# Patient Record
Sex: MALE | Race: BLACK OR AFRICAN AMERICAN | NOT HISPANIC OR LATINO | Employment: OTHER | URBAN - METROPOLITAN AREA
[De-identification: names, ages, dates, MRNs, and addresses within clinical notes are randomized per-mention and may not be internally consistent; named-entity substitution may affect disease eponyms.]

---

## 2018-01-04 PROBLEM — C61 PROSTATE CANCER: Status: ACTIVE | Noted: 2018-01-04

## 2018-01-04 PROBLEM — E78.5 HYPERLIPIDEMIA LDL GOAL <70: Status: ACTIVE | Noted: 2018-01-04

## 2018-01-04 PROBLEM — E88.810 METABOLIC SYNDROME: Status: ACTIVE | Noted: 2018-01-04

## 2018-01-04 PROBLEM — K06.2 DENTURE IRRITATION: Status: ACTIVE | Noted: 2018-01-04

## 2018-07-05 PROBLEM — F33.41 RECURRENT MAJOR DEPRESSIVE DISORDER, IN PARTIAL REMISSION: Status: ACTIVE | Noted: 2018-07-05

## 2018-07-05 PROBLEM — E11.610 TYPE 2 DIABETES MELLITUS WITH DIABETIC NEUROPATHIC ARTHROPATHY, WITHOUT LONG-TERM CURRENT USE OF INSULIN: Status: ACTIVE | Noted: 2018-07-05

## 2020-03-29 ENCOUNTER — HOSPITAL ENCOUNTER (INPATIENT)
Facility: OTHER | Age: 65
LOS: 19 days | Discharge: HOME HEALTH | DRG: 207 | End: 2020-04-17
Attending: EMERGENCY MEDICINE | Admitting: INTERNAL MEDICINE
Payer: MEDICARE

## 2020-03-29 DIAGNOSIS — N17.9 AKI (ACUTE KIDNEY INJURY): ICD-10-CM

## 2020-03-29 DIAGNOSIS — E87.0 HYPERNATREMIA: ICD-10-CM

## 2020-03-29 DIAGNOSIS — Z20.822 SUSPECTED COVID-19 VIRUS INFECTION: Primary | ICD-10-CM

## 2020-03-29 DIAGNOSIS — R94.31 QT PROLONGATION: ICD-10-CM

## 2020-03-29 DIAGNOSIS — J18.9 PNEUMONIA OF RIGHT LOWER LOBE DUE TO INFECTIOUS ORGANISM: ICD-10-CM

## 2020-03-29 DIAGNOSIS — U07.1 ACUTE RESPIRATORY DISEASE DUE TO COVID-19 VIRUS: ICD-10-CM

## 2020-03-29 DIAGNOSIS — J06.9 ACUTE RESPIRATORY DISEASE DUE TO COVID-19 VIRUS: ICD-10-CM

## 2020-03-29 DIAGNOSIS — U07.1 ACUTE RESPIRATORY DISTRESS SYNDROME (ARDS) DUE TO COVID-19 VIRUS: ICD-10-CM

## 2020-03-29 DIAGNOSIS — J96.01 ACUTE HYPOXEMIC RESPIRATORY FAILURE: ICD-10-CM

## 2020-03-29 DIAGNOSIS — R06.00 DYSPNEA: ICD-10-CM

## 2020-03-29 DIAGNOSIS — U07.1 COVID-19 VIRUS DETECTED: ICD-10-CM

## 2020-03-29 DIAGNOSIS — R09.02 HYPOXIA: ICD-10-CM

## 2020-03-29 DIAGNOSIS — J80 ACUTE RESPIRATORY DISTRESS SYNDROME (ARDS) DUE TO COVID-19 VIRUS: ICD-10-CM

## 2020-03-29 LAB
ALBUMIN SERPL BCP-MCNC: 3 G/DL (ref 3.5–5.2)
ALP SERPL-CCNC: 78 U/L (ref 55–135)
ALT SERPL W/O P-5'-P-CCNC: 187 U/L (ref 10–44)
ANION GAP SERPL CALC-SCNC: 17 MMOL/L (ref 8–16)
AST SERPL-CCNC: 206 U/L (ref 10–40)
BACTERIA #/AREA URNS HPF: ABNORMAL /HPF
BASOPHILS # BLD AUTO: ABNORMAL K/UL (ref 0–0.2)
BASOPHILS NFR BLD: 0 % (ref 0–1.9)
BILIRUB SERPL-MCNC: 0.3 MG/DL (ref 0.1–1)
BILIRUB UR QL STRIP: NEGATIVE
BUN SERPL-MCNC: 25 MG/DL (ref 8–23)
CALCIUM SERPL-MCNC: 8.8 MG/DL (ref 8.7–10.5)
CHLORIDE SERPL-SCNC: 98 MMOL/L (ref 95–110)
CLARITY UR: CLEAR
CO2 SERPL-SCNC: 17 MMOL/L (ref 23–29)
COLOR UR: YELLOW
CREAT SERPL-MCNC: 1.4 MG/DL (ref 0.5–1.4)
CRP SERPL-MCNC: 122.7 MG/L (ref 0–8.2)
D DIMER PPP IA.FEU-MCNC: 0.53 MG/L FEU
DIFFERENTIAL METHOD: ABNORMAL
EOSINOPHIL # BLD AUTO: ABNORMAL K/UL (ref 0–0.5)
EOSINOPHIL NFR BLD: 0 % (ref 0–8)
ERYTHROCYTE [DISTWIDTH] IN BLOOD BY AUTOMATED COUNT: 18.1 % (ref 11.5–14.5)
EST. GFR  (AFRICAN AMERICAN): >60 ML/MIN/1.73 M^2
EST. GFR  (NON AFRICAN AMERICAN): 52 ML/MIN/1.73 M^2
GLUCOSE SERPL-MCNC: 203 MG/DL (ref 70–110)
GLUCOSE UR QL STRIP: ABNORMAL
HCT VFR BLD AUTO: 36.5 % (ref 40–54)
HGB BLD-MCNC: 11.2 G/DL (ref 14–18)
HGB UR QL STRIP: ABNORMAL
HYALINE CASTS #/AREA URNS LPF: 0 /LPF
IMM GRANULOCYTES # BLD AUTO: ABNORMAL K/UL (ref 0–0.04)
IMM GRANULOCYTES NFR BLD AUTO: ABNORMAL % (ref 0–0.5)
KETONES UR QL STRIP: NEGATIVE
LACTATE SERPL-SCNC: 3.7 MMOL/L (ref 0.5–2.2)
LDH SERPL L TO P-CCNC: 789 U/L (ref 110–260)
LEUKOCYTE ESTERASE UR QL STRIP: NEGATIVE
LYMPHOCYTES # BLD AUTO: ABNORMAL K/UL (ref 1–4.8)
LYMPHOCYTES NFR BLD: 8 % (ref 18–48)
MCH RBC QN AUTO: 22.5 PG (ref 27–31)
MCHC RBC AUTO-ENTMCNC: 30.7 G/DL (ref 32–36)
MCV RBC AUTO: 73 FL (ref 82–98)
MICROSCOPIC COMMENT: ABNORMAL
MONOCYTES # BLD AUTO: ABNORMAL K/UL (ref 0.3–1)
MONOCYTES NFR BLD: 6 % (ref 4–15)
NEUTROPHILS NFR BLD: 86 % (ref 38–73)
NITRITE UR QL STRIP: NEGATIVE
NRBC BLD-RTO: 0 /100 WBC
PH UR STRIP: 6 [PH] (ref 5–8)
PLATELET # BLD AUTO: 268 K/UL (ref 150–350)
PLATELET BLD QL SMEAR: ABNORMAL
PMV BLD AUTO: 9.3 FL (ref 9.2–12.9)
POTASSIUM SERPL-SCNC: 5.2 MMOL/L (ref 3.5–5.1)
PROCALCITONIN SERPL IA-MCNC: 0.13 NG/ML
PROT SERPL-MCNC: 7.8 G/DL (ref 6–8.4)
PROT UR QL STRIP: ABNORMAL
RBC # BLD AUTO: 4.98 M/UL (ref 4.6–6.2)
RBC #/AREA URNS HPF: 10 /HPF (ref 0–4)
SODIUM SERPL-SCNC: 132 MMOL/L (ref 136–145)
SP GR UR STRIP: 1.01 (ref 1–1.03)
URN SPEC COLLECT METH UR: ABNORMAL
UROBILINOGEN UR STRIP-ACNC: NEGATIVE EU/DL
WBC # BLD AUTO: 7.92 K/UL (ref 3.9–12.7)
WBC #/AREA URNS HPF: 3 /HPF (ref 0–5)
YEAST URNS QL MICRO: ABNORMAL

## 2020-03-29 PROCEDURE — 63600175 PHARM REV CODE 636 W HCPCS: Performed by: EMERGENCY MEDICINE

## 2020-03-29 PROCEDURE — 25000003 PHARM REV CODE 250: Performed by: EMERGENCY MEDICINE

## 2020-03-29 PROCEDURE — 86140 C-REACTIVE PROTEIN: CPT

## 2020-03-29 PROCEDURE — 12000002 HC ACUTE/MED SURGE SEMI-PRIVATE ROOM

## 2020-03-29 PROCEDURE — 84145 PROCALCITONIN (PCT): CPT

## 2020-03-29 PROCEDURE — 85027 COMPLETE CBC AUTOMATED: CPT

## 2020-03-29 PROCEDURE — 85007 BL SMEAR W/DIFF WBC COUNT: CPT

## 2020-03-29 PROCEDURE — 87040 BLOOD CULTURE FOR BACTERIA: CPT | Mod: 59

## 2020-03-29 PROCEDURE — 81000 URINALYSIS NONAUTO W/SCOPE: CPT

## 2020-03-29 PROCEDURE — 96374 THER/PROPH/DIAG INJ IV PUSH: CPT

## 2020-03-29 PROCEDURE — 83605 ASSAY OF LACTIC ACID: CPT

## 2020-03-29 PROCEDURE — 93005 ELECTROCARDIOGRAM TRACING: CPT

## 2020-03-29 PROCEDURE — 93010 EKG 12-LEAD: ICD-10-PCS | Mod: ,,, | Performed by: INTERNAL MEDICINE

## 2020-03-29 PROCEDURE — 83615 LACTATE (LD) (LDH) ENZYME: CPT

## 2020-03-29 PROCEDURE — 80053 COMPREHEN METABOLIC PANEL: CPT

## 2020-03-29 PROCEDURE — 99291 CRITICAL CARE FIRST HOUR: CPT | Mod: 25

## 2020-03-29 PROCEDURE — 85379 FIBRIN DEGRADATION QUANT: CPT

## 2020-03-29 PROCEDURE — U0002 COVID-19 LAB TEST NON-CDC: HCPCS

## 2020-03-29 PROCEDURE — 93010 ELECTROCARDIOGRAM REPORT: CPT | Mod: ,,, | Performed by: INTERNAL MEDICINE

## 2020-03-29 RX ORDER — SIMETHICONE 80 MG
80 TABLET,CHEWABLE ORAL EVERY 6 HOURS PRN
COMMUNITY

## 2020-03-29 RX ORDER — SODIUM CHLORIDE 9 MG/ML
1000 INJECTION, SOLUTION INTRAVENOUS
Status: COMPLETED | OUTPATIENT
Start: 2020-03-29 | End: 2020-03-29

## 2020-03-29 RX ORDER — ONDANSETRON 2 MG/ML
4 INJECTION INTRAMUSCULAR; INTRAVENOUS
Status: COMPLETED | OUTPATIENT
Start: 2020-03-29 | End: 2020-03-29

## 2020-03-29 RX ORDER — PANTOPRAZOLE SODIUM 40 MG/1
40 TABLET, DELAYED RELEASE ORAL DAILY
COMMUNITY

## 2020-03-29 RX ORDER — ACETAMINOPHEN 500 MG
1000 TABLET ORAL
Status: COMPLETED | OUTPATIENT
Start: 2020-03-29 | End: 2020-03-29

## 2020-03-29 RX ADMIN — ONDANSETRON 4 MG: 2 INJECTION INTRAMUSCULAR; INTRAVENOUS at 10:03

## 2020-03-29 RX ADMIN — SODIUM CHLORIDE 1000 ML: 0.9 INJECTION, SOLUTION INTRAVENOUS at 11:03

## 2020-03-29 RX ADMIN — ACETAMINOPHEN 1000 MG: 500 TABLET ORAL at 10:03

## 2020-03-29 RX ADMIN — CEFTRIAXONE SODIUM 2 G: 2 INJECTION, SOLUTION INTRAVENOUS at 10:03

## 2020-03-29 RX ADMIN — AZITHROMYCIN MONOHYDRATE 250 MG: 500 INJECTION, POWDER, LYOPHILIZED, FOR SOLUTION INTRAVENOUS at 11:03

## 2020-03-30 ENCOUNTER — ANESTHESIA EVENT (OUTPATIENT)
Dept: INTENSIVE CARE | Facility: OTHER | Age: 65
DRG: 207 | End: 2020-03-30
Payer: MEDICARE

## 2020-03-30 ENCOUNTER — ANESTHESIA (OUTPATIENT)
Dept: INTENSIVE CARE | Facility: OTHER | Age: 65
DRG: 207 | End: 2020-03-30
Payer: MEDICARE

## 2020-03-30 PROBLEM — U07.1 COVID-19 VIRUS INFECTION: Status: ACTIVE | Noted: 2020-03-29

## 2020-03-30 PROBLEM — R79.89 ELEVATED LFTS: Status: ACTIVE | Noted: 2020-03-30

## 2020-03-30 PROBLEM — J06.9 ACUTE RESPIRATORY DISEASE DUE TO COVID-19 VIRUS: Status: ACTIVE | Noted: 2020-03-29

## 2020-03-30 LAB
ALBUMIN SERPL BCP-MCNC: 2.6 G/DL (ref 3.5–5.2)
ALP SERPL-CCNC: 66 U/L (ref 55–135)
ALT SERPL W/O P-5'-P-CCNC: 160 U/L (ref 10–44)
ANION GAP SERPL CALC-SCNC: 15 MMOL/L (ref 8–16)
AST SERPL-CCNC: 174 U/L (ref 10–40)
BASOPHILS # BLD AUTO: 0.01 K/UL (ref 0–0.2)
BASOPHILS NFR BLD: 0.2 % (ref 0–1.9)
BILIRUB SERPL-MCNC: 0.2 MG/DL (ref 0.1–1)
BUN SERPL-MCNC: 24 MG/DL (ref 8–23)
CALCIUM SERPL-MCNC: 8 MG/DL (ref 8.7–10.5)
CHLORIDE SERPL-SCNC: 102 MMOL/L (ref 95–110)
CO2 SERPL-SCNC: 14 MMOL/L (ref 23–29)
CREAT SERPL-MCNC: 1.2 MG/DL (ref 0.5–1.4)
DIFFERENTIAL METHOD: ABNORMAL
EOSINOPHIL # BLD AUTO: 0 K/UL (ref 0–0.5)
EOSINOPHIL NFR BLD: 0 % (ref 0–8)
ERYTHROCYTE [DISTWIDTH] IN BLOOD BY AUTOMATED COUNT: 18.3 % (ref 11.5–14.5)
EST. GFR  (AFRICAN AMERICAN): >60 ML/MIN/1.73 M^2
EST. GFR  (NON AFRICAN AMERICAN): >60 ML/MIN/1.73 M^2
ESTIMATED AVG GLUCOSE: 154 MG/DL (ref 68–131)
GLUCOSE SERPL-MCNC: 146 MG/DL (ref 70–110)
HBA1C MFR BLD HPLC: 7 % (ref 4–5.6)
HCT VFR BLD AUTO: 35.5 % (ref 40–54)
HGB BLD-MCNC: 10.2 G/DL (ref 14–18)
IMM GRANULOCYTES # BLD AUTO: 0.04 K/UL (ref 0–0.04)
IMM GRANULOCYTES NFR BLD AUTO: 0.6 % (ref 0–0.5)
LACTATE SERPL-SCNC: 2.3 MMOL/L (ref 0.5–2.2)
LYMPHOCYTES # BLD AUTO: 0.5 K/UL (ref 1–4.8)
LYMPHOCYTES NFR BLD: 7.6 % (ref 18–48)
MAGNESIUM SERPL-MCNC: 2.8 MG/DL (ref 1.6–2.6)
MCH RBC QN AUTO: 22.3 PG (ref 27–31)
MCHC RBC AUTO-ENTMCNC: 28.7 G/DL (ref 32–36)
MCV RBC AUTO: 78 FL (ref 82–98)
MONOCYTES # BLD AUTO: 0.5 K/UL (ref 0.3–1)
MONOCYTES NFR BLD: 7.6 % (ref 4–15)
NEUTROPHILS # BLD AUTO: 5.3 K/UL (ref 1.8–7.7)
NEUTROPHILS NFR BLD: 84 % (ref 38–73)
NRBC BLD-RTO: 0 /100 WBC
PHOSPHATE SERPL-MCNC: 4 MG/DL (ref 2.7–4.5)
PLATELET # BLD AUTO: 220 K/UL (ref 150–350)
PMV BLD AUTO: 9.4 FL (ref 9.2–12.9)
POCT GLUCOSE: 128 MG/DL (ref 70–110)
POCT GLUCOSE: 128 MG/DL (ref 70–110)
POTASSIUM SERPL-SCNC: 4.9 MMOL/L (ref 3.5–5.1)
PROT SERPL-MCNC: 6.7 G/DL (ref 6–8.4)
RBC # BLD AUTO: 4.57 M/UL (ref 4.6–6.2)
SARS-COV-2 RNA RESP QL NAA+PROBE: DETECTED
SODIUM SERPL-SCNC: 131 MMOL/L (ref 136–145)
WBC # BLD AUTO: 6.32 K/UL (ref 3.9–12.7)

## 2020-03-30 PROCEDURE — 63600175 PHARM REV CODE 636 W HCPCS: Performed by: ANESTHESIOLOGY

## 2020-03-30 PROCEDURE — 25000003 PHARM REV CODE 250: Performed by: INTERNAL MEDICINE

## 2020-03-30 PROCEDURE — S0028 INJECTION, FAMOTIDINE, 20 MG: HCPCS | Performed by: STUDENT IN AN ORGANIZED HEALTH CARE EDUCATION/TRAINING PROGRAM

## 2020-03-30 PROCEDURE — 63600175 PHARM REV CODE 636 W HCPCS: Performed by: NURSE PRACTITIONER

## 2020-03-30 PROCEDURE — 99291 PR CRITICAL CARE, E/M 30-74 MINUTES: ICD-10-PCS | Mod: ,,, | Performed by: INTERNAL MEDICINE

## 2020-03-30 PROCEDURE — 76937 US GUIDE VASCULAR ACCESS: CPT | Performed by: ANESTHESIOLOGY

## 2020-03-30 PROCEDURE — 25000003 PHARM REV CODE 250: Performed by: NURSE PRACTITIONER

## 2020-03-30 PROCEDURE — 25000003 PHARM REV CODE 250: Performed by: ANESTHESIOLOGY

## 2020-03-30 PROCEDURE — 63600175 PHARM REV CODE 636 W HCPCS

## 2020-03-30 PROCEDURE — 36415 COLL VENOUS BLD VENIPUNCTURE: CPT

## 2020-03-30 PROCEDURE — 99223 1ST HOSP IP/OBS HIGH 75: CPT | Mod: ,,, | Performed by: NURSE PRACTITIONER

## 2020-03-30 PROCEDURE — 85025 COMPLETE CBC W/AUTO DIFF WBC: CPT

## 2020-03-30 PROCEDURE — 80053 COMPREHEN METABOLIC PANEL: CPT

## 2020-03-30 PROCEDURE — 93010 ELECTROCARDIOGRAM REPORT: CPT | Mod: ,,, | Performed by: INTERNAL MEDICINE

## 2020-03-30 PROCEDURE — 83605 ASSAY OF LACTIC ACID: CPT

## 2020-03-30 PROCEDURE — 25000003 PHARM REV CODE 250: Performed by: STUDENT IN AN ORGANIZED HEALTH CARE EDUCATION/TRAINING PROGRAM

## 2020-03-30 PROCEDURE — 20000000 HC ICU ROOM

## 2020-03-30 PROCEDURE — 99223 PR INITIAL HOSPITAL CARE,LEVL III: ICD-10-PCS | Mod: ,,, | Performed by: NURSE PRACTITIONER

## 2020-03-30 PROCEDURE — 93005 ELECTROCARDIOGRAM TRACING: CPT

## 2020-03-30 PROCEDURE — 94761 N-INVAS EAR/PLS OXIMETRY MLT: CPT

## 2020-03-30 PROCEDURE — 63600175 PHARM REV CODE 636 W HCPCS: Performed by: STUDENT IN AN ORGANIZED HEALTH CARE EDUCATION/TRAINING PROGRAM

## 2020-03-30 PROCEDURE — 83036 HEMOGLOBIN GLYCOSYLATED A1C: CPT

## 2020-03-30 PROCEDURE — 84100 ASSAY OF PHOSPHORUS: CPT

## 2020-03-30 PROCEDURE — 93010 EKG 12-LEAD: ICD-10-PCS | Mod: ,,, | Performed by: INTERNAL MEDICINE

## 2020-03-30 PROCEDURE — 99900026 HC AIRWAY MAINTENANCE (STAT)

## 2020-03-30 PROCEDURE — 99291 CRITICAL CARE FIRST HOUR: CPT | Mod: ,,, | Performed by: INTERNAL MEDICINE

## 2020-03-30 PROCEDURE — 25000003 PHARM REV CODE 250

## 2020-03-30 PROCEDURE — 83735 ASSAY OF MAGNESIUM: CPT

## 2020-03-30 PROCEDURE — 94002 VENT MGMT INPAT INIT DAY: CPT

## 2020-03-30 PROCEDURE — 27000221 HC OXYGEN, UP TO 24 HOURS

## 2020-03-30 RX ORDER — KETAMINE HCL IN 0.9 % NACL 50 MG/5 ML
50 SYRINGE (ML) INTRAVENOUS ONCE
Status: COMPLETED | OUTPATIENT
Start: 2020-03-30 | End: 2020-03-30

## 2020-03-30 RX ORDER — SIMETHICONE 80 MG
80 TABLET,CHEWABLE ORAL EVERY 6 HOURS PRN
Status: DISCONTINUED | OUTPATIENT
Start: 2020-03-30 | End: 2020-04-01

## 2020-03-30 RX ORDER — MIDAZOLAM HYDROCHLORIDE 1 MG/ML
INJECTION INTRAMUSCULAR; INTRAVENOUS
Status: COMPLETED
Start: 2020-03-30 | End: 2020-03-30

## 2020-03-30 RX ORDER — INSULIN ASPART 100 [IU]/ML
1-10 INJECTION, SOLUTION INTRAVENOUS; SUBCUTANEOUS
Status: DISCONTINUED | OUTPATIENT
Start: 2020-03-30 | End: 2020-03-31

## 2020-03-30 RX ORDER — METFORMIN HYDROCHLORIDE 1000 MG/1
1000 TABLET ORAL 2 TIMES DAILY WITH MEALS
COMMUNITY
End: 2020-04-28 | Stop reason: SDUPTHER

## 2020-03-30 RX ORDER — IBUPROFEN 200 MG
24 TABLET ORAL
Status: DISCONTINUED | OUTPATIENT
Start: 2020-03-30 | End: 2020-04-18 | Stop reason: HOSPADM

## 2020-03-30 RX ORDER — SODIUM CHLORIDE 0.9 % (FLUSH) 0.9 %
10 SYRINGE (ML) INJECTION
Status: DISCONTINUED | OUTPATIENT
Start: 2020-03-30 | End: 2020-04-18 | Stop reason: HOSPADM

## 2020-03-30 RX ORDER — TALC
9 POWDER (GRAM) TOPICAL NIGHTLY PRN
COMMUNITY

## 2020-03-30 RX ORDER — HYDROXYCHLOROQUINE SULFATE 200 MG/1
400 TABLET, FILM COATED ORAL 2 TIMES DAILY
Status: COMPLETED | OUTPATIENT
Start: 2020-03-30 | End: 2020-03-31

## 2020-03-30 RX ORDER — KETAMINE HCL IN 0.9 % NACL 50 MG/5 ML
SYRINGE (ML) INTRAVENOUS
Status: DISCONTINUED | OUTPATIENT
Start: 2020-03-30 | End: 2020-03-30 | Stop reason: HOSPADM

## 2020-03-30 RX ORDER — DICLOFENAC SODIUM 10 MG/G
GEL TOPICAL 4 TIMES DAILY PRN
COMMUNITY

## 2020-03-30 RX ORDER — LAMOTRIGINE 100 MG/1
200 TABLET ORAL DAILY
Status: DISCONTINUED | OUTPATIENT
Start: 2020-03-30 | End: 2020-03-30

## 2020-03-30 RX ORDER — HEPARIN SODIUM 10000 [USP'U]/ML
10000 INJECTION, SOLUTION INTRAVENOUS; SUBCUTANEOUS
Status: DISCONTINUED | OUTPATIENT
Start: 2020-03-30 | End: 2020-04-13

## 2020-03-30 RX ORDER — HYDROXYCHLOROQUINE SULFATE 200 MG/1
400 TABLET, FILM COATED ORAL DAILY
Status: DISPENSED | OUTPATIENT
Start: 2020-04-01 | End: 2020-04-05

## 2020-03-30 RX ORDER — PHENYLEPHRINE HCL IN 0.9% NACL 1 MG/10 ML
100 SYRINGE (ML) INTRAVENOUS ONCE
Status: DISCONTINUED | OUTPATIENT
Start: 2020-03-30 | End: 2020-04-02

## 2020-03-30 RX ORDER — ROCURONIUM BROMIDE 10 MG/ML
INJECTION, SOLUTION INTRAVENOUS
Status: DISCONTINUED | OUTPATIENT
Start: 2020-03-30 | End: 2020-03-30 | Stop reason: HOSPADM

## 2020-03-30 RX ORDER — FAMOTIDINE 10 MG/ML
20 INJECTION INTRAVENOUS 2 TIMES DAILY
Status: DISCONTINUED | OUTPATIENT
Start: 2020-03-30 | End: 2020-04-03

## 2020-03-30 RX ORDER — PROPOFOL 10 MG/ML
VIAL (ML) INTRAVENOUS
Status: DISCONTINUED | OUTPATIENT
Start: 2020-03-30 | End: 2020-03-30 | Stop reason: HOSPADM

## 2020-03-30 RX ORDER — SUCCINYLCHOLINE CHLORIDE 20 MG/ML
INJECTION INTRAMUSCULAR; INTRAVENOUS
Status: DISCONTINUED | OUTPATIENT
Start: 2020-03-30 | End: 2020-03-30 | Stop reason: HOSPADM

## 2020-03-30 RX ORDER — MIDAZOLAM HYDROCHLORIDE 1 MG/ML
INJECTION INTRAMUSCULAR; INTRAVENOUS
Status: DISCONTINUED | OUTPATIENT
Start: 2020-03-30 | End: 2020-03-30 | Stop reason: HOSPADM

## 2020-03-30 RX ORDER — LISINOPRIL 10 MG/1
10 TABLET ORAL DAILY
Status: DISCONTINUED | OUTPATIENT
Start: 2020-03-30 | End: 2020-03-30

## 2020-03-30 RX ORDER — ZIPRASIDONE HYDROCHLORIDE 20 MG/1
80 CAPSULE ORAL NIGHTLY
Status: DISCONTINUED | OUTPATIENT
Start: 2020-03-30 | End: 2020-03-30

## 2020-03-30 RX ORDER — BUPROPION HYDROCHLORIDE 150 MG/1
450 TABLET ORAL DAILY
Status: DISCONTINUED | OUTPATIENT
Start: 2020-03-30 | End: 2020-03-30

## 2020-03-30 RX ORDER — LIDOCAINE HYDROCHLORIDE 20 MG/ML
INJECTION INTRAVENOUS
Status: DISCONTINUED | OUTPATIENT
Start: 2020-03-30 | End: 2020-03-30 | Stop reason: HOSPADM

## 2020-03-30 RX ORDER — HEPARIN SODIUM 1000 [USP'U]/ML
INJECTION, SOLUTION INTRAVENOUS; SUBCUTANEOUS
Status: DISPENSED
Start: 2020-03-30 | End: 2020-03-31

## 2020-03-30 RX ORDER — MODAFINIL 100 MG/1
200 TABLET ORAL DAILY
Status: DISCONTINUED | OUTPATIENT
Start: 2020-03-30 | End: 2020-03-30

## 2020-03-30 RX ORDER — GABAPENTIN 100 MG/1
100 CAPSULE ORAL 2 TIMES DAILY
Status: DISCONTINUED | OUTPATIENT
Start: 2020-03-30 | End: 2020-04-02

## 2020-03-30 RX ORDER — CHLORHEXIDINE GLUCONATE ORAL RINSE 1.2 MG/ML
15 SOLUTION DENTAL 2 TIMES DAILY
Status: DISCONTINUED | OUTPATIENT
Start: 2020-03-30 | End: 2020-04-06

## 2020-03-30 RX ORDER — PROPOFOL 10 MG/ML
INJECTION, EMULSION INTRAVENOUS
Status: COMPLETED
Start: 2020-03-30 | End: 2020-03-30

## 2020-03-30 RX ORDER — ONDANSETRON 8 MG/1
8 TABLET, ORALLY DISINTEGRATING ORAL EVERY 8 HOURS PRN
Status: DISCONTINUED | OUTPATIENT
Start: 2020-03-30 | End: 2020-04-18 | Stop reason: HOSPADM

## 2020-03-30 RX ORDER — ACETAMINOPHEN 325 MG/1
650 TABLET ORAL EVERY 4 HOURS PRN
Status: DISCONTINUED | OUTPATIENT
Start: 2020-03-30 | End: 2020-04-18 | Stop reason: HOSPADM

## 2020-03-30 RX ORDER — IBUPROFEN 200 MG
1 TABLET ORAL
Status: DISCONTINUED | OUTPATIENT
Start: 2020-03-30 | End: 2020-03-31

## 2020-03-30 RX ORDER — PROPOFOL 10 MG/ML
5 INJECTION, EMULSION INTRAVENOUS CONTINUOUS
Status: DISCONTINUED | OUTPATIENT
Start: 2020-03-30 | End: 2020-03-30

## 2020-03-30 RX ORDER — MEGESTROL ACETATE 40 MG/1
40 TABLET ORAL DAILY
Status: DISCONTINUED | OUTPATIENT
Start: 2020-03-30 | End: 2020-03-30

## 2020-03-30 RX ORDER — IBUPROFEN 200 MG
16 TABLET ORAL
Status: DISCONTINUED | OUTPATIENT
Start: 2020-03-30 | End: 2020-04-18 | Stop reason: HOSPADM

## 2020-03-30 RX ORDER — PROPOFOL 10 MG/ML
5 INJECTION, EMULSION INTRAVENOUS CONTINUOUS
Status: DISCONTINUED | OUTPATIENT
Start: 2020-03-30 | End: 2020-04-05

## 2020-03-30 RX ORDER — GLUCAGON 1 MG
1 KIT INJECTION
Status: DISCONTINUED | OUTPATIENT
Start: 2020-03-30 | End: 2020-04-01

## 2020-03-30 RX ORDER — PHENYLEPHRINE HCL IN 0.9% NACL 1 MG/10 ML
SYRINGE (ML) INTRAVENOUS
Status: DISPENSED
Start: 2020-03-30 | End: 2020-03-31

## 2020-03-30 RX ORDER — PANTOPRAZOLE SODIUM 40 MG/1
40 TABLET, DELAYED RELEASE ORAL DAILY
Status: DISCONTINUED | OUTPATIENT
Start: 2020-03-30 | End: 2020-03-30

## 2020-03-30 RX ORDER — ENOXAPARIN SODIUM 100 MG/ML
40 INJECTION SUBCUTANEOUS EVERY 24 HOURS
Status: DISCONTINUED | OUTPATIENT
Start: 2020-03-30 | End: 2020-04-07

## 2020-03-30 RX ORDER — LISINOPRIL 10 MG/1
10 TABLET ORAL NIGHTLY
Status: DISCONTINUED | OUTPATIENT
Start: 2020-03-30 | End: 2020-03-30

## 2020-03-30 RX ORDER — HEPARIN SODIUM 10000 [USP'U]/ML
10000 INJECTION, SOLUTION INTRAVENOUS; SUBCUTANEOUS
Status: DISCONTINUED | OUTPATIENT
Start: 2020-03-30 | End: 2020-03-30

## 2020-03-30 RX ADMIN — GABAPENTIN 100 MG: 100 CAPSULE ORAL at 08:03

## 2020-03-30 RX ADMIN — PROPOFOL 150 MG: 10 INJECTION, EMULSION INTRAVENOUS at 05:03

## 2020-03-30 RX ADMIN — PHENYLEPHRINE HYDROCHLORIDE 200 MCG: 10 INJECTION INTRAVENOUS at 05:03

## 2020-03-30 RX ADMIN — Medication 50 MG: at 04:03

## 2020-03-30 RX ADMIN — HYDROXYCHLOROQUINE SULFATE 400 MG: 200 TABLET, FILM COATED ORAL at 08:03

## 2020-03-30 RX ADMIN — SUCCINYLCHOLINE CHLORIDE 120 MG: 20 INJECTION, SOLUTION INTRAMUSCULAR; INTRAVENOUS at 05:03

## 2020-03-30 RX ADMIN — MEGESTROL ACETATE 40 MG: 40 TABLET ORAL at 08:03

## 2020-03-30 RX ADMIN — FENTANYL CITRATE 100 MCG/HR: 50 INJECTION, SOLUTION INTRAMUSCULAR; INTRAVENOUS at 04:03

## 2020-03-30 RX ADMIN — LAMOTRIGINE 200 MG: 100 TABLET ORAL at 08:03

## 2020-03-30 RX ADMIN — LIDOCAINE HYDROCHLORIDE 100 MG: 20 INJECTION, SOLUTION INTRAVENOUS at 05:03

## 2020-03-30 RX ADMIN — PROPOFOL 10 MCG/KG/MIN: 10 INJECTION, EMULSION INTRAVENOUS at 04:03

## 2020-03-30 RX ADMIN — MIDAZOLAM HYDROCHLORIDE 10 MG: 1 INJECTION, SOLUTION INTRAMUSCULAR; INTRAVENOUS at 05:03

## 2020-03-30 RX ADMIN — Medication 100 MG: at 05:03

## 2020-03-30 RX ADMIN — MODAFINIL 200 MG: 100 TABLET ORAL at 08:03

## 2020-03-30 RX ADMIN — PROPOFOL 35 MCG/KG/MIN: 10 INJECTION, EMULSION INTRAVENOUS at 07:03

## 2020-03-30 RX ADMIN — ROCURONIUM BROMIDE 50 MG: 10 SOLUTION INTRAVENOUS at 05:03

## 2020-03-30 RX ADMIN — BUPROPION HYDROCHLORIDE 450 MG: 150 TABLET, FILM COATED, EXTENDED RELEASE ORAL at 08:03

## 2020-03-30 RX ADMIN — CEFTRIAXONE 1 G: 1 INJECTION, SOLUTION INTRAVENOUS at 10:03

## 2020-03-30 RX ADMIN — CHLORHEXIDINE GLUCONATE 0.12% ORAL RINSE 15 ML: 1.2 LIQUID ORAL at 08:03

## 2020-03-30 RX ADMIN — FAMOTIDINE 20 MG: 10 INJECTION, SOLUTION INTRAVENOUS at 08:03

## 2020-03-30 RX ADMIN — HEPARIN SODIUM 10000 UNITS: 10000 INJECTION, SOLUTION INTRAVENOUS; SUBCUTANEOUS at 04:03

## 2020-03-30 RX ADMIN — PANTOPRAZOLE SODIUM 40 MG: 40 TABLET, DELAYED RELEASE ORAL at 08:03

## 2020-03-30 RX ADMIN — AZITHROMYCIN MONOHYDRATE 500 MG: 500 INJECTION, POWDER, LYOPHILIZED, FOR SOLUTION INTRAVENOUS at 11:03

## 2020-03-30 RX ADMIN — ENOXAPARIN SODIUM 40 MG: 100 INJECTION SUBCUTANEOUS at 05:03

## 2020-03-30 NOTE — H&P
[facility] Medicine  History & Physical    Patient Name: Cash Crawford  MRN: [id number]  Admission Date: 3/29/2020  Attending Physician: Emilee Mandel MD   Primary Care Provider: Briana Gonzalez MD (Inactive)         Patient information was obtained from patient, past medical records and ER records.     Subjective:     Principal Problem:Suspected Covid-19 Virus Infection    Chief Complaint:   Chief Complaint   Patient presents with    Shortness of Breath     c/o SOB since this morning,  denies cough and fever.  denies being around anybody with covid19        HPI: The patient is a 64 y.o. male with hx of bipolar disorder and prostate cancer who presents with complaint of shortness of breath. Pt works at the [employer] in housekeeping. Four days ago, pt experienced her SOB symptoms while working. Pt was seen at the [facility] and reports having a negative flu swab and a normal chest X-ray. Pt has been at home ever since. Pt reports his SOB is intermittent. Today, he was walking from his kitchen to the room and felt weak to the point of falling. Pt is normally able to walk this distance. He denies fever, chills, bodyaches, vomiting, diarrhea, and cough.    Past Medical History:   Diagnosis Date    Behavioral problem     Bipolar 1 disorder     Cancer     Prostate; completed treatment    Diabetes mellitus type II     History of psychiatric hospitalization     Hx of psychiatric care     Hypertension     Yen     Psychiatric exam requested by authority     Psychiatric problem     Self-harming behavior     Sleep apnea     Sleep apnea with use of continuous positive airway pressure (CPAP)     Suicide attempt     Therapy        Past Surgical History:   Procedure Laterality Date    CHOLECYSTECTOMY      HERNIA REPAIR      LEG SURGERY      ORIF tib/fib    SPINE SURGERY         Review of patient's allergies indicates:  No Known Allergies    No current facility-administered medications on  file prior to encounter.      Current Outpatient Medications on File Prior to Encounter   Medication Sig    BUPROPION HCL (BUPROBAN ORAL) Take 450 mg by mouth once daily.     empagliflozin (JARDIANCE) 25 mg Tab Take by mouth.    gabapentin (NEURONTIN) 100 MG capsule Take 1 capsule (100 mg total) by mouth 2 (two) times daily.    gluc-kenneth-msm3-ffgh-yzcene-etc 500-450-0.67 mg Cap Take by mouth.    lamotrigine (LAMICTAL) 200 MG tablet Take 200 mg by mouth once daily.    lisinopril 10 MG tablet Take 1 tablet (10 mg total) by mouth once daily.    megestrol (MEGACE) 40 MG Tab Take 40 mg by mouth once daily.    modafinil (PROVIGIL) 200 MG Tab Take 200 mg by mouth once daily.    multivitamin capsule Take 1 capsule by mouth once daily.    pantoprazole (PROTONIX) 40 MG tablet Take 40 mg by mouth once daily.    simethicone (MYLICON) 80 MG chewable tablet Take 80 mg by mouth every 6 (six) hours as needed for Flatulence.    ziprasidone (GEODON) 80 MG capsule Take 80 mg by mouth nightly.    B-complex with vitamin C (Z-BEC OR EQUIV) tablet Take 1 tablet by mouth once daily.    multivitamin capsule Take 1 capsule by mouth once daily.    nicotine (NICODERM CQ) 21 mg/24 hr Place 1 patch onto the skin every 24 hours.    terbinafine HCl (LAMISIL) 1 % cream Apply 1 % topically daily as needed.    [DISCONTINUED] diazepam (VALIUM) 5 MG tablet Take 1 tablet (5 mg total) by mouth every 8 (eight) hours as needed (muscle spasms). 1 tablet Oral Every 6 hours     Family History     Problem Relation (Age of Onset)    Alcohol abuse Mother, Father, Maternal Uncle    Arthritis Mother    Drug abuse Maternal Uncle    Hypertension Mother    Suicide Maternal Uncle        Tobacco Use    Smoking status: Current Every Day Smoker     Packs/day: 0.50     Years: 30.00     Pack years: 15.00     Types: Cigarettes     Last attempt to quit: 2015     Years since quittin.3    Smokeless tobacco: Never Used    Tobacco comment: currently  using nicotine patches 21 mg   Substance and Sexual Activity    Alcohol use: No     Alcohol/week: 0.0 standard drinks     Comment: stoped 17 months ago but says that he is an alcoholic    Drug use: Not Currently     Comment: stopped 17 months ago; used to used heavy drugs, particularly marijuana, acid, amphetamines, cocaine, IV crack cocaine    Sexual activity: Not on file     Review of Systems   Constitutional: Positive for activity change, appetite change, fatigue and fever.   HENT: Negative for congestion, ear pain and postnasal drip.    Eyes: Negative for discharge.   Respiratory: Positive for cough and shortness of breath. Negative for apnea and wheezing.    Cardiovascular: Negative for chest pain and leg swelling.   Gastrointestinal: Negative for abdominal distention, abdominal pain, nausea and vomiting.   Endocrine: Negative for polydipsia, polyphagia and polyuria.   Genitourinary: Negative for difficulty urinating, flank pain, frequency, hematuria and urgency.   Musculoskeletal: Positive for myalgias. Negative for arthralgias and joint swelling.   Skin: Negative for pallor and rash.   Allergic/Immunologic: Negative for environmental allergies and food allergies.   Neurological: Positive for weakness. Negative for dizziness, speech difficulty, light-headedness and headaches.   Hematological: Does not bruise/bleed easily.   Psychiatric/Behavioral: Negative for agitation.     Objective:     Vital Signs (Most Recent):  Temp: 98.8 °F (37.1 °C) (03/30/20 0018)  Pulse: 99 (03/30/20 0018)  Resp: (!) 22 (03/30/20 0018)  BP: 107/71 (03/30/20 0018)  SpO2: (!) 93 % (03/30/20 0018) Vital Signs (24h Range):  Temp:  [98.8 °F (37.1 °C)-101.6 °F (38.7 °C)] 98.8 °F (37.1 °C)  Pulse:  [] 99  Resp:  [22-34] 22  SpO2:  [85 %-96 %] 93 %  BP: (107-152)/(59-74) 107/71     Weight: 87.4 kg (192 lb 11.3 oz)  Body mass index is 28.46 kg/m².    Physical Exam   Constitutional: He is oriented to person, place, and time. He  appears well-developed and well-nourished.   HENT:   Head: Normocephalic.   Eyes: Conjunctivae are normal.   Neck: Normal range of motion. Neck supple.   Cardiovascular: Regular rhythm, normal heart sounds and intact distal pulses. Tachycardia present.   Pulses:       Radial pulses are 2+ on the right side, and 2+ on the left side.   Pulmonary/Chest: Effort normal. Tachypnea noted. He has decreased breath sounds in the right lower field and the left lower field.   Abdominal: Soft. He exhibits no distension. Bowel sounds are increased. There is no tenderness.   Musculoskeletal: Normal range of motion.   Neurological: He is alert and oriented to person, place, and time. He has normal strength. GCS eye subscore is 4. GCS verbal subscore is 5. GCS motor subscore is 6.   Skin: Skin is warm and dry.   Psychiatric: He has a normal mood and affect. His speech is normal and behavior is normal.           Significant Labs:   CBC:   Recent Labs   Lab 03/29/20  2141   WBC 7.92   HGB 11.2*   HCT 36.5*        CMP:   Recent Labs   Lab 03/29/20  2141   *   K 5.2*   CL 98   CO2 17*   *   BUN 25*   CREATININE 1.4   CALCIUM 8.8   PROT 7.8   ALBUMIN 3.0*   BILITOT 0.3   ALKPHOS 78   *   *   ANIONGAP 17*   EGFRNONAA 52*       Significant Imaging: I have reviewed all pertinent imaging results/findings within the past 24 hours.    Assessment/Plan:     * Suspected Covid-19 Virus Infection  CXR- Consolidation seen within the medial aspect of the right lung base most suggestive for developing pneumonia.   CRP- 122.7, D dimer and LDH elevated    Covid pending  Isolation  Oxygen  Rocephin/Azithromycin  Procalcitonin- .13    Type 2 diabetes mellitus with diabetic neuropathic arthropathy, without long-term current use of insulin  BG- 203    A1c pending  Moderate dose SSI  BG AC/HS      Hyperlipidemia LDL goal <70  Lipid Panel pending          Essential hypertension, benign  Normotensive currently    Continue  lisinopril        VTE Risk Mitigation (From admission, onward)         Ordered     enoxaparin injection 40 mg  Daily      03/30/20 0014     Place sequential compression device  Until discontinued      03/30/20 0015     IP VTE HIGH RISK PATIENT  Once      03/30/20 0015                   Larry Conroy NP  Department of Hospital Medicine   [facility]

## 2020-03-30 NOTE — SIGNIFICANT EVENT
Artificial Intelligence Notification      Admit Date: 3/29/2020  LOS: 1  Code Status: Full Code   Date of Consult: 2020  : 1955  Age: 64 y.o.  Weight:   Wt Readings from Last 1 Encounters:   20 87.4 kg (192 lb 11.3 oz)     Sex: male  Bed: [place] [address] A:   MRN: [id number]  Attending Physician: Emilee Mandel MD  Primary Service: Networked reference to record PCT   Time AI Alert Received: 1217   Time at Bedside: at bedside doing H&P           Patient found SEE H&P      Vital Signs (Most Recent):  Temp: 98.8 °F (37.1 °C) (20 0018)  Pulse: 98 (20 0025)  Resp: (!) 22 (20 0018)  BP: 107/71 (20 0018)  SpO2: (!) 93 % (20 0018) Vital Signs (24h Range):  Temp:  [98.8 °F (37.1 °C)-101.6 °F (38.7 °C)] 98.8 °F (37.1 °C)  Pulse:  [] 98  Resp:  [22-34] 22  SpO2:  [85 %-96 %] 93 %  BP: (107-152)/(59-74) 107/71         This encounter was triggered by an Artificial Intelligence Notification.     Artificial Intelligence alert discussed with Primary team:

## 2020-03-30 NOTE — SUBJECTIVE & OBJECTIVE
Past Medical History:   Diagnosis Date    Behavioral problem     Bipolar 1 disorder     Cancer     Prostate; completed treatment    Diabetes mellitus type II     History of psychiatric hospitalization     Hx of psychiatric care     Hypertension     Yen     Psychiatric exam requested by authority     Psychiatric problem     Self-harming behavior     Sleep apnea     Sleep apnea with use of continuous positive airway pressure (CPAP)     Suicide attempt     Therapy        Past Surgical History:   Procedure Laterality Date    CHOLECYSTECTOMY      HERNIA REPAIR      LEG SURGERY      ORIF tib/fib    SPINE SURGERY         Review of patient's allergies indicates:  No Known Allergies    No current facility-administered medications on file prior to encounter.      Current Outpatient Medications on File Prior to Encounter   Medication Sig    BUPROPION HCL (BUPROBAN ORAL) Take 450 mg by mouth once daily.     empagliflozin (JARDIANCE) 25 mg Tab Take by mouth.    gabapentin (NEURONTIN) 100 MG capsule Take 1 capsule (100 mg total) by mouth 2 (two) times daily.    gluc-kenneth-msm3-opty-fchbzy-xvb 500-450-0.67 mg Cap Take by mouth.    lamotrigine (LAMICTAL) 200 MG tablet Take 200 mg by mouth once daily.    lisinopril 10 MG tablet Take 1 tablet (10 mg total) by mouth once daily.    megestrol (MEGACE) 40 MG Tab Take 40 mg by mouth once daily.    modafinil (PROVIGIL) 200 MG Tab Take 200 mg by mouth once daily.    multivitamin capsule Take 1 capsule by mouth once daily.    pantoprazole (PROTONIX) 40 MG tablet Take 40 mg by mouth once daily.    simethicone (MYLICON) 80 MG chewable tablet Take 80 mg by mouth every 6 (six) hours as needed for Flatulence.    ziprasidone (GEODON) 80 MG capsule Take 80 mg by mouth nightly.    B-complex with vitamin C (Z-BEC OR EQUIV) tablet Take 1 tablet by mouth once daily.    multivitamin capsule Take 1 capsule by mouth once daily.    nicotine (NICODERM CQ) 21 mg/24 hr Place  1 patch onto the skin every 24 hours.    terbinafine HCl (LAMISIL) 1 % cream Apply 1 % topically daily as needed.    [DISCONTINUED] diazepam (VALIUM) 5 MG tablet Take 1 tablet (5 mg total) by mouth every 8 (eight) hours as needed (muscle spasms). 1 tablet Oral Every 6 hours     Family History     Problem Relation (Age of Onset)    Alcohol abuse Mother, Father, Maternal Uncle    Arthritis Mother    Drug abuse Maternal Uncle    Hypertension Mother    Suicide Maternal Uncle        Tobacco Use    Smoking status: Current Every Day Smoker     Packs/day: 0.50     Years: 30.00     Pack years: 15.00     Types: Cigarettes     Last attempt to quit: 2015     Years since quittin.3    Smokeless tobacco: Never Used    Tobacco comment: currently using nicotine patches 21 mg   Substance and Sexual Activity    Alcohol use: No     Alcohol/week: 0.0 standard drinks     Comment: stoped 17 months ago but says that he is an alcoholic    Drug use: Not Currently     Comment: stopped 17 months ago; used to used heavy drugs, particularly marijuana, acid, amphetamines, cocaine, IV crack cocaine    Sexual activity: Not on file     Review of Systems   Constitutional: Positive for activity change, appetite change, fatigue and fever.   HENT: Negative for congestion, ear pain and postnasal drip.    Eyes: Negative for discharge.   Respiratory: Positive for cough and shortness of breath. Negative for apnea and wheezing.    Cardiovascular: Negative for chest pain and leg swelling.   Gastrointestinal: Negative for abdominal distention, abdominal pain, nausea and vomiting.   Endocrine: Negative for polydipsia, polyphagia and polyuria.   Genitourinary: Negative for difficulty urinating, flank pain, frequency, hematuria and urgency.   Musculoskeletal: Positive for myalgias. Negative for arthralgias and joint swelling.   Skin: Negative for pallor and rash.   Allergic/Immunologic: Negative for environmental allergies and food allergies.    Neurological: Positive for weakness. Negative for dizziness, speech difficulty, light-headedness and headaches.   Hematological: Does not bruise/bleed easily.   Psychiatric/Behavioral: Negative for agitation.     Objective:     Vital Signs (Most Recent):  Temp: 98.8 °F (37.1 °C) (03/30/20 0018)  Pulse: 99 (03/30/20 0018)  Resp: (!) 22 (03/30/20 0018)  BP: 107/71 (03/30/20 0018)  SpO2: (!) 93 % (03/30/20 0018) Vital Signs (24h Range):  Temp:  [98.8 °F (37.1 °C)-101.6 °F (38.7 °C)] 98.8 °F (37.1 °C)  Pulse:  [] 99  Resp:  [22-34] 22  SpO2:  [85 %-96 %] 93 %  BP: (107-152)/(59-74) 107/71     Weight: 87.4 kg (192 lb 11.3 oz)  Body mass index is 28.46 kg/m².    Physical Exam   Constitutional: He is oriented to person, place, and time. He appears well-developed and well-nourished.   HENT:   Head: Normocephalic.   Eyes: Conjunctivae are normal.   Neck: Normal range of motion. Neck supple.   Cardiovascular: Regular rhythm, normal heart sounds and intact distal pulses. Tachycardia present.   Pulses:       Radial pulses are 2+ on the right side, and 2+ on the left side.   Pulmonary/Chest: Effort normal. Tachypnea noted. He has decreased breath sounds in the right lower field and the left lower field.   Abdominal: Soft. He exhibits no distension. Bowel sounds are increased. There is no tenderness.   Musculoskeletal: Normal range of motion.   Neurological: He is alert and oriented to person, place, and time. He has normal strength. GCS eye subscore is 4. GCS verbal subscore is 5. GCS motor subscore is 6.   Skin: Skin is warm and dry.   Psychiatric: He has a normal mood and affect. His speech is normal and behavior is normal.           Significant Labs:   CBC:   Recent Labs   Lab 03/29/20 2141   WBC 7.92   HGB 11.2*   HCT 36.5*        CMP:   Recent Labs   Lab 03/29/20  2141   *   K 5.2*   CL 98   CO2 17*   *   BUN 25*   CREATININE 1.4   CALCIUM 8.8   PROT 7.8   ALBUMIN 3.0*   BILITOT 0.3   ALKPHOS 78    *   *   ANIONGAP 17*   EGFRNONAA 52*       Significant Imaging: I have reviewed all pertinent imaging results/findings within the past 24 hours.

## 2020-03-30 NOTE — NURSING
Pt arrived to ICU on nonrebreather  AAOx4, in respiratory distress. Anesthesia immediately at bedside to intubate, place lines, and sommer.  Will continue to monitor.

## 2020-03-30 NOTE — NURSING
Rounding performed and vitals taken, pt O2 saturation 88-90% on 4L NC. Switched pt to 31% at 6L per VM. Pts current O2 saturation 91% on VM. Notified Dr. Mandel. Pt denies SOB and does not appear to be labored. Will continue to monitor.

## 2020-03-30 NOTE — NURSING
I, Ayan Martinez LPN, am scribing for Benita Rao LPN. Assessment documented by myself and performed by Benita Rao LPN.

## 2020-03-30 NOTE — ASSESSMENT & PLAN NOTE
CXR- Consolidation seen within the medial aspect of the right lung base most suggestive for developing pneumonia.   CRP- 122.7, D dimer and LDH elevated    Covid pending  Isolation  Oxygen  Rocephin/Azithromycin  Procalcitonin- .13

## 2020-03-30 NOTE — PLAN OF CARE
CM spoke with pt's spouse, nino Crawford, [phone], for initial discharge planning assessment.    Pt's demographic information is correct in Epic. Pt's PCP is correct, [facility] is [facility].  [facility], so will need paper scripts when discharged.    Pt is independent with ADL's, uses no DME, and has not had HH. Spouse will provide transportation home when discharged.  Pt has not had HH, is not on HD and does not take coumadin.  Pt does not have a LW or POA at this time.    No CM needs anticipated for discharge, but will follow.     03/30/20 1002   Discharge Assessment   Assessment Type Discharge Planning Assessment   Confirmed/corrected address and phone number on facesheet? Yes   Assessment information obtained from? Caregiver;Medical Record   Expected Length of Stay (days) 3   Prior to hospitilization cognitive status: Alert/Oriented   Prior to hospitalization functional status: Independent   Current cognitive status: Alert/Oriented   Current Functional Status: Independent   Lives With spouse   Able to Return to Prior Arrangements yes   Is patient able to care for self after discharge? Yes   Patient's perception of discharge disposition home or selfcare   Readmission Within the Last 30 Days no previous admission in last 30 days   Patient currently being followed by outpatient case management? No   Patient currently receives any other outside agency services? No   Equipment Currently Used at Home none   Do you have any problems affording any of your prescribed medications? No   Is the patient taking medications as prescribed? yes   Does the patient have transportation home? Yes   Transportation Anticipated family or friend will provide   Discharge Plan A Home   Discharge Plan B Home   DME Needed Upon Discharge  none   Patient/Family in Agreement with Plan yes

## 2020-03-30 NOTE — ANESTHESIA PROCEDURE NOTES
Intubation  Performed by: Mervin Meyers MD  Authorized by: Mervin Meyers MD     Intubation:     Induction:  Intravenous    Intubated:  Postinduction    Mask Ventilation:  Easy mask    Attempts:  1    Attempted By:  Staff anesthesiologist    Method of Intubation:  Video laryngoscopy    Blade:  Thomas 3    Laryngeal View Grade: Grade I - full view of chords      Difficult Airway Encountered?: No      Complications:  None    Airway Device:  Oral endotracheal tube    Airway Device Size:  8.0    Style/Cuff Inflation:  Cuffed    Tube secured:  23    Secured at:  The lips    Placement Verified By:  Colorimetric ETCO2 device and Chest x-ray    Complicating Factors:  None    Findings Post-Intubation:  BS equal bilateral  Notes:      cxr ordered

## 2020-03-30 NOTE — NURSING TRANSFER
Nursing Transfer Note      3/30/2020     Transfer ICU    Transfer via bed    Transfer with zole and 50% at 12L VM    Transported by ALESHA Mckeon and Nenita RN    Medicines sent: none    Chart send with patient: yes    Notified: ALESHA Tamayo    Patient reassessed at: 1615    Upon arrival to floor:1615

## 2020-03-30 NOTE — NURSING
Pt oxygen saturations between 89-90% on 4L oxygen per NC. Switched pt to 28% at 6L VM. Pt oxygen saturations 91% on VM. Notified Dr. Mandel. Will continue to monitor.

## 2020-03-30 NOTE — ANESTHESIA PROCEDURE NOTES
Right supraclavicular subclavian    Diagnosis: resp failure  Patient location during procedure: ICU    Staffing  Authorizing Provider: Daniel Lopez MD  Performing Provider: Daniel Lopez MD    Staffing  Performed: anesthesiologist   Anesthesiologist was present at the time of the procedure.  Preanesthetic Checklist  Completed: patient identified, site marked, pre-op evaluation, timeout performed, IV checked, risks and benefits discussed, monitors and equipment checked and anesthesia consent given  Indication   Indication: hemodialysis, med administration, vascular access, hemodynamic monitoring     Anesthesia   general anesthesia    Central Line   Skin Prep: skin prepped with ChloraPrep, skin prep agent completely dried prior to procedure  maximum sterile barriers used during central venous catheter insertion  hand hygiene performed prior to central venous catheter insertion  Location: right, subclavian.   Catheter type: double lumen  Catheter Size: 14 Fr  Inserted Catheter Length: 14 cm  Ultrasound: vascular probe with ultrasound  Vessel Caliber: medium, patent, compressibility normal  Needle advanced into vessel with real time Ultrasound guidance.  Guidewire confirmed in vessel.  Manometry: none  Insertion Attempts: 1   Securement:line sutured, chlorhexidine patch, sterile dressing applied and blood return through all ports    Post-Procedure   Adverse Events:none    Guidewire Guidewire removed intact.   Additional Notes  CXR pending

## 2020-03-30 NOTE — ED PROVIDER NOTES
Encounter Date: 3/29/2020    SCRIBE #1 NOTE: I, Kemal Chang, am scribing for, and in the presence of, Dr. Martinez.       History     Chief Complaint   Patient presents with    Shortness of Breath     c/o SOB since this morning,  denies cough and fever.  denies being around anybody with covid19     Time seen by provider: 9:46 PM     This is a 64 y.o. male with hx of bipolar disorder and prostate cancer who presents with complaint of shortness of breath. Pt works at the [employer] in housekeeping. Four days ago, pt experienced her SOB symptoms while working. Pt was seen at the [employer] and reports having a negative flu swab and a normal chest X-ray. Pt has been at home ever since. Pt reports her SOB is intermittent. Today, he was walking from his kitchen to the room and felt weak to the point of falling. Pt is normally able to walk this distance. He denies fever, chills, bodyaches, vomiting, diarrhea, and cough.      The history is provided by the patient.     Review of patient's allergies indicates:   Allergen Reactions    Aspirin Nausea And Vomiting    Tylenol [acetaminophen] Nausea And Vomiting     Past Medical History:   Diagnosis Date    Behavioral problem     Bipolar 1 disorder     Cancer     Prostate; completed treatment    Diabetes mellitus type II     History of psychiatric hospitalization     Hx of psychiatric care     Hypertension     Yen     Psychiatric exam requested by authority     Psychiatric problem     Self-harming behavior     Sleep apnea     Sleep apnea with use of continuous positive airway pressure (CPAP)     Suicide attempt     Therapy      Past Surgical History:   Procedure Laterality Date    CHOLECYSTECTOMY      HERNIA REPAIR      LEG SURGERY      ORIF tib/fib    SPINE SURGERY       Family History   Problem Relation Age of Onset    Hypertension Mother     Arthritis Mother     Alcohol abuse Mother     Alcohol abuse Father     Alcohol abuse Maternal Uncle     Drug abuse Maternal  Uncle     Suicide Maternal Uncle      Social History     Tobacco Use    Smoking status: Current Every Day Smoker     Packs/day: 0.50     Years: 30.00     Pack years: 15.00     Types: Cigarettes     Last attempt to quit: 2015     Years since quittin.3    Smokeless tobacco: Never Used    Tobacco comment: currently using nicotine patches 21 mg   Substance Use Topics    Alcohol use: No     Alcohol/week: 0.0 standard drinks     Comment: stoped 17 months ago but says that he is an alcoholic    Drug use: Yes     Comment: stopped 17 months ago; used to used heavy drugs, particularly marijuana, acid, amphetamines, cocaine, IV crack cocaine     Review of Systems   Constitutional: Negative for chills and fever.   HENT: Negative for sore throat.    Eyes: Negative for visual disturbance.   Respiratory: Positive for shortness of breath. Negative for cough.    Cardiovascular: Negative for chest pain.   Gastrointestinal: Negative for diarrhea and vomiting.   Genitourinary: Negative for dysuria.   Musculoskeletal: Negative for myalgias.   Neurological: Negative for weakness.   Psychiatric/Behavioral: Negative for confusion.       Physical Exam     Initial Vitals [20 2132]   BP Pulse Resp Temp SpO2   (!) 152/74 (!) 113 (!) 24 98.9 °F (37.2 °C) (!) 85 %      MAP       --         Physical Exam    Nursing note and vitals reviewed.  Constitutional: He appears well-developed and well-nourished. He is not diaphoretic. No distress.   HENT:   Head: Normocephalic and atraumatic.   Eyes: EOM are normal. Pupils are equal, round, and reactive to light.   Neck: Normal range of motion. Neck supple. No JVD present.   Cardiovascular: Normal rate, regular rhythm and normal heart sounds. Exam reveals no gallop and no friction rub.    No murmur heard.  Pulmonary/Chest: Breath sounds normal. No respiratory distress. He has no wheezes (audible). He has no rhonchi. He has no rales.   Speaking in full sentences.   Musculoskeletal:  Normal range of motion. He exhibits no edema or tenderness.   No leg edema.   Neurological: He is alert and oriented to person, place, and time.   Skin: Skin is warm and dry.   Psychiatric: He has a normal mood and affect. His behavior is normal. Judgment and thought content normal.         ED Course   Critical Care  Date/Time: 3/29/2020 11:03 PM  Performed by: Denice Martinez MD  Authorized by: Denice Martinez MD   Direct patient critical care time: 30 minutes  Additional history critical care time: 10 minutes  Ordering / reviewing critical care time: 15 minutes  Documentation critical care time: 10 minutes  Consulting other physicians critical care time: 5 minutes  Total critical care time (exclusive of procedural time) : 70 minutes  Critical care was necessary to treat or prevent imminent or life-threatening deterioration of the following conditions: respiratory failure.  Critical care was time spent personally by me on the following activities: discussions with consultants, obtaining history from patient or surrogate, ordering and review of laboratory studies, review of old charts, examination of patient, development of treatment plan with patient or surrogate, evaluation of patient's response to treatment, ordering and performing treatments and interventions, ordering and review of radiographic studies and re-evaluation of patient's condition.        Labs Reviewed   CBC W/ AUTO DIFFERENTIAL - Abnormal; Notable for the following components:       Result Value    Hemoglobin 11.2 (*)     Hematocrit 36.5 (*)     Mean Corpuscular Volume 73 (*)     Mean Corpuscular Hemoglobin 22.5 (*)     Mean Corpuscular Hemoglobin Conc 30.7 (*)     RDW 18.1 (*)     Gran% 86.0 (*)     Lymph% 8.0 (*)     All other components within normal limits   C-REACTIVE PROTEIN - Abnormal; Notable for the following components:    .7 (*)     All other components within normal limits   COMPREHENSIVE METABOLIC PANEL - Abnormal; Notable for the  following components:    Sodium 132 (*)     Potassium 5.2 (*)     CO2 17 (*)     Glucose 203 (*)     BUN, Bld 25 (*)     Albumin 3.0 (*)      (*)      (*)     Anion Gap 17 (*)     eGFR if non  52 (*)     All other components within normal limits   D DIMER, QUANTITATIVE - Abnormal; Notable for the following components:    D-Dimer 0.53 (*)     All other components within normal limits   URINALYSIS, REFLEX TO URINE CULTURE - Abnormal; Notable for the following components:    Protein, UA 1+ (*)     Glucose, UA 3+ (*)     Occult Blood UA Trace (*)     All other components within normal limits    Narrative:     Preferred Collection Type->Urine, Clean Catch   LACTIC ACID, PLASMA - Abnormal; Notable for the following components:    Lactate (Lactic Acid) 3.7 (*)     All other components within normal limits    Narrative:        critical result(s) called and verbal readback obtained from   Damion Arango RN by [facility] 03/29/2020 22:53   URINALYSIS MICROSCOPIC - Abnormal; Notable for the following components:    RBC, UA 10 (*)     All other components within normal limits    Narrative:     Preferred Collection Type->Urine, Clean Catch   CULTURE, BLOOD   CULTURE, BLOOD   LACTATE DEHYDROGENASE   PROCALCITONIN   SARS-COV-2 (COVID-19) QUALITATIVE PCR     EKG Readings: (Independently Interpreted)   Sinus tachycardia at rate of 109. No significant ST/T wave changes compared to to September 2014.       Imaging Results          X-Ray Chest 1 View (Final result)  Result time 03/29/20 22:27:03    Final result by Norma Antunez MD (03/29/20 22:27:03)                 Impression:      Consolidation seen within the medial aspect of the right lung base most suggestive for developing pneumonia.      Electronically signed by: Norma Antunez MD  Date:    03/29/2020  Time:    22:27             Narrative:    EXAMINATION:  XR CHEST 1 VIEW    CLINICAL HISTORY:  Hypoxemia    TECHNIQUE:  Single frontal view of the chest was  performed.    COMPARISON:  September 2014.    FINDINGS:  Cardiac silhouette is stable in size.  Lungs are symmetrically expanded.  Consolidative changes are visualized within the medial aspect of the right lung base.  No evidence of pneumothorax or significant pleural effusion.  No acute osseous abnormality identified.                              X-Rays:   Independently Interpreted Readings:   Chest X-Ray: Normal heart size. Right-sided consolidation. No acute process.        Medical Decision Making:   History:   Old Medical Records: I decided to obtain old medical records.  Independently Interpreted Test(s):   I have ordered and independently interpreted X-rays - see prior notes.  I have ordered and independently interpreted EKG Reading(s) - see prior notes  Clinical Tests:   Lab Tests: Ordered and Reviewed  Radiological Study: Ordered and Reviewed  Medical Tests: Ordered and Reviewed    Additional MDM:   Comments: 64-year-old male presents complaining of dyspnea intermittently over the past 5 days that worsened today while at home and was associated with episode of weakness.  The patient denies any other symptoms at this time.  He was found to be febrile, tachycardic and hypoxic once placed in the room.  Oxygen saturation did improve significantly on nasal cannula.  He is currently on 6 L nasal cannula and oxygen saturation is in the mid 90s.  Patient is currently sitting comfortably in bed using his phone and reports feeling much better since arriving in the emergency department.  Chest x-ray does show a right lower lobe consolidation.  Labs with changes concerning for COVID-19.  Patient was tested tonight.  He will be given Rocephin and azithromycin given chest x-ray findings and 1 L of normal saline given lactic acid of 3.5 as well as Tylenol.  The case has been discussed with the hospitalist and patient will be admitted to their service..          Scribe Attestation:   Scribe #1: I performed the above scribed  service and the documentation accurately describes the services I performed. I attest to the accuracy of the note.    Attending Attestation:           Physician Attestation for Scribe:  Physician Attestation Statement for Scribe #1: I, Dr. Martinez, reviewed documentation, as scribed by Kemal Chang in my presence, and it is both accurate and complete.                               Clinical Impression:     1. Suspected Covid-19 Virus Infection    2. Hypoxia    3. Dyspnea    4. Pneumonia of right lower lobe due to infectious organism                ED Disposition Condition    Admit                           Denice Martinez MD  03/29/20 2644

## 2020-03-30 NOTE — SIGNIFICANT EVENT
Artificial Intelligence Notification      Admit Date: 3/29/2020  LOS: 1  Code Status: Full Code   Date of Consult: 2020  : 1955  Age: 64 y.o.  Weight:   Wt Readings from Last 1 Encounters:   20 87.4 kg (192 lb 11.3 oz)     Sex: male  Bed: [place] [address] A:   MRN: [id number]  Attending Physician: Emilee Mandel MD  Primary Service: Networked reference to record PCT   Time AI Alert Received: 2:10 pm  Time at Bedside: 2:25 pm           Patient evaluated at bedside. He is COVID-19 positive with worsening hypoxia. Initially on 4L NC this morning and has increased to FiO2 50% 12 LPM VM. He reports feeling comfortable and able to speak complete sentences. Exam unchanged from earlier today. He is at increased risk of decompensation. Continuous pulse ox has been ordered and I have discussed patient status with pulm/CC. Will monitor closely      Vital Signs (Most Recent):  Temp: (!) 100.7 °F (38.2 °C) (20 1135)  Pulse: 105 (20 1441)  Resp: (!) 26 (20 1358)  BP: 113/62 (20 1135)  SpO2: (!) 92 % (20 1441) Vital Signs (24h Range):  Temp:  [98.8 °F (37.1 °C)-101.6 °F (38.7 °C)] 100.7 °F (38.2 °C)  Pulse:  [] 105  Resp:  [16-34] 26  SpO2:  [84 %-96 %] 92 %  BP: (107-152)/(59-74) 113/62         This encounter was triggered by an Artificial Intelligence Notification.

## 2020-03-30 NOTE — CONSULTS
Pulmonary / Critical Care Medicine  Consult Note      Reason for Consult: hypoxic respiratory failure      History of Present Illness:   Mr. Cash Crawford is a 63 yo M with PMHx HTN, bipolar disorder, BARBI, tobacco dependence, and prostate cancer who presented to [facility] 3/29 with chief complaint of flu like symptoms and SOB x 5 days. Admitted to hospital medicine for COVID pneumonia. Over course of past 24 hours, had rapidly increasing oxygen requirements. On floor still with tachypnia to the upper 30s requiring nonrebreather to maintain adequate saturations thus was transferred to MICU. COVID-19 positive, stepped up to MICU 3/30 for acute hypoxic respiratory failure requiring mechanical ventilation.        ROS: Comprehensive review of systems obtained and is negative except as noted in HPI.    PMHx:   Past Medical History:   Diagnosis Date    Behavioral problem     Bipolar 1 disorder     Cancer     Prostate; completed treatment    Diabetes mellitus type II     History of psychiatric hospitalization     Hx of psychiatric care     Hypertension     Yen     Psychiatric exam requested by authority     Psychiatric problem     Self-harming behavior     Sleep apnea     Sleep apnea with use of continuous positive airway pressure (CPAP)     Suicide attempt     Therapy        Home Meds:   No current facility-administered medications on file prior to encounter.      Current Outpatient Medications on File Prior to Encounter   Medication Sig Dispense Refill    BUPROPION HCL (BUPROBAN ORAL) Take 450 mg by mouth once daily.       diclofenac sodium (VOLTAREN) 1 % Gel Apply topically 4 (four) times daily as needed.      empagliflozin (JARDIANCE) 25 mg Tab Take by mouth.      gabapentin (NEURONTIN) 100 MG capsule Take 1 capsule (100 mg total) by mouth 2 (two) times daily. 180 capsule 3    gluc-kenneth-msm3-vuqn-zgsapj-kia 500-450-0.67 mg Cap Take by mouth.      lamotrigine (LAMICTAL) 200 MG tablet Take 200 mg by  mouth once daily.      lisinopril 10 MG tablet Take 1 tablet (10 mg total) by mouth once daily. 90 tablet 3    megestrol (MEGACE) 40 MG Tab Take 40 mg by mouth once daily.      melatonin (MELATIN) 3 mg tablet Take 9 mg by mouth nightly as needed for Insomnia.      metFORMIN (GLUCOPHAGE) 1000 MG tablet Take 1,000 mg by mouth 2 (two) times daily with meals.      modafinil (PROVIGIL) 200 MG Tab Take 200 mg by mouth once daily.      multivitamin capsule Take 1 capsule by mouth once daily.      pantoprazole (PROTONIX) 40 MG tablet Take 40 mg by mouth once daily.      peg 400-propylene glycol, PF, (LUBRICANT EYE, PG-,,PF,) 0.4-0.3 % Dpet Apply 1 drop to eye 4 (four) times daily as needed.      simethicone (MYLICON) 80 MG chewable tablet Take 80 mg by mouth every 6 (six) hours as needed for Flatulence.      ziprasidone (GEODON) 80 MG capsule Take 80 mg by mouth nightly.      B-complex with vitamin C (Z-BEC OR EQUIV) tablet Take 1 tablet by mouth once daily.      multivitamin capsule Take 1 capsule by mouth once daily.      nicotine (NICODERM CQ) 21 mg/24 hr Place 1 patch onto the skin every 24 hours.      terbinafine HCl (LAMISIL) 1 % cream Apply 1 % topically daily as needed.      [DISCONTINUED] diazepam (VALIUM) 5 MG tablet Take 1 tablet (5 mg total) by mouth every 8 (eight) hours as needed (muscle spasms). 1 tablet Oral Every 6 hours 60 tablet 0       PSHx:   Past Surgical History:   Procedure Laterality Date    CHOLECYSTECTOMY      HERNIA REPAIR      LEG SURGERY      ORIF tib/fib    SPINE SURGERY         Allergies:   Review of patient's allergies indicates:  No Known Allergies      SHX:   - Tobacco:  reports that he has been smoking cigarettes. He has a 15.00 pack-year smoking history. He has never used smokeless tobacco.  - EtOH:  reports that he does not drink alcohol.  - Illicit:   Social History     Substance and Sexual Activity   Drug Use Not Currently    Comment: stopped 17 months ago;  used to used heavy drugs, particularly marijuana, acid, amphetamines, cocaine, IV crack cocaine     - Occupation: Data Unavailable    FHx:   family history includes Alcohol abuse in his father, maternal uncle, and mother; Arthritis in his mother; Drug abuse in his maternal uncle; Hypertension in his mother; Suicide in his maternal uncle.    Current Meds:   Scheduled:    azithromycin  500 mg Intravenous Q24H    cefTRIAXone (ROCEPHIN) IVPB  1 g Intravenous Q24H    chlorhexidine  15 mL Mouth/Throat BID    enoxaparin  40 mg Subcutaneous Daily    famotidine (PF)  20 mg Intravenous BID    gabapentin  100 mg Oral BID    hydroxychloroquine  400 mg Oral BID    Followed by    [START ON 4/1/2020] hydroxychloroquine  400 mg Oral Daily    ketamine in 0.9 % sod chloride  50 mg Intravenous Once    ketamine in 0.9 % sod chloride  50 mg Intravenous Once    lamoTRIgine  200 mg Oral QHS    lisinopriL  10 mg Oral QHS    megestroL  40 mg Oral Daily    midazolam        modafiniL  200 mg Oral Daily    nicotine  1 patch Transdermal Q24H    phenylephrine HCl in 0.9% NaCl  100 mcg Intravenous Once    phenylephrine HCl in 0.9% NaCl        propofoL           Continuous Infusions:    fentanyl      propofoL         PRN:   acetaminophen, dextrose 50%, dextrose 50%, glucagon (human recombinant), glucose, glucose, insulin aspart U-100, ondansetron, simethicone, sodium chloride 0.9%, sodium chloride 0.9%    VITAL SIGNS:   Temp:  [98.8 °F (37.1 °C)-101.6 °F (38.7 °C)]   Pulse:  []   Resp:  [16-34]   BP: (107-152)/(59-74)   SpO2:  [84 %-100 %]           Physical Exam   Gen: well developed, well nourished, respiratory distress   HEENT: moist mucus membranes  conjunctivae/corneas clear. PERRL.   CVS: tachycardic, regular rhythm   Chest: increased respiratory effort, occasional crackles bilaterally   Abdomen: soft, non-tender non-distended; bowel sounds normal   Ext: no cyanosis or edema, or clubbing   Skin: Dry, warm   Neuro:  oriented, normal, normal mood, grossly non-focal            LABS:     Recent Labs   Lab 03/30/20  0131   WBC 6.32   RBC 4.57*   HGB 10.2*   HCT 35.5*      MCV 78*   MCH 22.3*   MCHC 28.7*   *   K 4.9      CO2 14*   BUN 24*   CREATININE 1.2   MG 2.8*   *   *   ALKPHOS 66   BILITOT 0.2   PROT 6.7   ALBUMIN 2.6*           CXR:    X-ray Chest 1 View    Result Date: 3/29/2020  Consolidation seen within the medial aspect of the right lung base most suggestive for developing pneumonia. Electronically signed by: Norma Antunez MD Date:    03/29/2020 Time:    22:27     Micro:   Microbiology Results (last 7 days)     Procedure Component Value Units Date/Time    Blood culture x two cultures. Draw prior to antibiotics. [[id number]] Collected:  03/29/20 2203    Order Status:  Sent Specimen:  Blood from Peripheral, Forearm, Right Updated:  03/30/20 0822    Blood culture x two cultures. Draw prior to antibiotics. [[id number]] Collected:  03/29/20 2230    Order Status:  Sent Specimen:  Blood from Peripheral, Forearm, Right Updated:  03/30/20 0822            ASSESSMENT/PLAN:   Mr. Cash Crawford is a 65 yo M with PMHx HTN, bipolar disorder, BARBI, and prostate cancer who presented [facility] 3/29 with chief complaint of flu like symptoms and SOB x 5 days. Admitted to hospital medicine for COVID pneumonia. Over course of 24 hours, had rapidly increasing oxygen requirements. COVID-19 positive, stepped up to MICU 3/30 for acute hypoxic respiratory failure requiring mechanical ventilation.      CNS:   - sedate with propofol, fentanyl    Cardio:   #HTN  - hold lisinopril for now    Resp:   # Acute hypoxic respiratory failure   # ARDS secondary to pneumonia   - intubated 3/30  - lung protective strategies with 6cc/kg TV (420mm), can allow permissive hypercapnia to pH >7.2, goal plateau pressures < 30,  - goal O2 sat > 88%, titrate PEEP/FiO2 per ARDS net protocol.     2) Pneumonia due to COVID-19  - ddimer,  ferritin, TG, CRP, coags, d-dimer in am   - continue HCQ (day 1/5)  - continue rocephin/azithro (day 2), will discontinue if culture remain negative  - f/u sputum culture    F: caution IVF so as to not precipitate pulmonary edema  E: wnl  N: likely start tube feeds tomorrow, nutrition consulted    GI:  - famotidine for GI ppx    Renal  - no issues, creatinine 1.2 which is baseline    Endo:   #DM: A1c 7.0  - accuchecks q6h  - SSI for now, may consider long acting pending BG trend    Psych:   #depression/bipolar disorder:   - will hold home buproprion, modafanil while intubated  - hold home ziprazidone as on multiple medications can prolong QT, will restart later in admission once off other QT prolonging agents  - continue home gabapentin and lamotrigine    PPx: lovenox, famotidine    Lottie Garsia MD  Pulmonary / Critical Care Fellow, PGYIV  Pager: [phone]  03/30/2020  4:33 PM

## 2020-03-30 NOTE — ANESTHESIA PROCEDURE NOTES
Right radial    Diagnosis: Resp failure    Patient location during procedure: ICU    Staffing  Authorizing Provider: Daniel Lopez MD  Performing Provider: Daniel Lopez MD    Anesthesiologist was present at the time of the procedure.    Preanesthetic Checklist  Completed: patient identified, site marked, pre-op evaluation, timeout performed, IV checked, risks and benefits discussed, monitors and equipment checked and anesthesia consent givenRight radial  Skin Prep: chlorhexidine gluconate  Orientation: right  Location: radial  Catheter Size: 20 G  Catheter placement by Ultrasound guidance and Anatomical landmarks. Heme positive aspiration all ports.  Vessel Caliber: medium, patent, compressibility normal  Needle advanced into vessel with real time Ultrasound guidance.  Guidewire confirmed in vessel.  Sterile sheath used.  Assessment  Dressing: secured with tape and tegaderm  Patient: Tolerated well  Additional Notes  Taped securely and coban wrapped

## 2020-03-30 NOTE — HPI
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     Hospital Course:  Initial workup showed CXR right lobe infiltrate with elevated , D-Dimer 1.03,  and Ferritin 331. Patient swabbed for COVID 19:positive. Patient started on IV Rocephin and Azithromycin and Plaquenil. He had rapid decline in respiratory status within 24H of admission and was intubated 3/30/2020 c/b ARDS.  He completed Abx for empiric CAP treatment on 4/2 and course of hydroxychloroquine ended 4/5.  Extubated on 4/4/2020 and transitioned to bipap/NC.   Developed hypernatremia which resolved on D5 drip requiring insulin for hyperglycemia.  Developed RUE DVT on therapeutic lovenox.  Started on Depakote for agitation    4/12: Started apixaban, down to 8L NC, pending psych consult  4/13: Down to 4L NC, feeling well, behavioral problems w/ nursing (smeared feces all over lines)  4/14: Down to 3L NC, resume home ziprasidone, weaning depakote  4/15: On 3L NC, H/H drifting down to 7.8, decreased oxycodone  4/16;    On 2L NC, H/H is stable at 7.2      Interval History:     No acute events overnight,   Hgb at 7.2, VS are stable  no N/V/abd pain, eating mechanical diet due to lack of dentures  No bleeding per patient  On 2L NC    4/17 -  96% on RA,  Took a few steps w the walker.  Reported ankle pain - history of trauma, previous  surgery.  Used a walker.   Xray - no fracture,     Medically ready for dc, to home under family's care.   F/u pcp.  Cold packs/ stretch to ankle.     Review of Systems:  Gen  - working w therapists and walker  Respiratory:  No dysnea, denies cough  Cardiovascular: denies chest pain/palpitations  GI: no abd pain, no N/V.      Inpatient Medications:    Current Facility-Administered Medications:     acetaminophen tablet 650 mg, 650 mg, Oral, Q4H PRN, Emilee Mandel MD, 650 mg at 04/16/20 1553    apixaban tablet 10 mg, 10 mg, Oral, BID, 10 mg at 04/17/20 0843 **FOLLOWED BY** [START ON 4/19/2020] apixaban tablet 5 mg, 5 mg, Oral, BID, Layo Elam MD    bisacodyL suppository 10 mg, 10 mg, Rectal, Daily PRN, Anila Daily MD, 10 mg at 04/08/20 2205    sennosides 8.8 mg/5 ml syrup 5 mL, 5 mL, Oral, Daily, 5 mL at 04/15/20 0932 **AND** docusate 50 mg/5 mL liquid 50 mg, 50 mg, Oral, Daily, Crow Chery MD, 50 mg at 04/15/20 0932    glucose chewable tablet 16 g, 16 g, Oral, PRN, Emilee Mandel MD    glucose chewable tablet 24 g, 24 g, Oral, PRN, Emilee Mandel MD    insulin aspart U-100 pen 0-5 Units, 0-5 Units, Subcutaneous, QID (AC + HS) PRN, Rob Alejandro MD    insulin detemir U-100 pen 10 Units, 10 Units, Subcutaneous, QHS, Rob Alejandro MD, 10 Units at 04/16/20 2123    labetalol 20 mg/4 mL (5 mg/mL) IV syring, 15 mg, Intravenous, Q4H PRN, Usman Bal MD, 15 mg at 04/04/20 1932    lamoTRIgine tablet 200 mg, 200 mg, Oral, QHS, Emilee Mandel MD, 200 mg at 04/16/20 2122    lidocaine 5 % patch 1 patch, 1 patch, Transdermal, Q24H, Waleska Muñoz MD, 1 patch at 04/17/20 0843    lisinopriL tablet 10 mg, 10 mg, Oral, Daily, Layo Elam MD, 10 mg at 04/17/20 0843    melatonin tablet 3 mg, 3 mg, Oral, Nightly PRN, Mary Boston NP, 3 mg at 04/15/20 2006    OLANZapine tablet 5 mg, 5 mg, Oral, Q6H PRN, Layo Elam MD    ondansetron disintegrating tablet 8 mg, 8 mg, Oral, Q8H PRN,  "Emilee Mandel MD    oxyCODONE-acetaminophen 5-325 mg per tablet 1 tablet, 1 tablet, Oral, Q6H PRN, Layo Elam MD, 1 tablet at 04/17/20 0421    polyethylene glycol packet 17 g, 17 g, Oral, Daily, Crow Chery MD, 17 g at 04/15/20 0932    sodium chloride 0.9% flush 10 mL, 10 mL, Intravenous, PRN, Emilee Mandel MD    sodium chloride 0.9% flush 10 mL, 10 mL, Intravenous, PRN, Emilee Mandel MD    ziprasidone capsule 20 mg, 20 mg, Oral, QHS, Layo Elam MD, 20 mg at 04/16/20 2122      Physical Exam:      Intake/Output Summary (Last 24 hours) at 4/17/2020 1159  Last data filed at 4/17/2020 0612  Gross per 24 hour   Intake 597 ml   Output 775 ml   Net -178 ml     Wt Readings from Last 3 Encounters:   04/10/20 87.4 kg (192 lb 10.9 oz)   04/01/20 87.1 kg (192 lb)   02/05/19 96.8 kg (213 lb 6.4 oz)       /67   Pulse 96   Temp 98.9 °F (37.2 °C) (Oral)   Resp (!) 26   Ht 5' 9" (1.753 m)   Wt 87.4 kg (192 lb 10.9 oz)   SpO2 (!) 94%   BMI 28.45 kg/m²     Limited exam 2/2 minimizing exposure to COVID-19.  GEN: NAD, MMM  Resp: normal work of breathing   Neuro: AAOx3    Laboratory:  Lab Results   Component Value Date    [id number] Not Detected 04/15/2020       Recent Labs   Lab 04/15/20  1011 04/16/20  0016 04/17/20  1112   WBC 6.43 5.26 7.34   LYMPH 11.2*  0.7* 12.9*  0.7* 10.8*  0.8*   HGB 7.8* 7.2* 7.4*   HCT 26.1* 21.9* 25.4*   * 627* 696*     Recent Labs   Lab 04/11/20  0435 04/12/20  0421 04/15/20  1011 04/16/20  0344    145 144 144   K 4.1 3.5 3.8 3.8    109 107 107   CO2 23 27 26 28   BUN 14 12 8 7*   CREATININE 0.7 0.7 0.7 0.7   * 144* 117* 78   CALCIUM 7.9* 7.8* 8.5* 8.0*   MG 2.8*  --   --   --    PHOS 2.4*  --   --   --      Recent Labs   Lab 04/12/20  0421 04/15/20  1011 04/16/20  0344   ALKPHOS 81 78 76   * 164* 177*   * 168* 208*   ALBUMIN 1.7* 1.9* 1.7*   PROT 6.3 6.4 6.0   BILITOT 0.3 0.3 0.3        Recent Labs     04/14/20  1741 " 04/15/20  1011 04/16/20  1744   DDIMER  --  4.98*  --    FERRITIN  --  259  --    CRP 78.6*  --  86.8*   LDH  --  569*  --        All labs within the last 24 hours were reviewed.     Microbiology:  Microbiology Results (last 7 days)     ** No results found for the last 168 hours. **            Imaging  ECG Results          EKG 12-lead (Final result)  Result time 04/02/20 08:08:36    Final result by Interface, Lab In [facility] (04/02/20 08:08:36)                 Narrative:    Test Reason : R68.89,    Vent. Rate : 097 BPM     Atrial Rate : 097 BPM     P-R Int : 150 ms          QRS Dur : 072 ms      QT Int : 332 ms       P-R-T Axes : 065 025 039 degrees     QTc Int : 421 ms    Age and gender specific analysis  Normal sinus rhythm  Normal ECG  When compared with ECG of 30-MAR-2020 15:09,    No significant change was found  Confirmed by CORY DURAND MD ([id number]) on 4/2/2020 8:08:25 AM    Referred By: [email or web address]   SELF           Confirmed By:CORY DURAND MD                             EKG 12-lead (Final result)  Result time 03/31/20 09:55:44    Final result by Interface, Lab In [facility] (03/31/20 09:55:44)                 Narrative:    Test Reason : J22,B97.29,    Vent. Rate : 105 BPM     Atrial Rate : 104 BPM     P-R Int : 000 ms          QRS Dur : 068 ms      QT Int : 338 ms       P-R-T Axes : 000 -06 006 degrees     QTc Int : 446 ms    Atrial pacing.  Confirmed by Musa Padron MD ([id number]) on 3/31/2020 9:55:37 AM    Referred By: [email or web address]   SELF           Confirmed By:Musa Padron MD                             EKG 12-lead (Final result)  Result time 03/30/20 14:45:25    Final result by Interface, Lab In [facility] (03/30/20 14:45:25)                 Narrative:    Test Reason : R06.00,    Vent. Rate : 109 BPM     Atrial Rate : 109 BPM     P-R Int : 146 ms          QRS Dur : 070 ms      QT Int : 324 ms       P-R-T Axes : 063 023 036 degrees     QTc Int : 436 ms    Sinus tachycardia  Otherwise normal ECG    Confirmed  by Musa Padron MD ([id number]) on 3/30/2020 2:45:14 PM    Referred By: IRMA   SELF           Confirmed By:Musa Padron MD                              No results found for this or any previous visit.    X-Ray Ankle Complete Left  Narrative: EXAMINATION:  XR ANKLE COMPLETE 3 VIEW LEFT    CLINICAL HISTORY:  New On set acute L ankle pain;    TECHNIQUE:  AP, lateral and oblique views of the left ankle were performed.    COMPARISON:  None    FINDINGS:  Three views left ankle.    No acute displaced fracture or dislocation of the ankle.  No radiopaque foreign body.  The ankle mortise is intact.  The distal aspect of tibial naren and screw construct noted, no findings to suggest loosening.  Impression: 1. No acute displaced fracture or dislocation of the ankle.    Electronically signed by: Brannon Cardona MD  Date:    04/16/2020  Time:    17:28      All imaging within the last 24 hours was reviewed.     Assessment and Plan:    Active Hospital Problems    Diagnosis  POA    *Acute respiratory distress syndrome (ARDS) due to COVID-19 virus [U07.1, J80]  Yes    Hypernatremia [E87.0]  Unknown    Acute deep vein thrombosis (DVT) of axillary vein of right upper extremity [I82.A11]  No    Bipolar 1 disorder, depressed [F31.9]  Yes     - Per PCP Dr. Lechuga notes, sees psychiatrist, on Ziprasidone 80 mg QD, Buproprion 150 mg TID, Lamotrigine 200mg QD.  - Patient currently sedated, and holding home meds EXCEPT for lamotrigine.  4/1.  - Will consider re-adding his home medications once extubated and out of ICU.      Anemia [D64.9]  No    JULIET (acute kidney injury) [N17.9]  Unknown    Elevated LFTs [R94.5]  Yes     - likely 2/2 to covid; however, does have a history of treated hep c in 2015  - AST//600s on transfer, now downtrending to 400s/480s. Continuing to trend CMPs  - Despite downtrending AST/ALTs, patient has Hx of treated HCV but no recent HCV RNA, and continued IVDU/substance use.      Type 2  diabetes mellitus with diabetic neuropathic arthropathy, without long-term current use of insulin [E11.610]  Yes    Essential hypertension, benign [I10]  Yes    Hepatitis C [B19.20]  Yes      Resolved Hospital Problems   No resolved problems to display.       Covid-19 Virus Infection  - COVID-19 testing: Collection Date: 4/15/2020 Collection Time:   4:07 PM  - Infection Control notified    - Isolation:   - Airborne and Droplet Precautions  - Surgical mask on patient   - N95 masks must be fit tested, wear eye protection  - 20 second hand hygiene   - Limit visitors per hospital policy   - Consolidate lab draws, nursing care, and interventions    - Diagnostics: (Lymphopenia, hyponatremia, hyperferritinemia, elevated troponin, elevated d-dimer, age, and comorbidities are significant predictors of poor clinical outcome)   - CBC: Hb stable , normal WBC  trend Q48hrs  - CMP:    hypoNa-> hyperNa--> Na 144   trend Q48hrs  - Procalcitonin: 0.13  - D-dimer: 1.03->4.98    repeat prior to discharge  - Ferritin: 331->369->259   repeat prior to discharge   - CRP:  122-->peak 306-->146->182->78   trend Q48hrs  - LDH: 789->987->569   repeat prior to discharge  - Troponin: 0.011   - ECG: normal   - rapid Flu: negative   - CXR: RLL consolidation   - UA : no infection   - CPK: 143->699->408    - Management:   Bundle care as able to maintain isolation & minimize in/out of room   - Supplemental O2 to maintain SpO2 92%-96%   if requiring 6L NC or higher, place on nonrebreather and discuss case with MICU   - Telemetry & continuous pulse oximetry    - If wheezing   - albuterol inhaler 2-4 puff Q6hr PRN    - ipratropium daily    - acetaminophen 650mg PO Q6hr PRN fever   - loperamide PRN for viral diarrhea   - Empiric antibiotics and hydroxychloroquine completed    - not on statin 2/2 elevated CPK   - Investigational Treatment Protocol: (if patient meets criteria)    [email or web address]       Safety notes:   - d/c bipap as he is no longer requiring it   - Cautious use of NSAIDS for fever per WHO recommendations (3/16/2020)   - No new ACEi/ARB start or discontinuation of chronic med unless hypotensive (Esler et al. Journal of Hypertension 2020, 38:000-000)   - Careful use of steroids in the absence of other indications (shock, ARDS)   - Fluid sparing resuscitation, avoid maintenance fluids    5/17 - resolving,     Anemia of chronic disease:  H/H slowly trending down but no signs of bleeding or rise in BUN  - continue apixaban and monitor for bleeding  - so far no signs of bleeding  4/17 - stable      Acute deep vein thrombosis (DVT) of axillary vein of right upper extremity  RUE us 04/07 consistent with DVTs in the R IJ, subclavian, and axillary veins. Noted thrombosis of cephalic, brachial, and basilic veins.    -transitioned from therapeutic lovenox to apixabian 4/12, no bleeding  -PRN oxy decreased from  -> 5mg q 6H prn on 4/15  -CPK normal    4/17 - on apixaban x 5 days, will need about 6 months.  F/u pcp.       Type 2 diabetes mellitus with diabetic neuropathic arthropathy, without long-term current use of insulin  Home medication includes jardiance 25mg (on hold)   -Change to levemir 10 units QHS, to prevent low, was on BID  -Low dose sliding scale aspart TID AC for now  -If DC home can consider restarting Jardiance, given A1C of 7, no need for insulin  Recent Labs     04/16/20  0725 04/16/20  1110 04/16/20  1528 04/16/20  2119 04/17/20  0753 04/17/20  1113   POCTGLUCOSE 125* 130* 178* 167* 109 160*           Bipolar 1 disorder, depressed  Home meds include lamotrigine 200 QHS and ziprasidone but now on lamotrigine and depakote per ICU team  -per psych recommendations: (appreciate recs on resuming bupropion)   - resume home ziprasidone 20 mg nightly tonight   - continue  home lamotrigine 200 mg qHS   - got last dose of depakote this am, monitor mood/behavior   - zyprexa prn po for agitation (no benzos within 1 hr of zyprexa)      Essential hypertension, benign  BP at goal, continue home lisinopril 10 mg daily      Diet: mech soft (per patient preference 2/2 not having dentures) + bloost glucose control, thin liquids       Advance Care Planning  Goals of care, counseling/discussion- Full code    4/15 12pm - had long discussion w/ wife regarding patient's level of deconditioning and amount of assistance he requires now for all activities including transfers.  She reports that there are 4 people living in her home, one of which is her daughter who is a nurse.  They feel confident that they will be able to provide safe care for Mr. Crawford and home and do not want him to go to SNF.       4/17 - called wife, Zeenat,  [phone].   Discussed home isolation. One more week.          Anticipated DME needs:    Advance Care Planning        VTE High Risk Prophylaxis:   VTE Risk Mitigation (From admission, onward)         Ordered     apixaban tablet 5 mg  2 times daily      04/12/20 1231     apixaban tablet 10 mg  2 times daily      04/12/20 1231     heparin (porcine) 1,000 unit/mL injection      03/30/20 1640     Place sequential compression device  Until discontinued      03/30/20 0015     IP VTE HIGH RISK PATIENT  Once      03/30/20 0015                Patient's chronic/stable medical conditions noted in the problem list above will be managed with the patient's home medications as tolerated.       Maddy Mackenzie MD   [facility]

## 2020-03-30 NOTE — NURSING
Rounding performed and rechecked pt's O2 saturations. Pt was 84% on 31% at 6L per VM. Increased oxygen to 35% at 8L per VM and pt recovered to 88-89%. Increased pt's oxygen again to 50% at 12L per VM and pt's current oxygen saturation 95% per VM. Notified Dr. Mandel and Dr. Fortune. Bed low and locked, call light within reach, side rails up x2, urinal provided at bedside, room near nurses station. Will continue to monitor.

## 2020-03-30 NOTE — ANESTHESIA PROCEDURE NOTES
Central Line    Diagnosis: respiratory failure  Doctor requesting consult: creek  Patient location during procedure: ICU  Procedure end time: 3/30/2020 5:30 PM    Staffing  Authorizing Provider: Mervin Meyers MD  Performing Provider: Mervin Meyers MD    Staffing  Anesthesiologist: Mervin Meyers MD  Performed: anesthesiologist   Anesthesiologist was present at the time of the procedure.  Preanesthetic Checklist  Completed: patient identified, site marked, timeout performed, IV checked, risks and benefits discussed, monitors and equipment checked and anesthesia consent given  Indication   Indication: hemodialysis, med administration, vascular access     Anesthesia   general anesthesia    Central Line   Skin Prep: skin prepped with ChloraPrep, skin prep agent completely dried prior to procedure  maximum sterile barriers used during central venous catheter insertion  hand hygiene performed prior to central venous catheter insertion  Location: right, subclavian.   Catheter type: dialysis  Catheter Size: 14 Fr  Ultrasound: vascular probe with ultrasound  Vessel Caliber: medium, patent  Vascular Doppler:  not done, compressibility normal  Needle advanced into vessel with real time Ultrasound guidance.  Guidewire confirmed in vessel.  Image recorded and saved.   Manometry: Venous cannualation confirmed by visual estimation of blood vessel pressure using manometry.  Insertion Attempts: 1   Securement:line sutured, chlorhexidine patch, sterile dressing applied and blood return through all ports    Post-Procedure   X-Ray: no pneumothorax on x-ray, placement verified by x-ray, successful placement and tip termination  Tip termination comments: svc   Adverse Events:none    Guidewire

## 2020-03-30 NOTE — ANESTHESIA PROCEDURE NOTES
Arterial    Diagnosis: respiratory failure  Doctor requesting consult: creek    Patient location during procedure: ICU  Procedure end time: 3/30/2020 5:33 PM    Staffing  Authorizing Provider: Mervin Meyers MD  Performing Provider: Mervin Meyers MD    Anesthesiologist was present at the time of the procedure.    Preanesthetic Checklist  Completed: patient identified, site marked, timeout performed, IV checked, risks and benefits discussed, monitors and equipment checked and anesthesia consent givenArterial  Skin Prep: chlorhexidine gluconate  Local Infiltration: none  Orientation: right  Location: radial  Catheter Size: 20 G  Catheter placement by Ultrasound guidance. Heme positive aspiration all ports.  Vessel Caliber: medium, patent, compressibility normal  Vascular Doppler:  not done  Needle advanced into vessel with real time Ultrasound guidance.  Guidewire confirmed in vessel.  Sterile sheath used.  Image recorded and saved.Insertion Attempts: 1  Assessment  Dressing: secured with tape and tegaderm  Patient: Tolerated well

## 2020-03-30 NOTE — ANESTHESIA PROCEDURE NOTES
Ad Hoc Intubation  Performed by: Daniel Lopez MD  Authorized by: Daniel Lopez MD     Indications:  Respiratory failure  Diagnosis:  Resp failure  Patient Location:  ICU  Staff:     patient identified, IV checked, site marked, risks and benefits discussed, monitors and equipment checked, pre-op evaluation, timeout performed and anesthesia consent      Anesthesiologist Present: Yes    Intubation:     Induction:  Intravenous    Intubated:  Postinduction    Mask Ventilation:  Easy mask    Attempts:  1    Attempted By:  Staff anesthesiologist    Method of Intubation:  Video laryngoscopy    Blade:  Thomas 3    Laryngeal View Grade: Grade I - full view of chords      Difficult Airway Encountered?: No      Complications:  None    Airway Device Size:  8.0    Style/Cuff Inflation:  Cuffed (inflated to minimal occlusive pressure)    Tube secured:  22    Secured at:  The lips    Placement Verified By:  Colorimetric ETCO2 device    Complicating Factors:  None    Findings Post-Intubation:  BS equal bilateral  Notes:      CXR pending

## 2020-03-30 NOTE — PLAN OF CARE
Pt intubated with 8.0ETT secured at the 23cm angela. +ETCO2. BBS. Placed on vent with documented settings. Will continue to monitor.

## 2020-03-30 NOTE — PLAN OF CARE
Patient lying quietly in bed with television on. Sinus rhythm on telemetry. Left antecubital 18 gauge IV access and right antecubital 18 gauge IV access intact. Patient currently 91% on 4 liters nasal cannula. Call light and urinal within reach Bed alarm set. Encouraged patient to call for any and all needs. Patient verbalized understanding of instructions. Will continue to monitor patient.

## 2020-03-31 PROBLEM — J80 ACUTE RESPIRATORY DISTRESS SYNDROME (ARDS) DUE TO COVID-19 VIRUS: Status: ACTIVE | Noted: 2020-03-29

## 2020-03-31 PROBLEM — J18.9 PNEUMONIA DUE TO INFECTIOUS ORGANISM: Status: ACTIVE | Noted: 2020-03-31

## 2020-03-31 LAB
ALBUMIN SERPL BCP-MCNC: 2.5 G/DL (ref 3.5–5.2)
ALLENS TEST: ABNORMAL
ALP SERPL-CCNC: 100 U/L (ref 55–135)
ALT SERPL W/O P-5'-P-CCNC: 311 U/L (ref 10–44)
ANION GAP SERPL CALC-SCNC: 11 MMOL/L (ref 8–16)
ANION GAP SERPL CALC-SCNC: 13 MMOL/L (ref 8–16)
APTT BLDCRRT: 28.4 SEC (ref 21–32)
AST SERPL-CCNC: 383 U/L (ref 10–40)
BILIRUB SERPL-MCNC: 0.3 MG/DL (ref 0.1–1)
BUN SERPL-MCNC: 25 MG/DL (ref 8–23)
BUN SERPL-MCNC: 27 MG/DL (ref 8–23)
CALCIUM SERPL-MCNC: 7.9 MG/DL (ref 8.7–10.5)
CALCIUM SERPL-MCNC: 8 MG/DL (ref 8.7–10.5)
CHLORIDE SERPL-SCNC: 100 MMOL/L (ref 95–110)
CHLORIDE SERPL-SCNC: 102 MMOL/L (ref 95–110)
CK SERPL-CCNC: 143 U/L (ref 20–200)
CO2 SERPL-SCNC: 19 MMOL/L (ref 23–29)
CO2 SERPL-SCNC: 20 MMOL/L (ref 23–29)
CREAT SERPL-MCNC: 1.3 MG/DL (ref 0.5–1.4)
CREAT SERPL-MCNC: 1.5 MG/DL (ref 0.5–1.4)
CRP SERPL-MCNC: 173.8 MG/L (ref 0–8.2)
D DIMER PPP IA.FEU-MCNC: 1.03 MG/L FEU
DELSYS: ABNORMAL
ERYTHROCYTE [SEDIMENTATION RATE] IN BLOOD BY WESTERGREN METHOD: 22 MM/H
EST. GFR  (AFRICAN AMERICAN): 56 ML/MIN/1.73 M^2
EST. GFR  (AFRICAN AMERICAN): >60 ML/MIN/1.73 M^2
EST. GFR  (NON AFRICAN AMERICAN): 48 ML/MIN/1.73 M^2
EST. GFR  (NON AFRICAN AMERICAN): 57 ML/MIN/1.73 M^2
FERRITIN SERPL-MCNC: 331 NG/ML (ref 20–300)
FIO2: 50
GLUCOSE SERPL-MCNC: 98 MG/DL (ref 70–110)
GLUCOSE SERPL-MCNC: 99 MG/DL (ref 70–110)
HCO3 UR-SCNC: 21.8 MMOL/L (ref 24–28)
INR PPP: 0.9 (ref 0.8–1.2)
MIN VOL: 10.8
MODE: ABNORMAL
PCO2 BLDA: 40.5 MMHG (ref 35–45)
PEEP: 12
PH SMN: 7.34 [PH] (ref 7.35–7.45)
PIP: 33
PO2 BLDA: 85 MMHG (ref 80–100)
POC BE: -4 MMOL/L
POC SATURATED O2: 96 % (ref 95–100)
POC TCO2: 23 MMOL/L (ref 23–27)
POCT GLUCOSE: 114 MG/DL (ref 70–110)
POCT GLUCOSE: 142 MG/DL (ref 70–110)
POCT GLUCOSE: 165 MG/DL (ref 70–110)
POTASSIUM SERPL-SCNC: 4.9 MMOL/L (ref 3.5–5.1)
POTASSIUM SERPL-SCNC: 5.2 MMOL/L (ref 3.5–5.1)
PROT SERPL-MCNC: 7.1 G/DL (ref 6–8.4)
PROTHROMBIN TIME: 9.5 SEC (ref 9–12.5)
SAMPLE: ABNORMAL
SITE: ABNORMAL
SODIUM SERPL-SCNC: 132 MMOL/L (ref 136–145)
SODIUM SERPL-SCNC: 133 MMOL/L (ref 136–145)
SP02: 95
TRIGL SERPL-MCNC: 160 MG/DL (ref 30–150)
VT: 420

## 2020-03-31 PROCEDURE — 25000003 PHARM REV CODE 250: Performed by: STUDENT IN AN ORGANIZED HEALTH CARE EDUCATION/TRAINING PROGRAM

## 2020-03-31 PROCEDURE — 99291 PR CRITICAL CARE, E/M 30-74 MINUTES: ICD-10-PCS | Mod: GC,,, | Performed by: INTERNAL MEDICINE

## 2020-03-31 PROCEDURE — 63600175 PHARM REV CODE 636 W HCPCS: Performed by: INTERNAL MEDICINE

## 2020-03-31 PROCEDURE — 63600175 PHARM REV CODE 636 W HCPCS: Performed by: NURSE PRACTITIONER

## 2020-03-31 PROCEDURE — S0028 INJECTION, FAMOTIDINE, 20 MG: HCPCS | Performed by: STUDENT IN AN ORGANIZED HEALTH CARE EDUCATION/TRAINING PROGRAM

## 2020-03-31 PROCEDURE — 82728 ASSAY OF FERRITIN: CPT

## 2020-03-31 PROCEDURE — 25000003 PHARM REV CODE 250: Performed by: INTERNAL MEDICINE

## 2020-03-31 PROCEDURE — S4991 NICOTINE PATCH NONLEGEND: HCPCS | Performed by: NURSE PRACTITIONER

## 2020-03-31 PROCEDURE — 63600175 PHARM REV CODE 636 W HCPCS: Performed by: STUDENT IN AN ORGANIZED HEALTH CARE EDUCATION/TRAINING PROGRAM

## 2020-03-31 PROCEDURE — 85730 THROMBOPLASTIN TIME PARTIAL: CPT

## 2020-03-31 PROCEDURE — 99900026 HC AIRWAY MAINTENANCE (STAT)

## 2020-03-31 PROCEDURE — 99291 CRITICAL CARE FIRST HOUR: CPT | Mod: GC,,, | Performed by: INTERNAL MEDICINE

## 2020-03-31 PROCEDURE — 94003 VENT MGMT INPAT SUBQ DAY: CPT

## 2020-03-31 PROCEDURE — 94761 N-INVAS EAR/PLS OXIMETRY MLT: CPT

## 2020-03-31 PROCEDURE — 80048 BASIC METABOLIC PNL TOTAL CA: CPT

## 2020-03-31 PROCEDURE — 80053 COMPREHEN METABOLIC PANEL: CPT

## 2020-03-31 PROCEDURE — 85379 FIBRIN DEGRADATION QUANT: CPT

## 2020-03-31 PROCEDURE — 82550 ASSAY OF CK (CPK): CPT

## 2020-03-31 PROCEDURE — 99900035 HC TECH TIME PER 15 MIN (STAT)

## 2020-03-31 PROCEDURE — 84478 ASSAY OF TRIGLYCERIDES: CPT

## 2020-03-31 PROCEDURE — 20000000 HC ICU ROOM

## 2020-03-31 PROCEDURE — 85610 PROTHROMBIN TIME: CPT

## 2020-03-31 PROCEDURE — 82803 BLOOD GASES ANY COMBINATION: CPT

## 2020-03-31 PROCEDURE — 86140 C-REACTIVE PROTEIN: CPT

## 2020-03-31 PROCEDURE — 25000003 PHARM REV CODE 250: Performed by: NURSE PRACTITIONER

## 2020-03-31 RX ORDER — GLUCAGON 1 MG
1 KIT INJECTION
Status: DISCONTINUED | OUTPATIENT
Start: 2020-03-31 | End: 2020-03-31 | Stop reason: SDUPTHER

## 2020-03-31 RX ORDER — INSULIN ASPART 100 [IU]/ML
0-5 INJECTION, SOLUTION INTRAVENOUS; SUBCUTANEOUS EVERY 6 HOURS PRN
Status: DISCONTINUED | OUTPATIENT
Start: 2020-03-31 | End: 2020-03-31 | Stop reason: SDUPTHER

## 2020-03-31 RX ORDER — SODIUM BICARBONATE 650 MG/1
1300 TABLET ORAL 2 TIMES DAILY
Status: DISCONTINUED | OUTPATIENT
Start: 2020-03-31 | End: 2020-04-02

## 2020-03-31 RX ORDER — INSULIN ASPART 100 [IU]/ML
1-10 INJECTION, SOLUTION INTRAVENOUS; SUBCUTANEOUS EVERY 6 HOURS
Status: DISCONTINUED | OUTPATIENT
Start: 2020-03-31 | End: 2020-04-01

## 2020-03-31 RX ADMIN — CHLORHEXIDINE GLUCONATE 0.12% ORAL RINSE 15 ML: 1.2 LIQUID ORAL at 08:03

## 2020-03-31 RX ADMIN — FENTANYL CITRATE 200 MCG/HR: 50 INJECTION, SOLUTION INTRAMUSCULAR; INTRAVENOUS at 04:03

## 2020-03-31 RX ADMIN — FENTANYL CITRATE 35 MCG/HR: 50 INJECTION, SOLUTION INTRAMUSCULAR; INTRAVENOUS at 12:03

## 2020-03-31 RX ADMIN — SODIUM BICARBONATE 650 MG TABLET 1300 MG: at 11:03

## 2020-03-31 RX ADMIN — PROPOFOL 50 MCG/KG/MIN: 10 INJECTION, EMULSION INTRAVENOUS at 08:03

## 2020-03-31 RX ADMIN — LAMOTRIGINE 200 MG: 100 TABLET ORAL at 08:03

## 2020-03-31 RX ADMIN — GABAPENTIN 100 MG: 100 CAPSULE ORAL at 08:03

## 2020-03-31 RX ADMIN — FAMOTIDINE 20 MG: 10 INJECTION, SOLUTION INTRAVENOUS at 08:03

## 2020-03-31 RX ADMIN — PROPOFOL 50 MCG/KG/MIN: 10 INJECTION, EMULSION INTRAVENOUS at 05:03

## 2020-03-31 RX ADMIN — ENOXAPARIN SODIUM 40 MG: 100 INJECTION SUBCUTANEOUS at 03:03

## 2020-03-31 RX ADMIN — PROPOFOL 50 MCG/KG/MIN: 10 INJECTION, EMULSION INTRAVENOUS at 02:03

## 2020-03-31 RX ADMIN — SODIUM BICARBONATE 650 MG TABLET 1300 MG: at 08:03

## 2020-03-31 RX ADMIN — CEFTRIAXONE 1 G: 1 INJECTION, SOLUTION INTRAVENOUS at 11:03

## 2020-03-31 RX ADMIN — PROPOFOL 35 MCG/KG/MIN: 10 INJECTION, EMULSION INTRAVENOUS at 04:03

## 2020-03-31 RX ADMIN — HYDROXYCHLOROQUINE SULFATE 400 MG: 200 TABLET, FILM COATED ORAL at 08:03

## 2020-03-31 RX ADMIN — NICOTINE 1 PATCH: 21 PATCH, EXTENDED RELEASE TRANSDERMAL at 12:03

## 2020-03-31 RX ADMIN — PROPOFOL 35 MCG/KG/MIN: 10 INJECTION, EMULSION INTRAVENOUS at 12:03

## 2020-03-31 RX ADMIN — AZITHROMYCIN MONOHYDRATE 500 MG: 500 INJECTION, POWDER, LYOPHILIZED, FOR SOLUTION INTRAVENOUS at 11:03

## 2020-03-31 NOTE — PLAN OF CARE
Recommendations    1. On day three of intubation recommend Peptamen VHP @ 30ml/hr   to provide 720 kcals, 66 g protein, 605 g water.     2. Weekly weights.     Goals: 1. Initiate nutrition intake by RD follow up.   Nutrition Goal Status: new  Communication of RD Recs: other (comment)(POC)

## 2020-03-31 NOTE — ASSESSMENT & PLAN NOTE
Contributing Nutrition Diagnosis  Inadequate energy intake     Related to (etiology):   Inability to consume sufficient energy     Signs and Symptoms (as evidenced by):   NPO, mechanically ventilated     Interventions (treatment strategy):  Collaboration with other providers  EN    Nutrition Diagnosis Status:   New

## 2020-03-31 NOTE — PROGRESS NOTES
[facility]  Pulmonology  Progress Note    Patient Name: Cash Crawford  MRN: [id number]  Admission Date: 3/29/2020  Hospital Length of Stay: 2 days  Code Status: Full Code  Attending Provider: Donaldo Trejo MD  Primary Care Provider: Briana Gonzalez MD (Inactive)   Principal Problem: Acute respiratory distress syndrome (ARDS) due to COVID-19 virus    Subjective:     Interval History: No overnight events reported.  This morning patient was on 50% and PEEP of 19 with an O2 Sat of 98%.    Objective:     Vital Signs (Most Recent):  Temp: 98.4 °F (36.9 °C) (03/31/20 1115)  Pulse: 89 (03/31/20 1245)  Resp: (!) 26 (03/31/20 1245)  BP: 105/65 (03/31/20 1135)  SpO2: 95 % (03/31/20 1245) Vital Signs (24h Range):  Temp:  [98.4 °F (36.9 °C)-100.6 °F (38.1 °C)] 98.4 °F (36.9 °C)  Pulse:  [] 89  Resp:  [9-30] 26  SpO2:  [84 %-100 %] 95 %  BP: ()/(61-90) 105/65  Arterial Line BP: (115-142)/(57-60) 115/60     Weight: 87.4 kg (192 lb 10.9 oz)  Body mass index is 28.45 kg/m².      Intake/Output Summary (Last 24 hours) at 3/31/2020 1336  Last data filed at 3/31/2020 1200  Gross per 24 hour   Intake 412.79 ml   Output 1250 ml   Net -837.21 ml       Physical Exam   Constitutional: He appears well-developed and well-nourished.   HENT:   Head: Normocephalic and atraumatic.   ETT in place  OG tube in place   Eyes: Pupils are equal, round, and reactive to light.   Neck: Normal range of motion. No tracheal deviation present.   Cardiovascular: Normal rate and regular rhythm.   No murmur heard.  Pulmonary/Chest: Effort normal and breath sounds normal. He has no wheezes. He has no rales.   Abdominal: Soft. Bowel sounds are normal. He exhibits no distension. There is no tenderness.   Musculoskeletal: He exhibits no edema.   Neurological:   sedated   Skin: Skin is warm and dry.   Psychiatric: He has a normal mood and affect. His behavior is normal.   Vitals reviewed.      Vents:  Vent Mode: A/C (03/31/20  1245)  Ventilator Initiated: Yes (03/30/20 1624)  Set Rate: 22 BPM (03/31/20 1245)  Vt Set: 420 mL (03/31/20 1245)  Pressure Support: 0 cmH20 (03/31/20 1245)  PEEP/CPAP: 10 cmH20 (03/31/20 1245)  Oxygen Concentration (%): 35 (03/31/20 1245)  Peak Airway Pressure: 30 cmH2O (03/31/20 1245)  Plateau Pressure: 0 cmH20 (03/31/20 1245)  Total Ve: 9.85 mL (03/31/20 1245)  F/VT Ratio<105 (RSBI): (!) 56.4 (03/31/20 1245)    Lines/Drains/Airways     Central Venous Catheter Line                 Hemodialysis Catheter 03/30/20 1630 right subclavian less than 1 day    Percutaneous Central Line Insertion/Assessment - Double Lumen  03/30/20 1837 less than 1 day    Permacath 03/30/20 1733 less than 1 day          Drain                 NG/OG Tube 03/30/20 1630 [place] sump less than 1 day         Urethral Catheter 03/30/20 1657 less than 1 day          Airway                 Airway - Non-Surgical 03/30/20 1620 Endotracheal Tube less than 1 day       Airway Anesthesia 03/30/20 less than 1 day          Arterial Line                 Arterial Line 03/30/20 1645 Right Radial less than 1 day          Peripheral Intravenous Line                 Peripheral IV - Single Lumen 03/29/20 2203 18 G Right Antecubital 1 day         Peripheral IV - Single Lumen 03/29/20 2230 18 G Left Antecubital 1 day                Significant Labs:    CBC/Anemia Profile:  Recent Labs   Lab 03/29/20 2141 03/30/20 0131 03/31/20  0412   WBC 7.92 6.32  --    HGB 11.2* 10.2*  --    HCT 36.5* 35.5*  --     220  --    MCV 73* 78*  --    RDW 18.1* 18.3*  --    FERRITIN  --   --  331*        Chemistries:  Recent Labs   Lab 03/29/20 2141 03/30/20 0131 03/31/20  0412   * 131* 132*   K 5.2* 4.9 5.2*   CL 98 102 100   CO2 17* 14* 19*   BUN 25* 24* 25*   CREATININE 1.4 1.2 1.5*   CALCIUM 8.8 8.0* 7.9*   ALBUMIN 3.0* 2.6* 2.5*   PROT 7.8 6.7 7.1   BILITOT 0.3 0.2 0.3   ALKPHOS 78 66 100   * 160* 311*   * 174* 383*   MG  --  2.8*  --    PHOS  --  4.0   --        All pertinent labs within the past 24 hours have been reviewed.    Significant Imaging:  I have reviewed and interpreted all pertinent imaging results/findings within the past 24 hours.    Assessment/Plan:     * Acute respiratory distress syndrome (ARDS) due to COVID-19 virus  Continue low tidal volume lung protective ventilation.  Weaning FiO2 to 35%; will wean PEEP further today while maintaining an O2Sat>92%  Sedation with propofol and fentanyl to promote ventilator synchrony    Pneumonia due to infectious organism  Likely 2/2 COVID-19  Will send respiratory cultures to r/o a concomitant bacterial co-infection.  Continue azithromycin and ceftriaxone until cultures mature.    COVID-19 virus detected  isolation precautions: contact/droplet/airborne  Hydroxychloroquine day #2 of 5    Type 2 diabetes mellitus with diabetic neuropathic arthropathy, without long-term current use of insulin  ISS q6h to maintain a FSBG 140-180        Critical Care Time: 32 minutes  Critical care was time spent personally by me on the following activities: evaluating this patient's organ dysfunction, development of treatment plan, discussing treatment plan with patient or surrogate and bedside caregivers, discussions with consultants, evaluation of patient's response to treatment, examination of patient, ordering and performing treatments and interventions, ordering and review of laboratory studies, ordering and review of radiographic studies, re-evaluation of patient's condition. This critical care time did not overlap with that of any other provider or involve time for any procedures.             Lala Corado MD  Pulmonology  [facility]

## 2020-03-31 NOTE — CONSULTS
"  [facility]  Adult Nutrition  Consult Note    SUMMARY     Recommendations    1. On day three of intubation recommend Peptamen VHP @ 30ml/hr   to provide 720 kcals, 66 g protein, 605 g water.     2. Weekly weights.     Goals: 1. Initiate nutrition intake by RD follow up.   Nutrition Goal Status: new  Communication of RD Recs: other (comment)(POC)    Reason for Assessment    Reason For Assessment: consult  Diagnosis: (Acute respiratory disease due to COVID-19 virus )  Interdisciplinary Rounds: did not attend  General Information Comments:   3.20- Pt is a 64 y.o. male positive with COVID -19. Pt currently intubated. UBW 213lbs, -15lbs x 1 year. Prior to intubation pt was on 2000 kcal DM, cardiac diet eating 50% of meals. NFPE not performed patient positive for SARS-CoV-2 (COVID-19) and has been placed on airborne and contact precautions.  Nutrition Discharge Planning: Unable to detemine at this time.     Nutrition Risk Screen    Nutrition Risk Screen: no indicators present    Nutrition/Diet History    Spiritual, Cultural Beliefs, Lutheran Practices, Values that Affect Care: no    Anthropometrics    Temp: 98.4 °F (36.9 °C)  Height Method: Stated  Height: 5' 9" (175.3 cm)  Height (inches): 69 in  Weight Method: Standard Scale  Weight: 87.4 kg (192 lb 10.9 oz)  Weight (lb): 192.68 lb  Ideal Body Weight (IBW), Male: 160 lb  % Ideal Body Weight, Male (lb): 120.44 %  BMI (Calculated): 28.4  BMI Grade: 25 - 29.9 - overweight  Usual Body Weight (UBW), k.8 kg  % Usual Body Weight: 90.48  % Weight Change From Usual Weight: -9.71 %     Lab/Procedures/Meds    Pertinent Labs Reviewed: reviewed  Pertinent Labs Comments: Na 132, K 5.2, BUN 25, Cr 1.5   Pertinent Medications Reviewed: reviewed  Pertinent Medications Comments: Phenylephrine, profofol    Estimated/Assessed Needs    Weight Used For Calorie Calculations: 87.4 kg (192 lb 10.9 oz)  Energy Calorie Requirements (kcal): 2041.31 kcals/day  Energy " Need Method: [facility]  Protein Requirements: 105.1-131.3 g/day(1.2-1.5 g/kg )  Weight Used For Protein Calculations: 87.4 kg (192 lb 10.9 oz)  Fluid Requirements (mL): 1mL/kcal or per MD  Estimated Fluid Requirement Method: RDA Method  RDA Method (mL): 2041.31     Nutrition Prescription Ordered    Current Diet Order: NPO    Evaluation of Received Nutrient/Fluid Intake    Energy Calories Required: not meeting needs  Protein Required: not meeting needs  Fluid Required: not meeting needs  Comments: LBM 3.28.20  % Intake of Estimated Energy Needs: 0 %  % Meal Intake: NPO    Nutrition Risk    Level of Risk/Frequency of Follow-up: moderate - high     Assessment and Plan    * Acute respiratory disease due to COVID-19 virus  Contributing Nutrition Diagnosis  Inadequate energy intake     Related to (etiology):   Inability to consume sufficient energy     Signs and Symptoms (as evidenced by):   NPO, mechanically ventilated     Interventions (treatment strategy):  Collaboration with other providers  EN    Nutrition Diagnosis Status:   New    Monitor and Evaluation    Food and Nutrient Intake: energy intake  Food and Nutrient Adminstration: enteral and parenteral nutrition administration  Knowledge/Beliefs/Attitudes: food and nutrition knowledge/skill  Physical Activity and Function: nutrition-related ADLs and IADLs  Anthropometric Measurements: weight  Biochemical Data, Medical Tests and Procedures: lipid profile, glucose/endocrine profile  Nutrition-Focused Physical Findings: overall appearance     Malnutrition Assessment     Eusebio Score: 13  NFPE not performed patient positive for SARS-CoV-2 (COVID-19) and has been placed on airborne and contact precautions.    Nutrition Follow-Up    RD Follow-up?: Yes

## 2020-03-31 NOTE — SUBJECTIVE & OBJECTIVE
Interval History: No overnight events reported.  This morning patient was on 50% and PEEP of 19 with an O2 Sat of 98%.    Objective:     Vital Signs (Most Recent):  Temp: 98.4 °F (36.9 °C) (03/31/20 1115)  Pulse: 89 (03/31/20 1245)  Resp: (!) 26 (03/31/20 1245)  BP: 105/65 (03/31/20 1135)  SpO2: 95 % (03/31/20 1245) Vital Signs (24h Range):  Temp:  [98.4 °F (36.9 °C)-100.6 °F (38.1 °C)] 98.4 °F (36.9 °C)  Pulse:  [] 89  Resp:  [9-30] 26  SpO2:  [84 %-100 %] 95 %  BP: ()/(61-90) 105/65  Arterial Line BP: (115-142)/(57-60) 115/60     Weight: 87.4 kg (192 lb 10.9 oz)  Body mass index is 28.45 kg/m².      Intake/Output Summary (Last 24 hours) at 3/31/2020 1336  Last data filed at 3/31/2020 1200  Gross per 24 hour   Intake 412.79 ml   Output 1250 ml   Net -837.21 ml       Physical Exam   Constitutional: He appears well-developed and well-nourished.   HENT:   Head: Normocephalic and atraumatic.   ETT in place  OG tube in place   Eyes: Pupils are equal, round, and reactive to light.   Neck: Normal range of motion. No tracheal deviation present.   Cardiovascular: Normal rate and regular rhythm.   No murmur heard.  Pulmonary/Chest: Effort normal and breath sounds normal. He has no wheezes. He has no rales.   Abdominal: Soft. Bowel sounds are normal. He exhibits no distension. There is no tenderness.   Musculoskeletal: He exhibits no edema.   Neurological:   sedated   Skin: Skin is warm and dry.   Psychiatric: He has a normal mood and affect. His behavior is normal.   Vitals reviewed.      Vents:  Vent Mode: A/C (03/31/20 1245)  Ventilator Initiated: Yes (03/30/20 1624)  Set Rate: 22 BPM (03/31/20 1245)  Vt Set: 420 mL (03/31/20 1245)  Pressure Support: 0 cmH20 (03/31/20 1245)  PEEP/CPAP: 10 cmH20 (03/31/20 1245)  Oxygen Concentration (%): 35 (03/31/20 1245)  Peak Airway Pressure: 30 cmH2O (03/31/20 1245)  Plateau Pressure: 0 cmH20 (03/31/20 1245)  Total Ve: 9.85 mL (03/31/20 1245)  F/VT Ratio<105 (RSBI): (!) 56.4  (03/31/20 1245)    Lines/Drains/Airways     Central Venous Catheter Line                 Hemodialysis Catheter 03/30/20 1630 right subclavian less than 1 day    Percutaneous Central Line Insertion/Assessment - Double Lumen  03/30/20 1837 less than 1 day    Permacath 03/30/20 1733 less than 1 day          Drain                 NG/OG Tube 03/30/20 1630 [place] sump less than 1 day         Urethral Catheter 03/30/20 1657 less than 1 day          Airway                 Airway - Non-Surgical 03/30/20 1620 Endotracheal Tube less than 1 day       Airway Anesthesia 03/30/20 less than 1 day          Arterial Line                 Arterial Line 03/30/20 1645 Right Radial less than 1 day          Peripheral Intravenous Line                 Peripheral IV - Single Lumen 03/29/20 2203 18 G Right Antecubital 1 day         Peripheral IV - Single Lumen 03/29/20 2230 18 G Left Antecubital 1 day                Significant Labs:    CBC/Anemia Profile:  Recent Labs   Lab 03/29/20 2141 03/30/20 0131 03/31/20  0412   WBC 7.92 6.32  --    HGB 11.2* 10.2*  --    HCT 36.5* 35.5*  --     220  --    MCV 73* 78*  --    RDW 18.1* 18.3*  --    FERRITIN  --   --  331*        Chemistries:  Recent Labs   Lab 03/29/20 2141 03/30/20 0131 03/31/20  0412   * 131* 132*   K 5.2* 4.9 5.2*   CL 98 102 100   CO2 17* 14* 19*   BUN 25* 24* 25*   CREATININE 1.4 1.2 1.5*   CALCIUM 8.8 8.0* 7.9*   ALBUMIN 3.0* 2.6* 2.5*   PROT 7.8 6.7 7.1   BILITOT 0.3 0.2 0.3   ALKPHOS 78 66 100   * 160* 311*   * 174* 383*   MG  --  2.8*  --    PHOS  --  4.0  --        All pertinent labs within the past 24 hours have been reviewed.    Significant Imaging:  I have reviewed and interpreted all pertinent imaging results/findings within the past 24 hours.

## 2020-03-31 NOTE — PLAN OF CARE
Patient received on mechanical vent, settings as documented. Oral care done, FIO2 weaned.  ABG done, results WNL. Pt. in no distress, will continue to monitor.

## 2020-03-31 NOTE — ASSESSMENT & PLAN NOTE
Likely 2/2 COVID-19  Will send respiratory cultures to r/o a concomitant bacterial co-infection.  Continue azithromycin and ceftriaxone until cultures mature.

## 2020-03-31 NOTE — CONSULTS
REASON FOR CONSULTATION: JULIET     HPI:64 y.o. male with bipolar D/O, DM, prostate CA presented to ED 3/29/2020 with SOB, fever, weakness.  Admitted and subsequently transferred to ICU 3/30 and intubated due to hypoxia/resp distress.  COVID-19 Positive.    REVIEW OF SYSTEMS:  See HPI for all pertinent ROS.    Past Medical History:   Diagnosis Date    Behavioral problem     Bipolar 1 disorder     Cancer     Prostate; completed treatment    Diabetes mellitus type II     History of psychiatric hospitalization     Hx of psychiatric care     Hypertension     Yen     Psychiatric exam requested by authority     Psychiatric problem     Self-harming behavior     Sleep apnea     Sleep apnea with use of continuous positive airway pressure (CPAP)     Suicide attempt     Therapy        Past Surgical History:   Procedure Laterality Date    CHOLECYSTECTOMY      HERNIA REPAIR      LEG SURGERY      ORIF tib/fib    SPINE SURGERY         Review of patient's allergies indicates:  No Known Allergies    Medications Prior to Admission   Medication Sig Dispense Refill Last Dose    BUPROPION HCL (BUPROBAN ORAL) Take 450 mg by mouth once daily.    3/29/2020    diclofenac sodium (VOLTAREN) 1 % Gel Apply topically 4 (four) times daily as needed.       empagliflozin (JARDIANCE) 25 mg Tab Take by mouth.   3/29/2020    gabapentin (NEURONTIN) 100 MG capsule Take 1 capsule (100 mg total) by mouth 2 (two) times daily. 180 capsule 3 3/29/2020    gluc-kenneth-msm3-znsc-qwzukw-ybi 500-450-0.67 mg Cap Take by mouth.   3/29/2020    lamotrigine (LAMICTAL) 200 MG tablet Take 200 mg by mouth once daily.   3/29/2020    lisinopril 10 MG tablet Take 1 tablet (10 mg total) by mouth once daily. 90 tablet 3 3/28/2020    megestrol (MEGACE) 40 MG Tab Take 40 mg by mouth once daily.   3/29/2020    melatonin (MELATIN) 3 mg tablet Take 9 mg by mouth nightly as needed for Insomnia.       metFORMIN (GLUCOPHAGE) 1000 MG tablet Take 1,000 mg by  mouth 2 (two) times daily with meals.       modafinil (PROVIGIL) 200 MG Tab Take 200 mg by mouth once daily.   3/29/2020    multivitamin capsule Take 1 capsule by mouth once daily.   3/29/2020    pantoprazole (PROTONIX) 40 MG tablet Take 40 mg by mouth once daily.   3/29/2020    peg 400-propylene glycol, PF, (LUBRICANT EYE, PG-,,PF,) 0.4-0.3 % Dpet Apply 1 drop to eye 4 (four) times daily as needed.       simethicone (MYLICON) 80 MG chewable tablet Take 80 mg by mouth every 6 (six) hours as needed for Flatulence.   3/29/2020    ziprasidone (GEODON) 80 MG capsule Take 80 mg by mouth nightly.   3/28/2020    B-complex with vitamin C (Z-BEC OR EQUIV) tablet Take 1 tablet by mouth once daily.   Not Taking    multivitamin capsule Take 1 capsule by mouth once daily.   Not Taking    nicotine (NICODERM CQ) 21 mg/24 hr Place 1 patch onto the skin every 24 hours.   Not Taking    terbinafine HCl (LAMISIL) 1 % cream Apply 1 % topically daily as needed.   Not Taking       Current Facility-Administered Medications   Medication Dose Route Frequency Provider Last Rate Last Dose    acetaminophen tablet 650 mg  650 mg Oral Q4H PRN Larry Conroy NP        azithromycin 500 mg in dextrose 5 % 250 mL IVPB (ready to mix system)  500 mg Intravenous Q24H Lottie Garsia  mL/hr at 03/30/20 2329 500 mg at 03/30/20 2329    cefTRIAXone (ROCEPHIN) 1 g in dextrose 5 % 50 mL IVPB  1 g Intravenous Q24H Lottie Garsia MD   1 g at 03/30/20 2222    chlorhexidine 0.12 % solution 15 mL  15 mL Mouth/Throat BID Lottie Garsia MD   15 mL at 03/31/20 0834    dextrose 50% injection 12.5 g  12.5 g Intravenous PRN Larry Conroy NP        dextrose 50% injection 25 g  25 g Intravenous PRN Larry Conroy NP        enoxaparin injection 40 mg  40 mg Subcutaneous Daily Larry Conroy NP   40 mg at 03/30/20 1728    famotidine (PF) injection 20 mg  20 mg Intravenous BID Lottie Garsia MD   20 mg at 03/31/20 0841    fentaNYL  (SUBLIMAZE) 2,500 mcg in sodium chloride 0.9% 250 mL infusion  25 mcg/hr Intravenous Continuous Emilee Mandel MD 3.5 mL/hr at 03/31/20 0045 35 mcg/hr at 03/31/20 0045    gabapentin capsule 100 mg  100 mg Oral BID Larry Conroy NP   100 mg at 03/31/20 0842    glucagon (human recombinant) injection 1 mg  1 mg Intramuscular PRN Larry Conroy NP        glucose chewable tablet 16 g  16 g Oral PRN Larry Conroy NP        glucose chewable tablet 24 g  24 g Oral PRN Larry Conroy NP        heparin (porcine) injection 10,000 Units  10,000 Units Intravenous PRN Mervin Meyers MD   10,000 Units at 03/30/20 1647    [START ON 4/1/2020] hydroxychloroquine tablet 400 mg  400 mg Oral Daily Emilee Mandel MD        insulin aspart U-100 pen 1-10 Units  1-10 Units Subcutaneous QID (AC + HS) PRN Larry Conroy NP        lamoTRIgine tablet 200 mg  200 mg Oral QHS Emilee Mandel MD   200 mg at 03/30/20 2018    nicotine 21 mg/24 hr 1 patch  1 patch Transdermal Q24H Larry Conroy NP   1 patch at 03/31/20 0021    ondansetron disintegrating tablet 8 mg  8 mg Oral Q8H PRN Larry Conroy NP        phenylephrine HCl in 0.9% NaCl 1 mg/10 mL (100 mcg/mL) syringe 100 mcg  100 mcg Intravenous Once Mervin Meyers MD        propofol (DIPRIVAN) 10 mg/mL infusion  5 mcg/kg/min Intravenous Continuous Lottie Garsia MD 26.2 mL/hr at 03/31/20 0857 50 mcg/kg/min at 03/31/20 0857    simethicone chewable tablet 80 mg  80 mg Oral Q6H PRN Larry Conroy NP        sodium chloride 0.9% flush 10 mL  10 mL Intravenous PRN Denice Martinez MD        sodium chloride 0.9% flush 10 mL  10 mL Intravenous PRN Larry Conroy NP           Social History     Socioeconomic History    Marital status:      Spouse name: Not on file    Number of children: 7    Years of education: Not on file    Highest education level: Not on file   Occupational History    Occupation: unemployed   Social Needs     Financial resource strain: Not on file    Food insecurity:     Worry: Not on file     Inability: Not on file    Transportation needs:     Medical: Not on file     Non-medical: Not on file   Tobacco Use    Smoking status: Current Every Day Smoker     Packs/day: 0.50     Years: 30.00     Pack years: 15.00     Types: Cigarettes     Last attempt to quit: 2015     Years since quittin.3    Smokeless tobacco: Never Used    Tobacco comment: currently using nicotine patches 21 mg   Substance and Sexual Activity    Alcohol use: No     Alcohol/week: 0.0 standard drinks     Comment: stoped 17 months ago but says that he is an alcoholic    Drug use: Not Currently     Comment: stopped 17 months ago; used to used heavy drugs, particularly marijuana, acid, amphetamines, cocaine, IV crack cocaine    Sexual activity: Not on file   Lifestyle    Physical activity:     Days per week: Not on file     Minutes per session: Not on file    Stress: Not on file   Relationships    Social connections:     Talks on phone: Not on file     Gets together: Not on file     Attends Pentecostal service: Not on file     Active member of club or organization: Not on file     Attends meetings of clubs or organizations: Not on file     Relationship status: Not on file   Other Topics Concern    Patient feels they ought to cut down on drinking/drug use Not Asked    Patient annoyed by others criticizing their drinking/drug use Not Asked    Patient has felt bad or guilty about drinking/drug use Not Asked    Patient has had a drink/used drugs as an eye opener in the AM Not Asked   Social History Narrative    Not on file       Family History   Problem Relation Age of Onset    Hypertension Mother     Arthritis Mother     Alcohol abuse Mother     Alcohol abuse Father     Alcohol abuse Maternal Uncle     Drug abuse Maternal Uncle     Suicide Maternal Uncle        Temp:  [98.7 °F (37.1 °C)-100.7 °F (38.2 °C)] 98.7 °F (37.1 °C)  Pulse:   [] 97  Resp:  [9-30] 21  SpO2:  [84 %-100 %] 90 %  BP: ()/(61-90) 113/68  Arterial Line BP: (115-142)/(57-60) 115/60      PHYSICAL EXAM:    GEN:  Intubated and sedated, well developed BM  HEENT:  NCAT, VALERY, No conjunctivitis, normal sclera, No LAD, Nml JVD, no carotid bruits  CV:  RRR no MRG  Lungs:  CTAb, no rhonchi, wheeze, crackles, Decreased BS  Abdomen: Obese, soft, NTND, no fluid wave, no HSM, NABS  Ext:  No CCE, normal DP pulses bilat  Neuro:  Non-Focal, EOMI, gait not assessed  Skin:  No rash, excoriation, petchiae      Labs: Reviewed    CXR: Reviewed    ASSESSMENT AND PLAN:    63 YO male with acute resp distress/ARDS COVID +  -Intubated 3/30/2020  -On hydroxychloroqine, Rocephin, Azithromycin  -Per CCT    JULIET on CKD 2  -Baseline creat appears to be about 1.1 currently 1.5  -652 in/ 1925 UOP  -Hold ACE  -Would not treat hyperkalemia with anything other than bicarb at this time  -If trend continues will likely need to intitialte CRRT tomorrow.    DM  -Normally on Jardiance   -SSI for now  -History of poor control but last HgA1c on record 7.1  -Holding ACE    Transaminitis  -Related to COVID 19

## 2020-03-31 NOTE — PLAN OF CARE
spoke with doctor and provided reflective listening, prayer support and assessed natty resources for family as well as explored family's concerns via phone call.

## 2020-03-31 NOTE — ASSESSMENT & PLAN NOTE
Continue low tidal volume lung protective ventilation.  Weaning FiO2 to 35%; will wean PEEP further today while maintaining an O2Sat>92%  Sedation with propofol and fentanyl to promote ventilator synchrony

## 2020-04-01 PROBLEM — K06.2 DENTURE IRRITATION: Status: RESOLVED | Noted: 2018-01-04 | Resolved: 2020-04-01

## 2020-04-01 PROBLEM — E88.810 METABOLIC SYNDROME: Status: RESOLVED | Noted: 2018-01-04 | Resolved: 2020-04-01

## 2020-04-01 PROBLEM — J12.82 PNEUMONIA DUE TO COVID-19 VIRUS: Status: ACTIVE | Noted: 2020-03-31

## 2020-04-01 PROBLEM — U07.1 PNEUMONIA DUE TO COVID-19 VIRUS: Status: ACTIVE | Noted: 2020-03-31

## 2020-04-01 PROBLEM — N17.9 AKI (ACUTE KIDNEY INJURY): Status: ACTIVE | Noted: 2020-04-01

## 2020-04-01 PROBLEM — C61 PROSTATE CANCER: Status: RESOLVED | Noted: 2018-01-04 | Resolved: 2020-04-01

## 2020-04-01 LAB
ALBUMIN SERPL BCP-MCNC: 2.2 G/DL (ref 3.5–5.2)
ALBUMIN SERPL BCP-MCNC: 2.2 G/DL (ref 3.5–5.2)
ALP SERPL-CCNC: 154 U/L (ref 55–135)
ALP SERPL-CCNC: 181 U/L (ref 55–135)
ALT SERPL W/O P-5'-P-CCNC: 571 U/L (ref 10–44)
ALT SERPL W/O P-5'-P-CCNC: 598 U/L (ref 10–44)
ANION GAP SERPL CALC-SCNC: 10 MMOL/L (ref 8–16)
ANION GAP SERPL CALC-SCNC: 12 MMOL/L (ref 8–16)
ANISOCYTOSIS BLD QL SMEAR: SLIGHT
ANISOCYTOSIS BLD QL SMEAR: SLIGHT
AST SERPL-CCNC: 603 U/L (ref 10–40)
AST SERPL-CCNC: 650 U/L (ref 10–40)
BASOPHILS # BLD AUTO: 0.02 K/UL (ref 0–0.2)
BASOPHILS # BLD AUTO: ABNORMAL K/UL (ref 0–0.2)
BASOPHILS # BLD AUTO: ABNORMAL K/UL (ref 0–0.2)
BASOPHILS NFR BLD: 0 % (ref 0–1.9)
BASOPHILS NFR BLD: 0 % (ref 0–1.9)
BASOPHILS NFR BLD: 0.3 % (ref 0–1.9)
BILIRUB SERPL-MCNC: 0.3 MG/DL (ref 0.1–1)
BILIRUB SERPL-MCNC: 0.4 MG/DL (ref 0.1–1)
BUN SERPL-MCNC: 23 MG/DL (ref 8–23)
BUN SERPL-MCNC: 24 MG/DL (ref 8–23)
CALCIUM SERPL-MCNC: 7.7 MG/DL (ref 8.7–10.5)
CALCIUM SERPL-MCNC: 7.9 MG/DL (ref 8.7–10.5)
CHLORIDE SERPL-SCNC: 102 MMOL/L (ref 95–110)
CHLORIDE SERPL-SCNC: 103 MMOL/L (ref 95–110)
CK SERPL-CCNC: 699 U/L (ref 20–200)
CO2 SERPL-SCNC: 22 MMOL/L (ref 23–29)
CO2 SERPL-SCNC: 23 MMOL/L (ref 23–29)
CREAT SERPL-MCNC: 1 MG/DL (ref 0.5–1.4)
CREAT SERPL-MCNC: 1.2 MG/DL (ref 0.5–1.4)
CRP SERPL-MCNC: 215.9 MG/L (ref 0–8.2)
DIFFERENTIAL METHOD: ABNORMAL
EOSINOPHIL # BLD AUTO: 0 K/UL (ref 0–0.5)
EOSINOPHIL # BLD AUTO: ABNORMAL K/UL (ref 0–0.5)
EOSINOPHIL # BLD AUTO: ABNORMAL K/UL (ref 0–0.5)
EOSINOPHIL NFR BLD: 0.7 % (ref 0–8)
EOSINOPHIL NFR BLD: 1 % (ref 0–8)
EOSINOPHIL NFR BLD: 1 % (ref 0–8)
ERYTHROCYTE [DISTWIDTH] IN BLOOD BY AUTOMATED COUNT: 18.4 % (ref 11.5–14.5)
ERYTHROCYTE [DISTWIDTH] IN BLOOD BY AUTOMATED COUNT: 18.4 % (ref 11.5–14.5)
ERYTHROCYTE [DISTWIDTH] IN BLOOD BY AUTOMATED COUNT: 18.6 % (ref 11.5–14.5)
EST. GFR  (AFRICAN AMERICAN): >60 ML/MIN/1.73 M^2
EST. GFR  (AFRICAN AMERICAN): >60 ML/MIN/1.73 M^2
EST. GFR  (NON AFRICAN AMERICAN): >60 ML/MIN/1.73 M^2
EST. GFR  (NON AFRICAN AMERICAN): >60 ML/MIN/1.73 M^2
FERRITIN SERPL-MCNC: 369 NG/ML (ref 20–300)
GLUCOSE SERPL-MCNC: 106 MG/DL (ref 70–110)
GLUCOSE SERPL-MCNC: 132 MG/DL (ref 70–110)
HCT VFR BLD AUTO: 29.2 % (ref 40–54)
HCT VFR BLD AUTO: 29.2 % (ref 40–54)
HCT VFR BLD AUTO: 31.1 % (ref 40–54)
HGB BLD-MCNC: 8.9 G/DL (ref 14–18)
HGB BLD-MCNC: 8.9 G/DL (ref 14–18)
HGB BLD-MCNC: 9.2 G/DL (ref 14–18)
HYPOCHROMIA BLD QL SMEAR: ABNORMAL
HYPOCHROMIA BLD QL SMEAR: ABNORMAL
IMM GRANULOCYTES # BLD AUTO: 0.08 K/UL (ref 0–0.04)
IMM GRANULOCYTES # BLD AUTO: ABNORMAL K/UL (ref 0–0.04)
IMM GRANULOCYTES # BLD AUTO: ABNORMAL K/UL (ref 0–0.04)
IMM GRANULOCYTES NFR BLD AUTO: 1.4 % (ref 0–0.5)
IMM GRANULOCYTES NFR BLD AUTO: ABNORMAL % (ref 0–0.5)
IMM GRANULOCYTES NFR BLD AUTO: ABNORMAL % (ref 0–0.5)
LACTATE SERPL-SCNC: 0.9 MMOL/L (ref 0.5–2.2)
LDH SERPL L TO P-CCNC: 987 U/L (ref 110–260)
LYMPHOCYTES # BLD AUTO: 0.5 K/UL (ref 1–4.8)
LYMPHOCYTES # BLD AUTO: ABNORMAL K/UL (ref 1–4.8)
LYMPHOCYTES # BLD AUTO: ABNORMAL K/UL (ref 1–4.8)
LYMPHOCYTES NFR BLD: 13 % (ref 18–48)
LYMPHOCYTES NFR BLD: 13 % (ref 18–48)
LYMPHOCYTES NFR BLD: 8.3 % (ref 18–48)
MCH RBC QN AUTO: 22.7 PG (ref 27–31)
MCH RBC QN AUTO: 22.7 PG (ref 27–31)
MCH RBC QN AUTO: 22.8 PG (ref 27–31)
MCHC RBC AUTO-ENTMCNC: 29.6 G/DL (ref 32–36)
MCHC RBC AUTO-ENTMCNC: 30.5 G/DL (ref 32–36)
MCHC RBC AUTO-ENTMCNC: 30.5 G/DL (ref 32–36)
MCV RBC AUTO: 75 FL (ref 82–98)
MCV RBC AUTO: 75 FL (ref 82–98)
MCV RBC AUTO: 77 FL (ref 82–98)
MONOCYTES # BLD AUTO: 0.6 K/UL (ref 0.3–1)
MONOCYTES # BLD AUTO: ABNORMAL K/UL (ref 0.3–1)
MONOCYTES # BLD AUTO: ABNORMAL K/UL (ref 0.3–1)
MONOCYTES NFR BLD: 6 % (ref 4–15)
MONOCYTES NFR BLD: 6 % (ref 4–15)
MONOCYTES NFR BLD: 9.5 % (ref 4–15)
NEUTROPHILS # BLD AUTO: 4.6 K/UL (ref 1.8–7.7)
NEUTROPHILS NFR BLD: 79.8 % (ref 38–73)
NEUTROPHILS NFR BLD: 80 % (ref 38–73)
NEUTROPHILS NFR BLD: 80 % (ref 38–73)
NRBC BLD-RTO: 0 /100 WBC
PLATELET # BLD AUTO: 266 K/UL (ref 150–350)
PLATELET # BLD AUTO: 266 K/UL (ref 150–350)
PLATELET # BLD AUTO: 305 K/UL (ref 150–350)
PLATELET BLD QL SMEAR: ABNORMAL
PLATELET BLD QL SMEAR: ABNORMAL
PMV BLD AUTO: 9.5 FL (ref 9.2–12.9)
PMV BLD AUTO: 9.6 FL (ref 9.2–12.9)
PMV BLD AUTO: 9.6 FL (ref 9.2–12.9)
POCT GLUCOSE: 115 MG/DL (ref 70–110)
POCT GLUCOSE: 118 MG/DL (ref 70–110)
POCT GLUCOSE: 130 MG/DL (ref 70–110)
POCT GLUCOSE: 141 MG/DL (ref 70–110)
POCT GLUCOSE: 152 MG/DL (ref 70–110)
POIKILOCYTOSIS BLD QL SMEAR: SLIGHT
POIKILOCYTOSIS BLD QL SMEAR: SLIGHT
POTASSIUM SERPL-SCNC: 4.9 MMOL/L (ref 3.5–5.1)
POTASSIUM SERPL-SCNC: 5.1 MMOL/L (ref 3.5–5.1)
PROT SERPL-MCNC: 6.5 G/DL (ref 6–8.4)
PROT SERPL-MCNC: 6.8 G/DL (ref 6–8.4)
RBC # BLD AUTO: 3.92 M/UL (ref 4.6–6.2)
RBC # BLD AUTO: 3.92 M/UL (ref 4.6–6.2)
RBC # BLD AUTO: 4.04 M/UL (ref 4.6–6.2)
SCHISTOCYTES BLD QL SMEAR: ABNORMAL
SCHISTOCYTES BLD QL SMEAR: ABNORMAL
SODIUM SERPL-SCNC: 135 MMOL/L (ref 136–145)
SODIUM SERPL-SCNC: 137 MMOL/L (ref 136–145)
TROPONIN I SERPL DL<=0.01 NG/ML-MCNC: 0.01 NG/ML (ref 0–0.03)
WBC # BLD AUTO: 4.68 K/UL (ref 3.9–12.7)
WBC # BLD AUTO: 4.68 K/UL (ref 3.9–12.7)
WBC # BLD AUTO: 5.81 K/UL (ref 3.9–12.7)

## 2020-04-01 PROCEDURE — 80053 COMPREHEN METABOLIC PANEL: CPT

## 2020-04-01 PROCEDURE — 94002 VENT MGMT INPAT INIT DAY: CPT

## 2020-04-01 PROCEDURE — 27000221 HC OXYGEN, UP TO 24 HOURS

## 2020-04-01 PROCEDURE — 25000003 PHARM REV CODE 250: Performed by: INTERNAL MEDICINE

## 2020-04-01 PROCEDURE — S0028 INJECTION, FAMOTIDINE, 20 MG: HCPCS | Performed by: STUDENT IN AN ORGANIZED HEALTH CARE EDUCATION/TRAINING PROGRAM

## 2020-04-01 PROCEDURE — 93005 ELECTROCARDIOGRAM TRACING: CPT

## 2020-04-01 PROCEDURE — 99900026 HC AIRWAY MAINTENANCE (STAT)

## 2020-04-01 PROCEDURE — 63600175 PHARM REV CODE 636 W HCPCS

## 2020-04-01 PROCEDURE — 84484 ASSAY OF TROPONIN QUANT: CPT

## 2020-04-01 PROCEDURE — 94770 HC EXHALED C02 TEST: CPT

## 2020-04-01 PROCEDURE — 63600175 PHARM REV CODE 636 W HCPCS: Performed by: INTERNAL MEDICINE

## 2020-04-01 PROCEDURE — 82550 ASSAY OF CK (CPK): CPT

## 2020-04-01 PROCEDURE — 83605 ASSAY OF LACTIC ACID: CPT

## 2020-04-01 PROCEDURE — 63600175 PHARM REV CODE 636 W HCPCS: Performed by: EMERGENCY MEDICINE

## 2020-04-01 PROCEDURE — 93010 ELECTROCARDIOGRAM REPORT: CPT | Mod: ,,, | Performed by: INTERNAL MEDICINE

## 2020-04-01 PROCEDURE — 83615 LACTATE (LD) (LDH) ENZYME: CPT

## 2020-04-01 PROCEDURE — 63600175 PHARM REV CODE 636 W HCPCS: Performed by: HOSPITALIST

## 2020-04-01 PROCEDURE — 25000003 PHARM REV CODE 250: Performed by: STUDENT IN AN ORGANIZED HEALTH CARE EDUCATION/TRAINING PROGRAM

## 2020-04-01 PROCEDURE — 99900035 HC TECH TIME PER 15 MIN (STAT)

## 2020-04-01 PROCEDURE — 85007 BL SMEAR W/DIFF WBC COUNT: CPT

## 2020-04-01 PROCEDURE — 94003 VENT MGMT INPAT SUBQ DAY: CPT

## 2020-04-01 PROCEDURE — 85025 COMPLETE CBC W/AUTO DIFF WBC: CPT

## 2020-04-01 PROCEDURE — 86140 C-REACTIVE PROTEIN: CPT

## 2020-04-01 PROCEDURE — 80053 COMPREHEN METABOLIC PANEL: CPT | Mod: 91

## 2020-04-01 PROCEDURE — 94761 N-INVAS EAR/PLS OXIMETRY MLT: CPT

## 2020-04-01 PROCEDURE — 20000000 HC ICU ROOM

## 2020-04-01 PROCEDURE — 27100171 HC OXYGEN HIGH FLOW UP TO 24 HOURS

## 2020-04-01 PROCEDURE — 82728 ASSAY OF FERRITIN: CPT

## 2020-04-01 PROCEDURE — 63600175 PHARM REV CODE 636 W HCPCS: Performed by: STUDENT IN AN ORGANIZED HEALTH CARE EDUCATION/TRAINING PROGRAM

## 2020-04-01 PROCEDURE — 99291 CRITICAL CARE FIRST HOUR: CPT | Mod: ,,, | Performed by: INTERNAL MEDICINE

## 2020-04-01 PROCEDURE — 93010 EKG 12-LEAD: ICD-10-PCS | Mod: ,,, | Performed by: INTERNAL MEDICINE

## 2020-04-01 PROCEDURE — 27100092 HC HIGH FLOW DELIVERY CANNULA

## 2020-04-01 PROCEDURE — 85027 COMPLETE CBC AUTOMATED: CPT

## 2020-04-01 PROCEDURE — 99291 PR CRITICAL CARE, E/M 30-74 MINUTES: ICD-10-PCS | Mod: ,,, | Performed by: INTERNAL MEDICINE

## 2020-04-01 RX ORDER — FENTANYL CITRATE 50 UG/ML
50 INJECTION, SOLUTION INTRAMUSCULAR; INTRAVENOUS
Status: COMPLETED | OUTPATIENT
Start: 2020-04-01 | End: 2020-04-01

## 2020-04-01 RX ORDER — DEXTROSE MONOHYDRATE 100 MG/ML
INJECTION, SOLUTION INTRAVENOUS CONTINUOUS PRN
Status: DISCONTINUED | OUTPATIENT
Start: 2020-04-01 | End: 2020-04-06

## 2020-04-01 RX ORDER — CEFTRIAXONE 1 G/1
1 INJECTION, POWDER, FOR SOLUTION INTRAMUSCULAR; INTRAVENOUS
Status: DISCONTINUED | OUTPATIENT
Start: 2020-04-02 | End: 2020-04-02

## 2020-04-01 RX ORDER — INSULIN ASPART 100 [IU]/ML
0-5 INJECTION, SOLUTION INTRAVENOUS; SUBCUTANEOUS EVERY 4 HOURS PRN
Status: DISCONTINUED | OUTPATIENT
Start: 2020-04-01 | End: 2020-04-06

## 2020-04-01 RX ORDER — SENNOSIDES 8.8 MG/5ML
5 LIQUID ORAL DAILY
Status: DISCONTINUED | OUTPATIENT
Start: 2020-04-02 | End: 2020-04-14

## 2020-04-01 RX ORDER — AMOXICILLIN 250 MG
1 CAPSULE ORAL DAILY
Status: DISCONTINUED | OUTPATIENT
Start: 2020-04-02 | End: 2020-04-01

## 2020-04-01 RX ORDER — GLUCAGON 1 MG
1 KIT INJECTION
Status: DISCONTINUED | OUTPATIENT
Start: 2020-04-01 | End: 2020-04-06

## 2020-04-01 RX ORDER — POLYETHYLENE GLYCOL 3350 17 G/17G
17 POWDER, FOR SOLUTION ORAL DAILY
Status: DISCONTINUED | OUTPATIENT
Start: 2020-04-02 | End: 2020-04-14

## 2020-04-01 RX ORDER — DOCUSATE SODIUM 50 MG/5ML
50 LIQUID ORAL DAILY
Status: DISCONTINUED | OUTPATIENT
Start: 2020-04-02 | End: 2020-04-14

## 2020-04-01 RX ORDER — MIDAZOLAM HYDROCHLORIDE 1 MG/ML
2 INJECTION INTRAMUSCULAR; INTRAVENOUS EVERY 30 MIN PRN
Status: DISCONTINUED | OUTPATIENT
Start: 2020-04-01 | End: 2020-04-09

## 2020-04-01 RX ORDER — FENTANYL CITRATE-0.9 % NACL/PF 10 MCG/ML
25 PLASTIC BAG, INJECTION (ML) INTRAVENOUS CONTINUOUS
Status: DISCONTINUED | OUTPATIENT
Start: 2020-04-01 | End: 2020-04-05

## 2020-04-01 RX ORDER — PROPOFOL 10 MG/ML
INJECTION, EMULSION INTRAVENOUS
Status: COMPLETED
Start: 2020-04-01 | End: 2020-04-01

## 2020-04-01 RX ADMIN — HYDROXYCHLOROQUINE SULFATE 400 MG: 200 TABLET, FILM COATED ORAL at 08:04

## 2020-04-01 RX ADMIN — ENOXAPARIN SODIUM 40 MG: 100 INJECTION SUBCUTANEOUS at 08:04

## 2020-04-01 RX ADMIN — FENTANYL CITRATE 300 MCG/HR: 50 INJECTION, SOLUTION INTRAMUSCULAR; INTRAVENOUS at 10:04

## 2020-04-01 RX ADMIN — PROPOFOL 50 MCG/KG/MIN: 10 INJECTION, EMULSION INTRAVENOUS at 03:04

## 2020-04-01 RX ADMIN — PROPOFOL 50 MCG/KG/MIN: 10 INJECTION, EMULSION INTRAVENOUS at 06:04

## 2020-04-01 RX ADMIN — SODIUM BICARBONATE 650 MG TABLET 1300 MG: at 08:04

## 2020-04-01 RX ADMIN — FENTANYL CITRATE 300 MCG/HR: 50 INJECTION, SOLUTION INTRAMUSCULAR; INTRAVENOUS at 05:04

## 2020-04-01 RX ADMIN — FAMOTIDINE 20 MG: 10 INJECTION, SOLUTION INTRAVENOUS at 08:04

## 2020-04-01 RX ADMIN — CHLORHEXIDINE GLUCONATE 0.12% ORAL RINSE 15 ML: 1.2 LIQUID ORAL at 08:04

## 2020-04-01 RX ADMIN — LAMOTRIGINE 200 MG: 100 TABLET ORAL at 08:04

## 2020-04-01 RX ADMIN — GABAPENTIN 100 MG: 100 CAPSULE ORAL at 08:04

## 2020-04-01 RX ADMIN — PROPOFOL 50 MCG/KG/MIN: 10 INJECTION, EMULSION INTRAVENOUS at 12:04

## 2020-04-01 RX ADMIN — FENTANYL CITRATE 50 MCG: 50 INJECTION INTRAMUSCULAR; INTRAVENOUS at 04:04

## 2020-04-01 RX ADMIN — PROPOFOL 50 MCG/KG/MIN: 10 INJECTION, EMULSION INTRAVENOUS at 08:04

## 2020-04-01 RX ADMIN — FENTANYL CITRATE 300 MCG/HR: 50 INJECTION, SOLUTION INTRAMUSCULAR; INTRAVENOUS at 12:04

## 2020-04-01 RX ADMIN — INSULIN ASPART 2 UNITS: 100 INJECTION, SOLUTION INTRAVENOUS; SUBCUTANEOUS at 12:04

## 2020-04-01 RX ADMIN — FENTANYL CITRATE 300 MCG/HR: 50 INJECTION, SOLUTION INTRAMUSCULAR; INTRAVENOUS at 11:04

## 2020-04-01 NOTE — ASSESSMENT & PLAN NOTE
- Cr peak of 1.5 (baseline 1.1)  - Cr down trending to 1.2  - making good urine. 1.5L UOP yesterday, 500 UOP today  - net negative 1.6L for admission

## 2020-04-01 NOTE — ED PROVIDER NOTES
Encounter Date: 3/29/2020       History     Chief Complaint   Patient presents with    Shortness of Breath     c/o SOB since this morning,  denies cough and fever.  denies being around anybody with covid19    Transfer     arrived amadeologan from [facility] for icu admission covid patient     64-year-old male with past medical history of T2 DM, obstructive sleep apnea, psychiatric disorders, remote prostate cancer, presenting with shortness of breath for the past day.  Patient initially seen at [facility] where he was intubated for respiratory distress and hypoxia.  Patient is currently sedated and intubated.        Review of patient's allergies indicates:  No Known Allergies  Past Medical History:   Diagnosis Date    Behavioral problem     Bipolar 1 disorder     Cervical spondylosis with myelopathy 10/9/2012    Diabetes mellitus type II     History of prostate cancer, s/p radiation 2015    History of psychiatric hospitalization     Hx of psychiatric care     Psychiatric exam requested by authority     Psychiatric problem     Recurrent major depressive disorder, in partial remission 2018    Self-harming behavior     Sleep apnea with use of continuous positive airway pressure (CPAP)     Suicide attempt     Therapy     Tobacco abuse 2015     Past Surgical History:   Procedure Laterality Date    CHOLECYSTECTOMY      HERNIA REPAIR      LEG SURGERY      ORIF tib/fib    SPINE SURGERY       Family History   Problem Relation Age of Onset    Hypertension Mother     Arthritis Mother     Alcohol abuse Mother     Alcohol abuse Father     Alcohol abuse Maternal Uncle     Drug abuse Maternal Uncle     Suicide Maternal Uncle      Social History     Tobacco Use    Smoking status: Current Every Day Smoker     Packs/day: 0.50     Years: 30.00     Pack years: 15.00     Types: Cigarettes     Last attempt to quit: 2015     Years since quittin.3    Smokeless tobacco: Never Used    Tobacco  comment: currently using nicotine patches 21 mg   Substance Use Topics    Alcohol use: No     Alcohol/week: 0.0 standard drinks     Comment: stoped 17 months ago but says that he is an alcoholic    Drug use: Not Currently     Comment: stopped 17 months ago; used to used heavy drugs, particularly marijuana, acid, amphetamines, cocaine, IV crack cocaine     Review of Systems   Unable to perform ROS: Intubated       Physical Exam     Initial Vitals [03/29/20 2132]   BP Pulse Resp Temp SpO2   (!) 152/74 (!) 113 (!) 24 98.9 °F (37.2 °C) (!) 85 %      MAP       --         Physical Exam    Constitutional: He appears well-developed. He is not diaphoretic.   Ill-appearing   HENT:   Head: Normocephalic and atraumatic.   Pulmonary/Chest: No stridor.   Intubated, breath sounds equal bilaterally   Abdominal: Soft.   Neurological:   Sedated   Skin: Skin is warm and dry. Capillary refill takes less than 2 seconds.         ED Course   Critical Care  Date/Time: 4/1/2020 6:33 PM  Performed by: Cony Raza MD  Authorized by: Cony Raza MD   Direct patient critical care time: 20 minutes  Additional history critical care time: 10 minutes  Ordering / reviewing critical care time: 5 minutes  Documentation critical care time: 5 minutes  Total critical care time (exclusive of procedural time) : 40 minutes  Critical care time was exclusive of separately billable procedures and treating other patients and teaching time.  Critical care was necessary to treat or prevent imminent or life-threatening deterioration of the following conditions: respiratory failure.  Critical care was time spent personally by me on the following activities: discussions with consultants, interpretation of cardiac output measurements, evaluation of patient's response to treatment, examination of patient, ordering and performing treatments and interventions, ordering and review of laboratory studies, ordering and review of radiographic studies, pulse  oximetry and re-evaluation of patient's condition.  Subsequent provider of critical care: I assumed direction of critical care for this patient from another provider of my specialty.        Labs Reviewed   CBC W/ AUTO DIFFERENTIAL - Abnormal; Notable for the following components:       Result Value    Hemoglobin 11.2 (*)     Hematocrit 36.5 (*)     Mean Corpuscular Volume 73 (*)     Mean Corpuscular Hemoglobin 22.5 (*)     Mean Corpuscular Hemoglobin Conc 30.7 (*)     RDW 18.1 (*)     Gran% 86.0 (*)     Lymph% 8.0 (*)     All other components within normal limits   C-REACTIVE PROTEIN - Abnormal; Notable for the following components:    .7 (*)     All other components within normal limits   LACTATE DEHYDROGENASE - Abnormal; Notable for the following components:     (*)     All other components within normal limits   COMPREHENSIVE METABOLIC PANEL - Abnormal; Notable for the following components:    Sodium 132 (*)     Potassium 5.2 (*)     CO2 17 (*)     Glucose 203 (*)     BUN, Bld 25 (*)     Albumin 3.0 (*)      (*)      (*)     Anion Gap 17 (*)     eGFR if non  52 (*)     All other components within normal limits   D DIMER, QUANTITATIVE - Abnormal; Notable for the following components:    D-Dimer 0.53 (*)     All other components within normal limits   URINALYSIS, REFLEX TO URINE CULTURE - Abnormal; Notable for the following components:    Protein, UA 1+ (*)     Glucose, UA 3+ (*)     Occult Blood UA Trace (*)     All other components within normal limits    Narrative:     Preferred Collection Type->Urine, Clean Catch   LACTIC ACID, PLASMA - Abnormal; Notable for the following components:    Lactate (Lactic Acid) 3.7 (*)     All other components within normal limits    Narrative:        critical result(s) called and verbal readback obtained from   Damion Arango RN by DEQUAN 03/29/2020 22:53   URINALYSIS MICROSCOPIC - Abnormal; Notable for the following components:    RBC, UA 10  (*)     All other components within normal limits    Narrative:     Preferred Collection Type->Urine, Clean Catch   PROCALCITONIN        ECG Results          EKG 12-lead (Final result)  Result time 03/31/20 09:55:44    Final result by Interface, Lab In [facility] (03/31/20 09:55:44)                 Narrative:    Test Reason : J22,B97.29,    Vent. Rate : 105 BPM     Atrial Rate : 104 BPM     P-R Int : 000 ms          QRS Dur : 068 ms      QT Int : 338 ms       P-R-T Axes : 000 -06 006 degrees     QTc Int : 446 ms    Atrial pacing.  Confirmed by Musa Padron MD ([id number]) on 3/31/2020 9:55:37 AM    Referred By: IRMA   SELF           Confirmed By:Musa Padron MD                             EKG 12-lead (Final result)  Result time 03/30/20 14:45:25    Final result by Interface, Lab In [facility] (03/30/20 14:45:25)                 Narrative:    Test Reason : R06.00,    Vent. Rate : 109 BPM     Atrial Rate : 109 BPM     P-R Int : 146 ms          QRS Dur : 070 ms      QT Int : 324 ms       P-R-T Axes : 063 023 036 degrees     QTc Int : 436 ms    Sinus tachycardia  Otherwise normal ECG    Confirmed by Musa Padron MD ([id number]) on 3/30/2020 2:45:14 PM    Referred By: IRMA   SELF           Confirmed By:Musa Padron MD                            Imaging Results          X-Ray Chest AP Portable (Final result)  Result time 03/30/20 17:20:22    Final result by Vickey Lares MD (03/30/20 17:20:22)                 Impression:      As above.      Electronically signed by: Vickey Lares MD  Date:    03/30/2020  Time:    17:20             Narrative:    EXAMINATION:  XR CHEST AP PORTABLE    CLINICAL HISTORY:  ET tube placement;    TECHNIQUE:  Single frontal view of the chest was performed.    COMPARISON:  Chest radiograph 03/29/2020    FINDINGS:  Monitoring leads, tubing and ventilator apparatus overlie the chest.  Additionally, the lateral most aspect of the right mid to lower lung zone are not included in field  of view limiting evaluation of these regions.    Interval placement of endotracheal tube with tip approximately 3.8 cm above the miguel.  Interval placement of enteric tube with distal portion coiled upon itself cephalad with tip near the GE junction at the medial left upper quadrant.  Interval placement of right IJ CVC with tip overlying the mid SVC.  No large pneumothorax.    Interval increased opacities at the right greater than left mid to lower lung zones.  Cardiomediastinal silhouette is stable.  Osseous structures are unchanged.                               X-Ray Chest 1 View (Final result)  Result time 03/29/20 22:27:03    Final result by Norma Antunez MD (03/29/20 22:27:03)                 Impression:      Consolidation seen within the medial aspect of the right lung base most suggestive for developing pneumonia.      Electronically signed by: Norma Antunez MD  Date:    03/29/2020  Time:    22:27             Narrative:    EXAMINATION:  XR CHEST 1 VIEW    CLINICAL HISTORY:  Hypoxemia    TECHNIQUE:  Single frontal view of the chest was performed.    COMPARISON:  September 2014.    FINDINGS:  Cardiac silhouette is stable in size.  Lungs are symmetrically expanded.  Consolidative changes are visualized within the medial aspect of the right lung base.  No evidence of pneumothorax or significant pleural effusion.  No acute osseous abnormality identified.                                 Medical Decision Making:   History:   Old Medical Records: I decided to obtain old medical records.  Old Records Summarized: records from clinic visits, records from another hospital and records from previous admission(s).       <> Summary of Records: 64-year-old male presents complaining of dyspnea intermittently over the past 5 days that worsened today while at home and was associated with episode of weakness.  Pt works at the [employer] in housekeeping. Four days ago, pt experienced her SOB symptoms while working. Pt was seen at  the [employer] and reports having a negative flu swab and a normal chest X-ray. Pt has been at home ever since. Pt reports her SOB is intermittent. Today, he was walking from his kitchen to the room and felt weak to the point of falling. Pt is normally able to walk this distance. He denies fever, chills, bodyaches, vomiting, diarrhea, and cough.    He was found to be febrile, tachycardic and hypoxic once placed in the room.  O2 sat 85% on RA. Oxygen saturation did improve significantly on nasal cannula.  He is currently on 6 L nasal cannula and oxygen saturation is in the mid 90s.  Patient is currently sitting comfortably in bed using his phone and reports feeling much better since arriving in the emergency department.  Chest x-ray does show a right lower lobe consolidation.  Labs with changes concerning for COVID-19.  Patient was tested tonight.  He will be given Rocephin and azithromycin given chest x-ray findings and 1 L of normal saline given lactic acid of 3.5 as well as Tylenol.   Intubated 3/30 at 5:30 PM  Initial Assessment:   Patient is intubated and sedated on fentanyl 300 and propofol 50, blood pressure stable without need for pressors.  Bilateral breath sounds present.  ICU consulted.  Differential Diagnosis:   Covid, viral syndrome, PNA, pleural effusion, pulmonary edema, electrolyte abnormality, metabolic abnormality    Clinical Tests:   Lab Tests: Reviewed  ED Management:  ICU notifed.    4:15 PM  Pt becoming slightly more agitated, will give Versed. Stable on Vent with FiO2 50% and PEEP 10. D/w ICU fellow.   Other:   I have discussed this case with another health care provider.       <> Summary of the Discussion: Notified ICU fellow of increasing agitation and versed administration                                 Clinical Impression:       ICD-10-CM ICD-9-CM   1. Suspected Covid-19 Virus Infection R68.89    2. Hypoxia R09.02 799.02   3. Dyspnea R06.00 786.09   4. Pneumonia of right lower lobe due to  infectious organism J18.1 486   5. COVID-19 virus detected J22     B97.29    6. Acute respiratory disease due to COVID-19 virus J06.9     B97.29    7. Acute hypoxemic respiratory failure J96.01 518.81             ED Disposition Condition    Admit                           Cony Raza MD  04/05/20 0501

## 2020-04-01 NOTE — PROGRESS NOTES
REASON FOR CONSULTATION: JULIET     HPI:64 y.o. male with bipolar D/O, DM, prostate CA presented to ED 3/29/2020 with SOB, fever, weakness.  Admitted and subsequently transferred to ICU 3/30 and intubated due to hypoxia/resp distress.  COVID-19 Positive.    Interval History:  No real improvement over night in resp status. Drop in Hgb noted. Creatinine remains stable and   UOP adequate.    REVIEW OF SYSTEMS:  Unable to obtain    Past Medical History:   Diagnosis Date    Behavioral problem     Bipolar 1 disorder     Cancer     Prostate; completed treatment    Diabetes mellitus type II     History of psychiatric hospitalization     Hx of psychiatric care     Hypertension     Yen     Psychiatric exam requested by authority     Psychiatric problem     Self-harming behavior     Sleep apnea     Sleep apnea with use of continuous positive airway pressure (CPAP)     Suicide attempt     Therapy        Past Surgical History:   Procedure Laterality Date    CHOLECYSTECTOMY      HERNIA REPAIR      LEG SURGERY      ORIF tib/fib    SPINE SURGERY         Review of patient's allergies indicates:  No Known Allergies    Medications Prior to Admission   Medication Sig Dispense Refill Last Dose    BUPROPION HCL (BUPROBAN ORAL) Take 450 mg by mouth once daily.    3/29/2020    diclofenac sodium (VOLTAREN) 1 % Gel Apply topically 4 (four) times daily as needed.       empagliflozin (JARDIANCE) 25 mg Tab Take by mouth.   3/29/2020    gabapentin (NEURONTIN) 100 MG capsule Take 1 capsule (100 mg total) by mouth 2 (two) times daily. 180 capsule 3 3/29/2020    gluc-kenneth-msm3-tugm-jhmxqt-txp 500-450-0.67 mg Cap Take by mouth.   3/29/2020    lamotrigine (LAMICTAL) 200 MG tablet Take 200 mg by mouth once daily.   3/29/2020    lisinopril 10 MG tablet Take 1 tablet (10 mg total) by mouth once daily. 90 tablet 3 3/28/2020    megestrol (MEGACE) 40 MG Tab Take 40 mg by mouth once daily.   3/29/2020    melatonin (MELATIN) 3 mg  tablet Take 9 mg by mouth nightly as needed for Insomnia.       metFORMIN (GLUCOPHAGE) 1000 MG tablet Take 1,000 mg by mouth 2 (two) times daily with meals.       modafinil (PROVIGIL) 200 MG Tab Take 200 mg by mouth once daily.   3/29/2020    multivitamin capsule Take 1 capsule by mouth once daily.   3/29/2020    pantoprazole (PROTONIX) 40 MG tablet Take 40 mg by mouth once daily.   3/29/2020    peg 400-propylene glycol, PF, (LUBRICANT EYE, PG-,,PF,) 0.4-0.3 % Dpet Apply 1 drop to eye 4 (four) times daily as needed.       simethicone (MYLICON) 80 MG chewable tablet Take 80 mg by mouth every 6 (six) hours as needed for Flatulence.   3/29/2020    ziprasidone (GEODON) 80 MG capsule Take 80 mg by mouth nightly.   3/28/2020    B-complex with vitamin C (Z-BEC OR EQUIV) tablet Take 1 tablet by mouth once daily.   Not Taking    multivitamin capsule Take 1 capsule by mouth once daily.   Not Taking    nicotine (NICODERM CQ) 21 mg/24 hr Place 1 patch onto the skin every 24 hours.   Not Taking    terbinafine HCl (LAMISIL) 1 % cream Apply 1 % topically daily as needed.   Not Taking       Current Facility-Administered Medications   Medication Dose Route Frequency Provider Last Rate Last Dose    acetaminophen tablet 650 mg  650 mg Oral Q4H PRN Emilee Mandel MD        azithromycin 500 mg in dextrose 5 % 250 mL IVPB (ready to mix system)  500 mg Intravenous Q24H Lottie Garsia  mL/hr at 03/31/20 2346 500 mg at 03/31/20 2346    cefTRIAXone (ROCEPHIN) 1 g in dextrose 5 % 50 mL IVPB  1 g Intravenous Q24H Emilee Mandel MD   1 g at 03/31/20 2352    chlorhexidine 0.12 % solution 15 mL  15 mL Mouth/Throat BID Lottie Garsia MD   15 mL at 03/31/20 2001    dextrose 50% injection 12.5 g  12.5 g Intravenous PRN Emilee Mandel MD        dextrose 50% injection 25 g  25 g Intravenous PRN Emilee Mandel MD        enoxaparin injection 40 mg  40 mg Subcutaneous Daily Emilee Mandel MD   40 mg at 03/31/20 1551     famotidine (PF) injection 20 mg  20 mg Intravenous BID Lottie Garsia MD   20 mg at 03/31/20 2000    fentaNYL (SUBLIMAZE) 2,500 mcg in sodium chloride 0.9% 250 mL infusion  25 mcg/hr Intravenous Continuous Neto Robertson MD 30 mL/hr at 04/01/20 0800 300 mcg/hr at 04/01/20 0800    gabapentin capsule 100 mg  100 mg Oral BID Emilee Mandel MD   100 mg at 03/31/20 2000    glucagon (human recombinant) injection 1 mg  1 mg Intramuscular PRN Emilee Mandel MD        glucose chewable tablet 16 g  16 g Oral PRN Emilee Mandel MD        glucose chewable tablet 24 g  24 g Oral PRN Emilee Mandel MD        heparin (porcine) injection 10,000 Units  10,000 Units Intravenous PRN Mervin Meyers MD   10,000 Units at 03/30/20 1647    hydroxychloroquine tablet 400 mg  400 mg Oral Daily Emilee Mandel MD        insulin aspart U-100 pen 1-10 Units  1-10 Units Subcutaneous Q6H Lala Corado MD        lamoTRIgine tablet 200 mg  200 mg Oral QHS Emilee Mandel MD   200 mg at 03/31/20 2000    ondansetron disintegrating tablet 8 mg  8 mg Oral Q8H PRN Emilee Mandel MD        phenylephrine HCl in 0.9% NaCl 1 mg/10 mL (100 mcg/mL) syringe 100 mcg  100 mcg Intravenous Once Mervin Meyers MD        propofol (DIPRIVAN) 10 mg/mL infusion  5 mcg/kg/min Intravenous Continuous Lottie Garsia MD 26.2 mL/hr at 04/01/20 0800 50 mcg/kg/min at 04/01/20 0800    simethicone chewable tablet 80 mg  80 mg Oral Q6H PRN Emilee Mandel MD        sodium bicarbonate tablet 1,300 mg  1,300 mg Oral BID Radha Huynh MD   1,300 mg at 03/31/20 2000    sodium chloride 0.9% flush 10 mL  10 mL Intravenous PRN Emilee Mandel MD        sodium chloride 0.9% flush 10 mL  10 mL Intravenous PRN Emilee Mandel MD           Social History     Socioeconomic History    Marital status:      Spouse name: Not on file    Number of children: 7    Years of education: Not on file    Highest education level: Not on file   Occupational History     Occupation: unemployed   Social Needs    Financial resource strain: Not on file    Food insecurity:     Worry: Not on file     Inability: Not on file    Transportation needs:     Medical: Not on file     Non-medical: Not on file   Tobacco Use    Smoking status: Current Every Day Smoker     Packs/day: 0.50     Years: 30.00     Pack years: 15.00     Types: Cigarettes     Last attempt to quit: 2015     Years since quittin.3    Smokeless tobacco: Never Used    Tobacco comment: currently using nicotine patches 21 mg   Substance and Sexual Activity    Alcohol use: No     Alcohol/week: 0.0 standard drinks     Comment: stoped 17 months ago but says that he is an alcoholic    Drug use: Not Currently     Comment: stopped 17 months ago; used to used heavy drugs, particularly marijuana, acid, amphetamines, cocaine, IV crack cocaine    Sexual activity: Not on file   Lifestyle    Physical activity:     Days per week: Not on file     Minutes per session: Not on file    Stress: Not on file   Relationships    Social connections:     Talks on phone: Not on file     Gets together: Not on file     Attends Qu[employer] service: Not on file     Active member of club or organization: Not on file     Attends meetings of clubs or organizations: Not on file     Relationship status: Not on file   Other Topics Concern    Patient feels they ought to cut down on drinking/drug use Not Asked    Patient annoyed by others criticizing their drinking/drug use Not Asked    Patient has felt bad or guilty about drinking/drug use Not Asked    Patient has had a drink/used drugs as an eye opener in the AM Not Asked   Social History Narrative    Not on file       Family History   Problem Relation Age of Onset    Hypertension Mother     Arthritis Mother     Alcohol abuse Mother     Alcohol abuse Father     Alcohol abuse Maternal Uncle     Drug abuse Maternal Uncle     Suicide Maternal Uncle        Temp:  [98 °F (36.7 °C)-100.8  °F (38.2 °C)] 100.3 °F (37.9 °C)  Pulse:  [] 102  Resp:  [14-27] 22  SpO2:  [88 %-98 %] 95 %  BP: (103-126)/(59-73) 119/70  Arterial Line BP: (111)/(56) 111/56      PHYSICAL EXAM:    GEN:  Intubated and sedated, well developed BM  HEENT:  NCAT, VALERY, No conjunctivitis, normal sclera, No LAD, Nml JVD, no carotid bruits  CV:  RRR no MRG  Lungs:  CTAb, no rhonchi, wheeze, crackles, Decreased BS  Abdomen: Obese, soft, NTND, no fluid wave, no HSM, NABS  Ext:  No CCE, normal DP pulses bilat  Neuro:  Non-Focal, EOMI, gait not assessed  Skin:  No rash, excoriation, petchiae      Labs: Reviewed    CXR: Reviewed    ASSESSMENT AND PLAN:    63 YO male with acute resp distress/ARDS COVID +  -Intubated 3/30/2020  -On hydroxychloroqine, Rocephin, Azithromycin  -Per CCT    JULIET on CKD 2  -Baseline creat appears to be about 1.1 jumped to 1.5  -Today back to near baseline 1.2  -1700 in/ 1490 UOP  -Hold ACE  -Would not treat hyperkalemia with anything other than bicarb at this time  -continue to monitor closely    DM  -Normally on Jardiance   -SSI for now  -History of poor control but last HgA1c on record 7.1  -Holding ACE    Transaminitis  -Related to COVID 19

## 2020-04-01 NOTE — PROGRESS NOTES
Progress Note  [facility]        SUBJECTIVE:     Chief Complaint:   Dyspnea    History of present illness/Interval history since last note:  The patient is a 64 y.o. male admitted 3/29/2020 with shortness of breath. The patient was admitted due to concern for respiratory compromise from possible COVID 19.    Review of patient's allergies indicates:  No Known Allergies    Past Medical History:   Diagnosis Date    Behavioral problem     Bipolar 1 disorder     Cervical spondylosis with myelopathy 10/9/2012    Diabetes mellitus type II     History of prostate cancer, s/p radiation 11/11/2015    History of psychiatric hospitalization     Hx of psychiatric care     Psychiatric exam requested by authority     Psychiatric problem     Recurrent major depressive disorder, in partial remission 7/5/2018    Self-harming behavior     Sleep apnea with use of continuous positive airway pressure (CPAP)     Suicide attempt     Therapy     Tobacco abuse 11/11/2015     Past Surgical History:   Procedure Laterality Date    CHOLECYSTECTOMY      HERNIA REPAIR      LEG SURGERY      ORIF tib/fib    SPINE SURGERY       Family History   Problem Relation Age of Onset    Hypertension Mother     Arthritis Mother     Alcohol abuse Mother     Alcohol abuse Father     Alcohol abuse Maternal Uncle     Drug abuse Maternal Uncle     Suicide Maternal Uncle      Social History     Socioeconomic History    Marital status:      Spouse name: Not on file    Number of children: 7    Years of education: Not on file    Highest education level: Not on file   Occupational History    Occupation: unemployed   Social Needs    Financial resource strain: Not on file    Food insecurity:     Worry: Not on file     Inability: Not on file    Transportation needs:     Medical: Not on file     Non-medical: Not on file   Tobacco Use    Smoking status: Current Every Day Smoker     Packs/day: 0.50     Years: 30.00      Pack years: 15.00     Types: Cigarettes     Last attempt to quit: 2015     Years since quittin.3    Smokeless tobacco: Never Used    Tobacco comment: currently using nicotine patches 21 mg   Substance and Sexual Activity    Alcohol use: No     Alcohol/week: 0.0 standard drinks     Comment: stoped 17 months ago but says that he is an alcoholic    Drug use: Not Currently     Comment: stopped 17 months ago; used to used heavy drugs, particularly marijuana, acid, amphetamines, cocaine, IV crack cocaine    Sexual activity: Not on file   Lifestyle    Physical activity:     Days per week: Not on file     Minutes per session: Not on file    Stress: Not on file   Relationships    Social connections:     Talks on phone: Not on file     Gets together: Not on file     Attends Mandaeism service: Not on file     Active member of club or organization: Not on file     Attends meetings of clubs or organizations: Not on file     Relationship status: Not on file   Other Topics Concern    Patient feels they ought to cut down on drinking/drug use Not Asked    Patient annoyed by others criticizing their drinking/drug use Not Asked    Patient has felt bad or guilty about drinking/drug use Not Asked    Patient has had a drink/used drugs as an eye opener in the AM Not Asked   Social History Narrative    Not on file       Review of Systems:  Unable to obtain due to patient's status- respiratory compromise.    OBJECTIVE:     Vital Signs  Vitals:    20 1200 20 1230 20 1300 20 1400   BP: (!) 112/59  (!) 109/59 (!) 108/59   BP Location: Left arm  Right arm Right arm   Pulse: 93 94 92 90   Resp: (!) 21 (!) 21 (!) 22 (!) 21   Temp: 98.9 °F (37.2 °C)      TempSrc: Axillary      SpO2: 97% 97% 96% 97%   Weight:       Height:         Body mass index is 28.45 kg/m².    Physical Exam:  General: supine, in no acute distress  Eyes:  conjunctivae/corneas clear, PERRL  Nose: no discharge  Neck: trachea midline  with no masses appreciated  Lungs:  equal breath sounds bilaterally, no wheezes appreciated  Heart: regular rate and rhythm and no murmur  Abdomen: non-distended, soft  Extremities: no cyanosis, no edema  Skin: No rashes. Good skin turgor  Neurologic: PERRL, symmetric facies    Laboratory:  Lab Results   Component Value Date    WBC 4.68 04/01/2020    WBC 4.68 04/01/2020    HGB 8.9 (L) 04/01/2020    HGB 8.9 (L) 04/01/2020    HCT 29.2 (L) 04/01/2020    HCT 29.2 (L) 04/01/2020    MCV 75 (L) 04/01/2020    MCV 75 (L) 04/01/2020     04/01/2020     04/01/2020       Chest Imaging, My Impression:   Most recent CXR: bilateral infiltrates    Diagnostic Results:  Electrocardiogram reviewed    ASSESSMENT/PLAN:     1) Acute hypoxemic respiratory failure due to ARDS. The pt was intubated on 3/30. Ventilate with 6cc/kg tidal volume = 420. Pt is on 40% FiO2 & 10 PEEP today. Continue to wean as tolerated. Once PEEP has weaned to 8 or less, we can initiate spontaneous breathing trials & extubate if he does well.  2) Pneumonia due to COVID 19 virus. Testing for COVID 19 is positive. We are administering azithromycin & plaquenil.  Blood Pressure: normotensive  Renal: Estimated Creatinine Clearance: 67.2 mL/min (based on SCr of 1.2 mg/dL).  (please disregard this creatinine clearance if pt has received renal replacement therapy in recent days)  DVT ppx: lovenox  GI ppx: pepcid  Nutrition: he is tolerating tube feeds    I spoke with the patient's wife & requested authorization to transfer the patient to [facility] due to bed capacity limitations here at [facility]. Please call me immediately with any questions regarding his care during this transition. My cell phone number is [phone].    Critical Care Time: 40 minutes  Critical care was time spent personally by me on the following activities: evaluating this patient's organ dysfunction, development of treatment plan, discussing treatment plan with  patient or surrogate and bedside caregivers, discussions with consultants, evaluation of patient's response to treatment, examination of patient, ordering and performing treatments and interventions, ordering and review of laboratory studies, ordering and review of radiographic studies, re-evaluation of patient's condition. This critical care time did not overlap with that of any other provider or involve time for any procedures.

## 2020-04-01 NOTE — PLAN OF CARE
Pt gas 8.0 ETT at 23. Pt remain on Pb840 with documented settings. Vent settings weaned per ARDS protocol. Will continue to monitor.

## 2020-04-01 NOTE — PROVIDER TRANSFER
"Confirmed COVID 19 Patient     (Physician in Lead of Transfers)/Regional Referral Center Note    Patients name/MRN: Cash Crawford/MRN [id number]    Referring Physician or Mid-Level provider giving report:  Dr. Pablo Dangelo (Pulmonary/Critical care)  Contact number: [phone]    Referral Facility: [facility]    Date/Time of Acceptance: 4/1/2020 12:01 PM    :  Dr. 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 CXR right lobe infiltrate with elevated , D-Dimer 1.03,  and Ferritin 331. Patient swabbed for COVID 19 and has returned positive. Patient started on IV Rocephin and Azithromycin and Plaquenil. Patient initially on 2 liters for mild hypoxia at 88% on room air but within 24 hours of admit patient had increasing oxygen requirements and intubated on evening of 3/30. CXR showed worsening lungs with bilateral patchy infiltrates. Patient has had normal renal function with good UOP and no dialysis needs at [facility]. Patient has been improving on vent with less requirement since intubation and currently on AC with /PEEP +10/Rate 22/FIO2 40%. Patient with trialysis catheter/OG tube/and arterial line in place. Labs also showed transaminitis with ,  likely due to COVID infection. Patient on no vasopressors and on Fentanyl and Propofol for sedation. .     VS: /58 (BP Location: Left arm)   Pulse 96   Temp 100.3 °F (37.9 °C) (Axillary)   Resp (!) 21   Ht 5' 9" (1.753 m)   Wt 87.4 kg (192 lb 10.9 oz)   SpO2 96% " on FIO2 40%   BMI 28.45 kg/m²     Current Vent Settings:  Vent Mode: A/C  Oxygen Concentration (%):  [35-40] 40  Resp Rate Total:  [22 br/min-31 br/min] 22 br/min  Vt Set:  [420 mL] 420 mL  PEEP/CPAP:  [10 cmH20] 10 cmH20  Pressure Support:  [0 cmH20] 0 cmH20  Mean Airway Pressure:  [11 cgC20-95 cmH20] 14 cmH20    Past Medical/Surgical History: See EPIC     Meds: See EPIC    Labs: See EPIC     Imaging: See EPIC    To Do List upon arrival:     Admit to ICU for continued ICU care and ventilator weaning for ARDS   Special COVID isolation- airborne, droplet and contact   Treatment protocol for COVID 19 infection. Patient on day # 3 of abx and Plaquenil.     Rosaura Meyer MD  Senior Staff Physician  Department of Hospital Medicine  Patient Flow Center/   [phone]

## 2020-04-01 NOTE — HPI
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 CXR right lobe infiltrate with elevated , D-Dimer 1.03,  and Ferritin 331. Patient swabbed for COVID 19:positive. Patient started on IV Rocephin and Azithromycin and Plaquenil. Patient initially on 2 liters for mild hypoxia at 88% on room air but within 24 hours of admit patient had increasing oxygen requirements and intubated on evening of 3/30 (P/F then 170); CXR at the time suggested ARDS picture given bilateral patchy ground-glass infiltrates. Patient had mild JULIET with Cr of 1.5, but good UOP not requiring dialysis. Patient has been improving on vent with less requirement since intubation. Labs also showed worsening transaminitis with ,  likely due to COVID infection; history of treated hepatitis C infection in 2015. Patient not requiring vasopressors at [facility], on Fentanyl and Propofol for sedation.Transferred to [facility] on 4/1 due to capacity issues at [facility]     Full, itemized PMHx/SurgHx/Med list, per patient's medical records. Sees Dr. García Lechuga at [facility]  · HTN: on lisinopril 10mg daily  · 3-4yr Hx of T2DM with polyneuropathy, HBA1C 3/29 7.0%, non-insulin-dependent, on Metformin 500 BD, Glipizide 10 mg BD, Glucophage 1000 mg BD.  Last eye exam Jan 2019, last foot exam Feb 2019  · HLP: Atorvastatin 80mg daily  · Prostate CA, S/P XRT and seed brachytherapy in 2015, followed by [facility], last PSA <0.1 2/5/19  · Cervical spondylopathy with myelopathy, S/P ACDF C4-5 by Dr. Le in 2012  · Bipolar disorder with depressive features, last psych admit 2014 at [facility], with Hx of suicide attempts. On home ziprazidone 80mg QD, Bupropion  150 BD, Lamotrigine 200mg QD, sees psychiatrist   · Insomnia: On 3mg melatonin QHS  · Polysubstance abuser: EtOH (unclear daily use), cocaine, amphetamines, LSD,   · 15-PY smoker, quit 2015, on nicotine patch

## 2020-04-01 NOTE — ED TRIAGE NOTES
Patient reports to the ED today as a Transfer from [facility] ICU. Patient is COVID +. Patietn arrives intubated with with PEEP of 10 and FiO2 40. Arterial line and central line, OG and Atkins in place on arrival.

## 2020-04-01 NOTE — H&P
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 CXR right lobe infiltrate with elevated , D-Dimer 1.03,  and Ferritin 331. Patient swabbed for COVID 19 and has returned positive. Patient started on IV Rocephin and Azithromycin and Plaquenil. Patient initially on 2 liters for mild hypoxia at 88% on room air but within 24 hours of admit patient had increasing oxygen requirements and intubated on evening of 3/30. CXR showed worsening lungs with bilateral patchy infiltrates. Patient had mild JULIET with Cr of 1.5, now improving, with good UOP and no dialysis needs at [facility] [place]. Patient has been improving on vent with less requirement since intubation. patient with trialysis catheter/OG tube/and arterial line in place. Labs also showed worsening transaminitis with ,  likely due to COVID infection; however, does have a history of treated hepatitis C infection in 2015. Patient on no vasopressors and on Fentanyl and Propofol for sedation.Transferred [facility]1 due to capacity issues at [facility].     Currently on /FIO2 40% and 10 PEEP,  (6ml/kg).       Past Medical History:   Diagnosis Date    Behavioral problem     Bipolar 1 disorder     Cervical spondylosis with myelopathy 10/9/2012    Diabetes  mellitus type II     History of prostate cancer, s/p radiation 2015    History of psychiatric hospitalization     Hx of psychiatric care     Psychiatric exam requested by authority     Psychiatric problem     Recurrent major depressive disorder, in partial remission 2018    Self-harming behavior     Sleep apnea with use of continuous positive airway pressure (CPAP)     Suicide attempt     Therapy     Tobacco abuse 2015       Past Surgical History:   Procedure Laterality Date    CHOLECYSTECTOMY      HERNIA REPAIR      LEG SURGERY      ORIF tib/fib    SPINE SURGERY         Review of patient's allergies indicates:  No Known Allergies    Family History     Problem Relation (Age of Onset)    Alcohol abuse Mother, Father, Maternal Uncle    Arthritis Mother    Drug abuse Maternal Uncle    Hypertension Mother    Suicide Maternal Uncle        Tobacco Use    Smoking status: Current Every Day Smoker     Packs/day: 0.50     Years: 30.00     Pack years: 15.00     Types: Cigarettes     Last attempt to quit: 2015     Years since quittin.3    Smokeless tobacco: Never Used    Tobacco comment: currently using nicotine patches 21 mg   Substance and Sexual Activity    Alcohol use: No     Alcohol/week: 0.0 standard drinks     Comment: stoped 17 months ago but says that he is an alcoholic    Drug use: Not Currently     Comment: stopped 17 months ago; used to used heavy drugs, particularly marijuana, acid, amphetamines, cocaine, IV crack cocaine    Sexual activity: Not on file      Review of Systems   Unable to perform ROS: Intubated     Objective:     Vital Signs (Most Recent):  Temp: 98.9 °F (37.2 °C) (20 1200)  Pulse: 96 (20 1645)  Resp: (!) 22 (20 1645)  BP: 139/82 (20 1645)  SpO2: (!) 92 % (20 1645) Vital Signs (24h Range):  Temp:  [98.9 °F (37.2 °C)-100.8 °F (38.2 °C)] 98.9 °F (37.2 °C)  Pulse:  [] 96  Resp:  [14-22] 22  SpO2:  [90 %-97 %] 92 %  BP:  (103-139)/(58-82) 139/82  Arterial Line BP: (104-152)/(48-82) 132/64   Weight: 87.4 kg (192 lb 10.9 oz)  Body mass index is 28.45 kg/m².      Intake/Output Summary (Last 24 hours) at 4/1/2020 1752  Last data filed at 4/1/2020 1300  Gross per 24 hour   Intake 2315.65 ml   Output 1295 ml   Net 1020.65 ml       Physical Exam   Constitutional: He appears well-developed and well-nourished.   Exam from window to reduce covid exposure. Sedated and intubated   Cardiovascular: Normal rate and regular rhythm.   Not on pressors   Pulmonary/Chest: Effort normal.   intubated       Vents:  Vent Mode: A/C (04/01/20 1551)  Ventilator Initiated: Yes (04/01/20 1551)  Set Rate: 22 BPM (04/01/20 1551)  Vt Set: 420 mL (04/01/20 1551)  Pressure Support: 0 cmH20 (04/01/20 1434)  PEEP/CPAP: 10 cmH20 (04/01/20 1551)  Oxygen Concentration (%): 50 (04/01/20 1645)  Peak Airway Pressure: 29 cmH2O (04/01/20 1551)  Plateau Pressure: 0 cmH20 (04/01/20 1434)  Total Ve: 10.56 mL (04/01/20 1551)  F/VT Ratio<105 (RSBI): (!) 56.99 (04/01/20 0833)  Lines/Drains/Airways     Central Venous Catheter Line                 Hemodialysis Catheter 03/30/20 1630 right subclavian 2 days    Permacath 03/30/20 1733 2 days    Percutaneous Central Line Insertion/Assessment - Double Lumen  03/30/20 1837 1 day          Drain                 NG/OG Tube 03/30/20 1630 [place] sump 2 days         Urethral Catheter 03/30/20 1657 2 days          Airway                 Airway - Non-Surgical 03/30/20 1620 Endotracheal Tube 2 days       Airway Anesthesia 03/30/20 1 day          Arterial Line                 Arterial Line 03/30/20 1645 Right Radial 2 days          Peripheral Intravenous Line                 Peripheral IV - Single Lumen 03/29/20 2203 18 G Right Antecubital 2 days         Peripheral IV - Single Lumen 03/29/20 2230 18 G Left Antecubital 2 days              Significant Labs:    CBC/Anemia Profile:  Recent Labs   Lab 03/31/20  0412 04/01/20  0407 04/01/20  1607   WBC   --  4.68  4.68 5.81   HGB  --  8.9*  8.9* 9.2*   HCT  --  29.2*  29.2* 31.1*   PLT  --  266  266 305   MCV  --  75*  75* 77*   RDW  --  18.4*  18.4* 18.6*   FERRITIN 331*  --  369*        Chemistries:  Recent Labs   Lab 03/31/20  0412 03/31/20  1505 04/01/20  0407 04/01/20  1607   * 133* 135* 137   K 5.2* 4.9 4.9 5.1    102 102 103   CO2 19* 20* 23 22*   BUN 25* 27* 24* 23   CREATININE 1.5* 1.3 1.2 1.0   CALCIUM 7.9* 8.0* 7.9* 7.7*   ALBUMIN 2.5*  --  2.2* 2.2*   PROT 7.1  --  6.5 6.8   BILITOT 0.3  --  0.3 0.4   ALKPHOS 100  --  154* 181*   *  --  571* 598*   *  --  650* 603*         Significant Imaging: I have reviewed all pertinent imaging results/findings within the past 24 hours.    Assessment/Plan:     Cardiac/Vascular  Essential hypertension, benign  - holding home antihypertensives     Renal/  JULIET (acute kidney injury)  - Cr peak of 1.5 (baseline 1.1)  - Cr down trending to 1.2  - making good urine. 1.5L UOP yesterday, 500 UOP today  - net negative 1.6L for admission     Endocrine  Type 2 diabetes mellitus with diabetic neuropathic arthropathy, without long-term current use of insulin  - BGs within goal of 140-190  - continue LDSSI    GI  Elevated LFTs  - likely 2/2 to covid; however, does have a history of treated hep c in 2015  - uptrending AST//570. Alk phos 180  - continue to trend, may need quantitative hep c if continues to worsen.     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 CAP coverage with azithromycin and ctx  - Currently on FIO2 40% and 10 PEEP,  (6ml/kg).   - holding  steroids for now  - isolation precautions  - BCx NG. No resp Cx collected           Critical secondary to Patient has a condition that poses threat to life and bodily function: Severe Respiratory Distress     Critical care was time spent personally by me on the following activities: development of treatment plan with patient or surrogate and bedside caregivers, discussions with consultants, evaluation of patient's response to treatment, examination of patient, ordering and performing treatments and interventions, ordering and review of laboratory studies, ordering and review of radiographic studies, pulse oximetry, re-evaluation of patient's condition. This critical care time did not overlap with that of any other provider or involve time for any procedures.     Gurpreet Godinez MD  Critical Care Medicine  [facility]

## 2020-04-01 NOTE — ASSESSMENT & PLAN NOTE
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 CAP coverage with azithromycin and ctx  - Currently on FIO2 40% and 10 PEEP,  (6ml/kg).   - holding steroids for now  - isolation precautions  - BCx NG. No resp Cx collected

## 2020-04-01 NOTE — ASSESSMENT & PLAN NOTE
- likely 2/2 to covid; however, does have a history of treated hep c in 2015  - uptrending AST//570. Alk phos 180  - continue to trend, may need quantitative hep c if continues to worsen.

## 2020-04-01 NOTE — PLAN OF CARE
Problem: Fall Injury Risk  Goal: Absence of Fall and Fall-Related Injury  Outcome: Ongoing, Progressing     Problem: Adult Inpatient Plan of Care  Goal: Plan of Care Review  Outcome: Ongoing, Progressing  Goal: Patient-Specific Goal (Individualization)  Outcome: Ongoing, Progressing  Goal: Absence of Hospital-Acquired Illness or Injury  Outcome: Ongoing, Progressing  Goal: Optimal Comfort and Wellbeing  Outcome: Ongoing, Progressing  Goal: Readiness for Transition of Care  Outcome: Ongoing, Progressing  Goal: Rounds/Family Conference  Outcome: Ongoing, Progressing     Problem: Diabetes Comorbidity  Goal: Blood Glucose Level Within Desired Range  Outcome: Ongoing, Progressing     Problem: Infection  Goal: Infection Symptom Resolution  Outcome: Ongoing, Progressing     Problem: Restraint, Nonbehavioral (Nonviolent)  Goal: Discontinuation Criteria Achieved  Outcome: Ongoing, Progressing  Goal: Personal Dignity and Safety Maintained  Outcome: Ongoing, Progressing     Problem: Skin Injury Risk Increased  Goal: Skin Health and Integrity  Outcome: Ongoing, Progressing     Problem: Communication Impairment (Mechanical Ventilation, Invasive)  Goal: Effective Communication  Outcome: Ongoing, Progressing     Problem: Device-Related Complication Risk (Mechanical Ventilation, Invasive)  Goal: Optimal Device Function  Outcome: Ongoing, Progressing     Problem: Inability to Wean (Mechanical Ventilation, Invasive)  Goal: Mechanical Ventilation Liberation  Outcome: Ongoing, Progressing     Problem: Nutrition Impairment (Mechanical Ventilation, Invasive)  Goal: Optimal Nutrition Delivery  Outcome: Ongoing, Progressing     Problem: Skin and Tissue Injury (Mechanical Ventilation, Invasive)  Goal: Absence of Device-Related Skin and Tissue Injury  Outcome: Ongoing, Progressing     Problem: Ventilator-Induced Lung Injury (Mechanical Ventilation, Invasive)  Goal: Absence of Ventilator-Induced Lung Injury  Outcome: Ongoing, Progressing      Problem: Fluid Imbalance (Pneumonia)  Goal: Fluid Balance  Outcome: Ongoing, Progressing     Problem: Infection (Pneumonia)  Goal: Resolution of Infection Signs/Symptoms  Outcome: Ongoing, Progressing     Problem: Respiratory Compromise (Pneumonia)  Goal: Effective Oxygenation and Ventilation  Outcome: Ongoing, Progressing     Problem: ARDS (Acute Respiratory Distress Syndrome)  Goal: Effective Oxygenation  Outcome: Ongoing, Progressing     Problem: Spiritual Distress Risk or Actual  Goal: Spiritual Wellbeing  Outcome: Ongoing, Progressing

## 2020-04-01 NOTE — SUBJECTIVE & OBJECTIVE
Past Medical History:   Diagnosis Date    Behavioral problem     Bipolar 1 disorder     Cervical spondylosis with myelopathy 10/9/2012    Diabetes mellitus type II     History of prostate cancer, s/p radiation 2015    History of psychiatric hospitalization     Hx of psychiatric care     Psychiatric exam requested by authority     Psychiatric problem     Recurrent major depressive disorder, in partial remission 2018    Self-harming behavior     Sleep apnea with use of continuous positive airway pressure (CPAP)     Suicide attempt     Therapy     Tobacco abuse 2015       Past Surgical History:   Procedure Laterality Date    CHOLECYSTECTOMY      HERNIA REPAIR      LEG SURGERY      ORIF tib/fib    SPINE SURGERY         Review of patient's allergies indicates:  No Known Allergies    Family History     Problem Relation (Age of Onset)    Alcohol abuse Mother, Father, Maternal Uncle    Arthritis Mother    Drug abuse Maternal Uncle    Hypertension Mother    Suicide Maternal Uncle        Tobacco Use    Smoking status: Current Every Day Smoker     Packs/day: 0.50     Years: 30.00     Pack years: 15.00     Types: Cigarettes     Last attempt to quit: 2015     Years since quittin.3    Smokeless tobacco: Never Used    Tobacco comment: currently using nicotine patches 21 mg   Substance and Sexual Activity    Alcohol use: No     Alcohol/week: 0.0 standard drinks     Comment: stoped 17 months ago but says that he is an alcoholic    Drug use: Not Currently     Comment: stopped 17 months ago; used to used heavy drugs, particularly marijuana, acid, amphetamines, cocaine, IV crack cocaine    Sexual activity: Not on file      Review of Systems   Unable to perform ROS: Intubated     Objective:     Vital Signs (Most Recent):  Temp: 98.9 °F (37.2 °C) (20 1200)  Pulse: 96 (20 1645)  Resp: (!) 22 (20 1645)  BP: 139/82 (20 1645)  SpO2: (!) 92 % (20 164) Vital  Signs (24h Range):  Temp:  [98.9 °F (37.2 °C)-100.8 °F (38.2 °C)] 98.9 °F (37.2 °C)  Pulse:  [] 96  Resp:  [14-22] 22  SpO2:  [90 %-97 %] 92 %  BP: (103-139)/(58-82) 139/82  Arterial Line BP: (104-152)/(48-82) 132/64   Weight: 87.4 kg (192 lb 10.9 oz)  Body mass index is 28.45 kg/m².      Intake/Output Summary (Last 24 hours) at 4/1/2020 1752  Last data filed at 4/1/2020 1300  Gross per 24 hour   Intake 2315.65 ml   Output 1295 ml   Net 1020.65 ml       Physical Exam   Constitutional: He appears well-developed and well-nourished.   Exam from window to reduce covid exposure. Sedated and intubated   Cardiovascular: Normal rate and regular rhythm.   Not on pressors   Pulmonary/Chest: Effort normal.   intubated       Vents:  Vent Mode: A/C (04/01/20 1551)  Ventilator Initiated: Yes (04/01/20 1551)  Set Rate: 22 BPM (04/01/20 1551)  Vt Set: 420 mL (04/01/20 1551)  Pressure Support: 0 cmH20 (04/01/20 1434)  PEEP/CPAP: 10 cmH20 (04/01/20 1551)  Oxygen Concentration (%): 50 (04/01/20 1645)  Peak Airway Pressure: 29 cmH2O (04/01/20 1551)  Plateau Pressure: 0 cmH20 (04/01/20 1434)  Total Ve: 10.56 mL (04/01/20 1551)  F/VT Ratio<105 (RSBI): (!) 56.99 (04/01/20 0833)  Lines/Drains/Airways     Central Venous Catheter Line                 Hemodialysis Catheter 03/30/20 1630 right subclavian 2 days    Permacath 03/30/20 1733 2 days    Percutaneous Central Line Insertion/Assessment - Double Lumen  03/30/20 1837 1 day          Drain                 NG/OG Tube 03/30/20 1630 [place] sump 2 days         Urethral Catheter 03/30/20 1657 2 days          Airway                 Airway - Non-Surgical 03/30/20 1620 Endotracheal Tube 2 days       Airway Anesthesia 03/30/20 1 day          Arterial Line                 Arterial Line 03/30/20 1645 Right Radial 2 days          Peripheral Intravenous Line                 Peripheral IV - Single Lumen 03/29/20 2203 18 G Right Antecubital 2 days         Peripheral IV - Single Lumen 03/29/20 2230  18 G Left Antecubital 2 days              Significant Labs:    CBC/Anemia Profile:  Recent Labs   Lab 03/31/20  0412 04/01/20  0407 04/01/20  1607   WBC  --  4.68  4.68 5.81   HGB  --  8.9*  8.9* 9.2*   HCT  --  29.2*  29.2* 31.1*   PLT  --  266  266 305   MCV  --  75*  75* 77*   RDW  --  18.4*  18.4* 18.6*   FERRITIN 331*  --  369*        Chemistries:  Recent Labs   Lab 03/31/20  0412 03/31/20  1505 04/01/20  0407 04/01/20  1607   * 133* 135* 137   K 5.2* 4.9 4.9 5.1    102 102 103   CO2 19* 20* 23 22*   BUN 25* 27* 24* 23   CREATININE 1.5* 1.3 1.2 1.0   CALCIUM 7.9* 8.0* 7.9* 7.7*   ALBUMIN 2.5*  --  2.2* 2.2*   PROT 7.1  --  6.5 6.8   BILITOT 0.3  --  0.3 0.4   ALKPHOS 100  --  154* 181*   *  --  571* 598*   *  --  650* 603*         Significant Imaging: I have reviewed all pertinent imaging results/findings within the past 24 hours.

## 2020-04-01 NOTE — RESPIRATORY THERAPY
Pt admitted to CMICU and placed on pb 980 ventilator. Pt has a size 8 mm Et tube taped at 25 cm at the lip.

## 2020-04-02 PROBLEM — F31.9 BIPOLAR 1 DISORDER, DEPRESSED: Status: ACTIVE | Noted: 2020-04-02

## 2020-04-02 PROBLEM — D64.9 ANEMIA: Status: ACTIVE | Noted: 2020-04-02

## 2020-04-02 LAB
ALBUMIN SERPL BCP-MCNC: 2 G/DL (ref 3.5–5.2)
ALP SERPL-CCNC: 179 U/L (ref 55–135)
ALT SERPL W/O P-5'-P-CCNC: 481 U/L (ref 10–44)
ANION GAP SERPL CALC-SCNC: 12 MMOL/L (ref 8–16)
APAP SERPL-MCNC: <3 UG/ML (ref 10–20)
AST SERPL-CCNC: 388 U/L (ref 10–40)
BASOPHILS # BLD AUTO: 0.01 K/UL (ref 0–0.2)
BASOPHILS NFR BLD: 0.1 % (ref 0–1.9)
BILIRUB SERPL-MCNC: 0.3 MG/DL (ref 0.1–1)
BUN SERPL-MCNC: 21 MG/DL (ref 8–23)
CALCIUM SERPL-MCNC: 7.9 MG/DL (ref 8.7–10.5)
CHLORIDE SERPL-SCNC: 102 MMOL/L (ref 95–110)
CO2 SERPL-SCNC: 23 MMOL/L (ref 23–29)
CREAT SERPL-MCNC: 1 MG/DL (ref 0.5–1.4)
CRP SERPL-MCNC: 239.1 MG/L (ref 0–8.2)
CRP SERPL-MCNC: 271.1 MG/L (ref 0–8.2)
DIFFERENTIAL METHOD: ABNORMAL
EOSINOPHIL # BLD AUTO: 0 K/UL (ref 0–0.5)
EOSINOPHIL NFR BLD: 0.3 % (ref 0–8)
ERYTHROCYTE [DISTWIDTH] IN BLOOD BY AUTOMATED COUNT: 18.6 % (ref 11.5–14.5)
EST. GFR  (AFRICAN AMERICAN): >60 ML/MIN/1.73 M^2
EST. GFR  (NON AFRICAN AMERICAN): >60 ML/MIN/1.73 M^2
GLUCOSE SERPL-MCNC: 136 MG/DL (ref 70–110)
HCT VFR BLD AUTO: 30 % (ref 40–54)
HGB BLD-MCNC: 9 G/DL (ref 14–18)
IMM GRANULOCYTES # BLD AUTO: 0.08 K/UL (ref 0–0.04)
IMM GRANULOCYTES NFR BLD AUTO: 0.9 % (ref 0–0.5)
LYMPHOCYTES # BLD AUTO: 0.5 K/UL (ref 1–4.8)
LYMPHOCYTES NFR BLD: 5.4 % (ref 18–48)
MCH RBC QN AUTO: 23.2 PG (ref 27–31)
MCHC RBC AUTO-ENTMCNC: 30 G/DL (ref 32–36)
MCV RBC AUTO: 77 FL (ref 82–98)
MONOCYTES # BLD AUTO: 0.7 K/UL (ref 0.3–1)
MONOCYTES NFR BLD: 8.1 % (ref 4–15)
NEUTROPHILS # BLD AUTO: 7.4 K/UL (ref 1.8–7.7)
NEUTROPHILS NFR BLD: 85.2 % (ref 38–73)
NRBC BLD-RTO: 0 /100 WBC
PLATELET # BLD AUTO: 319 K/UL (ref 150–350)
PMV BLD AUTO: 9.2 FL (ref 9.2–12.9)
POCT GLUCOSE: 148 MG/DL (ref 70–110)
POCT GLUCOSE: 161 MG/DL (ref 70–110)
POCT GLUCOSE: 163 MG/DL (ref 70–110)
POTASSIUM SERPL-SCNC: 5.1 MMOL/L (ref 3.5–5.1)
PROT SERPL-MCNC: 6.9 G/DL (ref 6–8.4)
RBC # BLD AUTO: 3.88 M/UL (ref 4.6–6.2)
SODIUM SERPL-SCNC: 137 MMOL/L (ref 136–145)
TRIGL SERPL-MCNC: 300 MG/DL (ref 30–150)
TRIGL SERPL-MCNC: 379 MG/DL (ref 30–150)
WBC # BLD AUTO: 8.69 K/UL (ref 3.9–12.7)

## 2020-04-02 PROCEDURE — 27200966 HC CLOSED SUCTION SYSTEM

## 2020-04-02 PROCEDURE — 84478 ASSAY OF TRIGLYCERIDES: CPT

## 2020-04-02 PROCEDURE — 25000003 PHARM REV CODE 250: Performed by: INTERNAL MEDICINE

## 2020-04-02 PROCEDURE — 99900026 HC AIRWAY MAINTENANCE (STAT)

## 2020-04-02 PROCEDURE — 27000221 HC OXYGEN, UP TO 24 HOURS

## 2020-04-02 PROCEDURE — 94640 AIRWAY INHALATION TREATMENT: CPT

## 2020-04-02 PROCEDURE — 63600175 PHARM REV CODE 636 W HCPCS: Performed by: STUDENT IN AN ORGANIZED HEALTH CARE EDUCATION/TRAINING PROGRAM

## 2020-04-02 PROCEDURE — 86140 C-REACTIVE PROTEIN: CPT | Mod: 91

## 2020-04-02 PROCEDURE — 80053 COMPREHEN METABOLIC PANEL: CPT

## 2020-04-02 PROCEDURE — 85025 COMPLETE CBC W/AUTO DIFF WBC: CPT

## 2020-04-02 PROCEDURE — 25000003 PHARM REV CODE 250: Performed by: STUDENT IN AN ORGANIZED HEALTH CARE EDUCATION/TRAINING PROGRAM

## 2020-04-02 PROCEDURE — S0028 INJECTION, FAMOTIDINE, 20 MG: HCPCS | Performed by: STUDENT IN AN ORGANIZED HEALTH CARE EDUCATION/TRAINING PROGRAM

## 2020-04-02 PROCEDURE — 36415 COLL VENOUS BLD VENIPUNCTURE: CPT

## 2020-04-02 PROCEDURE — 94761 N-INVAS EAR/PLS OXIMETRY MLT: CPT

## 2020-04-02 PROCEDURE — 94770 HC EXHALED C02 TEST: CPT

## 2020-04-02 PROCEDURE — 25000242 PHARM REV CODE 250 ALT 637 W/ HCPCS: Performed by: STUDENT IN AN ORGANIZED HEALTH CARE EDUCATION/TRAINING PROGRAM

## 2020-04-02 PROCEDURE — 80329 ANALGESICS NON-OPIOID 1 OR 2: CPT

## 2020-04-02 PROCEDURE — 87522 HEPATITIS C REVRS TRNSCRPJ: CPT

## 2020-04-02 PROCEDURE — 99900035 HC TECH TIME PER 15 MIN (STAT)

## 2020-04-02 PROCEDURE — 63600175 PHARM REV CODE 636 W HCPCS: Performed by: INTERNAL MEDICINE

## 2020-04-02 PROCEDURE — 99291 CRITICAL CARE FIRST HOUR: CPT | Mod: ,,, | Performed by: INTERNAL MEDICINE

## 2020-04-02 PROCEDURE — 99291 PR CRITICAL CARE, E/M 30-74 MINUTES: ICD-10-PCS | Mod: ,,, | Performed by: INTERNAL MEDICINE

## 2020-04-02 PROCEDURE — 20000000 HC ICU ROOM

## 2020-04-02 PROCEDURE — 86703 HIV-1/HIV-2 1 RESULT ANTBDY: CPT

## 2020-04-02 PROCEDURE — 63600175 PHARM REV CODE 636 W HCPCS: Performed by: HOSPITALIST

## 2020-04-02 RX ORDER — IPRATROPIUM BROMIDE AND ALBUTEROL SULFATE 2.5; .5 MG/3ML; MG/3ML
3 SOLUTION RESPIRATORY (INHALATION) EVERY 6 HOURS
Status: DISCONTINUED | OUTPATIENT
Start: 2020-04-02 | End: 2020-04-16

## 2020-04-02 RX ORDER — SODIUM BICARBONATE 650 MG/1
1300 TABLET ORAL 2 TIMES DAILY
Status: DISCONTINUED | OUTPATIENT
Start: 2020-04-02 | End: 2020-04-02

## 2020-04-02 RX ORDER — FUROSEMIDE 10 MG/ML
40 INJECTION INTRAMUSCULAR; INTRAVENOUS 2 TIMES DAILY
Status: DISCONTINUED | OUTPATIENT
Start: 2020-04-02 | End: 2020-04-05

## 2020-04-02 RX ORDER — CEFTRIAXONE 1 G/1
1 INJECTION, POWDER, FOR SOLUTION INTRAMUSCULAR; INTRAVENOUS
Status: COMPLETED | OUTPATIENT
Start: 2020-04-03 | End: 2020-04-03

## 2020-04-02 RX ADMIN — FENTANYL CITRATE 300 MCG/HR: 50 INJECTION, SOLUTION INTRAMUSCULAR; INTRAVENOUS at 03:04

## 2020-04-02 RX ADMIN — IPRATROPIUM BROMIDE AND ALBUTEROL SULFATE 3 ML: .5; 3 SOLUTION RESPIRATORY (INHALATION) at 07:04

## 2020-04-02 RX ADMIN — CEFTRIAXONE SODIUM 1 G: 1 INJECTION, POWDER, FOR SOLUTION INTRAMUSCULAR; INTRAVENOUS at 12:04

## 2020-04-02 RX ADMIN — AZITHROMYCIN MONOHYDRATE 500 MG: 500 INJECTION, POWDER, LYOPHILIZED, FOR SOLUTION INTRAVENOUS at 12:04

## 2020-04-02 RX ADMIN — PROPOFOL 50 MCG/KG/MIN: 10 INJECTION, EMULSION INTRAVENOUS at 12:04

## 2020-04-02 RX ADMIN — FAMOTIDINE 20 MG: 10 INJECTION, SOLUTION INTRAVENOUS at 09:04

## 2020-04-02 RX ADMIN — CHLORHEXIDINE GLUCONATE 0.12% ORAL RINSE 15 ML: 1.2 LIQUID ORAL at 09:04

## 2020-04-02 RX ADMIN — ENOXAPARIN SODIUM 40 MG: 100 INJECTION SUBCUTANEOUS at 04:04

## 2020-04-02 RX ADMIN — CEFTRIAXONE 1 G: 1 INJECTION, SOLUTION INTRAVENOUS at 12:04

## 2020-04-02 RX ADMIN — HYDROXYCHLOROQUINE SULFATE 400 MG: 200 TABLET, FILM COATED ORAL at 09:04

## 2020-04-02 RX ADMIN — FUROSEMIDE 40 MG: 10 INJECTION, SOLUTION INTRAMUSCULAR; INTRAVENOUS at 09:04

## 2020-04-02 RX ADMIN — PROPOFOL 50 MCG/KG/MIN: 10 INJECTION, EMULSION INTRAVENOUS at 08:04

## 2020-04-02 RX ADMIN — SODIUM BICARBONATE 650 MG TABLET 1300 MG: at 09:04

## 2020-04-02 RX ADMIN — SENNOSIDES 5 ML: 8.8 SYRUP ORAL at 09:04

## 2020-04-02 RX ADMIN — GABAPENTIN 100 MG: 100 CAPSULE ORAL at 09:04

## 2020-04-02 RX ADMIN — POLYETHYLENE GLYCOL 3350 17 G: 17 POWDER, FOR SOLUTION ORAL at 09:04

## 2020-04-02 RX ADMIN — PROPOFOL 50 MCG/KG/MIN: 10 INJECTION, EMULSION INTRAVENOUS at 03:04

## 2020-04-02 RX ADMIN — DOCUSATE SODIUM 50 MG: 50 LIQUID ORAL at 09:04

## 2020-04-02 RX ADMIN — ACETAMINOPHEN 650 MG: 325 TABLET ORAL at 10:04

## 2020-04-02 RX ADMIN — LAMOTRIGINE 200 MG: 100 TABLET ORAL at 09:04

## 2020-04-02 NOTE — PROGRESS NOTES
"  [facility]  Adult Nutrition  Consult Note    SUMMARY     Recommendations    1. Consider modifying TF regimen:    - While on Propofol, rec'd Peptamen Intense VHP @ 45 mL/hr to provide 1772 kcals, 99 g of protein, 907 mL fluid.    - Propofol currently providing 692 calories.    - When Propofol discontinued, rec'd Glucerna 1.5 @ 50 mL/hr to provide 1800 kcals, 99 g of protein, 911 mL fluid.  2. RD to monitor & follow-up.    Goals: 1. Initiate nutrition intake by RD follow up.   Nutrition Goal Status: goal met  Communication of RD Recs: reviewed with RN    Reason for Assessment    Reason For Assessment: RD follow-up  Diagnosis: (Acute respiratory disease due to COVID-19 virus )  Relevant Medical History: DM, HTN  Interdisciplinary Rounds: did not attend    General Information Comments: Pt intubated/sedated, tolerating TFs at trickle (per RN documentation). UBW 213lbs, -15lbs x 1 year (per previous RN note). Prior to intubation pt was on 2000 kcal DM, cardiac diet eating 50% of meals. NFPE not performed patient positive for COVID19 and has been placed on airborne and contact precautions. Will monitor.  Nutrition Discharge Planning: Unable to detemine at this time.     Nutrition/Diet History    Spiritual, Cultural Beliefs, Catholic Practices, Values that Affect Care: no  Factors Affecting Nutritional Intake: on mechanical ventilation, NPO    Anthropometrics    Temp: (!) 101.9 °F (38.8 °C)  Height Method: Stated  Height: 5' 9" (175.3 cm)  Height (inches): 69 in  Weight Method: Standard Scale  Weight: 87.4 kg (192 lb 10.9 oz)  Weight (lb): 192.68 lb  Ideal Body Weight (IBW), Male: 160 lb  % Ideal Body Weight, Male (lb): 120.42 %  BMI (Calculated): 28.4  BMI Grade: 25 - 29.9 - overweight  Usual Body Weight (UBW), k.8 kg(Per chart review.)  % Usual Body Weight: 90.48  % Weight Change From Usual Weight: -9.71 %    Lab/Procedures/Meds    Pertinent Labs Reviewed: reviewed  Pertinent Labs Comments: A1C " 7  Pertinent Medications Reviewed: reviewed  Pertinent Medications Comments: Fentanyl, Propofl    Estimated/Assessed Needs    Weight Used For Calorie Calculations: 87.4 kg (192 lb 10.9 oz)     Energy Calorie Requirements (kcal): 1899 kcal/d  Energy Need Method: [facility]     Protein Requirements: 105-131 g/d (1.2-1.5 g/kg)  Weight Used For Protein Calculations: 87.4 kg (192 lb 10.9 oz)     Fluid Requirements (mL): 1mL/kcal or per MD    Nutrition Prescription Ordered    Current Diet Order: NPO  Current Nutrition Support Formula Ordered: Diabetisource AC  Current Nutrition Support Rate Ordered: 20 mL/hr    Evaluation of Received Nutrient/Fluid Intake    Enteral Calories (kcal): 576  Enteral Protein (gm): 29  Enteral (Free Water) Fluid (mL): 393    Other Calories (kcal): 692 (Propofol)    Total Calories (kcal): 1268    % Kcal Needs: 67%  % Protein Needs: 28%    Energy Calories Required: not meeting needs  Protein Required: not meeting needs  Fluid Required: other (see comments)(Per MD or 1 mL/kcal)    Comments: LBM: 3/28    Tolerance: tolerating    Nutrition Risk    Level of Risk/Frequency of Follow-up: (2x/week)     Assessment and Plan    Contributing Nutrition Diagnosis  Inadequate energy intake      Related to (etiology):   Inability to consume sufficient energy      Signs and Symptoms (as evidenced by):   NPO, mechanically ventilated      Interventions (treatment strategy):  Collaboration with other providers       Nutrition Diagnosis Status:   Continues      Monitor and Evaluation    Food and Nutrient Intake: energy intake, food and beverage intake, enteral nutrition intake  Food and Nutrient Adminstration: diet order, enteral and parenteral nutrition administration  Knowledge/Beliefs/Attitudes: food and nutrition knowledge/skill  Physical Activity and Function: nutrition-related ADLs and IADLs  Anthropometric Measurements: weight, weight change  Biochemical Data, Medical Tests and Procedures: glucose/endocrine  profile, inflammatory profile, lipid profile, gastrointestinal profile, electrolyte and renal panel  Nutrition-Focused Physical Findings: overall appearance     Nutrition Follow-Up    RD Follow-up?: Yes

## 2020-04-02 NOTE — ASSESSMENT & PLAN NOTE
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     - Continue plaquenil (now day 3). CAP coverage with azithromycin (day 4) and rocephin (day 4)  - Currently on FIO2 60% and 10 PEEP,  (6ml/kg). Weaning to 50%/10 per low-PEEP ladder  - Patient not a candidate for proning as he had P/F ratio 170 upon intubation at [facility], and is out of time window suggested by PROSEVA trial  - holding steroids for now  - isolation precautions  - Patient was NEG 1.6L upon transfer to [facility], but today , suspect due to infusions of his sedation, thus will diurese with Lasix 40mg IV BD to maintain net negative fluid status to avoid aggravating patient's COVID-ARDS with fluid overload.  - BCx NG. No resp Cx collected. Holding off on further cultures as no leukocytosis, increasing vasopressor requirements despite his fevers and tachycardia today. Will blood/resp culture tomorrow IF patient's fever continues/worsens, leukocytosis on CBCs, increasing ventilator/oxygenation requirements, or requiring vasopressors.

## 2020-04-02 NOTE — PLAN OF CARE
Problem: Adult Inpatient Plan of Care  Goal: Plan of Care Review  Outcome: Ongoing, Progressing  Flowsheets (Taken 4/2/2020 1719)  Plan of Care Reviewed With: patient; spouse  Goal: Patient-Specific Goal (Individualization)  Outcome: Ongoing, Progressing  Flowsheets (Taken 4/2/2020 1719)  Individualized Care Needs: Cool cloths and lightweight blankets to reduce fever  Anxieties, Fears or Concerns: CHRISTOPHER; intubated/medically sedated  Patient-Specific Goals (Include Timeframe): Wean ventilator settings as tolerant  Goal: Absence of Hospital-Acquired Illness or Injury  Outcome: Ongoing, Progressing  Goal: Optimal Comfort and Wellbeing  Outcome: Ongoing, Progressing     Problem: Diabetes Comorbidity  Goal: Blood Glucose Level Within Desired Range  Outcome: Ongoing, Progressing     Problem: Infection  Goal: Infection Symptom Resolution  Outcome: Ongoing, Progressing     Pt remains intubated and mechanically ventilated at this time; PEEP = 10.0, FiO2 = 50%, rate = 20, TV = 420. Propofol and fentanyl gtt infusing per MD order to maintain appropriate sedation. No acute events during shift. Plan of care reviewed with pt's spouse via phone call with evidence of understanding. Will continue to monitor.

## 2020-04-02 NOTE — PROGRESS NOTES
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 CXR right lobe infiltrate with elevated , D-Dimer 1.03,  and Ferritin 331. Patient swabbed for COVID 19:positive. Patient started on IV Rocephin and Azithromycin and Plaquenil. Patient initially on 2 liters for mild hypoxia at 88% on room air but within 24 hours of admit patient had increasing oxygen requirements and intubated on evening of 3/30 (P/F then 170); CXR at the time suggested ARDS picture given bilateral patchy ground-glass infiltrates. Patient had mild JULIET with Cr of 1.5, but good UOP not requiring dialysis. Patient has been improving on vent with less requirement since intubation. Labs also showed worsening transaminitis with ,  likely due to COVID infection; history of treated hepatitis C infection in 2015. Patient not requiring vasopressors at [facility], on Fentanyl and Propofol for sedation.Transferred to [facility] on 4/1 due to capacity issues at [facility]     Full, itemized PMHx/SurgHx/Med list, per patient's medical records. Sees Dr. García Lechuga at [facility]  · HTN: on lisinopril 10mg daily  · 3-4yr Hx of T2DM with polyneuropathy, HBA1C 3/29 7.0%, non-insulin-dependent, on Metformin 500 BD, Glipizide 10 mg BD, Glucophage 1000 mg BD.   Last eye exam Jan 2019, last foot exam Feb 2019  · HLP: Atorvastatin 80mg daily  · Prostate CA, S/P XRT and seed brachytherapy in 2015, followed by [facility], last PSA <0.1 2/5/19  · Cervical spondylopathy with myelopathy, S/P ACDF C4-5 by Dr. Le in 2012  · Bipolar disorder with depressive features, last psych admit 2014 at [facility], with Hx of suicide attempts. On home ziprazidone 80mg QD, Bupropion 150 BD, Lamotrigine 200mg QD, sees psychiatrist   · Insomnia: On 3mg melatonin QHS  · Polysubstance abuser: EtOH (unclear daily use), cocaine, amphetamines, LSD,   15-PY smoker, quit 2015, on nicotine patch    Hospital/ICU Course:      Patient arrived at [facility] main intubated/ventilated CAP coverage with Azithro/Rocephin continued, as with plaquenil. Elevated LFTs trending down, likely due to COVID. JULIET resolved, never needed HD.       Interval History/Significant Events: No acute events overnight. Began spiking fevers 100.4-101.9 at 0700 today, tachy to 110s, but NOT requiring vasopressors. Continuing CAP coverage (Azithro/Rocephin) and plaquenil. 60/10 on ventilator, low PEEP ladder. Transaminases improving. Good UOP, continuing diuresis.    Review of Systems   Unable to perform ROS: Intubated     Objective:     Vital Signs (Most Recent):  Temp: (!) 101.6 °F (38.7 °C) (04/02/20 1100)  Pulse: 105 (04/02/20 1200)  Resp: 20 (04/02/20 1200)  BP: 125/61 (04/02/20 1200)  SpO2: 96 % (04/02/20 1200) Vital Signs (24h Range):  Temp:  [101.6 °F (38.7 °C)-101.9 °F (38.8 °C)] 101.6 °F (38.7 °C)  Pulse:  [] 105  Resp:  [18-22] 20  SpO2:  [91 %-98 %] 96 %  BP: (108-165)/() 125/61  Arterial Line BP: (112-152)/(52-82) 126/62   Weight: 87.4 kg (192 lb 10.9 oz)  Body mass index is 28.45 kg/m².      Intake/Output Summary (Last 24 hours) at 4/2/2020 1301  Last data filed at 4/2/2020 1200  Gross per 24 hour   Intake 2531.08 ml   Output 1030 ml   Net 1501.08 ml       Physical Exam exam limited due to COVID status.  Patient  intubated/sedated, synchronous with ventilator, not agitated  Febrile at 101.2, tachycardic at 110s, not on vasopressors    Vents:  Vent Mode: A/C (04/02/20 0921)  Ventilator Initiated: Yes (04/01/20 1551)  Set Rate: 20 BPM (04/02/20 0921)  Vt Set: 420 mL (04/02/20 0921)  Pressure Support: 0 cmH20 (04/01/20 1434)  PEEP/CPAP: 10 cmH20 (04/02/20 0921)  Oxygen Concentration (%): 60 (04/02/20 1200)  Peak Airway Pressure: 29 cmH2O (04/02/20 0921)  Plateau Pressure: (S) 25 cmH20 (04/02/20 0921)  Total Ve: 11.4 mL (04/02/20 0921)  F/VT Ratio<105 (RSBI): (!) 18.37 (04/02/20 0921)  Lines/Drains/Airways     Central Venous Catheter Line            Percutaneous Central Line Insertion/Assessment - Double Lumen  03/30/20 1837 right subclavian 2 days          Drain                 NG/OG Tube 03/30/20 1630 [place] sump 2 days         Urethral Catheter 03/30/20 1657 2 days          Airway                 Airway - Non-Surgical 03/30/20 1620 Endotracheal Tube 2 days          Arterial Line                 Arterial Line 03/30/20 1645 Right Radial 2 days          Peripheral Intravenous Line                 Peripheral IV - Single Lumen 04/02/20 1115 20 G Anterior;Left Forearm less than 1 day              Significant Labs:    CBC/Anemia Profile:  Recent Labs   Lab 04/01/20  0407 04/01/20  1607 04/02/20  0409   WBC 4.68  4.68 5.81 8.69   HGB 8.9*  8.9* 9.2* 9.0*   HCT 29.2*  29.2* 31.1* 30.0*     266 305 319   MCV 75*  75* 77* 77*   RDW 18.4*  18.4* 18.6* 18.6*   FERRITIN  --  369*  --         Chemistries:  Recent Labs   Lab 04/01/20  0407 04/01/20  1607 04/02/20  0940   * 137 137   K 4.9 5.1 5.1    103 102   CO2 23 22* 23   BUN 24* 23 21   CREATININE 1.2 1.0 1.0   CALCIUM 7.9* 7.7* 7.9*   ALBUMIN 2.2* 2.2* 2.0*   PROT 6.5 6.8 6.9   BILITOT 0.3 0.4 0.3   ALKPHOS 154* 181* 179*   * 598* 481*   * 603* 388*    on admission to [facility], now 240 as of 4/2  D-dimer 0.53 3/29 --> 1.03 3/31  Ferritin 369  on 4/1     4/2.  PT/INR/APTT on 3/31 WNL    ECG 4/1: QTc 421 ms. NSR, Normal Axis, Normal intervals, No ST/T wave changes, no ectopy.    Significant Imaging:  CXR 4/1: Continued bilateral ground-glass opacities not particularly worse compared to imaging taken at [facility] on admission      ABG  Recent Labs   Lab 03/31/20  0600   PH 7.340*   PO2 85   PCO2 40.5   HCO3 21.8*   BE -4     Assessment/Plan:     Psychiatric  Bipolar 1 disorder, depressed  Holding all home meds except lamotrigine 200 QHS. Will re-visit home meds once extubated and stepped down from ICU.    Cardiac/Vascular  Essential hypertension, benign  - holding home antihypertensives    Renal/  JULIET (acute kidney injury)  - Cr peak of 1.5 (baseline 1.1) at [facility]  - Cr back at baseline 1.1 today, BUN WNL  - making good urine. 1.5L UOP yesterday, 500 UOP today  - net negative 1.6L on transfer to [facility], but sedation fluids offsetting this (now -300)  - Lasix 40 BD to maintain net negative status to improve ventilation, per ARDS strategy    Oncology  Anemia  - Will order iron studies as R/O, trend with daily CBCs, Transfusion threshold HB <7    Endocrine  Type 2 diabetes mellitus with diabetic neuropathic arthropathy, without long-term current use of insulin  - BGs within goal of 140-190  - continue LDSSI  - Holding home meds    GI  Elevated LFTs  - Plan: HIV testing given Hx IVDU (tested neg for HIV 2013, but unclear if still active IVDU)    Hepatitis C  Despite Harvoni treatment in 2015, no recent HCV RNA evidence of seronegativity. Will order HCV RNA to confirm.    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     - Continue plaquenil (now day 3). CAP coverage with azithromycin (day 4) and rocephin (day 4)  - Currently on FIO2 60% and 10 PEEP,  (6ml/kg). Weaning to 50%/10 per low-PEEP ladder  - Patient not a candidate for proning as he had P/F ratio 170 upon intubation at [facility], and is out of time window suggested by PROSEVA trial  - holding steroids for now  - isolation precautions  - Patient was NEG 1.6L upon transfer to [facility], but today , suspect due to infusions of his sedation, thus will diurese with Lasix 40mg IV BD to maintain net negative fluid status to avoid aggravating patient's COVID-ARDS with fluid overload.  - BCx NG. No resp Cx collected. Holding off on further cultures as no leukocytosis, increasing vasopressor requirements despite his fevers and tachycardia today. Will blood/resp culture tomorrow IF patient's fever continues/worsens, leukocytosis on CBCs, increasing ventilator/oxygenation requirements, or requiring vasopressors.       Critical Care Daily Checklist:    A: Awake: RASS Goal/Actual Goal:   0 (Alert and Calm)  Actual: [place] Agitation Sedation Scale (RASS): Deep sedation   B: Spontaneous Breathing Trial Performed?  Not ready   C: SAT & SBT Coordinated?  No                      D: Delirium: CAM-ICU Overall CAM-ICU: Positive   E: Early Mobility Performed? No   F: Feeding Goal: Goals: 1. Initiate nutrition intake by RD follow up.   Status: Nutrition Goal Status: goal met   Current Diet Order   Procedures    Diet NPO   Tube feeds started today.   AS: Analgesia/Sedation Fentanyl 300 mcg/hr, Propofol 50 mcg/kg/min   T: Thromboembolic Prophylaxis Enoxaparin 40 SQ QD   H: HOB > 300 Yes   U: Stress Ulcer Prophylaxis (if needed) Famotidine 20mg IV   G: Glucose Control SSI, -180   B: Bowel Function Stool Occurrence: 0   I: Indwelling Catheter (Lines & Atkins) Necessity PIV x 2(Triple 3d and Single 4d), CVC 1d, Art line <1d, NG 3d, Atkins dd, ETT 3d   D: De-escalation of  Antimicrobials/Pharmacotherapies On CAP coverage currently, will D/C after 5d. On plaquenil day 3    Plan for the day/ETD Wean sedation, wean vent, diurese, david labs, HCV RNA, HIV status, iron studies    Code Status:  Family/Goals of Care: Full Code  Will call for updates       Critical secondary to Patient has a condition that poses threat to life and bodily function: Severe Respiratory Distress      Critical care was time spent personally by me on the following activities: development of treatment plan with patient or surrogate and bedside caregivers, discussions with consultants, evaluation of patient's response to treatment, examination of patient, ordering and performing treatments and interventions, ordering and review of laboratory studies, ordering and review of radiographic studies, pulse oximetry, re-evaluation of patient's condition. This critical care time did not overlap with that of any other provider or involve time for any procedures.     Gurpreet Godinez MD  Critical Care Medicine  [facility]

## 2020-04-02 NOTE — SUBJECTIVE & OBJECTIVE
Interval History/Significant Events: No acute events overnight. Began spiking fevers 100.4-101.9 at 0700 today, tachy to 110s, but NOT requiring vasopressors. Continuing CAP coverage (Azithro/Rocephin) and plaquenil. 60/10 on ventilator, low PEEP ladder. Transaminases improving. Good UOP, continuing diuresis.    Review of Systems   Unable to perform ROS: Intubated     Objective:     Vital Signs (Most Recent):  Temp: (!) 101.6 °F (38.7 °C) (04/02/20 1100)  Pulse: 105 (04/02/20 1200)  Resp: 20 (04/02/20 1200)  BP: 125/61 (04/02/20 1200)  SpO2: 96 % (04/02/20 1200) Vital Signs (24h Range):  Temp:  [101.6 °F (38.7 °C)-101.9 °F (38.8 °C)] 101.6 °F (38.7 °C)  Pulse:  [] 105  Resp:  [18-22] 20  SpO2:  [91 %-98 %] 96 %  BP: (108-165)/() 125/61  Arterial Line BP: (112-152)/(52-82) 126/62   Weight: 87.4 kg (192 lb 10.9 oz)  Body mass index is 28.45 kg/m².      Intake/Output Summary (Last 24 hours) at 4/2/2020 1301  Last data filed at 4/2/2020 1200  Gross per 24 hour   Intake 2531.08 ml   Output 1030 ml   Net 1501.08 ml       Physical Exam exam limited due to COVID status.  Patient intubated/sedated, synchronous with ventilator, not agitated  Febrile at 101.2, tachycardic at 110s, not on vasopressors    Vents:  Vent Mode: A/C (04/02/20 0921)  Ventilator Initiated: Yes (04/01/20 1551)  Set Rate: 20 BPM (04/02/20 0921)  Vt Set: 420 mL (04/02/20 0921)  Pressure Support: 0 cmH20 (04/01/20 1434)  PEEP/CPAP: 10 cmH20 (04/02/20 0921)  Oxygen Concentration (%): 60 (04/02/20 1200)  Peak Airway Pressure: 29 cmH2O (04/02/20 0921)  Plateau Pressure: (S) 25 cmH20 (04/02/20 0921)  Total Ve: 11.4 mL (04/02/20 0921)  F/VT Ratio<105 (RSBI): (!) 18.37 (04/02/20 0921)  Lines/Drains/Airways     Central Venous Catheter Line            Percutaneous Central Line Insertion/Assessment - Double Lumen  03/30/20 1837 right subclavian 2 days          Drain                 NG/OG Tube 03/30/20 1630 [place] sump 2 days         Urethral Catheter  03/30/20 1657 2 days          Airway                 Airway - Non-Surgical 03/30/20 1620 Endotracheal Tube 2 days          Arterial Line                 Arterial Line 03/30/20 1645 Right Radial 2 days          Peripheral Intravenous Line                 Peripheral IV - Single Lumen 04/02/20 1115 20 G Anterior;Left Forearm less than 1 day              Significant Labs:    CBC/Anemia Profile:  Recent Labs   Lab 04/01/20  0407 04/01/20  1607 04/02/20  0409   WBC 4.68  4.68 5.81 8.69   HGB 8.9*  8.9* 9.2* 9.0*   HCT 29.2*  29.2* 31.1* 30.0*     266 305 319   MCV 75*  75* 77* 77*   RDW 18.4*  18.4* 18.6* 18.6*   FERRITIN  --  369*  --         Chemistries:  Recent Labs   Lab 04/01/20  0407 04/01/20  1607 04/02/20  0940   * 137 137   K 4.9 5.1 5.1    103 102   CO2 23 22* 23   BUN 24* 23 21   CREATININE 1.2 1.0 1.0   CALCIUM 7.9* 7.7* 7.9*   ALBUMIN 2.2* 2.2* 2.0*   PROT 6.5 6.8 6.9   BILITOT 0.3 0.4 0.3   ALKPHOS 154* 181* 179*   * 598* 481*   * 603* 388*    on admission to [facility], now 240 as of 4/2  D-dimer 0.53 3/29 --> 1.03 3/31  Ferritin 369 on 4/1     4/2.  PT/INR/APTT on 3/31 WNL    ECG 4/1: QTc 421 ms. NSR, Normal Axis, Normal intervals, No ST/T wave changes, no ectopy.    Significant Imaging:  CXR 4/1: Continued bilateral ground-glass opacities not particularly worse compared to imaging taken at [facility] on admission

## 2020-04-02 NOTE — ASSESSMENT & PLAN NOTE
Despite Harvoni treatment in 2015, no recent HCV RNA evidence of seronegativity. Will order HCV RNA to confirm.

## 2020-04-02 NOTE — HOSPITAL COURSE
Patient arrived at [facility] on 4/1- intubated/ventilated CAP coverage with Azithro/Rocephin continued, as with plaquenil. Elevated LFTs trending down, likely due to COVID. JULIET resolved, never needed HD. He was extubated to a venti mask on 04/04 but desaturated overnight and was placed on bipap. On 04/07 he was transitioned to HFNC and bipap qHS. Started working with PT/OT, who recommend SNF placement. SLP recommended puree with crushed medication but still too weak for PO.

## 2020-04-02 NOTE — PLAN OF CARE
Patient not medically stable for stepdown at this time. Patient continues to require [facility] service for:     ETT  Sedation  Pressors       04/02/20 1101   Discharge Reassessment   Do you have any problems affording any of your prescribed medications? No   Discharge Plan A Home;Home with family   Discharge Plan B Home with family;Home Health   DME Needed Upon Discharge  other (see comments)  (TBD)   Anticipated Discharge Disposition Home   Can the patient/caregiver answer the patient profile reliably? No, cognitively impaired  (intubated, sedated)       Jesús Black RN MSN  Critical Care-   Ext. [phone]

## 2020-04-02 NOTE — CONSULTS
Patient admitted to MICU. See H and P dated 4/1    Kanu Demarco MD   Internal Medicine PGY-3  [phone]

## 2020-04-02 NOTE — ASSESSMENT & PLAN NOTE
Holding all home meds except lamotrigine 200 QHS. Will re-visit home meds once extubated and stepped down from ICU.

## 2020-04-02 NOTE — ASSESSMENT & PLAN NOTE
- Cr peak of 1.5 (baseline 1.1) at [facility]  - Cr back at baseline 1.1 today, BUN WNL  - making good urine. 1.5L UOP yesterday, 500 UOP today  - net negative 1.6L on transfer to [facility], but sedation fluids offsetting this (now -300)  - Lasix 40 BD to maintain net negative status to improve ventilation, per ARDS strategy

## 2020-04-02 NOTE — NURSING
Pt arrived to CMICU room 6094 from ED with ED RN and RT. Pt on stretcher and ventilator upon arrival; fentanyl and propofol gtts infusing per MD order. Pt transferred to CMICU bed and placed on CMICU continuous cardiac monitoring. No acute events upon arrival. MD to be notified once primary team identified. Will continue to monitor.

## 2020-04-03 LAB
ALBUMIN SERPL BCP-MCNC: 1.9 G/DL (ref 3.5–5.2)
ALP SERPL-CCNC: 164 U/L (ref 55–135)
ALT SERPL W/O P-5'-P-CCNC: 404 U/L (ref 10–44)
ANION GAP SERPL CALC-SCNC: 15 MMOL/L (ref 8–16)
AST SERPL-CCNC: 280 U/L (ref 10–40)
BACTERIA BLD CULT: NORMAL
BACTERIA BLD CULT: NORMAL
BASOPHILS # BLD AUTO: 0.01 K/UL (ref 0–0.2)
BASOPHILS NFR BLD: 0.1 % (ref 0–1.9)
BILIRUB SERPL-MCNC: 0.3 MG/DL (ref 0.1–1)
BUN SERPL-MCNC: 22 MG/DL (ref 8–23)
CALCIUM SERPL-MCNC: 8.2 MG/DL (ref 8.7–10.5)
CHLORIDE SERPL-SCNC: 104 MMOL/L (ref 95–110)
CO2 SERPL-SCNC: 21 MMOL/L (ref 23–29)
CREAT SERPL-MCNC: 0.8 MG/DL (ref 0.5–1.4)
DIFFERENTIAL METHOD: ABNORMAL
EOSINOPHIL # BLD AUTO: 0.1 K/UL (ref 0–0.5)
EOSINOPHIL NFR BLD: 0.9 % (ref 0–8)
ERYTHROCYTE [DISTWIDTH] IN BLOOD BY AUTOMATED COUNT: 19.4 % (ref 11.5–14.5)
EST. GFR  (AFRICAN AMERICAN): >60 ML/MIN/1.73 M^2
EST. GFR  (NON AFRICAN AMERICAN): >60 ML/MIN/1.73 M^2
GLUCOSE SERPL-MCNC: 141 MG/DL (ref 70–110)
HCT VFR BLD AUTO: 30.8 % (ref 40–54)
HGB BLD-MCNC: 9 G/DL (ref 14–18)
HIV 1+2 AB+HIV1 P24 AG SERPL QL IA: NEGATIVE
IMM GRANULOCYTES # BLD AUTO: 0.09 K/UL (ref 0–0.04)
IMM GRANULOCYTES NFR BLD AUTO: 1.3 % (ref 0–0.5)
LYMPHOCYTES # BLD AUTO: 0.5 K/UL (ref 1–4.8)
LYMPHOCYTES NFR BLD: 6.6 % (ref 18–48)
MAGNESIUM SERPL-MCNC: 3.4 MG/DL (ref 1.6–2.6)
MCH RBC QN AUTO: 23.4 PG (ref 27–31)
MCHC RBC AUTO-ENTMCNC: 29.2 G/DL (ref 32–36)
MCV RBC AUTO: 80 FL (ref 82–98)
MONOCYTES # BLD AUTO: 0.7 K/UL (ref 0.3–1)
MONOCYTES NFR BLD: 9.7 % (ref 4–15)
NEUTROPHILS # BLD AUTO: 5.5 K/UL (ref 1.8–7.7)
NEUTROPHILS NFR BLD: 81.4 % (ref 38–73)
NRBC BLD-RTO: 0 /100 WBC
PHOSPHATE SERPL-MCNC: 3.7 MG/DL (ref 2.7–4.5)
PLATELET # BLD AUTO: 341 K/UL (ref 150–350)
PMV BLD AUTO: 9.2 FL (ref 9.2–12.9)
POCT GLUCOSE: 143 MG/DL (ref 70–110)
POCT GLUCOSE: 161 MG/DL (ref 70–110)
POCT GLUCOSE: 184 MG/DL (ref 70–110)
POTASSIUM SERPL-SCNC: 4.9 MMOL/L (ref 3.5–5.1)
PROT SERPL-MCNC: 6.9 G/DL (ref 6–8.4)
RBC # BLD AUTO: 3.85 M/UL (ref 4.6–6.2)
SODIUM SERPL-SCNC: 140 MMOL/L (ref 136–145)
WBC # BLD AUTO: 6.77 K/UL (ref 3.9–12.7)

## 2020-04-03 PROCEDURE — 63600175 PHARM REV CODE 636 W HCPCS: Performed by: HOSPITALIST

## 2020-04-03 PROCEDURE — 94660 CPAP INITIATION&MGMT: CPT

## 2020-04-03 PROCEDURE — 63600175 PHARM REV CODE 636 W HCPCS: Performed by: STUDENT IN AN ORGANIZED HEALTH CARE EDUCATION/TRAINING PROGRAM

## 2020-04-03 PROCEDURE — 20000000 HC ICU ROOM

## 2020-04-03 PROCEDURE — 93005 ELECTROCARDIOGRAM TRACING: CPT

## 2020-04-03 PROCEDURE — 36415 COLL VENOUS BLD VENIPUNCTURE: CPT

## 2020-04-03 PROCEDURE — 25000003 PHARM REV CODE 250: Performed by: STUDENT IN AN ORGANIZED HEALTH CARE EDUCATION/TRAINING PROGRAM

## 2020-04-03 PROCEDURE — 80053 COMPREHEN METABOLIC PANEL: CPT

## 2020-04-03 PROCEDURE — 84100 ASSAY OF PHOSPHORUS: CPT

## 2020-04-03 PROCEDURE — 93010 EKG 12-LEAD: ICD-10-PCS | Mod: ,,, | Performed by: INTERNAL MEDICINE

## 2020-04-03 PROCEDURE — S0028 INJECTION, FAMOTIDINE, 20 MG: HCPCS | Performed by: STUDENT IN AN ORGANIZED HEALTH CARE EDUCATION/TRAINING PROGRAM

## 2020-04-03 PROCEDURE — 94640 AIRWAY INHALATION TREATMENT: CPT

## 2020-04-03 PROCEDURE — S0166 INJ OLANZAPINE 2.5MG: HCPCS | Performed by: STUDENT IN AN ORGANIZED HEALTH CARE EDUCATION/TRAINING PROGRAM

## 2020-04-03 PROCEDURE — 83735 ASSAY OF MAGNESIUM: CPT

## 2020-04-03 PROCEDURE — 94770 HC EXHALED C02 TEST: CPT

## 2020-04-03 PROCEDURE — 99291 CRITICAL CARE FIRST HOUR: CPT | Mod: ,,, | Performed by: INTERNAL MEDICINE

## 2020-04-03 PROCEDURE — 99900035 HC TECH TIME PER 15 MIN (STAT)

## 2020-04-03 PROCEDURE — 25000003 PHARM REV CODE 250: Performed by: INTERNAL MEDICINE

## 2020-04-03 PROCEDURE — 25000003 PHARM REV CODE 250: Performed by: HOSPITALIST

## 2020-04-03 PROCEDURE — 63600175 PHARM REV CODE 636 W HCPCS: Performed by: INTERNAL MEDICINE

## 2020-04-03 PROCEDURE — 99291 PR CRITICAL CARE, E/M 30-74 MINUTES: ICD-10-PCS | Mod: ,,, | Performed by: INTERNAL MEDICINE

## 2020-04-03 PROCEDURE — 25000242 PHARM REV CODE 250 ALT 637 W/ HCPCS: Performed by: STUDENT IN AN ORGANIZED HEALTH CARE EDUCATION/TRAINING PROGRAM

## 2020-04-03 PROCEDURE — 93010 ELECTROCARDIOGRAM REPORT: CPT | Mod: ,,, | Performed by: INTERNAL MEDICINE

## 2020-04-03 PROCEDURE — 94003 VENT MGMT INPAT SUBQ DAY: CPT

## 2020-04-03 PROCEDURE — 94761 N-INVAS EAR/PLS OXIMETRY MLT: CPT

## 2020-04-03 PROCEDURE — 99900026 HC AIRWAY MAINTENANCE (STAT)

## 2020-04-03 PROCEDURE — 27000221 HC OXYGEN, UP TO 24 HOURS

## 2020-04-03 PROCEDURE — 85025 COMPLETE CBC W/AUTO DIFF WBC: CPT

## 2020-04-03 RX ORDER — OLANZAPINE 10 MG/2ML
2.5 INJECTION, POWDER, FOR SOLUTION INTRAMUSCULAR ONCE
Status: COMPLETED | OUTPATIENT
Start: 2020-04-03 | End: 2020-04-03

## 2020-04-03 RX ORDER — DEXMEDETOMIDINE HYDROCHLORIDE 4 UG/ML
0.2 INJECTION, SOLUTION INTRAVENOUS CONTINUOUS
Status: DISCONTINUED | OUTPATIENT
Start: 2020-04-03 | End: 2020-04-09

## 2020-04-03 RX ORDER — LABETALOL HCL 20 MG/4 ML
10 SYRINGE (ML) INTRAVENOUS ONCE
Status: COMPLETED | OUTPATIENT
Start: 2020-04-03 | End: 2020-04-03

## 2020-04-03 RX ORDER — LABETALOL HCL 20 MG/4 ML
15 SYRINGE (ML) INTRAVENOUS EVERY 4 HOURS PRN
Status: DISCONTINUED | OUTPATIENT
Start: 2020-04-03 | End: 2020-04-18 | Stop reason: HOSPADM

## 2020-04-03 RX ORDER — OLANZAPINE 10 MG/2ML
5 INJECTION, POWDER, FOR SOLUTION INTRAMUSCULAR EVERY 6 HOURS PRN
Status: DISCONTINUED | OUTPATIENT
Start: 2020-04-03 | End: 2020-04-06

## 2020-04-03 RX ORDER — FAMOTIDINE 20 MG/1
20 TABLET, FILM COATED ORAL 2 TIMES DAILY
Status: DISCONTINUED | OUTPATIENT
Start: 2020-04-03 | End: 2020-04-03

## 2020-04-03 RX ADMIN — HYDROXYCHLOROQUINE SULFATE 400 MG: 200 TABLET, FILM COATED ORAL at 08:04

## 2020-04-03 RX ADMIN — OLANZAPINE 5 MG: 10 INJECTION, POWDER, FOR SOLUTION INTRAMUSCULAR at 07:04

## 2020-04-03 RX ADMIN — FENTANYL CITRATE 300 MCG/HR: 50 INJECTION, SOLUTION INTRAMUSCULAR; INTRAVENOUS at 08:04

## 2020-04-03 RX ADMIN — IPRATROPIUM BROMIDE AND ALBUTEROL SULFATE 3 ML: .5; 3 SOLUTION RESPIRATORY (INHALATION) at 02:04

## 2020-04-03 RX ADMIN — CEFTRIAXONE SODIUM 1 G: 1 INJECTION, POWDER, FOR SOLUTION INTRAMUSCULAR; INTRAVENOUS at 01:04

## 2020-04-03 RX ADMIN — IPRATROPIUM BROMIDE AND ALBUTEROL SULFATE 3 ML: .5; 3 SOLUTION RESPIRATORY (INHALATION) at 01:04

## 2020-04-03 RX ADMIN — Medication 10 MG: at 12:04

## 2020-04-03 RX ADMIN — SENNOSIDES 5 ML: 8.8 SYRUP ORAL at 08:04

## 2020-04-03 RX ADMIN — OLANZAPINE 2.5 MG: 10 INJECTION, POWDER, FOR SOLUTION INTRAMUSCULAR at 03:04

## 2020-04-03 RX ADMIN — DOCUSATE SODIUM 50 MG: 50 LIQUID ORAL at 08:04

## 2020-04-03 RX ADMIN — PROPOFOL 50 MCG/KG/MIN: 10 INJECTION, EMULSION INTRAVENOUS at 03:04

## 2020-04-03 RX ADMIN — FAMOTIDINE 20 MG: 10 INJECTION, SOLUTION INTRAVENOUS at 08:04

## 2020-04-03 RX ADMIN — CHLORHEXIDINE GLUCONATE 0.12% ORAL RINSE 15 ML: 1.2 LIQUID ORAL at 08:04

## 2020-04-03 RX ADMIN — IPRATROPIUM BROMIDE AND ALBUTEROL SULFATE 3 ML: .5; 3 SOLUTION RESPIRATORY (INHALATION) at 08:04

## 2020-04-03 RX ADMIN — DEXMEDETOMIDINE HYDROCHLORIDE 0.2 MCG/KG/HR: 100 INJECTION, SOLUTION, CONCENTRATE INTRAVENOUS at 12:04

## 2020-04-03 RX ADMIN — FUROSEMIDE 40 MG: 10 INJECTION, SOLUTION INTRAMUSCULAR; INTRAVENOUS at 08:04

## 2020-04-03 RX ADMIN — POLYETHYLENE GLYCOL 3350 17 G: 17 POWDER, FOR SOLUTION ORAL at 08:04

## 2020-04-03 RX ADMIN — FENTANYL CITRATE 300 MCG/HR: 50 INJECTION, SOLUTION INTRAMUSCULAR; INTRAVENOUS at 02:04

## 2020-04-03 RX ADMIN — ENOXAPARIN SODIUM 40 MG: 100 INJECTION SUBCUTANEOUS at 05:04

## 2020-04-03 RX ADMIN — PROPOFOL 40 MCG/KG/MIN: 10 INJECTION, EMULSION INTRAVENOUS at 07:04

## 2020-04-03 RX ADMIN — PROPOFOL 50 MCG/KG/MIN: 10 INJECTION, EMULSION INTRAVENOUS at 02:04

## 2020-04-03 RX ADMIN — Medication 15 MG: at 01:04

## 2020-04-03 NOTE — ASSESSMENT & PLAN NOTE
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     - Continue plaquenil (now day 3). CAP coverage with azithromycin (day 4) and rocephin (day 4)  - Was FIO2 60% and 10 PEEP,  (6ml/kg). Weaning to 50%/10 per low-PEEP ladder   - Patient not a candidate for proning as he had P/F ratio 170 upon intubation at [facility], and is out of time window suggested by PROSEVA trial  - holding steroids for now  - isolation precautions  - Patient was NEG 1.6L upon transfer to [facility], but today , suspect due to infusions of his sedation, thus will diurese with Lasix 40mg IV BD to maintain net negative fluid status to avoid aggravating patient's COVID-ARDS with fluid overload.  - BCx NG. No resp Cx collected. Holding off on further cultures as no leukocytosis, increasing vasopressor requirements despite his fevers and tachycardia today. Will blood/resp culture IF patient's fever continues/worsens, leukocytosis on CBCs, increasing ventilator/oxygenation requirements, or requiring vasopressors.  - Tolerated SBT on 4/3 and extubated.

## 2020-04-03 NOTE — SUBJECTIVE & OBJECTIVE
Interval History/Significant Events: NAEON. Tolerated SBT on 4/3 and extubated.     Review of Systems   Unable to perform ROS: Severe respiratory distress     Objective:     Vital Signs (Most Recent):  Temp: 98.3 °F (36.8 °C) (04/03/20 0800)  Pulse: 105 (04/03/20 1202)  Resp: 20 (04/03/20 1202)  BP: (!) 184/97 (04/03/20 1202)  SpO2: (!) 94 % (04/03/20 1202) Vital Signs (24h Range):  Temp:  [98.3 °F (36.8 °C)-99.8 °F (37.7 °C)] 98.3 °F (36.8 °C)  Pulse:  [] 105  Resp:  [19-25] 20  SpO2:  [87 %-99 %] 94 %  BP: (108-184)/() 184/97  Arterial Line BP: (105-214)/(56-91) 150/58   Weight: 87.4 kg (192 lb 10.9 oz)  Body mass index is 28.45 kg/m².      Intake/Output Summary (Last 24 hours) at 4/3/2020 1326  Last data filed at 4/3/2020 1200  Gross per 24 hour   Intake 1033.4 ml   Output 3300 ml   Net -2266.6 ml       Physical Exam   Constitutional: He appears well-developed and well-nourished.   Exam from window to reduce covid exposure.   Cardiovascular: Normal rate and regular rhythm.   Not on pressors   Pulmonary/Chest: Effort normal.   intubated       Vents:  Vent Mode: Spont (04/03/20 1202)  Ventilator Initiated: Yes (04/01/20 1551)  Set Rate: 20 BPM (04/03/20 0852)  Vt Set: 420 mL (04/03/20 0852)  Pressure Support: 10 cmH20 (04/03/20 1202)  PEEP/CPAP: 10 cmH20 (04/03/20 1202)  Oxygen Concentration (%): 70 (04/03/20 1202)  Peak Airway Pressure: 21 cmH2O (04/03/20 1202)  Plateau Pressure: 25 cmH20 (04/03/20 1202)  Total Ve: 26.4 mL (04/03/20 1202)  F/VT Ratio<105 (RSBI): (!) 17.24 (04/03/20 1202)  Lines/Drains/Airways     Central Venous Catheter Line            Percutaneous Central Line Insertion/Assessment - Double Lumen  03/30/20 1837 right subclavian 3 days          Drain                 NG/OG Tube 03/30/20 1630 [place] sump 3 days         Urethral Catheter 03/30/20 1657 3 days          Arterial Line                 Arterial Line 03/30/20 1645 Right Radial 3 days          Peripheral Intravenous Line                  Peripheral IV - Single Lumen 04/02/20 1115 20 G Anterior;Left Forearm 1 day              Significant Labs:    CBC/Anemia Profile:  Recent Labs   Lab 04/01/20  1607 04/02/20  0409 04/03/20  0228   WBC 5.81 8.69 6.77   HGB 9.2* 9.0* 9.0*   HCT 31.1* 30.0* 30.8*    319 341   MCV 77* 77* 80*   RDW 18.6* 18.6* 19.4*   FERRITIN 369*  --   --         Chemistries:  Recent Labs   Lab 04/01/20  1607 04/02/20  0940 04/03/20 0228    137 140   K 5.1 5.1 4.9    102 104   CO2 22* 23 21*   BUN 23 21 22   CREATININE 1.0 1.0 0.8   CALCIUM 7.7* 7.9* 8.2*   ALBUMIN 2.2* 2.0* 1.9*   PROT 6.8 6.9 6.9   BILITOT 0.4 0.3 0.3   ALKPHOS 181* 179* 164*   * 481* 404*   * 388* 280*   MG  --   --  3.4*   PHOS  --   --  3.7       All pertinent labs within the past 24 hours have been reviewed.    Significant Imaging:  I have reviewed all pertinent imaging results/findings within the past 24 hours.

## 2020-04-03 NOTE — ASSESSMENT & PLAN NOTE
Despite Harvoni treatment in 2015, no recent HCV RNA evidence of seronegativity. Will order HCV RNA to confirm.   Suturegard Body: The suture ends were repeatedly re-tightened and re-clamped to achieve the desired tissue expansion.

## 2020-04-03 NOTE — ASSESSMENT & PLAN NOTE
- Cr peak of 1.5 (baseline 1.1) at [facility]  - Cr back at baseline 1.1 today, BUN WNL  - making good urine.  - net negative 1.6L on transfer to [facility], but sedation fluids offsetting this (now -300)  - Lasix 40 BD to maintain net negative status to improve ventilation, per ARDS strategy

## 2020-04-03 NOTE — ASSESSMENT & PLAN NOTE
- Plan: Ordered HCV RNA, and HIV testing given Hx IVDU (tested neg for HIV 2013, but unclear if still active IVDU). Results pending

## 2020-04-03 NOTE — PROGRESS NOTES
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 CXR right lobe infiltrate with elevated , D-Dimer 1.03,  and Ferritin 331. Patient swabbed for COVID 19:positive. Patient started on IV Rocephin and Azithromycin and Plaquenil. Patient initially on 2 liters for mild hypoxia at 88% on room air but within 24 hours of admit patient had increasing oxygen requirements and intubated on evening of 3/30 (P/F then 170); CXR at the time suggested ARDS picture given bilateral patchy ground-glass infiltrates. Patient had mild JULIET with Cr of 1.5, but good UOP not requiring dialysis. Patient has been improving on vent with less requirement since intubation. Labs also showed worsening transaminitis with ,  likely due to COVID infection; history of treated hepatitis C infection in 2015. Patient not requiring vasopressors at [facility], on Fentanyl and Propofol for sedation.Transferred to [facility] on 4/1 due to capacity issues at [facility]     Full, itemized PMHx/SurgHx/Med list, per patient's medical records. Sees Dr. García Lechuga at [facility]  · HTN: on lisinopril 10mg daily  · 3-4yr Hx of T2DM with polyneuropathy, HBA1C 3/29 7.0%, non-insulin-dependent, on Metformin 500 BD, Glipizide 10 mg BD, Glucophage 1000 mg BD.   Last eye exam Jan 2019, last foot exam Feb 2019  · HLP: Atorvastatin 80mg daily  · Prostate CA, S/P XRT and seed brachytherapy in 2015, followed by [facility], last PSA <0.1 2/5/19  · Cervical spondylopathy with myelopathy, S/P ACDF C4-5 by Dr. Le in 2012  · Bipolar disorder with depressive features, last psych admit 2014 at [facility], with Hx of suicide attempts. On home ziprazidone 80mg QD, Bupropion 150 BD, Lamotrigine 200mg QD, sees psychiatrist   · Insomnia: On 3mg melatonin QHS  · Polysubstance abuser: EtOH (unclear daily use), cocaine, amphetamines, LSD,   15-PY smoker, quit 2015, on nicotine patch    Hospital/ICU Course:  4/1: Patient arrived at [facility] intubated/ventilated at 60% FiO2 PEEP 10, sedated on 50 mcg/kg/min propofol 300 mcg/hr fentanyl. No issues during transport. Admitted to CMICU for ventilation. CAP coverage with Azithro/Rocephin continued, as with plaquenil. AST/ALT steady at 600/600s, normal T-bili. Remains off pressors. Tolerated SBT on 4/3 and extubated.     Interval History/Significant Events: NAEON. Tolerated SBT on 4/3 and extubated.     Review of Systems   Unable to perform ROS: Severe respiratory distress     Objective:     Vital Signs (Most Recent):  Temp: 98.3 °F (36.8 °C) (04/03/20 0800)  Pulse: 105 (04/03/20 1202)  Resp: 20 (04/03/20 1202)  BP: (!) 184/97 (04/03/20 1202)  SpO2: (!) 94 % (04/03/20 1202) Vital Signs (24h Range):  Temp:  [98.3 °F (36.8 °C)-99.8 °F (37.7 °C)] 98.3 °F (36.8 °C)  Pulse:  [] 105  Resp:  [19-25] 20  SpO2:  [87 %-99 %] 94 %  BP: (108-184)/() 184/97  Arterial Line BP: (105-214)/(56-91) 150/58   Weight: 87.4 kg (192 lb 10.9 oz)  Body mass index is 28.45 kg/m².      Intake/Output Summary (Last 24 hours) at 4/3/2020 1326  Last data filed at 4/3/2020 1200  Gross per 24 hour   Intake 1033.4 ml   Output 3300 ml   Net -2266.6 ml       Physical Exam   Constitutional: He appears well-developed and well-nourished.   Exam from window to reduce covid  exposure.   Cardiovascular: Normal rate and regular rhythm.   Not on pressors   Pulmonary/Chest: Effort normal.   intubated       Vents:  Vent Mode: Spont (04/03/20 1202)  Ventilator Initiated: Yes (04/01/20 1551)  Set Rate: 20 BPM (04/03/20 0852)  Vt Set: 420 mL (04/03/20 0852)  Pressure Support: 10 cmH20 (04/03/20 1202)  PEEP/CPAP: 10 cmH20 (04/03/20 1202)  Oxygen Concentration (%): 70 (04/03/20 1202)  Peak Airway Pressure: 21 cmH2O (04/03/20 1202)  Plateau Pressure: 25 cmH20 (04/03/20 1202)  Total Ve: 26.4 mL (04/03/20 1202)  F/VT Ratio<105 (RSBI): (!) 17.24 (04/03/20 1202)  Lines/Drains/Airways     Central Venous Catheter Line            Percutaneous Central Line Insertion/Assessment - Double Lumen  03/30/20 1837 right subclavian 3 days          Drain                 NG/OG Tube 03/30/20 1630 [place] sump 3 days         Urethral Catheter 03/30/20 1657 3 days          Arterial Line                 Arterial Line 03/30/20 1645 Right Radial 3 days          Peripheral Intravenous Line                 Peripheral IV - Single Lumen 04/02/20 1115 20 G Anterior;Left Forearm 1 day              Significant Labs:    CBC/Anemia Profile:  Recent Labs   Lab 04/01/20  1607 04/02/20  0409 04/03/20  0228   WBC 5.81 8.69 6.77   HGB 9.2* 9.0* 9.0*   HCT 31.1* 30.0* 30.8*    319 341   MCV 77* 77* 80*   RDW 18.6* 18.6* 19.4*   FERRITIN 369*  --   --         Chemistries:  Recent Labs   Lab 04/01/20  1607 04/02/20  0940 04/03/20  0228    137 140   K 5.1 5.1 4.9    102 104   CO2 22* 23 21*   BUN 23 21 22   CREATININE 1.0 1.0 0.8   CALCIUM 7.7* 7.9* 8.2*   ALBUMIN 2.2* 2.0* 1.9*   PROT 6.8 6.9 6.9   BILITOT 0.4 0.3 0.3   ALKPHOS 181* 179* 164*   * 481* 404*   * 388* 280*   MG  --   --  3.4*   PHOS  --   --  3.7       All pertinent labs within the past 24 hours have been reviewed.    Significant Imaging:  I have reviewed all pertinent imaging results/findings within the past 24 hours.      ABG  Recent Labs    Lab 03/31/20  0600   PH 7.340*   PO2 85   PCO2 40.5   HCO3 21.8*   BE -4     Assessment/Plan:     Psychiatric  Bipolar 1 disorder, depressed  Holding all home meds except lamotrigine 200 QHS. Will re-visit home meds once extubated and stepped down from ICU.    Cardiac/Vascular  Essential hypertension, benign  - holding home antihypertensives    Renal/  JULIET (acute kidney injury)  - Cr peak of 1.5 (baseline 1.1) at [facility] [place], operated by [facility]  - Cr back at baseline 1.1 today, BUN WNL  - making good urine.  - net negative 1.6L on transfer to [facility], but sedation fluids offsetting this (now -300)  - Lasix 40 BD to maintain net negative status to improve ventilation, per ARDS strategy    Oncology  Anemia  - Will order iron studies as R/O, trend with daily CBCs, Transfusion threshold HB <7    Endocrine  Type 2 diabetes mellitus with diabetic neuropathic arthropathy, without long-term current use of insulin  - BGs within goal of 140-190  - continue LDSSI  - Holding home meds    GI  Elevated LFTs  - Plan: Ordered HCV RNA, and HIV testing given Hx IVDU (tested neg for HIV 2013, but unclear if still active IVDU). Results pending     Hepatitis C  Despite Harvoni treatment in 2015, no recent HCV RNA evidence of seronegativity. Will order HCV RNA to confirm.    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     - Continue plaquenil (now day 3). CAP coverage with azithromycin (day 4) and rocephin (day 4)  - Was FIO2 60% and 10 PEEP,  (6ml/kg). Weaning to 50%/10 per low-PEEP ladder   - Patient not a candidate for proning as he had P/F ratio 170 upon intubation at [facility] [place], operated by [facility], and is out of time window  suggested by PROSEVA trial  - holding steroids for now  - isolation precautions  - Patient was NEG 1.6L upon transfer to [facility], but today , suspect due to infusions of his sedation, thus will diurese with Lasix 40mg IV BD to maintain net negative fluid status to avoid aggravating patient's COVID-ARDS with fluid overload.  - BCx NG. No resp Cx collected. Holding off on further cultures as no leukocytosis, increasing vasopressor requirements despite his fevers and tachycardia today. Will blood/resp culture IF patient's fever continues/worsens, leukocytosis on CBCs, increasing ventilator/oxygenation requirements, or requiring vasopressors.  - Tolerated SBT on 4/3 and extubated.        Critical Care Daily Checklist:    A: Awake: RASS Goal/Actual Goal:    Actual: [place] Agitation Sedation Scale (RASS): Light sedation   B: Spontaneous Breathing Trial Performed? Spon. Breathing Trial Initiated?: Initiated (04/03/20 0852)   C: SAT & SBT Coordinated?  Extubated                      D: Delirium: CAM-ICU Overall CAM-ICU: Positive   E: Early Mobility Performed? Yes   F: Feeding Goal: Goals: 1. Initiate nutrition intake by RD follow up.   Status: Nutrition Goal Status: goal met   Current Diet Order   Procedures    Diet NPO      AS: Analgesia/Sedation NA   T: Thromboembolic Prophylaxis Lovenox   H: HOB > 300 Yes   U: Stress Ulcer Prophylaxis (if needed) Famotidine    G: Glucose Control SSI   B: Bowel Function Stool Occurrence: 0   I: Indwelling Catheter (Lines & Atkins) Necessity One more day of Plaquenil   D: De-escalation of Antimicrobials/Pharmacotherapies NA    Plan for the day/ETD Extubated    Code Status:  Family/Goals of Care: Full Code  Update daily        Critical secondary to Patient has a condition that poses threat to life and bodily function: Severe Respiratory Distress      Critical care was time spent personally by me on the following activities: development of treatment plan with patient or surrogate  and bedside caregivers, discussions with consultants, evaluation of patient's response to treatment, examination of patient, ordering and performing treatments and interventions, ordering and review of laboratory studies, ordering and review of radiographic studies, pulse oximetry, re-evaluation of patient's condition. This critical care time did not overlap with that of any other provider or involve time for any procedures.     Usman Bal MD  Critical Care Medicine  [facility]

## 2020-04-03 NOTE — CARE UPDATE
Patient extubated to NIV on  for a short time. If patient tolerates well will be switched to V60. MD Benites at bedside during extubation.

## 2020-04-03 NOTE — PLAN OF CARE
Spoke with Ms. Crawford, the Wife about Mr. Mcintosh current status and updates. Explained to her then we were able to wean him off the vent to BiPAP however, it is still a difficult perioud as patient still has risks of worsening and need the ventilator again. Patient understands the risks and agree with plan. Questions answered.    Usman Bal MD  Internal Medicine, PGY2  [facility]

## 2020-04-04 LAB
ALBUMIN SERPL BCP-MCNC: 1.9 G/DL (ref 3.5–5.2)
ALP SERPL-CCNC: 152 U/L (ref 55–135)
ALT SERPL W/O P-5'-P-CCNC: 299 U/L (ref 10–44)
ANION GAP SERPL CALC-SCNC: 16 MMOL/L (ref 8–16)
AST SERPL-CCNC: 183 U/L (ref 10–40)
BASOPHILS # BLD AUTO: 0.01 K/UL (ref 0–0.2)
BASOPHILS NFR BLD: 0.1 % (ref 0–1.9)
BILIRUB SERPL-MCNC: 0.5 MG/DL (ref 0.1–1)
BUN SERPL-MCNC: 23 MG/DL (ref 8–23)
CALCIUM SERPL-MCNC: 8.3 MG/DL (ref 8.7–10.5)
CHLORIDE SERPL-SCNC: 106 MMOL/L (ref 95–110)
CO2 SERPL-SCNC: 24 MMOL/L (ref 23–29)
CREAT SERPL-MCNC: 0.8 MG/DL (ref 0.5–1.4)
CRP SERPL-MCNC: 306.3 MG/L (ref 0–8.2)
DIFFERENTIAL METHOD: ABNORMAL
EOSINOPHIL # BLD AUTO: 0 K/UL (ref 0–0.5)
EOSINOPHIL NFR BLD: 0.5 % (ref 0–8)
ERYTHROCYTE [DISTWIDTH] IN BLOOD BY AUTOMATED COUNT: 18.6 % (ref 11.5–14.5)
EST. GFR  (AFRICAN AMERICAN): >60 ML/MIN/1.73 M^2
EST. GFR  (NON AFRICAN AMERICAN): >60 ML/MIN/1.73 M^2
GLUCOSE SERPL-MCNC: 168 MG/DL (ref 70–110)
HCT VFR BLD AUTO: 29 % (ref 40–54)
HGB BLD-MCNC: 8.7 G/DL (ref 14–18)
IMM GRANULOCYTES # BLD AUTO: 0.1 K/UL (ref 0–0.04)
IMM GRANULOCYTES NFR BLD AUTO: 1.3 % (ref 0–0.5)
LYMPHOCYTES # BLD AUTO: 0.4 K/UL (ref 1–4.8)
LYMPHOCYTES NFR BLD: 5 % (ref 18–48)
MAGNESIUM SERPL-MCNC: 3 MG/DL (ref 1.6–2.6)
MCH RBC QN AUTO: 23.6 PG (ref 27–31)
MCHC RBC AUTO-ENTMCNC: 30 G/DL (ref 32–36)
MCV RBC AUTO: 79 FL (ref 82–98)
MONOCYTES # BLD AUTO: 0.7 K/UL (ref 0.3–1)
MONOCYTES NFR BLD: 9 % (ref 4–15)
NEUTROPHILS # BLD AUTO: 6.7 K/UL (ref 1.8–7.7)
NEUTROPHILS NFR BLD: 84.1 % (ref 38–73)
NRBC BLD-RTO: 0 /100 WBC
PHOSPHATE SERPL-MCNC: 2.9 MG/DL (ref 2.7–4.5)
PLATELET # BLD AUTO: 315 K/UL (ref 150–350)
PMV BLD AUTO: 9 FL (ref 9.2–12.9)
POCT GLUCOSE: 184 MG/DL (ref 70–110)
POCT GLUCOSE: 187 MG/DL (ref 70–110)
POTASSIUM SERPL-SCNC: 4 MMOL/L (ref 3.5–5.1)
PROT SERPL-MCNC: 6.9 G/DL (ref 6–8.4)
RBC # BLD AUTO: 3.68 M/UL (ref 4.6–6.2)
SODIUM SERPL-SCNC: 146 MMOL/L (ref 136–145)
TRIGL SERPL-MCNC: 352 MG/DL (ref 30–150)
WBC # BLD AUTO: 7.96 K/UL (ref 3.9–12.7)

## 2020-04-04 PROCEDURE — 83735 ASSAY OF MAGNESIUM: CPT

## 2020-04-04 PROCEDURE — 80053 COMPREHEN METABOLIC PANEL: CPT

## 2020-04-04 PROCEDURE — 84478 ASSAY OF TRIGLYCERIDES: CPT

## 2020-04-04 PROCEDURE — 27000221 HC OXYGEN, UP TO 24 HOURS

## 2020-04-04 PROCEDURE — 63600175 PHARM REV CODE 636 W HCPCS: Performed by: STUDENT IN AN ORGANIZED HEALTH CARE EDUCATION/TRAINING PROGRAM

## 2020-04-04 PROCEDURE — 86140 C-REACTIVE PROTEIN: CPT

## 2020-04-04 PROCEDURE — 25000242 PHARM REV CODE 250 ALT 637 W/ HCPCS: Performed by: STUDENT IN AN ORGANIZED HEALTH CARE EDUCATION/TRAINING PROGRAM

## 2020-04-04 PROCEDURE — 94761 N-INVAS EAR/PLS OXIMETRY MLT: CPT

## 2020-04-04 PROCEDURE — 25000003 PHARM REV CODE 250: Performed by: INTERNAL MEDICINE

## 2020-04-04 PROCEDURE — 99900035 HC TECH TIME PER 15 MIN (STAT)

## 2020-04-04 PROCEDURE — 94660 CPAP INITIATION&MGMT: CPT

## 2020-04-04 PROCEDURE — S0166 INJ OLANZAPINE 2.5MG: HCPCS | Performed by: STUDENT IN AN ORGANIZED HEALTH CARE EDUCATION/TRAINING PROGRAM

## 2020-04-04 PROCEDURE — 25000003 PHARM REV CODE 250: Performed by: STUDENT IN AN ORGANIZED HEALTH CARE EDUCATION/TRAINING PROGRAM

## 2020-04-04 PROCEDURE — 94640 AIRWAY INHALATION TREATMENT: CPT

## 2020-04-04 PROCEDURE — 94770 HC EXHALED C02 TEST: CPT

## 2020-04-04 PROCEDURE — 99291 PR CRITICAL CARE, E/M 30-74 MINUTES: ICD-10-PCS | Mod: GC,,, | Performed by: INTERNAL MEDICINE

## 2020-04-04 PROCEDURE — 63600175 PHARM REV CODE 636 W HCPCS: Performed by: INTERNAL MEDICINE

## 2020-04-04 PROCEDURE — 36415 COLL VENOUS BLD VENIPUNCTURE: CPT

## 2020-04-04 PROCEDURE — 99291 CRITICAL CARE FIRST HOUR: CPT | Mod: GC,,, | Performed by: INTERNAL MEDICINE

## 2020-04-04 PROCEDURE — 85025 COMPLETE CBC W/AUTO DIFF WBC: CPT

## 2020-04-04 PROCEDURE — 20000000 HC ICU ROOM

## 2020-04-04 PROCEDURE — 84100 ASSAY OF PHOSPHORUS: CPT

## 2020-04-04 RX ADMIN — FUROSEMIDE 40 MG: 10 INJECTION, SOLUTION INTRAMUSCULAR; INTRAVENOUS at 09:04

## 2020-04-04 RX ADMIN — IPRATROPIUM BROMIDE AND ALBUTEROL SULFATE 3 ML: .5; 3 SOLUTION RESPIRATORY (INHALATION) at 12:04

## 2020-04-04 RX ADMIN — ENOXAPARIN SODIUM 40 MG: 100 INJECTION SUBCUTANEOUS at 04:04

## 2020-04-04 RX ADMIN — DEXMEDETOMIDINE HYDROCHLORIDE 1.4 MCG/KG/HR: 100 INJECTION, SOLUTION, CONCENTRATE INTRAVENOUS at 10:04

## 2020-04-04 RX ADMIN — Medication 15 MG: at 07:04

## 2020-04-04 RX ADMIN — DEXMEDETOMIDINE HYDROCHLORIDE 0.2 MCG/KG/HR: 100 INJECTION, SOLUTION, CONCENTRATE INTRAVENOUS at 02:04

## 2020-04-04 RX ADMIN — IPRATROPIUM BROMIDE AND ALBUTEROL SULFATE 3 ML: .5; 3 SOLUTION RESPIRATORY (INHALATION) at 01:04

## 2020-04-04 RX ADMIN — DEXMEDETOMIDINE HYDROCHLORIDE 0.2 MCG/KG/HR: 100 INJECTION, SOLUTION, CONCENTRATE INTRAVENOUS at 09:04

## 2020-04-04 RX ADMIN — DEXMEDETOMIDINE HYDROCHLORIDE 1.4 MCG/KG/HR: 100 INJECTION, SOLUTION, CONCENTRATE INTRAVENOUS at 04:04

## 2020-04-04 RX ADMIN — DEXMEDETOMIDINE HYDROCHLORIDE 0.2 MCG/KG/HR: 100 INJECTION, SOLUTION, CONCENTRATE INTRAVENOUS at 03:04

## 2020-04-04 RX ADMIN — IPRATROPIUM BROMIDE AND ALBUTEROL SULFATE 3 ML: .5; 3 SOLUTION RESPIRATORY (INHALATION) at 09:04

## 2020-04-04 RX ADMIN — IPRATROPIUM BROMIDE AND ALBUTEROL SULFATE 3 ML: .5; 3 SOLUTION RESPIRATORY (INHALATION) at 07:04

## 2020-04-04 RX ADMIN — OLANZAPINE 5 MG: 10 INJECTION, POWDER, FOR SOLUTION INTRAMUSCULAR at 10:04

## 2020-04-04 RX ADMIN — DEXMEDETOMIDINE HYDROCHLORIDE 1.4 MCG/KG/HR: 100 INJECTION, SOLUTION, CONCENTRATE INTRAVENOUS at 01:04

## 2020-04-04 NOTE — PROGRESS NOTES
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 CXR right lobe infiltrate with elevated , D-Dimer 1.03,  and Ferritin 331. Patient swabbed for COVID 19:positive. Patient started on IV Rocephin and Azithromycin and Plaquenil. Patient initially on 2 liters for mild hypoxia at 88% on room air but within 24 hours of admit patient had increasing oxygen requirements and intubated on evening of 3/30 (P/F then 170); CXR at the time suggested ARDS picture given bilateral patchy ground-glass infiltrates. Patient had mild JULIET with Cr of 1.5, but good UOP not requiring dialysis. Patient has been improving on vent with less requirement since intubation. Labs also showed worsening transaminitis with ,  likely due to COVID infection; history of treated hepatitis C infection in 2015. Patient not requiring vasopressors at [facility], on Fentanyl and Propofol for sedation.Transferred to [facility] on 4/1 due to capacity issues at [facility]     Full, itemized PMHx/SurgHx/Med list, per patient's medical records. Sees Dr. García Lechuga at [facility]  · HTN: on lisinopril 10mg daily  · 3-4yr Hx of T2DM with polyneuropathy, HBA1C 3/29 7.0%, non-insulin-dependent, on Metformin 500 BD, Glipizide 10 mg BD, Glucophage 1000  mg BD.  Last eye exam Jan 2019, last foot exam Feb 2019  · HLP: Atorvastatin 80mg daily  · Prostate CA, S/P XRT and seed brachytherapy in 2015, followed by [facility], last PSA <0.1 2/5/19  · Cervical spondylopathy with myelopathy, S/P ACDF C4-5 by Dr. Le in 2012  · Bipolar disorder with depressive features, last psych admit 2014 at [facility], with Hx of suicide attempts. On home ziprazidone 80mg QD, Bupropion 150 BD, Lamotrigine 200mg QD, sees psychiatrist   · Insomnia: On 3mg melatonin QHS  · Polysubstance abuser: EtOH (unclear daily use), cocaine, amphetamines, LSD,   15-PY smoker, quit 2015, on nicotine patch    Hospital/ICU Course:  4/1: [place] [facility] [place]/ventilated CAP coverage with Azithro/Rocephin continued, as with plaquenil. Elevated LFTs trending down, likely due to COVID. JULIET resolved, never needed HD.  Patient extubated to venti mask on 4/4. Destaed overnight so now on BiPAP. PT/OT ordered.     Interval History/Significant Events:   Please see hospital course above. Still making good urine with 2.4L out yesterday. Net neg 3.4L for admission. Currently on bipap 12/5 FIO2 45%     Review of Systems   Unable to perform ROS: Severe respiratory distress     Objective:     Vital Signs (Most Recent):  Temp: 98.4 °F (36.9 °C) (04/04/20 0300)  Pulse: 85 (04/04/20 1015)  Resp: (!) 29 (04/04/20 1015)  BP: (!) 156/79 (04/04/20 1020)  SpO2: (!) 92 % (04/04/20 1015) Vital Signs (24h Range):  Temp:  [98.1 °F (36.7 °C)-98.9 °F (37.2 °C)] 98.4 °F (36.9 °C)  Pulse:  [] 85  Resp:  [14-34] 29  SpO2:  [87 %-98 %] 92 %  BP: (126-184)/() 156/79  Arterial Line BP: ()/() 122/73   Weight: 87.4 kg (192 lb 10.9 oz)  Body mass index is 28.45 kg/m².      Intake/Output Summary (Last 24 hours) at 4/4/2020 1140  Last data filed at 4/4/2020 0715  Gross per 24 hour   Intake 523.45 ml   Output 1855 ml   Net -1331.55 ml       Physical Exam   Constitutional: He appears well-developed and  well-nourished.   Exam from window to reduce covid exposure.   Cardiovascular: Normal rate and regular rhythm.   Not on pressors   Pulmonary/Chest: Effort normal.   Currently on bipap   Neurological:   Confused, not following commands        Vents:  Vent Mode: Spont (04/03/20 1420)  Ventilator Initiated: Yes (04/01/20 1551)  Set Rate: 20 BPM (04/03/20 0852)  Vt Set: 420 mL (04/03/20 0852)  Pressure Support: 5 cmH20 (04/03/20 1420)  PEEP/CPAP: 10 cmH20 (04/03/20 1420)  Oxygen Concentration (%): 50 (04/04/20 0943)  Peak Airway Pressure: 15 cmH2O (04/03/20 1420)  Plateau Pressure: 25 cmH20 (04/03/20 1420)  Total Ve: 23 mL (04/03/20 1420)  F/VT Ratio<105 (RSBI): 2750 (04/03/20 1420)  Lines/Drains/Airways     Central Venous Catheter Line            Percutaneous Central Line Insertion/Assessment - Double Lumen  03/30/20 1837 right subclavian 4 days          Drain                 Urethral Catheter 03/30/20 1657 4 days          Arterial Line                 Arterial Line 03/30/20 1645 Right Radial 4 days          Peripheral Intravenous Line                 Peripheral IV - Single Lumen 04/02/20 1115 20 G Anterior;Left Forearm 2 days              Significant Labs:    CBC/Anemia Profile:  Recent Labs   Lab 04/03/20 0228 04/04/20  0236   WBC 6.77 7.96   HGB 9.0* 8.7*   HCT 30.8* 29.0*    315   MCV 80* 79*   RDW 19.4* 18.6*        Chemistries:  Recent Labs   Lab 04/03/20 0228 04/04/20  0236    146*   K 4.9 4.0    106   CO2 21* 24   BUN 22 23   CREATININE 0.8 0.8   CALCIUM 8.2* 8.3*   ALBUMIN 1.9* 1.9*   PROT 6.9 6.9   BILITOT 0.3 0.5   ALKPHOS 164* 152*   * 299*   * 183*   MG 3.4* 3.0*   PHOS 3.7 2.9         Significant Imaging:  I have reviewed all pertinent imaging results/findings within the past 24 hours.      ABG  Recent Labs   Lab 03/31/20  0600   PH 7.340*   PO2 85   PCO2 40.5   HCO3 21.8*   BE -4     Assessment/Plan:     Psychiatric  Bipolar 1 disorder, depressed  Holding all home meds  except lamotrigine 200 QHS. Will re-visit home meds once extubated and stepped down from ICU.    Cardiac/Vascular  Essential hypertension, benign  - holding home antihypertensives    Renal/  JULIET (acute kidney injury)  - Cr peak of 1.5 (baseline 1.1) at [facility]  - resolved. Cr 0.8  - making good urine.  - net neg 3.4L. Lasix 40 BID to maintain net negative status to improve ventilation, per ARDS strategy    Oncology  Anemia  - Transfusion threshold HB <7    Endocrine  Type 2 diabetes mellitus with diabetic neuropathic arthropathy, without long-term current use of insulin  - BGs within goal of 140-190  - continue LDSSI  - Holding home meds    GI  Elevated LFTs  - likely 2/2 to covid; however, does have a history of treated hep c in 2015  - AST//600s on transfer, now downtrending. Continuing to trend CMPs  - Despite downtrending AST/ALTs, patient has Hx of treated HCV but no recent HCV RNA, and continued IVDU/substance use.  - HCV quantitative pending    Hepatitis C  Despite Harvoni treatment in 2015, no recent HCV RNA evidence of seronegativity. Will order HCV RNA to confirm.    Other  * Acute respiratory distress syndrome (ARDS) due to COVID-19 virus  65 y/o male with Bipolar disorder, hepatitis C s/p harvoni, Prostate CA in remission s/p XRT, HTN, HLP, Non-insulin-dependent-T2DM with polyneuropathy, and polysubstance abuse,  was transferred from [facility] on 4/1 for acute respiratory failure 2/2 COVID+.     - Continue plaquenil  - CAP coverage with azithromycin and rocephin - complete  - holding steroids for now  - isolation precautions  - BCx NG. No resp Cx collected.   - extubated on 4/3. Currently on bipap 12/5 FIO2 45%       Critical secondary to Patient has a condition that poses threat to life and bodily function: Severe Respiratory Distress      Critical care was time spent personally by me on the following activities: development of treatment plan with patient or surrogate and bedside  caregivers, discussions with consultants, evaluation of patient's response to treatment, examination of patient, ordering and performing treatments and interventions, ordering and review of laboratory studies, ordering and review of radiographic studies, pulse oximetry, re-evaluation of patient's condition. This critical care time did not overlap with that of any other provider or involve time for any procedures.     Gurpreet Godinez MD  Critical Care Medicine  [facility]

## 2020-04-04 NOTE — ASSESSMENT & PLAN NOTE
- likely 2/2 to covid; however, does have a history of treated hep c in 2015  - AST//600s on transfer, now downtrending. Continuing to trend CMPs  - Despite downtrending AST/ALTs, patient has Hx of treated HCV but no recent HCV RNA, and continued IVDU/substance use.  - HCV quantitative pending

## 2020-04-04 NOTE — SUBJECTIVE & OBJECTIVE
Interval History/Significant Events:   Please see hospital course above. Still making good urine with 2.4L out yesterday. Net neg 3.4L for admission. Currently on bipap 12/5 FIO2 45%     Review of Systems   Unable to perform ROS: Severe respiratory distress     Objective:     Vital Signs (Most Recent):  Temp: 98.4 °F (36.9 °C) (04/04/20 0300)  Pulse: 85 (04/04/20 1015)  Resp: (!) 29 (04/04/20 1015)  BP: (!) 156/79 (04/04/20 1020)  SpO2: (!) 92 % (04/04/20 1015) Vital Signs (24h Range):  Temp:  [98.1 °F (36.7 °C)-98.9 °F (37.2 °C)] 98.4 °F (36.9 °C)  Pulse:  [] 85  Resp:  [14-34] 29  SpO2:  [87 %-98 %] 92 %  BP: (126-184)/() 156/79  Arterial Line BP: ()/() 122/73   Weight: 87.4 kg (192 lb 10.9 oz)  Body mass index is 28.45 kg/m².      Intake/Output Summary (Last 24 hours) at 4/4/2020 1140  Last data filed at 4/4/2020 0715  Gross per 24 hour   Intake 523.45 ml   Output 1855 ml   Net -1331.55 ml       Physical Exam   Constitutional: He appears well-developed and well-nourished.   Exam from window to reduce covid exposure.   Cardiovascular: Normal rate and regular rhythm.   Not on pressors   Pulmonary/Chest: Effort normal.   Currently on bipap   Neurological:   Confused, not following commands        Vents:  Vent Mode: Spont (04/03/20 1420)  Ventilator Initiated: Yes (04/01/20 1551)  Set Rate: 20 BPM (04/03/20 0852)  Vt Set: 420 mL (04/03/20 0852)  Pressure Support: 5 cmH20 (04/03/20 1420)  PEEP/CPAP: 10 cmH20 (04/03/20 1420)  Oxygen Concentration (%): 50 (04/04/20 0943)  Peak Airway Pressure: 15 cmH2O (04/03/20 1420)  Plateau Pressure: 25 cmH20 (04/03/20 1420)  Total Ve: 23 mL (04/03/20 1420)  F/VT Ratio<105 (RSBI): 2750 (04/03/20 1420)  Lines/Drains/Airways     Central Venous Catheter Line            Percutaneous Central Line Insertion/Assessment - Double Lumen  03/30/20 1837 right subclavian 4 days          Drain                 Urethral Catheter 03/30/20 1657 4 days          Arterial Line                  Arterial Line 03/30/20 1645 Right Radial 4 days          Peripheral Intravenous Line                 Peripheral IV - Single Lumen 04/02/20 1115 20 G Anterior;Left Forearm 2 days              Significant Labs:    CBC/Anemia Profile:  Recent Labs   Lab 04/03/20 0228 04/04/20  0236   WBC 6.77 7.96   HGB 9.0* 8.7*   HCT 30.8* 29.0*    315   MCV 80* 79*   RDW 19.4* 18.6*        Chemistries:  Recent Labs   Lab 04/03/20 0228 04/04/20  0236    146*   K 4.9 4.0    106   CO2 21* 24   BUN 22 23   CREATININE 0.8 0.8   CALCIUM 8.2* 8.3*   ALBUMIN 1.9* 1.9*   PROT 6.9 6.9   BILITOT 0.3 0.5   ALKPHOS 164* 152*   * 299*   * 183*   MG 3.4* 3.0*   PHOS 3.7 2.9         Significant Imaging:  I have reviewed all pertinent imaging results/findings within the past 24 hours.

## 2020-04-04 NOTE — ASSESSMENT & PLAN NOTE
- Cr peak of 1.5 (baseline 1.1) at [facility]  - resolved. Cr 0.8  - making good urine.  - net neg 3.4L. Lasix 40 BID to maintain net negative status to improve ventilation, per ARDS strategy

## 2020-04-04 NOTE — ASSESSMENT & PLAN NOTE
65 y/o male with Bipolar disorder, hepatitis C s/p harvoni, Prostate CA in remission s/p XRT, HTN, HLP, Non-insulin-dependent-T2DM with polyneuropathy, and polysubstance abuse,  was transferred from [facility] on 4/1 for acute respiratory failure 2/2 COVID+.     - Continue plaquenil  - CAP coverage with azithromycin and rocephin - complete  - holding steroids for now  - isolation precautions  - BCx NG. No resp Cx collected.   - extubated on 4/3. Currently on bipap 12/5 FIO2 45%

## 2020-04-05 LAB
ALBUMIN SERPL BCP-MCNC: 2 G/DL (ref 3.5–5.2)
ALP SERPL-CCNC: 158 U/L (ref 55–135)
ALT SERPL W/O P-5'-P-CCNC: 237 U/L (ref 10–44)
ANION GAP SERPL CALC-SCNC: 16 MMOL/L (ref 8–16)
AST SERPL-CCNC: 144 U/L (ref 10–40)
BASOPHILS # BLD AUTO: 0.02 K/UL (ref 0–0.2)
BASOPHILS NFR BLD: 0.3 % (ref 0–1.9)
BILIRUB SERPL-MCNC: 0.5 MG/DL (ref 0.1–1)
BUN SERPL-MCNC: 30 MG/DL (ref 8–23)
CALCIUM SERPL-MCNC: 8.2 MG/DL (ref 8.7–10.5)
CHLORIDE SERPL-SCNC: 110 MMOL/L (ref 95–110)
CO2 SERPL-SCNC: 25 MMOL/L (ref 23–29)
CREAT SERPL-MCNC: 0.8 MG/DL (ref 0.5–1.4)
DIFFERENTIAL METHOD: ABNORMAL
EOSINOPHIL # BLD AUTO: 0.1 K/UL (ref 0–0.5)
EOSINOPHIL NFR BLD: 1.1 % (ref 0–8)
ERYTHROCYTE [DISTWIDTH] IN BLOOD BY AUTOMATED COUNT: 18.8 % (ref 11.5–14.5)
EST. GFR  (AFRICAN AMERICAN): >60 ML/MIN/1.73 M^2
EST. GFR  (NON AFRICAN AMERICAN): >60 ML/MIN/1.73 M^2
GLUCOSE SERPL-MCNC: 157 MG/DL (ref 70–110)
HCT VFR BLD AUTO: 31.3 % (ref 40–54)
HGB BLD-MCNC: 9.4 G/DL (ref 14–18)
IMM GRANULOCYTES # BLD AUTO: 0.13 K/UL (ref 0–0.04)
IMM GRANULOCYTES NFR BLD AUTO: 1.6 % (ref 0–0.5)
LYMPHOCYTES # BLD AUTO: 0.4 K/UL (ref 1–4.8)
LYMPHOCYTES NFR BLD: 4.8 % (ref 18–48)
MAGNESIUM SERPL-MCNC: 2.9 MG/DL (ref 1.6–2.6)
MCH RBC QN AUTO: 23.7 PG (ref 27–31)
MCHC RBC AUTO-ENTMCNC: 30 G/DL (ref 32–36)
MCV RBC AUTO: 79 FL (ref 82–98)
MONOCYTES # BLD AUTO: 0.7 K/UL (ref 0.3–1)
MONOCYTES NFR BLD: 8.7 % (ref 4–15)
NEUTROPHILS # BLD AUTO: 6.6 K/UL (ref 1.8–7.7)
NEUTROPHILS NFR BLD: 83.5 % (ref 38–73)
NRBC BLD-RTO: 0 /100 WBC
PHOSPHATE SERPL-MCNC: 3.6 MG/DL (ref 2.7–4.5)
PLATELET # BLD AUTO: 314 K/UL (ref 150–350)
PMV BLD AUTO: 9.3 FL (ref 9.2–12.9)
POCT GLUCOSE: 215 MG/DL (ref 70–110)
POTASSIUM SERPL-SCNC: 3.9 MMOL/L (ref 3.5–5.1)
PROT SERPL-MCNC: 6.7 G/DL (ref 6–8.4)
RBC # BLD AUTO: 3.96 M/UL (ref 4.6–6.2)
SODIUM SERPL-SCNC: 151 MMOL/L (ref 136–145)
WBC # BLD AUTO: 7.91 K/UL (ref 3.9–12.7)

## 2020-04-05 PROCEDURE — 25000242 PHARM REV CODE 250 ALT 637 W/ HCPCS: Performed by: STUDENT IN AN ORGANIZED HEALTH CARE EDUCATION/TRAINING PROGRAM

## 2020-04-05 PROCEDURE — 25000003 PHARM REV CODE 250: Performed by: INTERNAL MEDICINE

## 2020-04-05 PROCEDURE — 20000000 HC ICU ROOM

## 2020-04-05 PROCEDURE — 63600175 PHARM REV CODE 636 W HCPCS: Performed by: INTERNAL MEDICINE

## 2020-04-05 PROCEDURE — 63600175 PHARM REV CODE 636 W HCPCS: Performed by: STUDENT IN AN ORGANIZED HEALTH CARE EDUCATION/TRAINING PROGRAM

## 2020-04-05 PROCEDURE — 99900026 HC AIRWAY MAINTENANCE (STAT)

## 2020-04-05 PROCEDURE — 85025 COMPLETE CBC W/AUTO DIFF WBC: CPT

## 2020-04-05 PROCEDURE — 83735 ASSAY OF MAGNESIUM: CPT

## 2020-04-05 PROCEDURE — 25000003 PHARM REV CODE 250: Performed by: STUDENT IN AN ORGANIZED HEALTH CARE EDUCATION/TRAINING PROGRAM

## 2020-04-05 PROCEDURE — 99291 PR CRITICAL CARE, E/M 30-74 MINUTES: ICD-10-PCS | Mod: GC,,, | Performed by: INTERNAL MEDICINE

## 2020-04-05 PROCEDURE — 27100171 HC OXYGEN HIGH FLOW UP TO 24 HOURS

## 2020-04-05 PROCEDURE — 94660 CPAP INITIATION&MGMT: CPT

## 2020-04-05 PROCEDURE — 27000221 HC OXYGEN, UP TO 24 HOURS

## 2020-04-05 PROCEDURE — 80053 COMPREHEN METABOLIC PANEL: CPT

## 2020-04-05 PROCEDURE — 99291 CRITICAL CARE FIRST HOUR: CPT | Mod: GC,,, | Performed by: INTERNAL MEDICINE

## 2020-04-05 PROCEDURE — 84100 ASSAY OF PHOSPHORUS: CPT

## 2020-04-05 PROCEDURE — 99900035 HC TECH TIME PER 15 MIN (STAT)

## 2020-04-05 PROCEDURE — 27100092 HC HIGH FLOW DELIVERY CANNULA

## 2020-04-05 PROCEDURE — 94640 AIRWAY INHALATION TREATMENT: CPT

## 2020-04-05 PROCEDURE — 94761 N-INVAS EAR/PLS OXIMETRY MLT: CPT

## 2020-04-05 RX ADMIN — IPRATROPIUM BROMIDE AND ALBUTEROL SULFATE 3 ML: .5; 3 SOLUTION RESPIRATORY (INHALATION) at 07:04

## 2020-04-05 RX ADMIN — VALPROATE SODIUM 250 MG: 100 INJECTION, SOLUTION INTRAVENOUS at 10:04

## 2020-04-05 RX ADMIN — VALPROATE SODIUM 250 MG: 100 INJECTION, SOLUTION INTRAVENOUS at 11:04

## 2020-04-05 RX ADMIN — CHLORHEXIDINE GLUCONATE 0.12% ORAL RINSE 15 ML: 1.2 LIQUID ORAL at 08:04

## 2020-04-05 RX ADMIN — DEXMEDETOMIDINE HYDROCHLORIDE 0.2 MCG/KG/HR: 100 INJECTION, SOLUTION, CONCENTRATE INTRAVENOUS at 12:04

## 2020-04-05 RX ADMIN — DEXMEDETOMIDINE HYDROCHLORIDE 1.4 MCG/KG/HR: 100 INJECTION, SOLUTION, CONCENTRATE INTRAVENOUS at 10:04

## 2020-04-05 RX ADMIN — DEXMEDETOMIDINE HYDROCHLORIDE 1.4 MCG/KG/HR: 100 INJECTION, SOLUTION, CONCENTRATE INTRAVENOUS at 08:04

## 2020-04-05 RX ADMIN — IPRATROPIUM BROMIDE AND ALBUTEROL SULFATE 3 ML: .5; 3 SOLUTION RESPIRATORY (INHALATION) at 12:04

## 2020-04-05 RX ADMIN — DEXMEDETOMIDINE HYDROCHLORIDE 1.4 MCG/KG/HR: 100 INJECTION, SOLUTION, CONCENTRATE INTRAVENOUS at 02:04

## 2020-04-05 RX ADMIN — DEXMEDETOMIDINE HYDROCHLORIDE 1.4 MCG/KG/HR: 100 INJECTION, SOLUTION, CONCENTRATE INTRAVENOUS at 04:04

## 2020-04-05 RX ADMIN — DEXMEDETOMIDINE HYDROCHLORIDE 1.4 MCG/KG/HR: 100 INJECTION, SOLUTION, CONCENTRATE INTRAVENOUS at 06:04

## 2020-04-05 RX ADMIN — VALPROATE SODIUM 250 MG: 100 INJECTION, SOLUTION INTRAVENOUS at 04:04

## 2020-04-05 RX ADMIN — INSULIN ASPART 2 UNITS: 100 INJECTION, SOLUTION INTRAVENOUS; SUBCUTANEOUS at 05:04

## 2020-04-05 RX ADMIN — ENOXAPARIN SODIUM 40 MG: 100 INJECTION SUBCUTANEOUS at 04:04

## 2020-04-05 NOTE — ASSESSMENT & PLAN NOTE
- Holding all home meds except lamotrigine 200 QHS. Will re-visit home meds once extubated and stepped down from ICU.  - will attempt to restart home antipsychotic (ziprasidone) - was agitated overnight and improved after olanzapine

## 2020-04-05 NOTE — PROGRESS NOTES
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 CXR right lobe infiltrate with elevated , D-Dimer 1.03,  and Ferritin 331. Patient swabbed for COVID 19:positive. Patient started on IV Rocephin and Azithromycin and Plaquenil. Patient initially on 2 liters for mild hypoxia at 88% on room air but within 24 hours of admit patient had increasing oxygen requirements and intubated on evening of 3/30 (P/F then 170); CXR at the time suggested ARDS picture given bilateral patchy ground-glass infiltrates. Patient had mild JULIET with Cr of 1.5, but good UOP not requiring dialysis. Patient has been improving on vent with less requirement since intubation. Labs also showed worsening transaminitis with ,  likely due to COVID infection; history of treated hepatitis C infection in 2015. Patient not requiring vasopressors at [facility], on Fentanyl and Propofol for sedation.Transferred to [facility] on 4/1 due to capacity issues at [facility]     Full, itemized PMHx/SurgHx/Med list, per patient's medical records. Sees Dr. Garc[facility]hcuga at [facility]  · HTN: on lisinopril 10mg daily  · 3-4yr Hx of T2DM with polyneuropathy, HBA1C 3/29 7.0%, non-insulin-dependent, on Metformin 500 BD, Glipizide 10 mg BD, Glucophage 1000  mg BD.  Last eye exam Jan 2019, last foot exam Feb 2019  · HLP: Atorvastatin 80mg daily  · Prostate CA, S/P XRT and seed brachytherapy in 2015, followed by [facility], last PSA <0.1 2/5/19  · Cervical spondylopathy with myelopathy, S/P ACDF C4-5 by Dr. Le in 2012  · Bipolar disorder with depressive features, last psych admit 2014 at [facility], with Hx of suicide attempts. On home ziprazidone 80mg QD, Bupropion 150 BD, Lamotrigine 200mg QD, sees psychiatrist   · Insomnia: On 3mg melatonin QHS  · Polysubstance abuser: EtOH (unclear daily use), cocaine, amphetamines, LSD,   15-PY smoker, quit 2015, on nicotine patch    Hospital/ICU Course:  Patient [facility] on 4/1- intubated/ventilated CAP coverage with Azithro/Rocephin continued, as with plaquenil. Elevated LFTs trending down, likely due to COVID. JULIET resolved, never needed HD.  Patient extubated to venti mask on 4/4. Destaed overnight so now on BiPAP. PT/OT ordered. 4/5, continued on BIPAP, will attempt to wean to venti mask today     Interval History/Significant Events:   Overnight patient had a couple episodes of agitation so was given olanzapine 5mg IM  x1. This morning he became agitated again and took his BIPAP mask off, desated to 70s. Mask replaced and sats improved to 95%. Not following commands. On precedex. Has a psych history and ziprasidone listed under home meds    Review of Systems   Unable to perform ROS: Severe respiratory distress     Objective:     Vital Signs (Most Recent):  Temp: 98 °F (36.7 °C) (04/05/20 0715)  Pulse: 108 (04/05/20 0735)  Resp: (!) 35 (04/05/20 0735)  BP: 119/74 (04/05/20 0715)  SpO2: (!) 94 % (04/05/20 0735) Vital Signs (24h Range):  Temp:  [98 °F (36.7 °C)-98.9 °F (37.2 °C)] 98 °F (36.7 °C)  Pulse:  [] 108  Resp:  [17-44] 35  SpO2:  [87 %-97 %] 94 %  BP: (119-166)/(70-91) 119/74  Arterial Line BP: (108-162)/() 108/68   Weight: 87.4 kg (192 lb 10.9 oz)  Body mass index is 28.45 kg/m².      Intake/Output  Summary (Last 24 hours) at 4/5/2020 0826  Last data filed at 4/5/2020 0715  Gross per 24 hour   Intake 571.87 ml   Output 2255 ml   Net -1683.13 ml       Physical Exam   Constitutional: He appears well-developed and well-nourished.   Exam from window to reduce covid exposure.   Cardiovascular: Normal rate and regular rhythm.   Not on pressors   Pulmonary/Chest: Effort normal.   Currently on bipap   Neurological:   Confused, not following commands        Vents:  Vent Mode: Spont (04/03/20 1420)  Ventilator Initiated: Yes (04/01/20 1551)  Set Rate: 20 BPM (04/03/20 0852)  Vt Set: 420 mL (04/03/20 0852)  Pressure Support: 5 cmH20 (04/03/20 1420)  PEEP/CPAP: 10 cmH20 (04/03/20 1420)  Oxygen Concentration (%): 60 (04/05/20 0735)  Peak Airway Pressure: 15 cmH2O (04/03/20 1420)  Plateau Pressure: 25 cmH20 (04/03/20 1420)  Total Ve: 23 mL (04/03/20 1420)  F/VT Ratio<105 (RSBI): 2750 (04/03/20 1420)  Lines/Drains/Airways     Central Venous Catheter Line            Percutaneous Central Line Insertion/Assessment - Double Lumen  03/30/20 1837 right subclavian 5 days          Drain                 Urethral Catheter 03/30/20 1657 5 days          Arterial Line                 Arterial Line 03/30/20 1645 Right Radial 5 days          Peripheral Intravenous Line                 Peripheral IV - Single Lumen 04/02/20 1115 20 G Anterior;Left Forearm 2 days              Significant Labs:    CBC/Anemia Profile:  Recent Labs   Lab 04/04/20  0236 04/05/20  0337   WBC 7.96 7.91   HGB 8.7* 9.4*   HCT 29.0* 31.3*    314   MCV 79* 79*   RDW 18.6* 18.8*        Chemistries:  Recent Labs   Lab 04/04/20  0236 04/05/20  0201   * 151*   K 4.0 3.9    110   CO2 24 25   BUN 23 30*   CREATININE 0.8 0.8   CALCIUM 8.3* 8.2*   ALBUMIN 1.9* 2.0*   PROT 6.9 6.7   BILITOT 0.5 0.5   ALKPHOS 152* 158*   * 237*   * 144*   MG 3.0* 2.9*   PHOS 2.9 3.6         Significant Imaging:  I have reviewed all pertinent imaging  results/findings within the past 24 hours.      ABG  Recent Labs   Lab 03/31/20  0600   PH 7.340*   PO2 85   PCO2 40.5   HCO3 21.8*   BE -4     Assessment/Plan:     Psychiatric  Bipolar 1 disorder, depressed  - Holding all home meds except lamotrigine 200 QHS. Will re-visit home meds once extubated and stepped down from ICU.  - will attempt to restart home antipsychotic (ziprasidone) - was agitated overnight and improved after olanzapine     Cardiac/Vascular  Essential hypertension, benign  - holding home antihypertensives    Renal/  JULIET (acute kidney injury)  - Cr peak of 1.5 (baseline 1.1) at [facility]  - resolved.  - making good urine.  - net neg 3.4L. Elevated BUN/Cr. Will hold Lasix     Oncology  Anemia  - Transfusion threshold HB <7    Endocrine  Type 2 diabetes mellitus with diabetic neuropathic arthropathy, without long-term current use of insulin  - BGs goal of 140-190  - continue LDSSI  - Holding home meds    GI  Elevated LFTs  - likely 2/2 to covid; however, does have a history of treated hep c in 2015  - AST//600s on transfer, now downtrending. Continuing to trend CMPs  - HCV quantitative pending    Hepatitis C  Despite Harvoni treatment in 2015, no recent HCV RNA evidence of seronegativity. Will order HCV RNA to confirm.    Other  * Acute respiratory distress syndrome (ARDS) due to COVID-19 virus  65 y/o male with Bipolar disorder, hepatitis C s/p harvoni, Prostate CA in remission s/p XRT, HTN, HLP, Non-insulin-dependent-T2DM with polyneuropathy, and polysubstance abuse,  was transferred from [facility] on 4/1 for acute respiratory failure 2/2 COVID+.     - Continue plaquenil  - CAP coverage with azithromycin and rocephin - complete  - holding steroids for now  - isolation precautions  - BCx NG. No resp Cx collected.   - extubated on 4/3. Currently on bipap 12/5 FIO2 60%. Will attempt to wean to venti mask today      Critical secondary to Patient has a condition that poses threat to life and  bodily function: Severe Respiratory Distress      Critical care was time spent personally by me on the following activities: development of treatment plan with patient or surrogate and bedside caregivers, discussions with consultants, evaluation of patient's response to treatment, examination of patient, ordering and performing treatments and interventions, ordering and review of laboratory studies, ordering and review of radiographic studies, pulse oximetry, re-evaluation of patient's condition. This critical care time did not overlap with that of any other provider or involve time for any procedures.     Gurpreet Godinez MD  Critical Care Medicine  [facility]

## 2020-04-05 NOTE — SUBJECTIVE & OBJECTIVE
Interval History/Significant Events:   Overnight patient had a couple episodes of agitation so was given olanzapine 5mg IM  x1. This morning he became agitated again and took his BIPAP mask off, desated to 70s. Mask replaced and sats improved to 95%. Not following commands. On precedex. Has a psych history and ziprasidone listed under home meds    Review of Systems   Unable to perform ROS: Severe respiratory distress     Objective:     Vital Signs (Most Recent):  Temp: 98 °F (36.7 °C) (04/05/20 0715)  Pulse: 108 (04/05/20 0735)  Resp: (!) 35 (04/05/20 0735)  BP: 119/74 (04/05/20 0715)  SpO2: (!) 94 % (04/05/20 0735) Vital Signs (24h Range):  Temp:  [98 °F (36.7 °C)-98.9 °F (37.2 °C)] 98 °F (36.7 °C)  Pulse:  [] 108  Resp:  [17-44] 35  SpO2:  [87 %-97 %] 94 %  BP: (119-166)/(70-91) 119/74  Arterial Line BP: (108-162)/() 108/68   Weight: 87.4 kg (192 lb 10.9 oz)  Body mass index is 28.45 kg/m².      Intake/Output Summary (Last 24 hours) at 4/5/2020 0826  Last data filed at 4/5/2020 0715  Gross per 24 hour   Intake 571.87 ml   Output 2255 ml   Net -1683.13 ml       Physical Exam   Constitutional: He appears well-developed and well-nourished.   Exam from window to reduce covid exposure.   Cardiovascular: Normal rate and regular rhythm.   Not on pressors   Pulmonary/Chest: Effort normal.   Currently on bipap   Neurological:   Confused, not following commands        Vents:  Vent Mode: Spont (04/03/20 1420)  Ventilator Initiated: Yes (04/01/20 1551)  Set Rate: 20 BPM (04/03/20 0852)  Vt Set: 420 mL (04/03/20 0852)  Pressure Support: 5 cmH20 (04/03/20 1420)  PEEP/CPAP: 10 cmH20 (04/03/20 1420)  Oxygen Concentration (%): 60 (04/05/20 0735)  Peak Airway Pressure: 15 cmH2O (04/03/20 1420)  Plateau Pressure: 25 cmH20 (04/03/20 1420)  Total Ve: 23 mL (04/03/20 1420)  F/VT Ratio<105 (RSBI): 2750 (04/03/20 1420)  Lines/Drains/Airways     Central Venous Catheter Line            Percutaneous Central Line  Insertion/Assessment - Double Lumen  03/30/20 1837 right subclavian 5 days          Drain                 Urethral Catheter 03/30/20 1657 5 days          Arterial Line                 Arterial Line 03/30/20 1645 Right Radial 5 days          Peripheral Intravenous Line                 Peripheral IV - Single Lumen 04/02/20 1115 20 G Anterior;Left Forearm 2 days              Significant Labs:    CBC/Anemia Profile:  Recent Labs   Lab 04/04/20  0236 04/05/20  0337   WBC 7.96 7.91   HGB 8.7* 9.4*   HCT 29.0* 31.3*    314   MCV 79* 79*   RDW 18.6* 18.8*        Chemistries:  Recent Labs   Lab 04/04/20  0236 04/05/20  0201   * 151*   K 4.0 3.9    110   CO2 24 25   BUN 23 30*   CREATININE 0.8 0.8   CALCIUM 8.3* 8.2*   ALBUMIN 1.9* 2.0*   PROT 6.9 6.7   BILITOT 0.5 0.5   ALKPHOS 152* 158*   * 237*   * 144*   MG 3.0* 2.9*   PHOS 2.9 3.6         Significant Imaging:  I have reviewed all pertinent imaging results/findings within the past 24 hours.

## 2020-04-05 NOTE — NURSING
Pt awakened and became restless and agitated. Incoherent speech continued. Began to desat on 60% FIO2 when calmed down. FIO2 to 70% RT notified.

## 2020-04-05 NOTE — ASSESSMENT & PLAN NOTE
- Cr peak of 1.5 (baseline 1.1) at [facility]  - resolved.  - making good urine.  - net neg 3.4L. Elevated BUN/Cr. Will hold Lasix

## 2020-04-05 NOTE — PT/OT/SLP PROGRESS
Occupational Therapy      Patient Name:  Cash Crawford   MRN:  [id number]    OT orders received and acknowledged. Patient not seen today secondary to Increased agitation and confusion.Will follow-up 4/6/2020.    Reina Geronimo OT  4/5/2020

## 2020-04-05 NOTE — ASSESSMENT & PLAN NOTE
- likely 2/2 to covid; however, does have a history of treated hep c in 2015  - AST//600s on transfer, now downtrending. Continuing to trend CMPs  - HCV quantitative pending

## 2020-04-05 NOTE — ASSESSMENT & PLAN NOTE
65 y/o male with Bipolar disorder, hepatitis C s/p harvoni, Prostate CA in remission s/p XRT, HTN, HLP, Non-insulin-dependent-T2DM with polyneuropathy, and polysubstance abuse,  was transferred from [facility] on 4/1 for acute respiratory failure 2/2 COVID+.     - Continue plaquenil  - CAP coverage with azithromycin and rocephin - complete  - holding steroids for now  - isolation precautions  - BCx NG. No resp Cx collected.   - extubated on 4/3. Currently on bipap 12/5 FIO2 60%. Will attempt to wean to venti mask today

## 2020-04-06 LAB
ALBUMIN SERPL BCP-MCNC: 1.8 G/DL (ref 3.5–5.2)
ALBUMIN SERPL BCP-MCNC: 2 G/DL (ref 3.5–5.2)
ALBUMIN SERPL BCP-MCNC: 2 G/DL (ref 3.5–5.2)
ALP SERPL-CCNC: 140 U/L (ref 55–135)
ALP SERPL-CCNC: 154 U/L (ref 55–135)
ALP SERPL-CCNC: 154 U/L (ref 55–135)
ALT SERPL W/O P-5'-P-CCNC: 180 U/L (ref 10–44)
ALT SERPL W/O P-5'-P-CCNC: 211 U/L (ref 10–44)
ALT SERPL W/O P-5'-P-CCNC: 211 U/L (ref 10–44)
ANION GAP SERPL CALC-SCNC: 14 MMOL/L (ref 8–16)
ANION GAP SERPL CALC-SCNC: 16 MMOL/L (ref 8–16)
ANION GAP SERPL CALC-SCNC: 20 MMOL/L (ref 8–16)
ANION GAP SERPL CALC-SCNC: 20 MMOL/L (ref 8–16)
AST SERPL-CCNC: 102 U/L (ref 10–40)
AST SERPL-CCNC: 131 U/L (ref 10–40)
AST SERPL-CCNC: 131 U/L (ref 10–40)
BASOPHILS # BLD AUTO: 0.02 K/UL (ref 0–0.2)
BASOPHILS NFR BLD: 0.2 % (ref 0–1.9)
BILIRUB SERPL-MCNC: 0.4 MG/DL (ref 0.1–1)
BILIRUB SERPL-MCNC: 0.5 MG/DL (ref 0.1–1)
BILIRUB SERPL-MCNC: 0.5 MG/DL (ref 0.1–1)
BUN SERPL-MCNC: 43 MG/DL (ref 8–23)
BUN SERPL-MCNC: 43 MG/DL (ref 8–23)
BUN SERPL-MCNC: 54 MG/DL (ref 8–23)
BUN SERPL-MCNC: 55 MG/DL (ref 8–23)
CALCIUM SERPL-MCNC: 7.8 MG/DL (ref 8.7–10.5)
CALCIUM SERPL-MCNC: 8.5 MG/DL (ref 8.7–10.5)
CALCIUM SERPL-MCNC: 8.6 MG/DL (ref 8.7–10.5)
CALCIUM SERPL-MCNC: 8.6 MG/DL (ref 8.7–10.5)
CHLORIDE SERPL-SCNC: 107 MMOL/L (ref 95–110)
CHLORIDE SERPL-SCNC: 113 MMOL/L (ref 95–110)
CHLORIDE SERPL-SCNC: 116 MMOL/L (ref 95–110)
CHLORIDE SERPL-SCNC: 116 MMOL/L (ref 95–110)
CO2 SERPL-SCNC: 20 MMOL/L (ref 23–29)
CO2 SERPL-SCNC: 20 MMOL/L (ref 23–29)
CO2 SERPL-SCNC: 23 MMOL/L (ref 23–29)
CO2 SERPL-SCNC: 24 MMOL/L (ref 23–29)
CREAT SERPL-MCNC: 1 MG/DL (ref 0.5–1.4)
CREAT SERPL-MCNC: 1 MG/DL (ref 0.5–1.4)
CREAT SERPL-MCNC: 1.1 MG/DL (ref 0.5–1.4)
CREAT SERPL-MCNC: 1.2 MG/DL (ref 0.5–1.4)
CRP SERPL-MCNC: 238.9 MG/L (ref 0–8.2)
DIFFERENTIAL METHOD: ABNORMAL
EOSINOPHIL # BLD AUTO: 0.2 K/UL (ref 0–0.5)
EOSINOPHIL NFR BLD: 1.6 % (ref 0–8)
ERYTHROCYTE [DISTWIDTH] IN BLOOD BY AUTOMATED COUNT: 20.5 % (ref 11.5–14.5)
EST. GFR  (AFRICAN AMERICAN): >60 ML/MIN/1.73 M^2
EST. GFR  (NON AFRICAN AMERICAN): >60 ML/MIN/1.73 M^2
GLUCOSE SERPL-MCNC: 211 MG/DL (ref 70–110)
GLUCOSE SERPL-MCNC: 211 MG/DL (ref 70–110)
GLUCOSE SERPL-MCNC: 279 MG/DL (ref 70–110)
GLUCOSE SERPL-MCNC: 494 MG/DL (ref 70–110)
HCT VFR BLD AUTO: 33 % (ref 40–54)
HGB BLD-MCNC: 10.6 G/DL (ref 14–18)
IMM GRANULOCYTES # BLD AUTO: 0.15 K/UL (ref 0–0.04)
IMM GRANULOCYTES NFR BLD AUTO: 1.4 % (ref 0–0.5)
LYMPHOCYTES # BLD AUTO: 0.6 K/UL (ref 1–4.8)
LYMPHOCYTES NFR BLD: 6.1 % (ref 18–48)
MAGNESIUM SERPL-MCNC: 3 MG/DL (ref 1.6–2.6)
MCH RBC QN AUTO: 26.4 PG (ref 27–31)
MCHC RBC AUTO-ENTMCNC: 32.1 G/DL (ref 32–36)
MCV RBC AUTO: 82 FL (ref 82–98)
MONOCYTES # BLD AUTO: 0.9 K/UL (ref 0.3–1)
MONOCYTES NFR BLD: 8.2 % (ref 4–15)
NEUTROPHILS # BLD AUTO: 8.6 K/UL (ref 1.8–7.7)
NEUTROPHILS NFR BLD: 82.5 % (ref 38–73)
NRBC BLD-RTO: 0 /100 WBC
PHOSPHATE SERPL-MCNC: 4 MG/DL (ref 2.7–4.5)
PLATELET # BLD AUTO: 311 K/UL (ref 150–350)
PMV BLD AUTO: 9.2 FL (ref 9.2–12.9)
POCT GLUCOSE: 213 MG/DL (ref 70–110)
POCT GLUCOSE: 291 MG/DL (ref 70–110)
POCT GLUCOSE: 308 MG/DL (ref 70–110)
POCT GLUCOSE: 344 MG/DL (ref 70–110)
POCT GLUCOSE: 392 MG/DL (ref 70–110)
POTASSIUM SERPL-SCNC: 3.3 MMOL/L (ref 3.5–5.1)
POTASSIUM SERPL-SCNC: 3.3 MMOL/L (ref 3.5–5.1)
POTASSIUM SERPL-SCNC: 4 MMOL/L (ref 3.5–5.1)
POTASSIUM SERPL-SCNC: 4 MMOL/L (ref 3.5–5.1)
PROT SERPL-MCNC: 6.9 G/DL (ref 6–8.4)
PROT SERPL-MCNC: 7.5 G/DL (ref 6–8.4)
PROT SERPL-MCNC: 7.5 G/DL (ref 6–8.4)
RBC # BLD AUTO: 4.02 M/UL (ref 4.6–6.2)
SODIUM SERPL-SCNC: 145 MMOL/L (ref 136–145)
SODIUM SERPL-SCNC: 152 MMOL/L (ref 136–145)
SODIUM SERPL-SCNC: 156 MMOL/L (ref 136–145)
SODIUM SERPL-SCNC: 156 MMOL/L (ref 136–145)
WBC # BLD AUTO: 10.43 K/UL (ref 3.9–12.7)

## 2020-04-06 PROCEDURE — 80053 COMPREHEN METABOLIC PANEL: CPT | Mod: 91

## 2020-04-06 PROCEDURE — 25000003 PHARM REV CODE 250: Performed by: STUDENT IN AN ORGANIZED HEALTH CARE EDUCATION/TRAINING PROGRAM

## 2020-04-06 PROCEDURE — 63600175 PHARM REV CODE 636 W HCPCS: Performed by: STUDENT IN AN ORGANIZED HEALTH CARE EDUCATION/TRAINING PROGRAM

## 2020-04-06 PROCEDURE — 80048 BASIC METABOLIC PNL TOTAL CA: CPT

## 2020-04-06 PROCEDURE — C1751 CATH, INF, PER/CENT/MIDLINE: HCPCS

## 2020-04-06 PROCEDURE — 99291 CRITICAL CARE FIRST HOUR: CPT | Mod: GC,,, | Performed by: INTERNAL MEDICINE

## 2020-04-06 PROCEDURE — 99900035 HC TECH TIME PER 15 MIN (STAT)

## 2020-04-06 PROCEDURE — 99291 PR CRITICAL CARE, E/M 30-74 MINUTES: ICD-10-PCS | Mod: GC,,, | Performed by: INTERNAL MEDICINE

## 2020-04-06 PROCEDURE — 36410 VNPNXR 3YR/> PHY/QHP DX/THER: CPT

## 2020-04-06 PROCEDURE — 94660 CPAP INITIATION&MGMT: CPT

## 2020-04-06 PROCEDURE — 80053 COMPREHEN METABOLIC PANEL: CPT

## 2020-04-06 PROCEDURE — 94761 N-INVAS EAR/PLS OXIMETRY MLT: CPT

## 2020-04-06 PROCEDURE — 25000003 PHARM REV CODE 250: Performed by: INTERNAL MEDICINE

## 2020-04-06 PROCEDURE — 86140 C-REACTIVE PROTEIN: CPT

## 2020-04-06 PROCEDURE — 85025 COMPLETE CBC W/AUTO DIFF WBC: CPT

## 2020-04-06 PROCEDURE — 83735 ASSAY OF MAGNESIUM: CPT

## 2020-04-06 PROCEDURE — 20000000 HC ICU ROOM

## 2020-04-06 PROCEDURE — 94640 AIRWAY INHALATION TREATMENT: CPT

## 2020-04-06 PROCEDURE — 27000221 HC OXYGEN, UP TO 24 HOURS

## 2020-04-06 PROCEDURE — C9399 UNCLASSIFIED DRUGS OR BIOLOG: HCPCS | Performed by: STUDENT IN AN ORGANIZED HEALTH CARE EDUCATION/TRAINING PROGRAM

## 2020-04-06 PROCEDURE — 76937 US GUIDE VASCULAR ACCESS: CPT

## 2020-04-06 PROCEDURE — 63600175 PHARM REV CODE 636 W HCPCS: Performed by: INTERNAL MEDICINE

## 2020-04-06 PROCEDURE — 84100 ASSAY OF PHOSPHORUS: CPT

## 2020-04-06 PROCEDURE — 25000242 PHARM REV CODE 250 ALT 637 W/ HCPCS: Performed by: STUDENT IN AN ORGANIZED HEALTH CARE EDUCATION/TRAINING PROGRAM

## 2020-04-06 RX ORDER — POTASSIUM CHLORIDE 7.45 MG/ML
10 INJECTION INTRAVENOUS
Status: COMPLETED | OUTPATIENT
Start: 2020-04-06 | End: 2020-04-06

## 2020-04-06 RX ORDER — DEXTROSE MONOHYDRATE 50 MG/ML
INJECTION, SOLUTION INTRAVENOUS CONTINUOUS
Status: DISCONTINUED | OUTPATIENT
Start: 2020-04-06 | End: 2020-04-06

## 2020-04-06 RX ORDER — INSULIN ASPART 100 [IU]/ML
1-10 INJECTION, SOLUTION INTRAVENOUS; SUBCUTANEOUS EVERY 6 HOURS PRN
Status: DISCONTINUED | OUTPATIENT
Start: 2020-04-06 | End: 2020-04-11

## 2020-04-06 RX ORDER — DEXTROSE MONOHYDRATE 50 MG/ML
INJECTION, SOLUTION INTRAVENOUS CONTINUOUS
Status: DISCONTINUED | OUTPATIENT
Start: 2020-04-06 | End: 2020-04-08

## 2020-04-06 RX ORDER — HALOPERIDOL 5 MG/ML
5 INJECTION INTRAMUSCULAR EVERY 6 HOURS PRN
Status: DISCONTINUED | OUTPATIENT
Start: 2020-04-06 | End: 2020-04-09

## 2020-04-06 RX ADMIN — CHLORHEXIDINE GLUCONATE 0.12% ORAL RINSE 15 ML: 1.2 LIQUID ORAL at 09:04

## 2020-04-06 RX ADMIN — IPRATROPIUM BROMIDE AND ALBUTEROL SULFATE 3 ML: .5; 3 SOLUTION RESPIRATORY (INHALATION) at 07:04

## 2020-04-06 RX ADMIN — DEXMEDETOMIDINE HYDROCHLORIDE 1.4 MCG/KG/HR: 100 INJECTION, SOLUTION, CONCENTRATE INTRAVENOUS at 09:04

## 2020-04-06 RX ADMIN — VALPROATE SODIUM 250 MG: 100 INJECTION, SOLUTION INTRAVENOUS at 11:04

## 2020-04-06 RX ADMIN — VALPROATE SODIUM 250 MG: 100 INJECTION, SOLUTION INTRAVENOUS at 05:04

## 2020-04-06 RX ADMIN — DEXMEDETOMIDINE HYDROCHLORIDE 1.4 MCG/KG/HR: 100 INJECTION, SOLUTION, CONCENTRATE INTRAVENOUS at 01:04

## 2020-04-06 RX ADMIN — IPRATROPIUM BROMIDE AND ALBUTEROL SULFATE 3 ML: .5; 3 SOLUTION RESPIRATORY (INHALATION) at 01:04

## 2020-04-06 RX ADMIN — DEXTROSE: 5 SOLUTION INTRAVENOUS at 09:04

## 2020-04-06 RX ADMIN — DEXMEDETOMIDINE HYDROCHLORIDE 1.4 MCG/KG/HR: 100 INJECTION, SOLUTION, CONCENTRATE INTRAVENOUS at 05:04

## 2020-04-06 RX ADMIN — IPRATROPIUM BROMIDE AND ALBUTEROL SULFATE 3 ML: .5; 3 SOLUTION RESPIRATORY (INHALATION) at 08:04

## 2020-04-06 RX ADMIN — DEXTROSE: 5 SOLUTION INTRAVENOUS at 06:04

## 2020-04-06 RX ADMIN — IPRATROPIUM BROMIDE AND ALBUTEROL SULFATE 3 ML: .5; 3 SOLUTION RESPIRATORY (INHALATION) at 12:04

## 2020-04-06 RX ADMIN — INSULIN DETEMIR 10 UNITS: 100 INJECTION, SOLUTION SUBCUTANEOUS at 10:04

## 2020-04-06 RX ADMIN — SENNOSIDES 5 ML: 8.8 SYRUP ORAL at 09:04

## 2020-04-06 RX ADMIN — POTASSIUM CHLORIDE 10 MEQ: 7.46 INJECTION, SOLUTION INTRAVENOUS at 10:04

## 2020-04-06 RX ADMIN — POTASSIUM CHLORIDE 10 MEQ: 7.46 INJECTION, SOLUTION INTRAVENOUS at 09:04

## 2020-04-06 RX ADMIN — DEXMEDETOMIDINE HYDROCHLORIDE 1.4 MCG/KG/HR: 100 INJECTION, SOLUTION, CONCENTRATE INTRAVENOUS at 06:04

## 2020-04-06 RX ADMIN — DEXMEDETOMIDINE HYDROCHLORIDE 1.4 MCG/KG/HR: 100 INJECTION, SOLUTION, CONCENTRATE INTRAVENOUS at 03:04

## 2020-04-06 RX ADMIN — ENOXAPARIN SODIUM 40 MG: 100 INJECTION SUBCUTANEOUS at 09:04

## 2020-04-06 RX ADMIN — INSULIN ASPART 5 UNITS: 100 INJECTION, SOLUTION INTRAVENOUS; SUBCUTANEOUS at 02:04

## 2020-04-06 RX ADMIN — INSULIN ASPART 4 UNITS: 100 INJECTION, SOLUTION INTRAVENOUS; SUBCUTANEOUS at 10:04

## 2020-04-06 RX ADMIN — DEXMEDETOMIDINE HYDROCHLORIDE 1.4 MCG/KG/HR: 100 INJECTION, SOLUTION, CONCENTRATE INTRAVENOUS at 02:04

## 2020-04-06 RX ADMIN — DEXTROSE 150 ML/HR: 5 SOLUTION INTRAVENOUS at 09:04

## 2020-04-06 NOTE — NURSING
Spoke with CCS, Dr. Keenan, r/t sodium 156. No new orders continue to hold correction insulin for npo status tonight and if started on D5W will correct with next POCT glucose.

## 2020-04-06 NOTE — ASSESSMENT & PLAN NOTE
-Holding all home meds except lamotrigine 200 QHS  -Can re-visit home meds once extubated and stepped down from ICU  -On precedex. Started on depakote for agitation.     Plan:  -Zyprexa IV PRN for further agitation.

## 2020-04-06 NOTE — PLAN OF CARE
Patient not medically stable for stepdown at this time. Patient continues to require CCM service for:     BiPaP @ 40  Paralytic       04/06/20 1741   Discharge Reassessment   Assessment Type Discharge Planning Reassessment   Do you have any problems affording any of your prescribed medications? No   Discharge Plan A Home with family   Discharge Plan B Home Health   DME Needed Upon Discharge  other (see comments)  (TBD)   Anticipated Discharge Disposition Home   Can the patient/caregiver answer the patient profile reliably? No, cognitively impaired     Jesús Black RN MSN  Critical Care-   Ext. [phone]

## 2020-04-06 NOTE — CONSULTS
Placed 18g X 8cm midline in left basilic vein of LUE using realtime ultrasound guidance. Lot#[id number]. Remove on or before 05/05/2020.

## 2020-04-06 NOTE — ASSESSMENT & PLAN NOTE
Home antihypertensive includes lisinopril 10mg daily.     Plan:  -continue holding home antihypertensives

## 2020-04-06 NOTE — ASSESSMENT & PLAN NOTE
-likely 2/2 to covid; however, does have a history of treated hep c in 2015  -AST//600s on transfer, have been downtrending since admission     Plan:  -Monitor w daily CMP  -Follow HCV quant

## 2020-04-06 NOTE — ASSESSMENT & PLAN NOTE
Home medication includes jardiance 25mg.     Plan:  -BGs goal of 140-190  -continue LDSSI  -Holding home meds

## 2020-04-06 NOTE — ASSESSMENT & PLAN NOTE
Despite Harvoni treatment in 2015, no recent HCV RNA evidence of seronegativity. Ordered HCV RNA to confirm seronegativity in the setting of transaminitis.

## 2020-04-06 NOTE — PT/OT/SLP PROGRESS
Physical Therapy      Patient Name:  Cash Crawford   MRN:  [id number]    Patient not seen today secondary to Increased agitation(Hold this date per RN 2* episodes of agitation with subsequent decrease in spO2). Will follow-up at next scheduled session as able.    Elizabeth Holland, PT, DPT   4/6/2020  [phone]

## 2020-04-06 NOTE — SUBJECTIVE & OBJECTIVE
Interval History/Significant Events: Overnight became agitated around midnight- unable to obtain IM zyprexa due to delivery issues. Temporarily on 70% O2 on bipap at that time; has been weaned to 50% with O2 sats >90%. Because he has continued to be agitated, will attempt to continue waking up and administer IV zyprexa as needed if agitated.     Review of Systems   Unable to perform ROS: Mental status change     Objective:     Vital Signs (Most Recent):  Temp: 98 °F (36.7 °C) (04/06/20 0730)  Pulse: 87 (04/06/20 1000)  Resp: (!) 33 (04/06/20 0817)  BP: 101/67 (04/06/20 1000)  SpO2: (!) 91 % (04/06/20 1000) Vital Signs (24h Range):  Temp:  [97.8 °F (36.6 °C)-98 °F (36.7 °C)] 98 °F (36.7 °C)  Pulse:  [] 87  Resp:  [20-33] 33  SpO2:  [88 %-99 %] 91 %  BP: ()/(66-83) 101/67   Weight: 87.4 kg (192 lb 10.9 oz)  Body mass index is 28.45 kg/m².      Intake/Output Summary (Last 24 hours) at 4/6/2020 1243  Last data filed at 4/6/2020 1000  Gross per 24 hour   Intake 1292.39 ml   Output 1485 ml   Net -192.61 ml       Physical Exam   Patient observed through the window of the room, in order to avoid contact in the setting of +COVID-19 and to avoid further PPE shortage.     Patient resting comfortably with bipap on in no distress.     Vents:  Vent Mode: A/C (04/05/20 2300)  Ventilator Initiated: Yes (04/01/20 1551)  Set Rate: 24 BPM (04/05/20 2300)  Vt Set: 350 mL (04/05/20 2300)  Pressure Support: 5 cmH20 (04/03/20 1420)  PEEP/CPAP: 8 cmH20 (04/05/20 2300)  Oxygen Concentration (%): 50 (04/06/20 1000)  Peak Airway Pressure: 38 cmH2O (04/05/20 2300)  Plateau Pressure: 25 cmH20 (04/05/20 2300)  Total Ve: 9.31 mL (04/05/20 2300)  F/VT Ratio<105 (RSBI): 2750 (04/03/20 1420)  Lines/Drains/Airways     Central Venous Catheter Line            Percutaneous Central Line Insertion/Assessment - Double Lumen  03/30/20 1837 right subclavian 6 days          Drain                 Urethral Catheter 03/30/20 1657 6 days           Peripheral Intravenous Line                 Peripheral IV - Single Lumen 04/02/20 1115 20 G Anterior;Left Forearm 4 days              Significant Labs:    CBC/Anemia Profile:  Recent Labs   Lab 04/05/20 0337 04/06/20 0223   WBC 7.91 10.43   HGB 9.4* 10.6*   HCT 31.3* 33.0*    311   MCV 79* 82   RDW 18.8* 20.5*       Chemistries:  Recent Labs   Lab 04/05/20  0201 04/06/20 0223   * 156*  156*   K 3.9 4.0  4.0    116*  116*   CO2 25 20*  20*   BUN 30* 43*  43*   CREATININE 0.8 1.0  1.0   CALCIUM 8.2* 8.6*  8.6*   ALBUMIN 2.0* 2.0*  2.0*   PROT 6.7 7.5  7.5   BILITOT 0.5 0.5  0.5   ALKPHOS 158* 154*  154*   * 211*  211*   * 131*  131*   MG 2.9* 3.0*   PHOS 3.6 4.0     CMP:   Recent Labs   Lab 04/05/20 0201 04/06/20 0223   * 156*  156*   K 3.9 4.0  4.0    116*  116*   CO2 25 20*  20*   * 211*  211*   BUN 30* 43*  43*   CREATININE 0.8 1.0  1.0   CALCIUM 8.2* 8.6*  8.6*   PROT 6.7 7.5  7.5   ALBUMIN 2.0* 2.0*  2.0*   BILITOT 0.5 0.5  0.5   ALKPHOS 158* 154*  154*   * 131*  131*   * 211*  211*   ANIONGAP 16 20*  20*   EGFRNONAA >60.0 >60.0  >60.0       Significant Imaging:  I have reviewed all pertinent imaging results/findings within the past 24 hours.

## 2020-04-06 NOTE — ASSESSMENT & PLAN NOTE
-Cr peak of 1.5 (baseline 1.1) at [facility]. Has since downtrended and resolved.   Has been having >1L UOP and is net negative 5.4L since adm.     Plan:  -Continue holding lasix  -Monitor w daily CMP  -Monitor I/Os

## 2020-04-06 NOTE — PROGRESS NOTES
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 CXR right lobe infiltrate with elevated , D-Dimer 1.03,  and Ferritin 331. Patient swabbed for COVID 19:positive. Patient started on IV Rocephin and Azithromycin and Plaquenil. Patient initially on 2 liters for mild hypoxia at 88% on room air but within 24 hours of admit patient had increasing oxygen requirements and intubated on evening of 3/30 (P/F then 170); CXR at the time suggested ARDS picture given bilateral patchy ground-glass infiltrates. Patient had mild JULIET with Cr of 1.5, but good UOP not requiring dialysis. Patient has been improving on vent with less requirement since intubation. Labs also showed worsening transaminitis with ,  likely due to COVID infection; history of treated hepatitis C infection in 2015. Patient not requiring vasopressors at [facility], on Fentanyl and Propofol for sedation.Transferred to [facility] on 4/1 due to capacity issues at [facility]     Full, itemized PMHx/SurgHx/Med list, per patient's medical records. Sees Dr. García Lechuga at [facility]  · HTN: on lisinopril 10mg daily  · 3-4yr Hx of T2DM with polyneuropathy, HBA1C 3/29 7.0%, non-insulin-dependent, on Metformin 500 BD, Glipizide 10 mg BD, Glucophage 1000  mg BD.  Last eye exam Jan 2019, last foot exam Feb 2019  · HLP: Atorvastatin 80mg daily  · Prostate CA, S/P XRT and seed brachytherapy in 2015, followed by [facility], last PSA <0.1 2/5/19  · Cervical spondylopathy with myelopathy, S/P ACDF C4-5 by Dr. Le in 2012  · Bipolar disorder with depressive features, last psych admit 2014 at [facility], with Hx of suicide attempts. On home ziprazidone 80mg QD, Bupropion 150 BD, Lamotrigine 200mg QD, sees psychiatrist   · Insomnia: On 3mg melatonin QHS  · Polysubstance abuser: EtOH (unclear daily use), cocaine, amphetamines, LSD,   15-PY smoker, quit 2015, on nicotine patch    Hospital/ICU Course:  Patient arrived at [facility] main on 4/1- intubated/ventilated CAP coverage with Azithro/Rocephin continued, as with plaquenil. Elevated LFTs trending down, likely due to COVID. JULIET resolved, never needed HD.  Patient extubated to venti mask on 4/4. Destaed overnight so now on BiPAP. PT/OT ordered. 4/5, continued on BIPAP, will attempt to wean to venti mask today     Interval History/Significant Events: Overnight became agitated around midnight- unable to obtain IM zyprexa due to delivery issues. Temporarily on 70% O2 on bipap at that time; has been weaned to 50% with O2 sats >90%. Because he has continued to be agitated, will attempt to continue waking up and administer IV zyprexa as needed if agitated.     Review of Systems   Unable to perform ROS: Mental status change     Objective:     Vital Signs (Most Recent):  Temp: 98 °F (36.7 °C) (04/06/20 0730)  Pulse: 87 (04/06/20 1000)  Resp: (!) 33 (04/06/20 0817)  BP: 101/67 (04/06/20 1000)  SpO2: (!) 91 % (04/06/20 1000) Vital Signs (24h Range):  Temp:  [97.8 °F (36.6 °C)-98 °F (36.7 °C)] 98 °F (36.7 °C)  Pulse:  [] 87  Resp:  [20-33] 33  SpO2:  [88 %-99 %] 91 %  BP: ()/(66-83) 101/67   Weight: 87.4 kg (192 lb 10.9 oz)  Body mass index is 28.45 kg/m².      Intake/Output Summary (Last 24 hours) at 4/6/2020 0188  Last data filed  at 4/6/2020 1000  Gross per 24 hour   Intake 1292.39 ml   Output 1485 ml   Net -192.61 ml       Physical Exam   Patient observed through the window of the room, in order to avoid contact in the setting of +COVID-19 and to avoid further PPE shortage.     Patient resting comfortably with bipap on in no distress.     Vents:  Vent Mode: A/C (04/05/20 2300)  Ventilator Initiated: Yes (04/01/20 1551)  Set Rate: 24 BPM (04/05/20 2300)  Vt Set: 350 mL (04/05/20 2300)  Pressure Support: 5 cmH20 (04/03/20 1420)  PEEP/CPAP: 8 cmH20 (04/05/20 2300)  Oxygen Concentration (%): 50 (04/06/20 1000)  Peak Airway Pressure: 38 cmH2O (04/05/20 2300)  Plateau Pressure: 25 cmH20 (04/05/20 2300)  Total Ve: 9.31 mL (04/05/20 2300)  F/VT Ratio<105 (RSBI): 2750 (04/03/20 1420)  Lines/Drains/Airways     Central Venous Catheter Line            Percutaneous Central Line Insertion/Assessment - Double Lumen  03/30/20 1837 right subclavian 6 days          Drain                 Urethral Catheter 03/30/20 1657 6 days          Peripheral Intravenous Line                 Peripheral IV - Single Lumen 04/02/20 1115 20 G Anterior;Left Forearm 4 days              Significant Labs:    CBC/Anemia Profile:  Recent Labs   Lab 04/05/20  0337 04/06/20  0223   WBC 7.91 10.43   HGB 9.4* 10.6*   HCT 31.3* 33.0*    311   MCV 79* 82   RDW 18.8* 20.5*       Chemistries:  Recent Labs   Lab 04/05/20  0201 04/06/20  0223   * 156*  156*   K 3.9 4.0  4.0    116*  116*   CO2 25 20*  20*   BUN 30* 43*  43*   CREATININE 0.8 1.0  1.0   CALCIUM 8.2* 8.6*  8.6*   ALBUMIN 2.0* 2.0*  2.0*   PROT 6.7 7.5  7.5   BILITOT 0.5 0.5  0.5   ALKPHOS 158* 154*  154*   * 211*  211*   * 131*  131*   MG 2.9* 3.0*   PHOS 3.6 4.0     CMP:   Recent Labs   Lab 04/05/20  0201 04/06/20 0223   * 156*  156*   K 3.9 4.0  4.0    116*  116*   CO2 25 20*  20*   * 211*  211*   BUN 30* 43*  43*   CREATININE 0.8 1.0  1.0   CALCIUM 8.2*  8.6*  8.6*   PROT 6.7 7.5  7.5   ALBUMIN 2.0* 2.0*  2.0*   BILITOT 0.5 0.5  0.5   ALKPHOS 158* 154*  154*   * 131*  131*   * 211*  211*   ANIONGAP 16 20*  20*   EGFRNONAA >60.0 >60.0  >60.0       Significant Imaging:  I have reviewed all pertinent imaging results/findings within the past 24 hours.      ABG  Recent Labs   Lab 03/31/20  0600   PH 7.340*   PO2 85   PCO2 40.5   HCO3 21.8*   BE -4     Assessment/Plan:     Psychiatric  Bipolar 1 disorder, depressed  -Holding all home meds except lamotrigine 200 QHS  -Can re-visit home meds once extubated and stepped down from ICU  -On precedex. Started on depakote for agitation.     Plan:  -Zyprexa IV PRN for further agitation.       Cardiac/Vascular  Essential hypertension, benign  Home antihypertensive includes lisinopril 10mg daily.     Plan:  -continue holding home antihypertensives    Renal/  JULIET (acute kidney injury)  -Cr peak of 1.5 (baseline 1.1) at [facility]. Has since downtrended and resolved.   Has been having >1L UOP and is net negative 5.4L since adm.     Plan:  -Continue holding lasix  -Monitor w daily CMP  -Monitor I/Os    Oncology  Anemia  Plan:  -Monitor w daily CBC  -Transfusion threshold HB <7    Endocrine  Type 2 diabetes mellitus with diabetic neuropathic arthropathy, without long-term current use of insulin  Home medication includes jardiance 25mg.     Plan:  -BGs goal of 140-190  -continue LDSSI  -Holding home meds    GI  Elevated LFTs  -likely 2/2 to covid; however, does have a history of treated hep c in 2015  -AST//600s on transfer, have been downtrending since admission     Plan:  -Monitor w daily CMP  -Follow HCV quant    Hepatitis C  Despite Harvoni treatment in 2015, no recent HCV RNA evidence of seronegativity. Ordered HCV RNA to confirm seronegativity in the setting of transaminitis.     Other  * Acute respiratory distress syndrome (ARDS) due to COVID-19 virus  63 y/o male with Bipolar disorder, hepatitis C s/p  jonnathan, Prostate CA in remission s/p XRT, HTN, HLP, Non-insulin-dependent-T2DM with polyneuropathy, and polysubstance abuse,  was transferred from [facility] on 4/1 for acute respiratory failure 2/2 COVID+.     Completed a course of plaquenil, azithromycin, and ceftriaxone. Extubated 04/03 and continues on bipap 12/5 FiO2 50%.     Plan:  -Continue with isolation precautions  -Wean off O2 as tolerated       Critical Care Daily Checklist:    A: Awake: RASS Goal/Actual Goal: RASS Goal: 0-->alert and calm  Actual: [place] Agitation Sedation Scale (RASS): Light sedation   B: Spontaneous Breathing Trial Performed? Spon. Breathing Trial Initiated?: Initiated (04/03/20 0852)   C: SAT & SBT Coordinated?  N/a; extubated                      D: Delirium: CAM-ICU Overall CAM-ICU: Positive   E: Early Mobility Performed? No   F: Feeding Goal: Goals: 1. Initiate nutrition intake by RD follow up.   Status: Nutrition Goal Status: goal met   Current Diet Order   Procedures    Diet NPO      AS: Analgesia/Sedation precedex   T: Thromboembolic Prophylaxis lovenox   H: HOB > 300 Yes   U: Stress Ulcer Prophylaxis (if needed) na   G: Glucose Control Detemir, ldssi   B: Bowel Function Stool Occurrence: 0   I: Indwelling Catheter (Lines & Atkins) Necessity    D: De-escalation of Antimicrobials/Pharmacotherapies S/p plaquenil, azithro, ctx    Plan for the day/ETD     Code Status:  Family/Goals of Care: Full Code         Critical secondary to Patient has a condition that poses threat to life and bodily function: Hypoxic RF 2/2 ARDS from COVID-19 infection.        Critical care was time spent personally by me on the following activities: development of treatment plan with patient or surrogate and bedside caregivers, discussions with consultants, evaluation of patient's response to treatment, examination of patient, ordering and performing treatments and interventions, ordering and review of laboratory studies, ordering and review of  radiographic studies, pulse oximetry, re-evaluation of patient's condition. This critical care time did not overlap with that of any other provider or involve time for any procedures.     Anita Cervantes MD  Critical Care Medicine  [facility]

## 2020-04-06 NOTE — ASSESSMENT & PLAN NOTE
63 y/o male with Bipolar disorder, hepatitis C s/p harvoni, Prostate CA in remission s/p XRT, HTN, HLP, Non-insulin-dependent-T2DM with polyneuropathy, and polysubstance abuse,  was transferred from [facility] on 4/1 for acute respiratory failure 2/2 COVID+.     Completed a course of plaquenil, azithromycin, and ceftriaxone. Extubated 04/03 and continues on bipap 12/5 FiO2 50%.     Plan:  -Continue with isolation precautions  -Wean off O2 as tolerated

## 2020-04-06 NOTE — NURSING
Additional agitation and restlessness with tachypnea noted. Attempts to calm patient unsuccessful. PRN olanzapine for agitation not readily available. Notified CCS of tachypnea, ok to give olanzapine when available. No new orders at this time.

## 2020-04-06 NOTE — PT/OT/SLP PROGRESS
Occupational Therapy      Patient Name:  Cash Crawford   MRN:  [id number]    Patient not seen today secondary to Increased agitation(Hold this date per RN 2* episodes of agitation with subsequent decrease in spO2). Will follow-up next date as able.    Renia Geronimo OT  4/6/2020

## 2020-04-07 PROBLEM — I82.A11 ACUTE DEEP VEIN THROMBOSIS (DVT) OF AXILLARY VEIN OF RIGHT UPPER EXTREMITY: Status: ACTIVE | Noted: 2020-04-07

## 2020-04-07 LAB
ALBUMIN SERPL BCP-MCNC: 1.6 G/DL (ref 3.5–5.2)
ALBUMIN SERPL BCP-MCNC: 1.7 G/DL (ref 3.5–5.2)
ALBUMIN SERPL BCP-MCNC: 1.7 G/DL (ref 3.5–5.2)
ALP SERPL-CCNC: 111 U/L (ref 55–135)
ALP SERPL-CCNC: 112 U/L (ref 55–135)
ALP SERPL-CCNC: 131 U/L (ref 55–135)
ALT SERPL W/O P-5'-P-CCNC: 140 U/L (ref 10–44)
ALT SERPL W/O P-5'-P-CCNC: 141 U/L (ref 10–44)
ALT SERPL W/O P-5'-P-CCNC: 151 U/L (ref 10–44)
ANION GAP SERPL CALC-SCNC: 12 MMOL/L (ref 8–16)
ANION GAP SERPL CALC-SCNC: 15 MMOL/L (ref 8–16)
ANION GAP SERPL CALC-SCNC: 15 MMOL/L (ref 8–16)
AST SERPL-CCNC: 85 U/L (ref 10–40)
AST SERPL-CCNC: 92 U/L (ref 10–40)
AST SERPL-CCNC: 94 U/L (ref 10–40)
BASOPHILS # BLD AUTO: 0.02 K/UL (ref 0–0.2)
BASOPHILS NFR BLD: 0.2 % (ref 0–1.9)
BILIRUB SERPL-MCNC: 0.3 MG/DL (ref 0.1–1)
BILIRUB SERPL-MCNC: 0.4 MG/DL (ref 0.1–1)
BILIRUB SERPL-MCNC: 0.4 MG/DL (ref 0.1–1)
BUN SERPL-MCNC: 48 MG/DL (ref 8–23)
BUN SERPL-MCNC: 52 MG/DL (ref 8–23)
BUN SERPL-MCNC: 53 MG/DL (ref 8–23)
CALCIUM SERPL-MCNC: 7.7 MG/DL (ref 8.7–10.5)
CALCIUM SERPL-MCNC: 7.8 MG/DL (ref 8.7–10.5)
CALCIUM SERPL-MCNC: 8 MG/DL (ref 8.7–10.5)
CHLORIDE SERPL-SCNC: 114 MMOL/L (ref 95–110)
CHLORIDE SERPL-SCNC: 116 MMOL/L (ref 95–110)
CHLORIDE SERPL-SCNC: 118 MMOL/L (ref 95–110)
CO2 SERPL-SCNC: 20 MMOL/L (ref 23–29)
CO2 SERPL-SCNC: 22 MMOL/L (ref 23–29)
CO2 SERPL-SCNC: 22 MMOL/L (ref 23–29)
CREAT SERPL-MCNC: 1 MG/DL (ref 0.5–1.4)
DIFFERENTIAL METHOD: ABNORMAL
EOSINOPHIL # BLD AUTO: 0.1 K/UL (ref 0–0.5)
EOSINOPHIL NFR BLD: 0.5 % (ref 0–8)
ERYTHROCYTE [DISTWIDTH] IN BLOOD BY AUTOMATED COUNT: 20.8 % (ref 11.5–14.5)
EST. GFR  (AFRICAN AMERICAN): >60 ML/MIN/1.73 M^2
EST. GFR  (NON AFRICAN AMERICAN): >60 ML/MIN/1.73 M^2
GLUCOSE SERPL-MCNC: 265 MG/DL (ref 70–110)
GLUCOSE SERPL-MCNC: 272 MG/DL (ref 70–110)
GLUCOSE SERPL-MCNC: 300 MG/DL (ref 70–110)
HCT VFR BLD AUTO: 27.5 % (ref 40–54)
HGB BLD-MCNC: 9.3 G/DL (ref 14–18)
IMM GRANULOCYTES # BLD AUTO: 0.12 K/UL (ref 0–0.04)
IMM GRANULOCYTES NFR BLD AUTO: 1.2 % (ref 0–0.5)
LYMPHOCYTES # BLD AUTO: 0.4 K/UL (ref 1–4.8)
LYMPHOCYTES NFR BLD: 3.4 % (ref 18–48)
MAGNESIUM SERPL-MCNC: 2.8 MG/DL (ref 1.6–2.6)
MCH RBC QN AUTO: 28.3 PG (ref 27–31)
MCHC RBC AUTO-ENTMCNC: 33.8 G/DL (ref 32–36)
MCV RBC AUTO: 84 FL (ref 82–98)
MONOCYTES # BLD AUTO: 0.7 K/UL (ref 0.3–1)
MONOCYTES NFR BLD: 6.6 % (ref 4–15)
NEUTROPHILS # BLD AUTO: 9.2 K/UL (ref 1.8–7.7)
NEUTROPHILS NFR BLD: 88.1 % (ref 38–73)
NRBC BLD-RTO: 0 /100 WBC
PHOSPHATE SERPL-MCNC: 4.3 MG/DL (ref 2.7–4.5)
PLATELET # BLD AUTO: 241 K/UL (ref 150–350)
PMV BLD AUTO: 9.4 FL (ref 9.2–12.9)
POCT GLUCOSE: 220 MG/DL (ref 70–110)
POCT GLUCOSE: 264 MG/DL (ref 70–110)
POCT GLUCOSE: 273 MG/DL (ref 70–110)
POCT GLUCOSE: 321 MG/DL (ref 70–110)
POTASSIUM SERPL-SCNC: 3.4 MMOL/L (ref 3.5–5.1)
POTASSIUM SERPL-SCNC: 3.6 MMOL/L (ref 3.5–5.1)
POTASSIUM SERPL-SCNC: 3.8 MMOL/L (ref 3.5–5.1)
PROT SERPL-MCNC: 6.2 G/DL (ref 6–8.4)
PROT SERPL-MCNC: 6.5 G/DL (ref 6–8.4)
PROT SERPL-MCNC: 6.6 G/DL (ref 6–8.4)
RBC # BLD AUTO: 3.29 M/UL (ref 4.6–6.2)
SODIUM SERPL-SCNC: 150 MMOL/L (ref 136–145)
SODIUM SERPL-SCNC: 151 MMOL/L (ref 136–145)
SODIUM SERPL-SCNC: 153 MMOL/L (ref 136–145)
WBC # BLD AUTO: 10.42 K/UL (ref 3.9–12.7)

## 2020-04-07 PROCEDURE — 97162 PT EVAL MOD COMPLEX 30 MIN: CPT

## 2020-04-07 PROCEDURE — 97165 OT EVAL LOW COMPLEX 30 MIN: CPT

## 2020-04-07 PROCEDURE — 99900035 HC TECH TIME PER 15 MIN (STAT)

## 2020-04-07 PROCEDURE — 20000000 HC ICU ROOM

## 2020-04-07 PROCEDURE — 27000221 HC OXYGEN, UP TO 24 HOURS

## 2020-04-07 PROCEDURE — 94761 N-INVAS EAR/PLS OXIMETRY MLT: CPT

## 2020-04-07 PROCEDURE — 80053 COMPREHEN METABOLIC PANEL: CPT | Mod: 91

## 2020-04-07 PROCEDURE — 63600175 PHARM REV CODE 636 W HCPCS: Performed by: STUDENT IN AN ORGANIZED HEALTH CARE EDUCATION/TRAINING PROGRAM

## 2020-04-07 PROCEDURE — 99291 CRITICAL CARE FIRST HOUR: CPT | Mod: GC,,, | Performed by: INTERNAL MEDICINE

## 2020-04-07 PROCEDURE — 25000003 PHARM REV CODE 250: Performed by: STUDENT IN AN ORGANIZED HEALTH CARE EDUCATION/TRAINING PROGRAM

## 2020-04-07 PROCEDURE — 25000003 PHARM REV CODE 250: Performed by: INTERNAL MEDICINE

## 2020-04-07 PROCEDURE — 97530 THERAPEUTIC ACTIVITIES: CPT

## 2020-04-07 PROCEDURE — 85025 COMPLETE CBC W/AUTO DIFF WBC: CPT

## 2020-04-07 PROCEDURE — 97112 NEUROMUSCULAR REEDUCATION: CPT

## 2020-04-07 PROCEDURE — 94660 CPAP INITIATION&MGMT: CPT

## 2020-04-07 PROCEDURE — 94640 AIRWAY INHALATION TREATMENT: CPT

## 2020-04-07 PROCEDURE — 25000242 PHARM REV CODE 250 ALT 637 W/ HCPCS: Performed by: STUDENT IN AN ORGANIZED HEALTH CARE EDUCATION/TRAINING PROGRAM

## 2020-04-07 PROCEDURE — C9399 UNCLASSIFIED DRUGS OR BIOLOG: HCPCS | Performed by: STUDENT IN AN ORGANIZED HEALTH CARE EDUCATION/TRAINING PROGRAM

## 2020-04-07 PROCEDURE — 99291 PR CRITICAL CARE, E/M 30-74 MINUTES: ICD-10-PCS | Mod: GC,,, | Performed by: INTERNAL MEDICINE

## 2020-04-07 PROCEDURE — 84100 ASSAY OF PHOSPHORUS: CPT

## 2020-04-07 PROCEDURE — 83735 ASSAY OF MAGNESIUM: CPT

## 2020-04-07 PROCEDURE — 80053 COMPREHEN METABOLIC PANEL: CPT

## 2020-04-07 RX ORDER — ENOXAPARIN SODIUM 100 MG/ML
1 INJECTION SUBCUTANEOUS
Status: DISCONTINUED | OUTPATIENT
Start: 2020-04-07 | End: 2020-04-12

## 2020-04-07 RX ADMIN — DEXMEDETOMIDINE HYDROCHLORIDE 0.8 MCG/KG/HR: 100 INJECTION, SOLUTION, CONCENTRATE INTRAVENOUS at 02:04

## 2020-04-07 RX ADMIN — IPRATROPIUM BROMIDE AND ALBUTEROL SULFATE 3 ML: .5; 3 SOLUTION RESPIRATORY (INHALATION) at 09:04

## 2020-04-07 RX ADMIN — IPRATROPIUM BROMIDE AND ALBUTEROL SULFATE 3 ML: .5; 3 SOLUTION RESPIRATORY (INHALATION) at 01:04

## 2020-04-07 RX ADMIN — ENOXAPARIN SODIUM 90 MG: 100 INJECTION SUBCUTANEOUS at 04:04

## 2020-04-07 RX ADMIN — VALPROATE SODIUM 250 MG: 100 INJECTION, SOLUTION INTRAVENOUS at 10:04

## 2020-04-07 RX ADMIN — VALPROATE SODIUM 250 MG: 100 INJECTION, SOLUTION INTRAVENOUS at 05:04

## 2020-04-07 RX ADMIN — INSULIN ASPART 6 UNITS: 100 INJECTION, SOLUTION INTRAVENOUS; SUBCUTANEOUS at 06:04

## 2020-04-07 RX ADMIN — VALPROATE SODIUM 250 MG: 100 INJECTION, SOLUTION INTRAVENOUS at 12:04

## 2020-04-07 RX ADMIN — DEXTROSE 100 ML/HR: 5 SOLUTION INTRAVENOUS at 09:04

## 2020-04-07 RX ADMIN — INSULIN DETEMIR 9 UNITS: 100 INJECTION, SOLUTION SUBCUTANEOUS at 10:04

## 2020-04-07 RX ADMIN — DEXMEDETOMIDINE HYDROCHLORIDE 1.4 MCG/KG/HR: 100 INJECTION, SOLUTION, CONCENTRATE INTRAVENOUS at 12:04

## 2020-04-07 RX ADMIN — DEXMEDETOMIDINE HYDROCHLORIDE 1.4 MCG/KG/HR: 100 INJECTION, SOLUTION, CONCENTRATE INTRAVENOUS at 04:04

## 2020-04-07 RX ADMIN — IPRATROPIUM BROMIDE AND ALBUTEROL SULFATE 3 ML: .5; 3 SOLUTION RESPIRATORY (INHALATION) at 07:04

## 2020-04-07 RX ADMIN — INSULIN DETEMIR 11 UNITS: 100 INJECTION, SOLUTION SUBCUTANEOUS at 08:04

## 2020-04-07 RX ADMIN — INSULIN ASPART 8 UNITS: 100 INJECTION, SOLUTION INTRAVENOUS; SUBCUTANEOUS at 10:04

## 2020-04-07 RX ADMIN — VALPROATE SODIUM 250 MG: 100 INJECTION, SOLUTION INTRAVENOUS at 04:04

## 2020-04-07 RX ADMIN — VALPROATE SODIUM 250 MG: 100 INJECTION, SOLUTION INTRAVENOUS at 11:04

## 2020-04-07 RX ADMIN — DEXMEDETOMIDINE HYDROCHLORIDE 0.8 MCG/KG/HR: 100 INJECTION, SOLUTION, CONCENTRATE INTRAVENOUS at 06:04

## 2020-04-07 NOTE — ASSESSMENT & PLAN NOTE
-Cr peak of 1.5 (baseline 1.1) at [facility]. Has since downtrended and resolved.   Has been having >1L UOP and is net negative since adm.     Plan:  -Continue holding lasix  -Monitor w daily CMP  -Monitor I/Os

## 2020-04-07 NOTE — NURSING
Called an informed Dr Castellon CMP sent this am was hemolyzed per  all to this RN-will reorder/redraw.

## 2020-04-07 NOTE — PT/OT/SLP EVAL
Physical Therapy Evaluation and Co-treatment with OT    Patient Name:  Cash Crawford   MRN:  [id number]    Recommendations:     Discharge Recommendations:  nursing facility, skilled   Discharge Equipment Recommendations: (will determine DME needs closer to discharge)   Barriers to discharge: Decreased caregiver support family will not be able to assist at current functional level.     Assessment:     Cash Crawford is a 64 y.o. male admitted with a medical diagnosis of Acute respiratory distress syndrome (ARDS) due to COVID-19 virus.  He presents with the following impairments/functional limitations:  weakness, impaired functional mobilty, impaired cognition, decreased safety awareness, impaired coordination, impaired endurance, gait instability, impaired balance, decreased lower extremity function. pt chris treatment fair but is at a low functional level and will benefit from skilled PT 4x/wk to progress physically. Pt will need SNF placement to maximize functional recovery.     Rehab Prognosis: Good; patient would benefit from acute skilled PT services to address these deficits and reach maximum level of function.    Recent Surgery: * No surgery found *      Plan:     During this hospitalization, patient to be seen 4 x/week to address the identified rehab impairments via gait training, therapeutic activities, therapeutic exercises and progress toward the following goals:    · Plan of Care Expires:  05/02/20    Subjective     Chief Complaint: pt had no complaints during treatment.   Patient/Family Comments/goals:  To get better and go home.   Pain/Comfort:  · Pain Rating 1: (pt had pain in RUE but could not quantify pain level. )  · Pain Rating Post-Intervention 1: (see above)    Patients cultural, spiritual, Church conflicts given the current situation: no    Living Environment:  Pt works full-time at [employer] in housekeeping. Pt lives with his wife and 2 daughters who work. They live in 1 story with no  steps.   Prior to admission, patients level of function was Independent .  Equipment used at home: none.  DME owned (not currently used): none.  Upon discharge, patient will have assistance from family?.    Objective:     Communicated with nurse prior to session.  Patient found supine with telemetry, pulse ox (continuous), blood pressure cuff, sommer catheter, oxygen(R sub clavian dialysis catheter)  upon PT entry to room. Pt RUE very swollen and painful. RN notified of such.     General Precautions: Standard, airborne, fall   Orthopedic Precautions:    Braces:       Exams:  · Cognitive Exam:  Patient is oriented to Person, Place, Time and Situation  · RLE ROM: WFL  · RLE Strength: 3/5 for major muscle groups  · LLE ROM: WFL  · LLE Strength: 3/5 for major muscle groups    Functional Mobility:  · Bed Mobility: pt needed verbal cues for hand placement and sequencing for functional mobility.     · Rolling Left:  total assistance  · Rolling Right: total assistance  · Supine to Sit: total assistance and of 2 persons  · Sit to Supine: total assistance and of 2 persons  ·   · Balance: pt sat on EOB x 5 min with CGA progressing to total assist. pt leaned backwards with sitting.       Therapeutic Activities and Exercises:   pt received verbal instruction in role of PT and POC. Pt verbally expressed understanding of such.     AM-PAC 6 CLICK MOBILITY  Total Score:8     Patient left bed in chair position with all lines intact and call button in reach.    GOALS:   Multidisciplinary Problems     Physical Therapy Goals        Problem: Physical Therapy Goal    Goal Priority Disciplines Outcome Goal Variances Interventions   Physical Therapy Goal     PT, PT/OT Ongoing, Progressing     Description:  Goals to be met by: 20    Patient will increase functional independence with mobility by performin. Supine to sit with Moderate Assistance -not met   2. Sit to stand transfer with Moderate Assistance with AD if needed. -not  met  3. Bed to chair transfer with Moderate Assistance using AD if needed. -not met  4. Gait  x 30n  feet with Moderate Assistance using AD if needed. -not met  5. Lower extremity exercise program x15 reps with assistance as needed-not met                    History:     Past Medical History:   Diagnosis Date    Behavioral problem     Bipolar 1 disorder     Cervical spondylosis with myelopathy 10/9/2012    Diabetes mellitus type II     History of prostate cancer, s/p radiation 11/11/2015    History of psychiatric hospitalization     Hx of psychiatric care     Psychiatric exam requested by authority     Psychiatric problem     Recurrent major depressive disorder, in partial remission 7/5/2018    Self-harming behavior     Sleep apnea with use of continuous positive airway pressure (CPAP)     Suicide attempt     Therapy     Tobacco abuse 11/11/2015       Past Surgical History:   Procedure Laterality Date    CHOLECYSTECTOMY      HERNIA REPAIR      LEG SURGERY      ORIF tib/fib    SPINE SURGERY         Time Tracking:     PT Received On: 04/07/20  PT Start Time: 1010     PT Stop Time: 1031  PT Total Time (min): 21 min     Billable Minutes: Evaluation 8 min and Neuromuscular Re-education 13 min      Natalia Holliday, PT  04/07/2020

## 2020-04-07 NOTE — NURSING
Pt tolerated days care fair-was not able to O2.  Pt becomes agitated with a stimulus like voice.  Wife was called by MD and password set up.

## 2020-04-07 NOTE — PT/OT/SLP EVAL
Occupational Therapy   Evaluation    Name: Cash Crawford  MRN: [id number]  Admitting Diagnosis:  Acute respiratory distress syndrome (ARDS) due to COVID-19 virus      Recommendations:     Discharge Recommendations: nursing facility, skilled  Discharge Equipment Recommendations:   TBD  Barriers to discharge:       Assessment:     Cash Crawford is a 64 y.o. male with a medical diagnosis of Acute respiratory distress syndrome (ARDS) due to COVID-19 virus.  Patient lives with family in 1 level home, family members work. He was orientated x 4. His oxygen remained stable during evaluation. Patient would benefit from skilled acute OT needs while in acute care to increase independence with ADLs and functional transfers. Recommending skill nursing at this time to increase independence with functional activities and reduce caregiver buren.  Performance deficits affecting function: weakness, impaired endurance, impaired self care skills, impaired functional mobilty, impaired balance, decreased upper extremity function, decreased lower extremity function, impaired cardiopulmonary response to activity.      Rehab Prognosis: Good; patient would benefit from acute skilled OT services to address these deficits and reach maximum level of function.       Plan:     Patient to be seen 4 x/week to address the above listed problems via self-care/home management, therapeutic activities, therapeutic exercises  · Plan of Care Expires:    · Plan of Care Reviewed with: patient    Subjective     Patient agreeable to OT evaluation/treatment.     Occupational Profile:  Living Environment: lives with wife and two daughters in 1 level home no steps, walk in shower with shower chair   Previous level of function: Patient independent with ADLs prior to admitted   Roles and Routines: Works in housekeeping at the [employer]  Equipment Used at Home:  shower chair  Assistance upon Discharge: family, family does work     Patients cultural,  spiritual, [employer] conflicts given the current situation: no    Objective:     Co-evaluation with PT to max functional level   Communicated with: RN prior to session.  Patient found supine with sommer catheter, telemetry, pulse ox (continuous), blood pressure cuff, oxygen upon OT entry to room.    General Precautions: Standard, airborne, droplet, fall, contact   Orthopedic Precautions:N/A   Braces: N/A     Occupational Performance:    Bed Mobility:    · Patient completed Scooting/Bridging with total assistance  · Patient completed Supine to Sit with total assistance  · Patient completed Sit to Supine with total assistance      Cognitive/Visual Perceptual:  Cognitive/Psychosocial Skills:     -       Oriented to: Person, Place, Time and Situation   -       Follows Commands/attention:Follows two-step commands  -       Communication: clear/fluent    Physical Exam:  Dominant hand: -       RHD  Upper Extremity Range of Motion:     -       Right Upper Extremity: patient with extreme swelling from right shoulder to fingertips    - patient able to make right first and move wrist    - patient unable to move right elbow or shoulder     - patient with ~45 degrees elbow flexion, full extension     - patient with ~90 degrees shoulder motion   -       Left Upper Extremity: WFL  Upper Extremity Strength:    -       Right Upper Extremity: decreased secondary to swelling  -       Left Upper Extremity: WFL   Strength:    -       Right Upper Extremity: 3/5  -       Left Upper Extremity: WFL    AMPAC 6 Click ADL:  AMPAC Total Score: 7    Treatment & Education:  -Patient sat EOB x 5 minutes with CGA to total assistance for sitting balance   -Educated on role of OT and plan of care   Education:    Patient left supine with all lines intact, call button in reach and RN notified    GOALS:   Multidisciplinary Problems     Occupational Therapy Goals        Problem: Occupational Therapy Goal    Goal Priority Disciplines Outcome  Interventions   Occupational Therapy Goal     OT, PT/OT Ongoing, Progressing    Description:  Goals to be met by: 4-14-20     Patient will increase functional independence with ADLs by performing:    Feeding with Minimal Assistance.  Grooming while EOB with Minimal Assistance.  Sitting at edge of bed x 15 minutes with Stand-by Assistance for functional ADL progress.  Tolerate sitting in bedside chair x 2-3 hours daily to increase endurance for functional activities.                    History:     Past Medical History:   Diagnosis Date    Behavioral problem     Bipolar 1 disorder     Cervical spondylosis with myelopathy 10/9/2012    Diabetes mellitus type II     History of prostate cancer, s/p radiation 11/11/2015    History of psychiatric hospitalization     Hx of psychiatric care     Psychiatric exam requested by authority     Psychiatric problem     Recurrent major depressive disorder, in partial remission 7/5/2018    Self-harming behavior     Sleep apnea with use of continuous positive airway pressure (CPAP)     Suicide attempt     Therapy     Tobacco abuse 11/11/2015       Past Surgical History:   Procedure Laterality Date    CHOLECYSTECTOMY      HERNIA REPAIR      LEG SURGERY      ORIF tib/fib    SPINE SURGERY         Time Tracking:     OT Date of Treatment: 04/07/20  OT Start Time: 1010  OT Stop Time: 1031  OT Total Time (min): 21 min    Billable Minutes:Evaluation 10  Therapeutic Activity 11    Vanessa King OT  4/7/2020

## 2020-04-07 NOTE — ASSESSMENT & PLAN NOTE
-likely 2/2 to covid; however, does have a history of treated hep c in 2015  -AST//600s on transfer; have significantly downtrended since admission.    Plan:  -Monitor w daily CMP  -Follow HCV quant

## 2020-04-07 NOTE — ASSESSMENT & PLAN NOTE
Despite Harvoni treatment in 2015, no recent HCV RNA evidence of seronegativity. Ordered HCV RNA to confirm seronegativity in the setting of transaminitis.     Plan:  -Pending HCV  -Downtrending, cont monitoring

## 2020-04-07 NOTE — ASSESSMENT & PLAN NOTE
65 y/o male with Bipolar disorder, hepatitis C s/p harvoni, Prostate CA in remission s/p XRT, HTN, HLP, Non-insulin-dependent-T2DM with polyneuropathy, and polysubstance abuse,  was transferred from [facility] on 4/1 for acute respiratory failure 2/2 COVID+.     Completed a course of plaquenil, azithromycin, and ceftriaxone. Extubated 04/03 and continues on bipap 12/5 FiO2 50%. This morning he has been less agitated than previous days-- will attempt to transition to HF NC.     Plan:  -Continue with isolation precautions  -Wean off O2 as tolerated

## 2020-04-07 NOTE — PLAN OF CARE
Problem: Occupational Therapy Goal  Goal: Occupational Therapy Goal  Description  Goals to be met by: 4-14-20     Patient will increase functional independence with ADLs by performing:    Feeding with Minimal Assistance.  Grooming while EOB with Minimal Assistance.  Sitting at edge of bed x 15 minutes with Stand-by Assistance for functional ADL progress.  Tolerate sitting in bedside chair x 2-3 hours daily to increase endurance for functional activities.   Outcome: Ongoing, Progressing     Goals and plan of care established.  Vanessa King OT  4/7/2020

## 2020-04-07 NOTE — ASSESSMENT & PLAN NOTE
Home medication includes jardiance 25mg.   Due to persistent hyperglycemia secondary to D5, have increased dose of detemir to 9U BID based off SSI requirements.     Plan:  -BGs goal of 140-190  -Continue MDSSI  -Hold home medications

## 2020-04-07 NOTE — PROGRESS NOTES
"  [facility]  Adult Nutrition  Consult Note    SUMMARY     Recommendations    1. If/when able to advance diet, recommend Diabetic diet (texture per SLP).   2. If unable to advance diet, place NGT & resume enteral nutrition. Rec'd Glucerna 1.5 @ 55 mL/hr to provide 1980 kcals, 109 g of protein, 1002 mL fluid.  3. RD to monitor & follow-up.    Goals: Initiate nutrition intake by RD follow up.   Nutrition Goal Status: goal not met  Communication of RD Recs: reviewed with RN    Reason for Assessment    Reason For Assessment: RD follow-up  Diagnosis: (Acute respiratory disease due to COVID-19 virus)  Relevant Medical History: DM, HTN  Interdisciplinary Rounds: did not attend    General Information Comments: RD working remotely. Pt extubated 4/3, remains NPO. UBW 213lbs, -15lbs x 1 year (per previous RN note). Prior to intubation pt was on 2000 kcal DM, cardiac diet eating 50% of meals. NFPE not performed patient positive for COVID19 and has been placed on airborne and contact precautions. Will monitor.  Nutrition Discharge Planning: Unable to detemine at this time.     Nutrition/Diet History    Spiritual, Cultural Beliefs, Jainism Practices, Values that Affect Care: no  Factors Affecting Nutritional Intake: NPO    Anthropometrics    Temp: 98.2 °F (36.8 °C)  Height Method: Stated  Height: 5' 9" (175.3 cm)  Height (inches): 69 in  Weight Method: Standard Scale  Weight: 87.4 kg (192 lb 10.9 oz)  Weight (lb): 192.68 lb  Ideal Body Weight (IBW), Male: 160 lb  % Ideal Body Weight, Male (lb): 120.42 %  BMI (Calculated): 28.4  BMI Grade: 25 - 29.9 - overweight  Usual Body Weight (UBW), k.8 kg(Per chart review.)  % Usual Body Weight: 90.48  % Weight Change From Usual Weight: -9.71 %    Lab/Procedures/Meds    Pertinent Labs Reviewed: reviewed  Pertinent Labs Comments: Na 150, BUN 52, Gluc 300, A1C 7  Pertinent Medications Reviewed: reviewed  Pertinent Medications Comments: Precedex, " D5    Estimated/Assessed Needs    Weight Used For Calorie Calculations: 87.4 kg (192 lb 10.9 oz)     Energy Calorie Requirements (kcal): 2061 kcal/d  Energy Need Method: [place]-St Jeor(1.25 PAL)     Protein Requirements:  g/d (1-1.2 g/kg)  Weight Used For Protein Calculations: 87.4 kg (192 lb 10.9 oz)     Fluid Requirements (mL): 1mL/kcal or per MD     CHO Requirement: 50% total kcals     Nutrition Prescription Ordered    Current Diet Order: NPO    Evaluation of Received Nutrient/Fluid Intake    Comments: LBM: 3/28    Nutrition Risk    Level of Risk/Frequency of Follow-up: (2x/week)     Assessment and Plan    Contributing Nutrition Diagnosis  Inadequate energy intake      Related to (etiology):   Inability to consume sufficient energy      Signs and Symptoms (as evidenced by):   NPO, mechanically ventilated      Interventions (treatment strategy):  Collaboration with other providers        Nutrition Diagnosis Status:   Continues     Monitor and Evaluation    Food and Nutrient Intake: energy intake, food and beverage intake, enteral nutrition intake  Food and Nutrient Adminstration: diet order, enteral and parenteral nutrition administration  Knowledge/Beliefs/Attitudes: food and nutrition knowledge/skill  Physical Activity and Function: nutrition-related ADLs and IADLs  Anthropometric Measurements: weight, weight change  Biochemical Data, Medical Tests and Procedures: glucose/endocrine profile, inflammatory profile, lipid profile, gastrointestinal profile, electrolyte and renal panel  Nutrition-Focused Physical Findings: overall appearance     Nutrition Follow-Up    RD Follow-up?: Yes

## 2020-04-07 NOTE — ASSESSMENT & PLAN NOTE
Holding all home meds except lamotrigine 200 QHS  On precedex. Added haldol PRN for agitation but has not required it yet.      Plan:  -Wean off precedex as tolerated  -Haldol PRN for agitation

## 2020-04-07 NOTE — SUBJECTIVE & OBJECTIVE
Interval History/Significant Events: Overnight Na increased so D5 was re-started at 75 ccs/hr; a repeat draw revealed persistence of hypernatremia so the rate was increased to 100ccs/hr. Otherwise, the patient was stable and did not become agitated.     This morning he appeared to be less agitated than before. Planning on transitioning from bipap to HFNC today and SLP eval.     Review of Systems   Unable to perform ROS: Mental status change     Objective:     Vital Signs (Most Recent):  Temp: 98.1 °F (36.7 °C) (04/07/20 0712)  Pulse: 85 (04/07/20 0957)  Resp: (!) 38 (04/07/20 0957)  BP: (!) 103/51 (04/07/20 0800)  SpO2: 97 % (04/07/20 0957) Vital Signs (24h Range):  Temp:  [97.7 °F (36.5 °C)-98.5 °F (36.9 °C)] 98.1 °F (36.7 °C)  Pulse:  [] 85  Resp:  [26-43] 38  SpO2:  [91 %-100 %] 97 %  BP: ()/(51-86) 103/51   Weight: 87.4 kg (192 lb 10.9 oz)  Body mass index is 28.45 kg/m².      Intake/Output Summary (Last 24 hours) at 4/7/2020 1039  Last data filed at 4/7/2020 0712  Gross per 24 hour   Intake 2720.61 ml   Output 880 ml   Net 1840.61 ml       Physical Exam   Patient seen through the window in order to limit exposure due to covid-19.     Patient was lying down and had the bipap in place. He appeared comfortable and had no increased work of breathing.     Vents:  Vent Mode: A/C (04/05/20 2300)  Ventilator Initiated: Yes (04/01/20 1551)  Set Rate: 24 BPM (04/05/20 2300)  Vt Set: 350 mL (04/05/20 2300)  Pressure Support: 5 cmH20 (04/03/20 1420)  PEEP/CPAP: 8 cmH20 (04/05/20 2300)  Oxygen Concentration (%): 30(decreased from 40% per weaning orders) (04/07/20 0957)  Peak Airway Pressure: 38 cmH2O (04/05/20 2300)  Plateau Pressure: 25 cmH20 (04/05/20 2300)  Total Ve: 9.31 mL (04/05/20 2300)  F/VT Ratio<105 (RSBI): 2750 (04/03/20 1420)  Lines/Drains/Airways     Central Venous Catheter Line            Percutaneous Central Line Insertion/Assessment - Double Lumen  03/30/20 1837 right subclavian 7 days           Drain                 Urethral Catheter 03/30/20 1657 7 days          Peripheral Intravenous Line                 Peripheral IV - Single Lumen 04/02/20 1115 20 G Anterior;Left Forearm 4 days         Midline Catheter Insertion/Assessment  - Single Lumen 04/06/20 1554 Left basilic vein (medial side of arm) 18g x 8cm less than 1 day              Significant Labs:    CBC/Anemia Profile:  Recent Labs   Lab 04/06/20  0223 04/07/20  0306   WBC 10.43 10.42   HGB 10.6* 9.3*   HCT 33.0* 27.5*    241   MCV 82 84   RDW 20.5* 20.8*        Chemistries:  Recent Labs   Lab 04/06/20  0223 04/06/20  1313 04/06/20  1753 04/07/20  0306   *  156* 145 152* 153*   K 4.0  4.0 3.3* 3.3* 3.8   *  116* 107 113* 118*   CO2 20*  20* 24 23 20*   BUN 43*  43* 55* 54* 53*   CREATININE 1.0  1.0 1.2 1.1 1.0   CALCIUM 8.6*  8.6* 7.8* 8.5* 7.8*   ALBUMIN 2.0*  2.0*  --  1.8* 1.7*   PROT 7.5  7.5  --  6.9 6.5   BILITOT 0.5  0.5  --  0.4 0.4   ALKPHOS 154*  154*  --  140* 131   *  211*  --  180* 151*   *  131*  --  102* 92*   MG 3.0*  --   --  2.8*   PHOS 4.0  --   --  4.3       BMP:   Recent Labs   Lab 04/07/20  0306   *   *   K 3.8   *   CO2 20*   BUN 53*   CREATININE 1.0   CALCIUM 7.8*   MG 2.8*       Significant Imaging:  I have reviewed all pertinent imaging results/findings within the past 24 hours.

## 2020-04-07 NOTE — NURSING
CC team made aware pt is not safe for PO meds. Team states to hold all current PO meds for tonight.

## 2020-04-07 NOTE — PLAN OF CARE
Problem: Physical Therapy Goal  Goal: Physical Therapy Goal  Description  Goals to be met by: 20    Patient will increase functional independence with mobility by performin. Supine to sit with Moderate Assistance -not met   2. Sit to stand transfer with Moderate Assistance with AD if needed. -not met  3. Bed to chair transfer with Moderate Assistance using AD if needed. -not met  4. Gait  x 30n  feet with Moderate Assistance using AD if needed. -not met  5. Lower extremity exercise program x15 reps with assistance as needed-not met   Outcome: Ongoing, Progressing   Evaluation completed and goals appropriate. Natalia Holliday, PT  2020

## 2020-04-07 NOTE — PROGRESS NOTES
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 CXR right lobe infiltrate with elevated , D-Dimer 1.03,  and Ferritin 331. Patient swabbed for COVID 19:positive. Patient started on IV Rocephin and Azithromycin and Plaquenil. Patient initially on 2 liters for mild hypoxia at 88% on room air but within 24 hours of admit patient had increasing oxygen requirements and intubated on evening of 3/30 (P/F then 170); CXR at the time suggested ARDS picture given bilateral patchy ground-glass infiltrates. Patient had mild JULIET with Cr of 1.5, but good UOP not requiring dialysis. Patient has been improving on vent with less requirement since intubation. Labs also showed worsening transaminitis with ,  likely due to COVID infection; history of treated hepatitis C infection in 2015. Patient not requiring vasopressors at [facility], on Fentanyl and Propofol for sedation.Transferred to [facility] on 4/1 due to capacity issues at [facility]     Full, itemized PMHx/SurgHx/Med list, per patient's medical records. Sees Dr. García Lechuga at [facility]  · HTN: on lisinopril 10mg daily  · 3-4yr Hx of T2DM with polyneuropathy, HBA1C 3/29 7.0%, non-insulin-dependent, on Metformin 500 BD, Glipizide 10 mg BD, Glucophage 1000  mg BD.  Last eye exam Jan 2019, last foot exam Feb 2019  · HLP: Atorvastatin 80mg daily  · Prostate CA, S/P XRT and seed brachytherapy in 2015, followed by [facility], last PSA <0.1 2/5/19  · Cervical spondylopathy with myelopathy, S/P ACDF C4-5 by Dr. Le in 2012  · Bipolar disorder with depressive features, last psych admit 2014 at [facility], with Hx of suicide attempts. On home ziprazidone 80mg QD, Bupropion 150 BD, Lamotrigine 200mg QD, sees psychiatrist   · Insomnia: On 3mg melatonin QHS  · Polysubstance abuser: EtOH (unclear daily use), cocaine, amphetamines, LSD,   15-PY smoker, quit 2015, on nicotine patch    Hospital/ICU Course:  Patient arrived at [facility] on 4/1- intubated/ventilated CAP coverage with Azithro/Rocephin continued, as with plaquenil. Elevated LFTs trending down, likely due to COVID. JULIET resolved, never needed HD.  Patient extubated to venti mask on 4/4. Destaed overnight so now on BiPAP. PT/OT ordered. 4/5, continued on BIPAP, will attempt to wean to venti mask today     Interval History/Significant Events: Overnight Na increased so D5 was re-started at 75 ccs/hr; a repeat draw revealed persistence of hypernatremia so the rate was increased to 100ccs/hr. Otherwise, the patient was stable and did not become agitated.     This morning he appeared to be less agitated than before. Planning on transitioning from bipap to HFNC today and SLP eval.     Review of Systems   Unable to perform ROS: Mental status change     Objective:     Vital Signs (Most Recent):  Temp: 98.1 °F (36.7 °C) (04/07/20 0712)  Pulse: 85 (04/07/20 0957)  Resp: (!) 38 (04/07/20 0957)  BP: (!) 103/51 (04/07/20 0800)  SpO2: 97 % (04/07/20 0957) Vital Signs (24h Range):  Temp:  [97.7 °F (36.5 °C)-98.5 °F (36.9 °C)] 98.1 °F (36.7 °C)  Pulse:  [] 85  Resp:  [26-43] 38  SpO2:  [91 %-100 %] 97 %  BP: ()/(51-86) 103/51   Weight: 87.4 kg (192 lb 10.9 oz)  Body mass index is 28.45 kg/m².      Intake/Output Summary (Last 24  hours) at 4/7/2020 1039  Last data filed at 4/7/2020 0712  Gross per 24 hour   Intake 2720.61 ml   Output 880 ml   Net 1840.61 ml       Physical Exam   Patient seen through the window in order to limit exposure due to covid-19.     Patient was lying down and had the bipap in place. He appeared comfortable and had no increased work of breathing.     Vents:  Vent Mode: A/C (04/05/20 2300)  Ventilator Initiated: Yes (04/01/20 1551)  Set Rate: 24 BPM (04/05/20 2300)  Vt Set: 350 mL (04/05/20 2300)  Pressure Support: 5 cmH20 (04/03/20 1420)  PEEP/CPAP: 8 cmH20 (04/05/20 2300)  Oxygen Concentration (%): 30(decreased from 40% per weaning orders) (04/07/20 0957)  Peak Airway Pressure: 38 cmH2O (04/05/20 2300)  Plateau Pressure: 25 cmH20 (04/05/20 2300)  Total Ve: 9.31 mL (04/05/20 2300)  F/VT Ratio<105 (RSBI): 2750 (04/03/20 1420)  Lines/Drains/Airways     Central Venous Catheter Line            Percutaneous Central Line Insertion/Assessment - Double Lumen  03/30/20 1837 right subclavian 7 days          Drain                 Urethral Catheter 03/30/20 1657 7 days          Peripheral Intravenous Line                 Peripheral IV - Single Lumen 04/02/20 1115 20 G Anterior;Left Forearm 4 days         Midline Catheter Insertion/Assessment  - Single Lumen 04/06/20 1554 Left basilic vein (medial side of arm) 18g x 8cm less than 1 day              Significant Labs:    CBC/Anemia Profile:  Recent Labs   Lab 04/06/20 0223 04/07/20  0306   WBC 10.43 10.42   HGB 10.6* 9.3*   HCT 33.0* 27.5*    241   MCV 82 84   RDW 20.5* 20.8*        Chemistries:  Recent Labs   Lab 04/06/20 0223 04/06/20  1313 04/06/20  1753 04/07/20  0306   *  156* 145 152* 153*   K 4.0  4.0 3.3* 3.3* 3.8   *  116* 107 113* 118*   CO2 20*  20* 24 23 20*   BUN 43*  43* 55* 54* 53*   CREATININE 1.0  1.0 1.2 1.1 1.0   CALCIUM 8.6*  8.6* 7.8* 8.5* 7.8*   ALBUMIN 2.0*  2.0*  --  1.8* 1.7*   PROT 7.5  7.5  --  6.9 6.5   BILITOT 0.5  0.5  --   0.4 0.4   ALKPHOS 154*  154*  --  140* 131   *  211*  --  180* 151*   *  131*  --  102* 92*   MG 3.0*  --   --  2.8*   PHOS 4.0  --   --  4.3       BMP:   Recent Labs   Lab 04/07/20  0306   *   *   K 3.8   *   CO2 20*   BUN 53*   CREATININE 1.0   CALCIUM 7.8*   MG 2.8*       Significant Imaging:  I have reviewed all pertinent imaging results/findings within the past 24 hours.      ABG  No results for input(s): PH, PO2, PCO2, HCO3, BE in the last 168 hours.  Assessment/Plan:     Psychiatric  Bipolar 1 disorder, depressed  Holding all home meds except lamotrigine 200 QHS  On precedex. Added haldol PRN for agitation but has not required it yet.      Plan:  -Wean off precedex as tolerated  -Haldol PRN for agitation      Cardiac/Vascular  Essential hypertension, benign  Home antihypertensive includes lisinopril 10mg daily.     Plan:  -continue holding home antihypertensives    Renal/  JULIET (acute kidney injury)  -Cr peak of 1.5 (baseline 1.1) at [facility]. Has since downtrended and resolved.   Has been having >1L UOP and is net negative since adm.     Plan:  -Continue holding lasix  -Monitor w daily CMP  -Monitor I/Os    Oncology  Anemia  Plan:  -Monitor w daily CBC  -Transfusion threshold HB <7    Endocrine  Type 2 diabetes mellitus with diabetic neuropathic arthropathy, without long-term current use of insulin  Home medication includes jardiance 25mg.   Due to persistent hyperglycemia secondary to D5, have increased dose of detemir to 9U BID based off SSI requirements.     Plan:  -BGs goal of 140-190  -Continue MDSSI  -Hold home medications    GI  Elevated LFTs  -likely 2/2 to covid; however, does have a history of treated hep c in 2015  -AST//600s on transfer; have significantly downtrended since admission.    Plan:  -Monitor w daily CMP  -Follow HCV quant    Hepatitis C  Despite Harvoni treatment in 2015, no recent HCV RNA evidence of seronegativity. Ordered HCV RNA to  confirm seronegativity in the setting of transaminitis.     Plan:  -Pending HCV  -Downtrending, cont monitoring    Other  * Acute respiratory distress syndrome (ARDS) due to COVID-19 virus  65 y/o male with Bipolar disorder, hepatitis C s/p harvoni, Prostate CA in remission s/p XRT, HTN, HLP, Non-insulin-dependent-T2DM with polyneuropathy, and polysubstance abuse,  was transferred from [facility] on 4/1 for acute respiratory failure 2/2 COVID+.     Completed a course of plaquenil, azithromycin, and ceftriaxone. Extubated 04/03 and continues on bipap 12/5 FiO2 50%. This morning he has been less agitated than previous days-- will attempt to transition to HF NC.     Plan:  -Continue with isolation precautions  -Wean off O2 as tolerated       Critical Care Daily Checklist:    A: Awake: RASS Goal/Actual Goal: RASS Goal: 0-->alert and calm  Actual: [place] Agitation Sedation Scale (RASS): Alert and calm   B: Spontaneous Breathing Trial Performed? Spon. Breathing Trial Initiated?: Initiated (04/03/20 0852)   C: SAT & SBT Coordinated?  n/a                      D: Delirium: CAM-ICU Overall CAM-ICU: Positive   E: Early Mobility Performed? Yes   F: Feeding Goal: Goals: 1. Initiate nutrition intake by RD follow up.   Status: Nutrition Goal Status: goal met   Current Diet Order   Procedures    Diet NPO      AS: Analgesia/Sedation precedex   T: Thromboembolic Prophylaxis lovenox   H: HOB > 300 Yes   U: Stress Ulcer Prophylaxis (if needed) n/a   G: Glucose Control detemir 9U bid   B: Bowel Function Stool Occurrence: 0   I: Indwelling Catheter (Lines & Sommer) Necessity sommer   D: De-escalation of Antimicrobials/Pharmacotherapies S/p plaquenil, azithro, ctx    Plan for the day/ETD Transition to HF    Code Status:  Family/Goals of Care: Full Code         Critical secondary to Patient has a condition that poses threat to life and bodily function: Hypoxic RF due to ARDS secondary to COVID-19      Critical care was time spent  personally by me on the following activities: development of treatment plan with patient or surrogate and bedside caregivers, discussions with consultants, evaluation of patient's response to treatment, examination of patient, ordering and performing treatments and interventions, ordering and review of laboratory studies, ordering and review of radiographic studies, pulse oximetry, re-evaluation of patient's condition. This critical care time did not overlap with that of any other provider or involve time for any procedures.     Anita Cervantes MD  Critical Care Medicine  [facility]

## 2020-04-08 LAB
ALBUMIN SERPL BCP-MCNC: 1.6 G/DL (ref 3.5–5.2)
ALBUMIN SERPL BCP-MCNC: 1.6 G/DL (ref 3.5–5.2)
ALBUMIN SERPL BCP-MCNC: 1.7 G/DL (ref 3.5–5.2)
ALBUMIN SERPL BCP-MCNC: 1.8 G/DL (ref 3.5–5.2)
ALBUMIN SERPL BCP-MCNC: 1.8 G/DL (ref 3.5–5.2)
ALP SERPL-CCNC: 104 U/L (ref 55–135)
ALP SERPL-CCNC: 110 U/L (ref 55–135)
ALP SERPL-CCNC: 115 U/L (ref 55–135)
ALP SERPL-CCNC: 96 U/L (ref 55–135)
ALP SERPL-CCNC: 97 U/L (ref 55–135)
ALT SERPL W/O P-5'-P-CCNC: 147 U/L (ref 10–44)
ALT SERPL W/O P-5'-P-CCNC: 155 U/L (ref 10–44)
ALT SERPL W/O P-5'-P-CCNC: 155 U/L (ref 10–44)
ALT SERPL W/O P-5'-P-CCNC: 163 U/L (ref 10–44)
ALT SERPL W/O P-5'-P-CCNC: 164 U/L (ref 10–44)
ANION GAP SERPL CALC-SCNC: 11 MMOL/L (ref 8–16)
ANION GAP SERPL CALC-SCNC: 13 MMOL/L (ref 8–16)
ANION GAP SERPL CALC-SCNC: 14 MMOL/L (ref 8–16)
ANION GAP SERPL CALC-SCNC: 16 MMOL/L (ref 8–16)
ANION GAP SERPL CALC-SCNC: 17 MMOL/L (ref 8–16)
AST SERPL-CCNC: 123 U/L (ref 10–40)
AST SERPL-CCNC: 137 U/L (ref 10–40)
AST SERPL-CCNC: 149 U/L (ref 10–40)
AST SERPL-CCNC: 151 U/L (ref 10–40)
AST SERPL-CCNC: 156 U/L (ref 10–40)
BACTERIA #/AREA URNS AUTO: NORMAL /HPF
BASOPHILS # BLD AUTO: 0.01 K/UL (ref 0–0.2)
BASOPHILS NFR BLD: 0.1 % (ref 0–1.9)
BILIRUB SERPL-MCNC: 0.3 MG/DL (ref 0.1–1)
BILIRUB SERPL-MCNC: 0.3 MG/DL (ref 0.1–1)
BILIRUB SERPL-MCNC: 0.4 MG/DL (ref 0.1–1)
BILIRUB UR QL STRIP: NEGATIVE
BUN SERPL-MCNC: 28 MG/DL (ref 8–23)
BUN SERPL-MCNC: 31 MG/DL (ref 8–23)
BUN SERPL-MCNC: 32 MG/DL (ref 8–23)
BUN SERPL-MCNC: 37 MG/DL (ref 8–23)
BUN SERPL-MCNC: 41 MG/DL (ref 8–23)
CALCIUM SERPL-MCNC: 7.7 MG/DL (ref 8.7–10.5)
CALCIUM SERPL-MCNC: 7.8 MG/DL (ref 8.7–10.5)
CALCIUM SERPL-MCNC: 7.9 MG/DL (ref 8.7–10.5)
CHLORIDE SERPL-SCNC: 111 MMOL/L (ref 95–110)
CHLORIDE SERPL-SCNC: 114 MMOL/L (ref 95–110)
CLARITY UR REFRACT.AUTO: CLEAR
CO2 SERPL-SCNC: 18 MMOL/L (ref 23–29)
CO2 SERPL-SCNC: 19 MMOL/L (ref 23–29)
CO2 SERPL-SCNC: 21 MMOL/L (ref 23–29)
CO2 SERPL-SCNC: 22 MMOL/L (ref 23–29)
CO2 SERPL-SCNC: 23 MMOL/L (ref 23–29)
COLOR UR AUTO: YELLOW
CREAT SERPL-MCNC: 0.8 MG/DL (ref 0.5–1.4)
CREAT SERPL-MCNC: 0.8 MG/DL (ref 0.5–1.4)
CREAT SERPL-MCNC: 0.9 MG/DL (ref 0.5–1.4)
CRP SERPL-MCNC: 146.6 MG/L (ref 0–8.2)
DIFFERENTIAL METHOD: ABNORMAL
EOSINOPHIL # BLD AUTO: 0.1 K/UL (ref 0–0.5)
EOSINOPHIL NFR BLD: 0.9 % (ref 0–8)
ERYTHROCYTE [DISTWIDTH] IN BLOOD BY AUTOMATED COUNT: 21 % (ref 11.5–14.5)
EST. GFR  (AFRICAN AMERICAN): >60 ML/MIN/1.73 M^2
EST. GFR  (NON AFRICAN AMERICAN): >60 ML/MIN/1.73 M^2
GLUCOSE SERPL-MCNC: 182 MG/DL (ref 70–110)
GLUCOSE SERPL-MCNC: 194 MG/DL (ref 70–110)
GLUCOSE SERPL-MCNC: 197 MG/DL (ref 70–110)
GLUCOSE SERPL-MCNC: 218 MG/DL (ref 70–110)
GLUCOSE SERPL-MCNC: 219 MG/DL (ref 70–110)
GLUCOSE UR QL STRIP: ABNORMAL
HCT VFR BLD AUTO: 26.3 % (ref 40–54)
HCV RNA SERPL NAA+PROBE-LOG IU: <1.08 LOG (10) IU/ML
HCV RNA SERPL QL NAA+PROBE: NOT DETECTED IU/ML
HCV RNA SPEC NAA+PROBE-ACNC: <12 IU/ML
HGB BLD-MCNC: 9.4 G/DL (ref 14–18)
HGB UR QL STRIP: ABNORMAL
HYALINE CASTS UR QL AUTO: 1 /LPF
IMM GRANULOCYTES # BLD AUTO: 0.08 K/UL (ref 0–0.04)
IMM GRANULOCYTES NFR BLD AUTO: 1 % (ref 0–0.5)
KETONES UR QL STRIP: NEGATIVE
LEUKOCYTE ESTERASE UR QL STRIP: NEGATIVE
LYMPHOCYTES # BLD AUTO: 0.7 K/UL (ref 1–4.8)
LYMPHOCYTES NFR BLD: 9.2 % (ref 18–48)
MAGNESIUM SERPL-MCNC: 2.8 MG/DL (ref 1.6–2.6)
MCH RBC QN AUTO: 30 PG (ref 27–31)
MCHC RBC AUTO-ENTMCNC: 35.7 G/DL (ref 32–36)
MCV RBC AUTO: 84 FL (ref 82–98)
MICROSCOPIC COMMENT: NORMAL
MONOCYTES # BLD AUTO: 0.6 K/UL (ref 0.3–1)
MONOCYTES NFR BLD: 8.1 % (ref 4–15)
NEUTROPHILS # BLD AUTO: 6.1 K/UL (ref 1.8–7.7)
NEUTROPHILS NFR BLD: 80.7 % (ref 38–73)
NITRITE UR QL STRIP: NEGATIVE
NRBC BLD-RTO: 1 /100 WBC
PH UR STRIP: 6 [PH] (ref 5–8)
PHOSPHATE SERPL-MCNC: 4 MG/DL (ref 2.7–4.5)
PLATELET # BLD AUTO: 268 K/UL (ref 150–350)
PMV BLD AUTO: 10.7 FL (ref 9.2–12.9)
POCT GLUCOSE: 176 MG/DL (ref 70–110)
POCT GLUCOSE: 207 MG/DL (ref 70–110)
POCT GLUCOSE: 218 MG/DL (ref 70–110)
POCT GLUCOSE: 229 MG/DL (ref 70–110)
POTASSIUM SERPL-SCNC: 3.7 MMOL/L (ref 3.5–5.1)
POTASSIUM SERPL-SCNC: 3.7 MMOL/L (ref 3.5–5.1)
POTASSIUM SERPL-SCNC: 3.8 MMOL/L (ref 3.5–5.1)
POTASSIUM SERPL-SCNC: 3.9 MMOL/L (ref 3.5–5.1)
POTASSIUM SERPL-SCNC: 4 MMOL/L (ref 3.5–5.1)
PROT SERPL-MCNC: 6.1 G/DL (ref 6–8.4)
PROT SERPL-MCNC: 6.4 G/DL (ref 6–8.4)
PROT SERPL-MCNC: 6.5 G/DL (ref 6–8.4)
PROT SERPL-MCNC: 6.5 G/DL (ref 6–8.4)
PROT SERPL-MCNC: 6.7 G/DL (ref 6–8.4)
PROT UR QL STRIP: NEGATIVE
RBC # BLD AUTO: 3.13 M/UL (ref 4.6–6.2)
RBC #/AREA URNS AUTO: 2 /HPF (ref 0–4)
SODIUM SERPL-SCNC: 145 MMOL/L (ref 136–145)
SODIUM SERPL-SCNC: 146 MMOL/L (ref 136–145)
SODIUM SERPL-SCNC: 146 MMOL/L (ref 136–145)
SODIUM SERPL-SCNC: 147 MMOL/L (ref 136–145)
SODIUM SERPL-SCNC: 148 MMOL/L (ref 136–145)
SP GR UR STRIP: 1.02 (ref 1–1.03)
SQUAMOUS #/AREA URNS AUTO: 0 /HPF
URN SPEC COLLECT METH UR: ABNORMAL
WBC # BLD AUTO: 7.62 K/UL (ref 3.9–12.7)
WBC #/AREA URNS AUTO: 3 /HPF (ref 0–5)
YEAST UR QL AUTO: NORMAL

## 2020-04-08 PROCEDURE — 63600175 PHARM REV CODE 636 W HCPCS: Performed by: STUDENT IN AN ORGANIZED HEALTH CARE EDUCATION/TRAINING PROGRAM

## 2020-04-08 PROCEDURE — 94660 CPAP INITIATION&MGMT: CPT

## 2020-04-08 PROCEDURE — 86140 C-REACTIVE PROTEIN: CPT

## 2020-04-08 PROCEDURE — 99291 PR CRITICAL CARE, E/M 30-74 MINUTES: ICD-10-PCS | Mod: GC,,, | Performed by: INTERNAL MEDICINE

## 2020-04-08 PROCEDURE — 97535 SELF CARE MNGMENT TRAINING: CPT

## 2020-04-08 PROCEDURE — 99900035 HC TECH TIME PER 15 MIN (STAT)

## 2020-04-08 PROCEDURE — 81001 URINALYSIS AUTO W/SCOPE: CPT

## 2020-04-08 PROCEDURE — 27000221 HC OXYGEN, UP TO 24 HOURS

## 2020-04-08 PROCEDURE — 94640 AIRWAY INHALATION TREATMENT: CPT

## 2020-04-08 PROCEDURE — 97530 THERAPEUTIC ACTIVITIES: CPT

## 2020-04-08 PROCEDURE — 92610 EVALUATE SWALLOWING FUNCTION: CPT

## 2020-04-08 PROCEDURE — 25000003 PHARM REV CODE 250: Performed by: INTERNAL MEDICINE

## 2020-04-08 PROCEDURE — 25000003 PHARM REV CODE 250: Performed by: STUDENT IN AN ORGANIZED HEALTH CARE EDUCATION/TRAINING PROGRAM

## 2020-04-08 PROCEDURE — 83735 ASSAY OF MAGNESIUM: CPT

## 2020-04-08 PROCEDURE — 94761 N-INVAS EAR/PLS OXIMETRY MLT: CPT

## 2020-04-08 PROCEDURE — 25000242 PHARM REV CODE 250 ALT 637 W/ HCPCS: Performed by: STUDENT IN AN ORGANIZED HEALTH CARE EDUCATION/TRAINING PROGRAM

## 2020-04-08 PROCEDURE — 85025 COMPLETE CBC W/AUTO DIFF WBC: CPT

## 2020-04-08 PROCEDURE — 20000000 HC ICU ROOM

## 2020-04-08 PROCEDURE — 27100092 HC HIGH FLOW DELIVERY CANNULA

## 2020-04-08 PROCEDURE — 27100171 HC OXYGEN HIGH FLOW UP TO 24 HOURS

## 2020-04-08 PROCEDURE — 99291 CRITICAL CARE FIRST HOUR: CPT | Mod: GC,,, | Performed by: INTERNAL MEDICINE

## 2020-04-08 PROCEDURE — 80053 COMPREHEN METABOLIC PANEL: CPT | Mod: 91

## 2020-04-08 PROCEDURE — 84100 ASSAY OF PHOSPHORUS: CPT

## 2020-04-08 RX ORDER — BISACODYL 10 MG
10 SUPPOSITORY, RECTAL RECTAL DAILY PRN
Status: DISCONTINUED | OUTPATIENT
Start: 2020-04-08 | End: 2020-04-18 | Stop reason: HOSPADM

## 2020-04-08 RX ADMIN — INSULIN DETEMIR 11 UNITS: 100 INJECTION, SOLUTION SUBCUTANEOUS at 08:04

## 2020-04-08 RX ADMIN — INSULIN DETEMIR 11 UNITS: 100 INJECTION, SOLUTION SUBCUTANEOUS at 10:04

## 2020-04-08 RX ADMIN — INSULIN ASPART 4 UNITS: 100 INJECTION, SOLUTION INTRAVENOUS; SUBCUTANEOUS at 07:04

## 2020-04-08 RX ADMIN — VALPROATE SODIUM 250 MG: 100 INJECTION, SOLUTION INTRAVENOUS at 05:04

## 2020-04-08 RX ADMIN — ENOXAPARIN SODIUM 90 MG: 100 INJECTION SUBCUTANEOUS at 03:04

## 2020-04-08 RX ADMIN — IPRATROPIUM BROMIDE AND ALBUTEROL SULFATE 3 ML: .5; 3 SOLUTION RESPIRATORY (INHALATION) at 02:04

## 2020-04-08 RX ADMIN — DEXTROSE 125 ML/HR: 5 SOLUTION INTRAVENOUS at 02:04

## 2020-04-08 RX ADMIN — VALPROATE SODIUM 250 MG: 100 INJECTION, SOLUTION INTRAVENOUS at 04:04

## 2020-04-08 RX ADMIN — IPRATROPIUM BROMIDE AND ALBUTEROL SULFATE 3 ML: .5; 3 SOLUTION RESPIRATORY (INHALATION) at 08:04

## 2020-04-08 RX ADMIN — IPRATROPIUM BROMIDE AND ALBUTEROL SULFATE 3 ML: .5; 3 SOLUTION RESPIRATORY (INHALATION) at 01:04

## 2020-04-08 RX ADMIN — DEXMEDETOMIDINE HYDROCHLORIDE 0.8 MCG/KG/HR: 100 INJECTION, SOLUTION, CONCENTRATE INTRAVENOUS at 06:04

## 2020-04-08 RX ADMIN — BISACODYL 10 MG: 10 SUPPOSITORY RECTAL at 10:04

## 2020-04-08 RX ADMIN — DEXMEDETOMIDINE HYDROCHLORIDE 0.6 MCG/KG/HR: 100 INJECTION, SOLUTION, CONCENTRATE INTRAVENOUS at 02:04

## 2020-04-08 RX ADMIN — ENOXAPARIN SODIUM 90 MG: 100 INJECTION SUBCUTANEOUS at 08:04

## 2020-04-08 RX ADMIN — VALPROATE SODIUM 250 MG: 100 INJECTION, SOLUTION INTRAVENOUS at 11:04

## 2020-04-08 RX ADMIN — INSULIN ASPART 4 UNITS: 100 INJECTION, SOLUTION INTRAVENOUS; SUBCUTANEOUS at 10:04

## 2020-04-08 RX ADMIN — DEXMEDETOMIDINE HYDROCHLORIDE 0.8 MCG/KG/HR: 100 INJECTION, SOLUTION, CONCENTRATE INTRAVENOUS at 12:04

## 2020-04-08 RX ADMIN — VALPROATE SODIUM 250 MG: 100 INJECTION, SOLUTION INTRAVENOUS at 10:04

## 2020-04-08 NOTE — NURSING
Pt tolerated day's care and wean from BIPAP to hiflow.  Pt was able to speak with his wife on the phone today and was very coherant with the conversation.  However pt failed bedside swallow by this RN (water dribbled out L side of mouth).  Pt did have many episodes of calling out LOUDLY which on hiflow and being anxious (while on precedex) and when reassured by RN (and other rns), pt calmed down.      US of R arm showed large range DVT.  R IJ double lumen dialysis cath was was removed (tip intact)  per VO from Dr Castellon per policy by this RN - pt tolerated well.  Hemostasis was achieved and occlusive dressing was placed.    Care assumed by PM RN - aware labs are pending

## 2020-04-08 NOTE — ASSESSMENT & PLAN NOTE
Holding all home meds except lamotrigine 200 QHS- however, he has not been able to take it because he has been unable to swallow.   On precedex. Haldol PRN for agitation but has not required its use.     Plan:  -Wean off precedex as tolerated  -Cont haldol PRN for agitation

## 2020-04-08 NOTE — ASSESSMENT & PLAN NOTE
-Cr peak of 1.5 (baseline 1.1) at [facility]. Has downtrended since and resolved.   Has been having >1L UOP and is net negative since adm.     Plan:  -Monitor w daily CMP  -Monitor I/Os

## 2020-04-08 NOTE — PLAN OF CARE
Problem: Occupational Therapy Goal  Goal: Occupational Therapy Goal  Description  Goals to be met by: 4-14-20     Patient will increase functional independence with ADLs by performing:    Feeding with Minimal Assistance.  Grooming while EOB with Minimal Assistance.  Sitting at edge of bed x 15 minutes with Stand-by Assistance for functional ADL progress.  Tolerate sitting in bedside chair x 2-3 hours daily to increase endurance for functional activities.   Outcome: Ongoing, Progressing

## 2020-04-08 NOTE — NURSING
ST had dialed phone so pt can speak with his wife, Zeenat.  Pt had strong voice throughout the conversation  And when he was done speaking with his wife and what sounded like other family members because he kept making an  acknowledgment that it was a new person who he was speaking with. Pt seemed to be appropriate with the conversation.  When this RN began to give wife update, and I mentioned that he failed his swallow study, his wife stated that the pt told her that his throat hurt.  I acknowledged the comment and said I would tell the MD (which I did as soon as I left the room-reported to Dr Lopez) but also reviewed that pt did not smile when asked so true facial symmetry was not able to be fully assessed however it appeared that his L corner of his mouth drooped and he was even building up spit in  the L corner of his mouth.  Wife said again that she felt he was   not swallowing because his throat hurt.  Since initial assessment this am, pt seemed to have a more prounounced L mouth droop and tongue seems heavy.  Mouth care was done and pt did not smile nor stick  His tongue out as requested by this RN.  Dr Lopez was called to evaluate and informed as wife requested that pt said his throat hurt.  1105 Dr Lopez as well as OT/PT at bedside - per Dr Lopez's report to this RN, pt was able to smile equally and even stuck his tongue out and exhibited no decrease in strength in L hand.  PT and OT currently still working with pt. [facility].

## 2020-04-08 NOTE — SUBJECTIVE & OBJECTIVE
Interval History/Significant Events: No acute events overnight. Tolerated HF well yesterday in the afternoon and bipap overnight.   Pending SLP evaluation. Continue w/ PT/OT. Will continue to monitor O2 and wean as tolerated.     Review of Systems   Constitutional: Negative for chills and fever.   Eyes: Negative for visual disturbance.   Respiratory: Negative for cough.    Gastrointestinal: Positive for constipation. Negative for abdominal pain, diarrhea, nausea and vomiting.   Genitourinary: Positive for dysuria. Negative for flank pain.   Musculoskeletal: Positive for myalgias. Negative for back pain.   Skin: Negative for rash.   Neurological: Negative for light-headedness and headaches.     Objective:     Vital Signs (Most Recent):  Temp: 98.1 °F (36.7 °C) (04/08/20 0300)  Pulse: 86 (04/08/20 0705)  Resp: 20 (04/08/20 0705)  BP: 128/61 (04/08/20 0800)  SpO2: 100 % (04/08/20 0705) Vital Signs (24h Range):  Temp:  [97.7 °F (36.5 °C)-98.2 °F (36.8 °C)] 98.1 °F (36.7 °C)  Pulse:  [] 86  Resp:  [19-46] 20  SpO2:  [86 %-100 %] 100 %  BP: ()/(57-83) 128/61   Weight: 87.4 kg (192 lb 10.9 oz)  Body mass index is 28.45 kg/m².      Intake/Output Summary (Last 24 hours) at 4/8/2020 0807  Last data filed at 4/8/2020 0800  Gross per 24 hour   Intake 3315.22 ml   Output 1255 ml   Net 2060.22 ml       Physical Exam   Constitutional: He is oriented to person, place, and time. He appears well-developed and well-nourished. No distress.   Patient lying down in bed with bipap on. Speaking clear in full sentences in no acute distress.    HENT:   Head: Normocephalic and atraumatic.   Eyes: Pupils are equal, round, and reactive to light. Conjunctivae are normal. No scleral icterus.   Cardiovascular: Normal rate and regular rhythm.   Pulmonary/Chest: No respiratory distress.   On bipap. Lung fields difficult to auscultate   Abdominal: Soft. He exhibits no distension. There is tenderness in the suprapubic area. There is no  rebound and no guarding.   Musculoskeletal: He exhibits no edema.   Tender and tight RUE. Strength 1/5. Sensation intact   Neurological: He is alert and oriented to person, place, and time. No cranial nerve deficit.   Skin: Skin is warm. He is not diaphoretic. No erythema.     Vents:  Vent Mode: A/C (04/05/20 2300)  Ventilator Initiated: Yes (04/01/20 1551)  Set Rate: 24 BPM (04/05/20 2300)  Vt Set: 350 mL (04/05/20 2300)  Pressure Support: 5 cmH20 (04/03/20 1420)  PEEP/CPAP: 8 cmH20 (04/05/20 2300)  Oxygen Concentration (%): 40 (04/08/20 0800)  Peak Airway Pressure: 38 cmH2O (04/05/20 2300)  Plateau Pressure: 25 cmH20 (04/05/20 2300)  Total Ve: 9.31 mL (04/05/20 2300)  F/VT Ratio<105 (RSBI): 2750 (04/03/20 1420)  Lines/Drains/Airways     Drain                 Urethral Catheter 03/30/20 1657 8 days          Peripheral Intravenous Line                 Midline Catheter Insertion/Assessment  - Single Lumen 04/06/20 1554 Left basilic vein (medial side of arm) 18g x 8cm 1 day         Peripheral IV - Single Lumen 04/07/20 1600 20 G;1 3/4 in Left Upper Arm less than 1 day              Significant Labs:    CBC/Anemia Profile:  Recent Labs   Lab 04/07/20  0306 04/08/20  0306   WBC 10.42 7.62   HGB 9.3* 9.4*   HCT 27.5* 26.3*    268   MCV 84 84   RDW 20.8* 21.0*        Chemistries:  Recent Labs   Lab 04/07/20  0306  04/07/20  1818 04/07/20  2334 04/08/20  0306   *   < > 151* 148* 147*   K 3.8   < > 3.6 3.9 3.8   *   < > 114* 114* 111*   CO2 20*   < > 22* 18* 19*   BUN 53*   < > 48* 41* 37*   CREATININE 1.0   < > 1.0 0.8 0.9   CALCIUM 7.8*   < > 8.0* 7.9* 7.9*   ALBUMIN 1.7*   < > 1.7* 1.7* 1.8*   PROT 6.5   < > 6.6 6.5 6.7   BILITOT 0.4   < > 0.3 0.3 0.4   ALKPHOS 131   < > 111 110 115   *   < > 141* 147* 163*   AST 92*   < > 94* 123* 156*   MG 2.8*  --   --   --  2.8*   PHOS 4.3  --   --   --  4.0    < > = values in this interval not displayed.       All pertinent labs within the past 24 hours have  been reviewed.    Significant Imaging:  I have reviewed all pertinent imaging results/findings within the past 24 hours.

## 2020-04-08 NOTE — PLAN OF CARE
Problem: SLP Goal  Goal: SLP Goal  Description  Speech Language Pathology Goals  Goals expected to be met by 4/15/2020  1. Pt will participate in ongoing assessment of swallow fx to determine safest, least restrictive means of nutrition  2. Educate Pt and family on S/S aspiration     Outcome: Ongoing, Progressing     SLP Bedside Swallow Study initiated. Pt with with decreased control of oral secretions, wet vocal quality and anterior loss of low-level PO trials.  Full report to follow.  REC: continue NPO with temporary alternative means nutrition/hydration/medication and ST to continue to follow.     ANTONIO Leonard., [facility]-SLP  Speech-Language Pathology  Pager: [phone]  4/8/2020

## 2020-04-08 NOTE — ASSESSMENT & PLAN NOTE
RUE us 04/07 consistent with DVTs in the R IJ, subclavian, and axillary veins. Noted thrombosis of cephalic, brachial, and basilic veins. Started on lovenox 90mg BID.     Plan:  -Continue lovenox

## 2020-04-08 NOTE — PT/OT/SLP EVAL
Speech Language Pathology Evaluation  Bedside Swallow    Patient Name:  Cash Crawford   MRN:  [id number]  Admitting Diagnosis: Acute respiratory distress syndrome (ARDS) due to COVID-19 virus    Recommendations:                 General Recommendations:  Dysphagia therapy and Pt/family education  Diet recommendations:  NPO, NPO   Aspiration Precautions: Continue alternate means of nutrition/hydration/medication, Eliminate distractions and Strict aspiration precautions, good oral care  General Precautions: Standard, aspiration, contact, fall, droplet, respiratory  Communication strategies:  provide increased time to answer and go to room if call light pushed    History:     Past Medical History:   Diagnosis Date    Behavioral problem     Bipolar 1 disorder     Cervical spondylosis with myelopathy 10/9/2012    Diabetes mellitus type II     History of prostate cancer, s/p radiation 11/11/2015    History of psychiatric hospitalization     Hx of psychiatric care     Psychiatric exam requested by authority     Psychiatric problem     Recurrent major depressive disorder, in partial remission 7/5/2018    Self-harming behavior     Sleep apnea with use of continuous positive airway pressure (CPAP)     Suicide attempt     Therapy     Tobacco abuse 11/11/2015       Past Surgical History:   Procedure Laterality Date    CHOLECYSTECTOMY      HERNIA REPAIR      LEG SURGERY      ORIF tib/fib    SPINE SURGERY         Social History: Patient lives with his Spouse and two daughters in home in [place]    Prior Intubation HX:  3/30/2020 -4/3/2020    Chest X-Rays: 4/1/2020: Satisfactory position of endotracheal tube tip.    Enteric tube tip coiled up within the proximal aspect of the stomach.    Unchanged diffuse ground-glass opacities.    Prior diet: regular, thin per Pt    Occupation/hobbies/homemaking: Employed ([employer], [employer]    Subjective     SLP reviewed Pt with RN, RN reported Pt with L anterior loss  "of trials of water  Pt presents calm  He explains, "I am trying"    Pain/Comfort:  · Pain Rating 1: other (see comments)(Pt states "I hurt all over [place]" )  · Location - Orientation 1: generalized  · Pain Addressed 1: Pre-medicate for activity, Nurse notified    Objective:     Oral Musculature Evaluation  · Oral Musculature: general weakness  · Dentition: edentulous  · Secretion Management: problems swallowing secretions  · Mucosal Quality: ulcerated  · Mandibular Strength and Mobility: impaired  · Oral Labial Strength and Mobility: impaired coordination  · Lingual Strength and Mobility: impaired strength  · Volitional Cough: elicited, weak  · Volitional Swallow: elicited, signficantly delayed   · Voice Prior to PO Intake: wet    Bedside Swallow Eval:   Consistencies Assessed:  · Honey thick liquids : tsp sips x2     Oral Phase:   · Anterior loss  · Decreased closure around utensil  · Leakage, mouth breathing  · Pocketing   Left : moderate, required use of suction to clear     Pharyngeal Phase:   · decreased hyolaryngeal excursion to palpation  · significantly delayed swallow initation    Compensatory Strategies  · Pt cued to initiate swallow 2/2 significant delayed initiation of swallow     Treatment: Pt found awake in bed with catheter, peripheral IV, midline, telemetry, blood pressure monitor, pulse ox (continuous) and oxygen (HFNC, 14L) in place. He was oriented to person, place and situation; however, demonstrated decreased sustained attention to task which required frequent redirection to structured task. His voice was wet and SLP provided suction to clear secretions pocketed in L buccal cavity. PO trials limited 2/2 anterior loss, oral holding, significantly delayed initiation of swallow and high aspiration risk. He was educated on SLP Role and need for ongoing assessment as strength, vocal quality and level of alertness improved. He nodded along but did not verbalize understanding. SLP assisted in calling Pts " spouse on phone per Pt request. RN in room at end of session. Pt remained upright in bed with RN at bedside upon SLP exit. No questions noted. Whiteboard updated.     Assessment:     Cash Crawford is a 64 y.o. male with an SLP diagnosis of Dysphagia.  He presents with wet vocal quality and significantly delayed initiation of swallow this service day.  Pt required suctioning for management of secretions t/o assessment.  ST to continue to follow.     Goals:   Multidisciplinary Problems     SLP Goals        Problem: SLP Goal    Goal Priority Disciplines Outcome   SLP Goal     SLP Ongoing, Progressing   Description:  Speech Language Pathology Goals  Goals expected to be met by 4/15/2020  1. Pt will participate in ongoing assessment of swallow fx to determine safest, least restrictive means of nutrition  2. Educate Pt and family on S/S aspiration                      Plan:     · Patient to be seen:  4 x/week   · Plan of Care expires:  05/08/20  · Plan of Care reviewed with:  patient   · SLP Follow-Up:  Yes       Discharge recommendations:  nursing facility, skilled   Barriers to Discharge: means of nutrition/hydration not yet determined    Time Tracking:     SLP Treatment Date:   04/08/20  Speech Start Time:  1001  Speech Stop Time:  1020     Speech Total Time (min):  19 min    Billable Minutes: Eval Swallow and Oral Function 9 and Seld Care/Home Management Training 10     ANTONIO Leonard., [facility]-SLP  Speech-Language Pathology  Pager: [phone]    04/08/2020

## 2020-04-08 NOTE — PROGRESS NOTES
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 CXR right lobe infiltrate with elevated , D-Dimer 1.03,  and Ferritin 331. Patient swabbed for COVID 19:positive. Patient started on IV Rocephin and Azithromycin and Plaquenil. Patient initially on 2 liters for mild hypoxia at 88% on room air but within 24 hours of admit patient had increasing oxygen requirements and intubated on evening of 3/30 (P/F then 170); CXR at the time suggested ARDS picture given bilateral patchy ground-glass infiltrates. Patient had mild JULIET with Cr of 1.5, but good UOP not requiring dialysis. Patient has been improving on vent with less requirement since intubation. Labs also showed worsening transaminitis with ,  likely due to COVID infection; history of treated hepatitis C infection in 2015. Patient not requiring vasopressors at [facility], on Fentanyl and Propofol for sedation.Transferred to [facility] on 4/1 due to capacity issues at [facility]     Full, itemized PMHx/SurgHx/Med list, per patient's medical records. Sees Dr. García Lechuga at [facility]  · HTN: on lisinopril 10mg daily  · 3-4yr Hx of T2DM with polyneuropathy, HBA1C 3/29 7.0%, non-insulin-dependent, on Metformin 500 BD, Glipizide 10 mg BD, Glucophage  1000 mg BD.  Last eye exam Jan 2019, last foot exam Feb 2019  · HLP: Atorvastatin 80mg daily  · Prostate CA, S/P XRT and seed brachytherapy in 2015, followed by [facility], last PSA <0.1 2/5/19  · Cervical spondylopathy with myelopathy, S/P ACDF C4-5 by Dr. Le in 2012  · Bipolar disorder with depressive features, last psych admit 2014 at [facility], with Hx of suicide attempts. On home ziprazidone 80mg QD, Bupropion 150 BD, Lamotrigine 200mg QD, sees psychiatrist   · Insomnia: On 3mg melatonin QHS  · Polysubstance abuser: EtOH (unclear daily use), cocaine, amphetamines, LSD,   15-PY smoker, quit 2015, on nicotine patch    Hospital/ICU Course:  Patient arrived at [facility] on 4/1- intubated/ventilated CAP coverage with Azithro/Rocephin continued, as with plaquenil. Elevated LFTs trending down, likely due to COVID. JULIET resolved, never needed HD.  He was extubated to a venti mask on 04/04 but desaturated overnight and was placed on bipap. On 04/07 he was transitioned to HFNC and bipap qHS. Started working with PT/OT, who recommend SNF placement. Pending SLP eval for further recommendations.     Interval History/Significant Events: No acute events overnight. Tolerated HF well yesterday in the afternoon and bipap overnight.   Pending SLP evaluation. Continue w/ PT/OT. Will continue to monitor O2 and wean as tolerated.     Review of Systems   Constitutional: Negative for chills and fever.   Eyes: Negative for visual disturbance.   Respiratory: Negative for cough.    Gastrointestinal: Positive for constipation. Negative for abdominal pain, diarrhea, nausea and vomiting.   Genitourinary: Positive for dysuria. Negative for flank pain.   Musculoskeletal: Positive for myalgias. Negative for back pain.   Skin: Negative for rash.   Neurological: Negative for light-headedness and headaches.     Objective:     Vital Signs (Most Recent):  Temp: 98.1 °F (36.7 °C) (04/08/20 0300)  Pulse: 86 (04/08/20 0705)  Resp: 20 (04/08/20  0705)  BP: 128/61 (04/08/20 0800)  SpO2: 100 % (04/08/20 0705) Vital Signs (24h Range):  Temp:  [97.7 °F (36.5 °C)-98.2 °F (36.8 °C)] 98.1 °F (36.7 °C)  Pulse:  [] 86  Resp:  [19-46] 20  SpO2:  [86 %-100 %] 100 %  BP: ()/(57-83) 128/61   Weight: 87.4 kg (192 lb 10.9 oz)  Body mass index is 28.45 kg/m².      Intake/Output Summary (Last 24 hours) at 4/8/2020 0807  Last data filed at 4/8/2020 0800  Gross per 24 hour   Intake 3315.22 ml   Output 1255 ml   Net 2060.22 ml       Physical Exam   Constitutional: He is oriented to person, place, and time. He appears well-developed and well-nourished. No distress.   Patient lying down in bed with bipap on. Speaking clear in full sentences in no acute distress.    HENT:   Head: Normocephalic and atraumatic.   Eyes: Pupils are equal, round, and reactive to light. Conjunctivae are normal. No scleral icterus.   Cardiovascular: Normal rate and regular rhythm.   Pulmonary/Chest: No respiratory distress.   On bipap. Lung fields difficult to auscultate   Abdominal: Soft. He exhibits no distension. There is tenderness in the suprapubic area. There is no rebound and no guarding.   Musculoskeletal: He exhibits no edema.   Tender and tight RUE. Strength 1/5. Sensation intact   Neurological: He is alert and oriented to person, place, and time. No cranial nerve deficit.   Skin: Skin is warm. He is not diaphoretic. No erythema.     Vents:  Vent Mode: A/C (04/05/20 2300)  Ventilator Initiated: Yes (04/01/20 1551)  Set Rate: 24 BPM (04/05/20 2300)  Vt Set: 350 mL (04/05/20 2300)  Pressure Support: 5 cmH20 (04/03/20 1420)  PEEP/CPAP: 8 cmH20 (04/05/20 2300)  Oxygen Concentration (%): 40 (04/08/20 0800)  Peak Airway Pressure: 38 cmH2O (04/05/20 2300)  Plateau Pressure: 25 cmH20 (04/05/20 2300)  Total Ve: 9.31 mL (04/05/20 2300)  F/VT Ratio<105 (RSBI): 2750 (04/03/20 1420)  Lines/Drains/Airways     Drain                 Urethral Catheter 03/30/20 1657 8 days          Peripheral  Intravenous Line                 Midline Catheter Insertion/Assessment  - Single Lumen 04/06/20 1554 Left basilic vein (medial side of arm) 18g x 8cm 1 day         Peripheral IV - Single Lumen 04/07/20 1600 20 G;1 3/4 in Left Upper Arm less than 1 day              Significant Labs:    CBC/Anemia Profile:  Recent Labs   Lab 04/07/20  0306 04/08/20  0306   WBC 10.42 7.62   HGB 9.3* 9.4*   HCT 27.5* 26.3*    268   MCV 84 84   RDW 20.8* 21.0*        Chemistries:  Recent Labs   Lab 04/07/20  0306  04/07/20  1818 04/07/20  2334 04/08/20  0306   *   < > 151* 148* 147*   K 3.8   < > 3.6 3.9 3.8   *   < > 114* 114* 111*   CO2 20*   < > 22* 18* 19*   BUN 53*   < > 48* 41* 37*   CREATININE 1.0   < > 1.0 0.8 0.9   CALCIUM 7.8*   < > 8.0* 7.9* 7.9*   ALBUMIN 1.7*   < > 1.7* 1.7* 1.8*   PROT 6.5   < > 6.6 6.5 6.7   BILITOT 0.4   < > 0.3 0.3 0.4   ALKPHOS 131   < > 111 110 115   *   < > 141* 147* 163*   AST 92*   < > 94* 123* 156*   MG 2.8*  --   --   --  2.8*   PHOS 4.3  --   --   --  4.0    < > = values in this interval not displayed.       All pertinent labs within the past 24 hours have been reviewed.    Significant Imaging:  I have reviewed all pertinent imaging results/findings within the past 24 hours.      ABG  No results for input(s): PH, PO2, PCO2, HCO3, BE in the last 168 hours.  Assessment/Plan:     Psychiatric  Bipolar 1 disorder, depressed  Holding all home meds except lamotrigine 200 QHS- however, he has not been able to take it because he has been unable to swallow.   On precedex. Haldol PRN for agitation but has not required its use.     Plan:  -Wean off precedex as tolerated  -Cont haldol PRN for agitation       Cardiac/Vascular  Essential hypertension, benign  Home antihypertensive includes lisinopril 10mg daily.     Plan:  -continue holding home antihypertensives    Renal/  JULIET (acute kidney injury)  -Cr peak of 1.5 (baseline 1.1) at [facility]. Has downtrended since and resolved.   Has  been having >1L UOP and is net negative since adm.     Plan:  -Monitor w daily CMP  -Monitor I/Os    Hematology  Acute deep vein thrombosis (DVT) of axillary vein of right upper extremity  RUE us 04/07 consistent with DVTs in the R IJ, subclavian, and axillary veins. Noted thrombosis of cephalic, brachial, and basilic veins. Started on lovenox 90mg BID.     Plan:  -Continue lovenox     Oncology  Anemia  Noted on labs. H/H stable.     Plan:  -Monitor w daily CBC  -Transfusion threshold HB <7    Endocrine  Type 2 diabetes mellitus with diabetic neuropathic arthropathy, without long-term current use of insulin  Home medication includes jardiance 25mg.   Due to persistent hyperglycemia secondary to D5, have increased dose of detemir to 11U BID based off SSI requirements.     Plan:  -BGs goal of 140-190  -Continue MDSSI  -Hold home medications    GI  Elevated LFTs  -likely 2/2 to covid; however, does have a history of treated hep c in 2015  -AST//600s on transfer; have significantly downtrended since admission.    Plan:  -Monitor w daily CMP  -Follow HCV quant    Hepatitis C  Despite Harvoni treatment in 2015, no recent HCV RNA evidence of seronegativity.   Ordered HCV RNA to confirm seronegativity in the setting of transaminitis noted on labs.     Plan:  -Pending HCV  -Cont monitoring     Other  * Acute respiratory distress syndrome (ARDS) due to COVID-19 virus  65 y/o male with Bipolar disorder, hepatitis C s/p harvoni, Prostate CA in remission s/p XRT, HTN, HLP, Non-insulin-dependent-T2DM with polyneuropathy, and polysubstance abuse,  was transferred from [facility] on 4/1 for acute respiratory failure 2/2 COVID+.     Completed a course of plaquenil, azithromycin, and ceftriaxone. Extubated 04/03 and continues on bipap 12/5 FiO2 50%. This morning he has been less agitated than previous days-- will attempt to transition to HF NC.     Plan:  -Continue with isolation precautions  -Wean off O2 as  tolerated       Critical Care Daily Checklist:    A: Awake: RASS Goal/Actual Goal: RASS Goal: 0-->alert and calm  Actual: [place] Agitation Sedation Scale (RASS): Drowsy   B: Spontaneous Breathing Trial Performed? Spon. Breathing Trial Initiated?: Initiated (04/03/20 0852)   C: SAT & SBT Coordinated?  -                      D: Delirium: CAM-ICU Overall CAM-ICU: Negative   E: Early Mobility Performed? Yes   F: Feeding Goal: Goals: 1. Initiate nutrition intake by RD follow up.   Status: Nutrition Goal Status: goal not met   Current Diet Order   Procedures    Diet NPO      AS: Analgesia/Sedation Precedex, depakote   T: Thromboembolic Prophylaxis lovenox    H: HOB > 300 Yes   U: Stress Ulcer Prophylaxis (if needed) -   G: Glucose Control detemir   B: Bowel Function Stool Occurrence: 0   I: Indwelling Catheter (Lines & Atkins) Necessity Atkins catheter   D: De-escalation of Antimicrobials/Pharmacotherapies -    Plan for the day/ETD Wean o2, SLP    Code Status:  Family/Goals of Care: Full Code         Critical secondary to Patient has a condition that poses threat to life and bodily function: Hypoxic respiratory failure secondary to covid-19.       Critical care was time spent personally by me on the following activities: development of treatment plan with patient or surrogate and bedside caregivers, discussions with consultants, evaluation of patient's response to treatment, examination of patient, ordering and performing treatments and interventions, ordering and review of laboratory studies, ordering and review of radiographic studies, pulse oximetry, re-evaluation of patient's condition. This critical care time did not overlap with that of any other provider or involve time for any procedures.     Anita Ceravntes MD  Critical Care Medicine  [facility]

## 2020-04-08 NOTE — ASSESSMENT & PLAN NOTE
Despite Harvoni treatment in 2015, no recent HCV RNA evidence of seronegativity.   Ordered HCV RNA to confirm seronegativity in the setting of transaminitis noted on labs.     Plan:  -Pending HCV  -Cont monitoring

## 2020-04-08 NOTE — ASSESSMENT & PLAN NOTE
Home medication includes jardiance 25mg.   Due to persistent hyperglycemia secondary to D5, have increased dose of detemir to 11U BID based off SSI requirements.     Plan:  -BGs goal of 140-190  -Continue MDSSI  -Hold home medications

## 2020-04-08 NOTE — PT/OT/SLP PROGRESS
Occupational Therapy   Treatment    Name: Cash Crawford  MRN: [id number]  Admitting Diagnosis:  Acute respiratory distress syndrome (ARDS) due to COVID-19 virus       Recommendations:     Discharge Recommendations: nursing facility, skilled  Discharge Equipment Recommendations:  (TBD)  Barriers to discharge:  None    Assessment:     Cash Crawford is a 64 y.o. male with a medical diagnosis of Acute respiratory distress syndrome (ARDS) due to COVID-19 virus.  He was able to perform supine/sit T/F c mod A x2 and has fair static sitting balance.  Pt was Ox4 and follows simple 1 step commands consistently.  Has 2/5 L UE strength and 1/5 R UE strength c noted edema and also noted DVT in L UE.  Pt c decreased O2 SATS while sitting up on EOB which limited his sitting and activity tolerance. Able to perform UB dressing c total assist.  Performance deficits affecting function are weakness, impaired endurance, impaired self care skills, impaired functional mobilty, impaired balance, impaired cognition, decreased upper extremity function, decreased ROM.     Rehab Prognosis:  Good; patient would benefit from acute skilled OT services to address these deficits and reach maximum level of function.       Plan:     Patient to be seen 4 x/week to address the above listed problems via self-care/home management, therapeutic activities, therapeutic exercises, cognitive retraining  · Plan of Care Expires:    · Plan of Care Reviewed with: patient    Subjective     Pain/Comfort:  · Pain Rating 1: 0/10    Objective:     Communicated with: RN prior to session.  Patient found supine with sommer catheter, telemetry, pulse ox (continuous), blood pressure cuff, oxygen, peripheral IV upon OT entry to room.    General Precautions: Standard, airborne, droplet, fall, contact   Orthopedic Precautions:N/A   Braces: N/A     Occupational Performance:     Bed Mobility:    · Patient completed Supine to Sit with moderate assistance and 2  persons  · Patient completed Sit to Supine with maximal assistance     Functional Mobility/Transfers:    · Functional Mobility: Pt was able to tolerate sitting up on EOB for approx. 10 minutes c CGA- min A.      Activities of Daily Living:  · Upper Body Dressing: total assistance to don hospital gown.      [facility] 6 Click ADL: 7        Patient left supine with all lines intact, call button in reach and RN notifiedEducation:      GOALS:   Multidisciplinary Problems     Occupational Therapy Goals        Problem: Occupational Therapy Goal    Goal Priority Disciplines Outcome Interventions   Occupational Therapy Goal     OT, PT/OT Ongoing, Progressing    Description:  Goals to be met by: 4-14-20     Patient will increase functional independence with ADLs by performing:    Feeding with Minimal Assistance.  Grooming while EOB with Minimal Assistance.  Sitting at edge of bed x 15 minutes with Stand-by Assistance for functional ADL progress.  Tolerate sitting in bedside chair x 2-3 hours daily to increase endurance for functional activities.                    Time Tracking:     OT Date of Treatment: 04/08/20  OT Start Time: 1113  OT Stop Time: 1132  OT Total Time (min): 19 min    Billable Minutes:Therapeutic Activity 19    GREGG Dudley  4/8/2020

## 2020-04-08 NOTE — PHYSICIAN QUERY
PT Name: Cash Crawford  MR #: [id number]     CDS/: Tono Clarke Jr, RN               Contact information:[email or web address]  This form is a permanent document in the medical record.     Query Date: April 8, 2020    Physician Query - Neurological Condition Clarification    By submitting this query, we are merely seeking further clarification of documentation to reflect the severity of illness of your patient. Please utilize your independent clinical judgment when addressing the question(s) below.    The Medical record reflects the following:     Indicators   Supporting Clinical Findings Location in Medical Record   x AMS, Confusion,  LOC, etc.  Review of Systems   Unable to perform ROS: Mental status change    4/6 Critical Care Progress Note   x Acute / Chronic Illness Encephalopathy: agitation improving.  Remains on precedex.  On depakote.  Haldol added    Acute respiratory distress syndrome (ARDS) due to COVID-19 virus    transferred from [facility] on 4/1 for acute respiratory failure 2/2 COVID+.  4/7 Critical Care Progress Note      4/7 Critical Care Progress Note      4/7 Critical Care Progress Note    Radiology Findings      Electrolyte Imbalance      Medication     x Treatment         Pneumonia due to COVID 19 virus. Testing for COVID 19 is positive. We are administering azithromycin & plaquenil. 4/1 Pulmonary Progress Note    Other       Encephalopathy- is a general term for any diffuse disease of the brain that alters brain function or structure. Treatment of the cognitive dysfunction varies but is ultimately dependent on the treatment of the underlying condition.    Major Symptoms of Encephalopathy - Decreased level of consciousness, fluctuating alertness/concentration, confusion, agitation, lethargy, somnolence, drowsiness, obtundation, stupor, or coma.         References: National Institutes of Healths (NIH) National [place] of Neurological Disorders and Strokes;  HCPro 2016;  Advisory Board     Clinical Guidelines:   These guidelines will set system standards to assist providers in managing, documentation, and coding of encephalopathy. The intent of this document is to serve as a system guideline, not replace the providers clinical judgment:    Provider, please specify the diagnosis or diagnoses associated with above clinical findings.  Specify the Encephalopathy Diagnosis     [   ] Metabolic Encephalopathy - Due to electrolye imbalance, metabolic derangements, or infections processes, includes Septic Encephalopathy     [   ] Other Encephalopathy - Includes uremic encephalopathy     [ x  ] Unspecified Encephalopathy        [   ] Other Neurological Condition-  Includes Post-ictal altered mental status. (please specify condition): _________     [   ]  Clinically Undetermined     Please document in your progress notes daily for the duration of treatment until resolved, and include in your discharge summary.

## 2020-04-08 NOTE — PLAN OF CARE
Plan of Care:  Discharge Recommendation: SNF.  Please continue Progressive Mobility Protocol as appropriate.  Appropriate transfer level with nursing staff: Bed <> Chair:  bed in bedchair position daily vs. Drawsheet to cardiac chair    Marie Garcia PT, DPT  2020  Pager: [phone]    Physical Therapy Treatment Goals:  Multidisciplinary Problems       Physical Therapy Goals          Problem: Physical Therapy Goal    Goal Priority Disciplines Outcome Goal Variances Interventions   Physical Therapy Goal     PT, PT/OT Ongoing, Progressing     Description:  Goals to be met by: 20    Patient will increase functional independence with mobility by performin. Supine to sit with Moderate Assistance -not met   2. Sit to stand transfer with Moderate Assistance with AD if needed. -not met  3. Bed to chair transfer with Moderate Assistance using AD if needed. -not met  4. Gait  x 30n  feet with Moderate Assistance using AD if needed. -not met  5. Lower extremity exercise program x15 reps with assistance as needed-not met

## 2020-04-08 NOTE — PHYSICIAN QUERY
PT Name: Cash Crawford  MR #: [id number]    Physician Query Form - Cause and Effect Relationship Clarification      CDS/: Tono Clarke Jr, RN               Contact information:[email or web address]    This form is a permanent document in the medical record.     Query Date: April 8, 2020    By submitting this query, we are merely seeking further clarification of documentation. Please utilize your independent clinical judgment when addressing the question(s) below.    The Medical record contains the following:  Supporting Clinical Findings   Location in record   Pt RUE very swollen and painful. RN notified of such.      IJ DVT: will remove line and anticoagulate with lovenox                                                                                         US of R arm showed large range DVT.  R IJ double lumen dialysis cath was was removed (tip intact)                                            Acute deep vein thrombosis (DVT) of axillary vein of right upper extremity    RUE us 04/07 consistent with DVTs in the R IJ, subclavian, and axillary veins. Noted thrombosis of cephalic, brachial, and basilic veins. Started on lovenox 90mg BID                                                    4/7 PT/OT Note      4/7 Critical Care Progress Note    4/7 Nursing Note        4/8 Critical Care Progress Note                                                                                                                                                                                                       Provider, please clarify if there is any correlation between   Acute deep vein thrombosis (DVT) of axillary vein of right upper extremity     And          Right Internal Jugular double lumen Dialysis catheter            .           Are the conditions:      [x  ] Acute DVT is a Complication of the Dialysis Catheter      [  ] Acute DVT and Dialysis Catheter Are Unrelated      [  ] Other (Please Specify):  _________________________   [  ] Clinically Undetermined

## 2020-04-08 NOTE — PT/OT/SLP PROGRESS
Physical Therapy Treatment    Patient Name:  Cash Crawford   MRN:  [id number]  Admit Date: 3/29/2020  Admitting Diagnosis:  Acute respiratory distress syndrome (ARDS) due to COVID-19 virus   Length of Stay: 10 days  Recent Surgery: * No surgery found *      Recommendations:     Discharge Recommendations:  nursing facility, skilled   Discharge Equipment Recommendations: walker, rolling, bedside commode   Barriers to discharge: Decreased caregiver support    Plan:     During this hospitalization, patient to be seen 4 x/week to address the listed problems via gait training, therapeutic activities, therapeutic exercises, neuromuscular re-education  · Plan of Care Expires:  05/02/20   Plan of Care Reviewed with: patient    Assessment:     Cash Crawford is a 64 y.o. male admitted with a medical diagnosis of Acute respiratory distress syndrome (ARDS) due to COVID-19 virus.   Patient is making progress towards goals, participating well in this session. Pt continues to require significant assist with all functional mobility due to significantly reduced cardiorespiratory endurance. Pt highly motivated to participate in session. Education was provided to patient regarding importance of continued participation in therapy, patient's progress and discharge disposition. Pt continues to benefit from a collaborative PT/OT/SLP program to improve quality of life and focus on recovery of impairments.     Problem List: weakness, impaired self care skills, impaired endurance, impaired functional mobilty, gait instability, impaired cardiopulmonary response to activity, decreased ROM.  Rehab Prognosis: Good     GOALS:   Multidisciplinary Problems     Physical Therapy Goals        Problem: Physical Therapy Goal    Goal Priority Disciplines Outcome Goal Variances Interventions   Physical Therapy Goal     PT, PT/OT Ongoing, Progressing     Description:  Goals to be met by: 4/28/20    Patient will increase functional independence  "with mobility by performin. Supine to sit with Moderate Assistance -not met   2. Sit to stand transfer with Moderate Assistance with AD if needed. -not met  3. Bed to chair transfer with Moderate Assistance using AD if needed. -not met  4. Gait  x 30n  feet with Moderate Assistance using AD if needed. -not met  5. Lower extremity exercise program x15 reps with assistance as needed-not met                    Subjective   Communicated with RN prior to session.  Patient found supine upon PT entry to room, agreeable to evaluation. Cash Crawford's alone present during session.    Patient/Family Comments/goals: "Is there anything different than this morning?"  Pain/Comfort:  · Pain Rating 1: (pt c/o generalized pain, did not grade)    Objective:   Patient found with: blood pressure cuff, pulse ox (continuous), telemetry, oxygen, peripheral IV, sommer catheter   General Precautions: Standard, Cardiac airborne, aspiration, contact, fall, respiratory   Orthopedic Precautions:N/A   Braces: N/A   Oxygen Device: High Flow Nasal Cannula 100% & 14L  Vitals: BP (!) 106/59 (BP Location: Left arm, Patient Position: Lying)   Pulse 94   Temp 98.1 °F (36.7 °C) (Axillary)   Resp (!) 30   Ht 5' 9" (1.753 m)   Wt 87.4 kg (192 lb 10.9 oz)   SpO2 98%   BMI 28.45 kg/m²     Outcome Measures:  AM-PAC 6 CLICK MOBILITY  Turning over in bed (including adjusting bedclothes, sheets and blankets)?: 2  Sitting down on and standing up from a chair with arms (e.g., wheelchair, bedside commode, etc.): 1  Moving from lying on back to sitting on the side of the bed?: 2  Moving to and from a bed to a chair (including a wheelchair)?: 1  Need to walk in hospital room?: 1  Climbing 3-5 steps with a railing?: 1  Basic Mobility Total Score: 8     Cognition:   · Alert and Cooperative  · AxOx4  · Command following: Follows one-step commands  · Fluency: clear/fluent    Functional Mobility:  Additional staff present: OT  Bed Mobility: "    Scooting to HOB via supine bridge: moderate assistance   Supine to Sit: moderate assistance; HOB flat and from left side of bed   Scooting anteriorly to EOB to have both feet planted on floor: moderate assistance   Sit to Supine: moderate assistance; HOB flat and to left side of bed    Sitting Balance at Edge of Bed:   Assistance Level Required: Contact Guard Assistance   Time: 5 minutes   Postural deviations noted: slouched posture, rounded shoulders and posterior pelvic tilt   Comments: Pt with kyphotic posture sitting EOB, constant vc's for deep breathing. SaO2 ranging from 84%-90%, requires increased time to recover. Pt requesting to return to supine due to fatigue.    Transfers:   NT due to pt's c/o fatigue.    Therapeutic Activities & Exercises:   *RN asked therapists to assess focal exam due to noticing left facial weakness. PT/OT did not appreciate facial weakness, or focal LUE exam. Pt with noted swelling of RUE due to DVT.  Education:  Patient provided with daily orientation and goals of this PT session. Patient agreed to participate in session. Patient aware of patient's deficits and therapy progression. They were educated to transfer to bedside chair/bedside commode/bathroom with RN/PCT present. Patient educated on Fall risk, home safety and Home exercise program by explanation. Patient was receptive to education and verbalizes understanding. Encouraged patient to perform daily exercises & mobility to increase endurance and decrease effects of bedrest.Time provided for therapeutic counseling and discussion of health disposition. All questions answered to patient's satisfaction, within scope of PT practice; voiced no other concerns. White board updated in patient's room, RN notified of session.    Patient left supine, with head in midline, neutral pelvis & heels floated for skin protection with all lines intact, call button in reach and RN notified  Time Tracking:     PT Received On:  04/08/20  PT Start Time: 1102     PT Stop Time: 1123  PT Total Time (min): 21 min   Co-treat with OT    Billable Minutes:   · Therapeutic Activity 20    Treatment Type: Treatment  PT/PTA: PT       Marie Garcia PT, DPT  04/08/2020  Pager: [phone]

## 2020-04-09 PROBLEM — E87.0 HYPERNATREMIA: Status: ACTIVE | Noted: 2020-04-09

## 2020-04-09 LAB
ALBUMIN SERPL BCP-MCNC: 1.7 G/DL (ref 3.5–5.2)
ALBUMIN SERPL BCP-MCNC: 1.7 G/DL (ref 3.5–5.2)
ALBUMIN SERPL BCP-MCNC: 1.8 G/DL (ref 3.5–5.2)
ALBUMIN SERPL BCP-MCNC: 1.8 G/DL (ref 3.5–5.2)
ALP SERPL-CCNC: 103 U/L (ref 55–135)
ALP SERPL-CCNC: 89 U/L (ref 55–135)
ALP SERPL-CCNC: 95 U/L (ref 55–135)
ALP SERPL-CCNC: 96 U/L (ref 55–135)
ALT SERPL W/O P-5'-P-CCNC: 161 U/L (ref 10–44)
ALT SERPL W/O P-5'-P-CCNC: 163 U/L (ref 10–44)
ALT SERPL W/O P-5'-P-CCNC: 164 U/L (ref 10–44)
ALT SERPL W/O P-5'-P-CCNC: 169 U/L (ref 10–44)
ANION GAP SERPL CALC-SCNC: 10 MMOL/L (ref 8–16)
ANION GAP SERPL CALC-SCNC: 12 MMOL/L (ref 8–16)
AST SERPL-CCNC: 154 U/L (ref 10–40)
AST SERPL-CCNC: 155 U/L (ref 10–40)
AST SERPL-CCNC: 161 U/L (ref 10–40)
AST SERPL-CCNC: 174 U/L (ref 10–40)
BASOPHILS # BLD AUTO: 0.01 K/UL (ref 0–0.2)
BASOPHILS NFR BLD: 0.1 % (ref 0–1.9)
BILIRUB SERPL-MCNC: 0.4 MG/DL (ref 0.1–1)
BILIRUB SERPL-MCNC: 0.4 MG/DL (ref 0.1–1)
BILIRUB SERPL-MCNC: 0.5 MG/DL (ref 0.1–1)
BILIRUB SERPL-MCNC: 0.5 MG/DL (ref 0.1–1)
BUN SERPL-MCNC: 23 MG/DL (ref 8–23)
BUN SERPL-MCNC: 24 MG/DL (ref 8–23)
BUN SERPL-MCNC: 24 MG/DL (ref 8–23)
BUN SERPL-MCNC: 25 MG/DL (ref 8–23)
CALCIUM SERPL-MCNC: 7.8 MG/DL (ref 8.7–10.5)
CALCIUM SERPL-MCNC: 7.9 MG/DL (ref 8.7–10.5)
CALCIUM SERPL-MCNC: 8 MG/DL (ref 8.7–10.5)
CALCIUM SERPL-MCNC: 8.1 MG/DL (ref 8.7–10.5)
CHLORIDE SERPL-SCNC: 112 MMOL/L (ref 95–110)
CHLORIDE SERPL-SCNC: 113 MMOL/L (ref 95–110)
CHLORIDE SERPL-SCNC: 113 MMOL/L (ref 95–110)
CHLORIDE SERPL-SCNC: 114 MMOL/L (ref 95–110)
CK SERPL-CCNC: 408 U/L (ref 20–200)
CO2 SERPL-SCNC: 22 MMOL/L (ref 23–29)
CO2 SERPL-SCNC: 23 MMOL/L (ref 23–29)
CO2 SERPL-SCNC: 23 MMOL/L (ref 23–29)
CO2 SERPL-SCNC: 25 MMOL/L (ref 23–29)
CREAT SERPL-MCNC: 0.8 MG/DL (ref 0.5–1.4)
DIFFERENTIAL METHOD: ABNORMAL
EOSINOPHIL # BLD AUTO: 0 K/UL (ref 0–0.5)
EOSINOPHIL NFR BLD: 0 % (ref 0–8)
ERYTHROCYTE [DISTWIDTH] IN BLOOD BY AUTOMATED COUNT: 19.9 % (ref 11.5–14.5)
EST. GFR  (AFRICAN AMERICAN): >60 ML/MIN/1.73 M^2
EST. GFR  (NON AFRICAN AMERICAN): >60 ML/MIN/1.73 M^2
GLUCOSE SERPL-MCNC: 113 MG/DL (ref 70–110)
GLUCOSE SERPL-MCNC: 117 MG/DL (ref 70–110)
GLUCOSE SERPL-MCNC: 132 MG/DL (ref 70–110)
GLUCOSE SERPL-MCNC: 231 MG/DL (ref 70–110)
HCT VFR BLD AUTO: 31.8 % (ref 40–54)
HGB BLD-MCNC: 9.3 G/DL (ref 14–18)
IMM GRANULOCYTES # BLD AUTO: 0.08 K/UL (ref 0–0.04)
IMM GRANULOCYTES NFR BLD AUTO: 0.9 % (ref 0–0.5)
LYMPHOCYTES # BLD AUTO: 0.6 K/UL (ref 1–4.8)
LYMPHOCYTES NFR BLD: 7.2 % (ref 18–48)
MAGNESIUM SERPL-MCNC: 2.8 MG/DL (ref 1.6–2.6)
MCH RBC QN AUTO: 22.7 PG (ref 27–31)
MCHC RBC AUTO-ENTMCNC: 29.2 G/DL (ref 32–36)
MCV RBC AUTO: 78 FL (ref 82–98)
MONOCYTES # BLD AUTO: 0.7 K/UL (ref 0.3–1)
MONOCYTES NFR BLD: 8.6 % (ref 4–15)
NEUTROPHILS # BLD AUTO: 7 K/UL (ref 1.8–7.7)
NEUTROPHILS NFR BLD: 83.2 % (ref 38–73)
NRBC BLD-RTO: 0 /100 WBC
PHOSPHATE SERPL-MCNC: 3.7 MG/DL (ref 2.7–4.5)
PLATELET # BLD AUTO: 321 K/UL (ref 150–350)
PMV BLD AUTO: 10.6 FL (ref 9.2–12.9)
POCT GLUCOSE: 116 MG/DL (ref 70–110)
POTASSIUM SERPL-SCNC: 3.5 MMOL/L (ref 3.5–5.1)
POTASSIUM SERPL-SCNC: 3.8 MMOL/L (ref 3.5–5.1)
POTASSIUM SERPL-SCNC: 3.9 MMOL/L (ref 3.5–5.1)
POTASSIUM SERPL-SCNC: 4 MMOL/L (ref 3.5–5.1)
PROT SERPL-MCNC: 6.2 G/DL (ref 6–8.4)
PROT SERPL-MCNC: 6.4 G/DL (ref 6–8.4)
PROT SERPL-MCNC: 6.5 G/DL (ref 6–8.4)
PROT SERPL-MCNC: 6.6 G/DL (ref 6–8.4)
RBC # BLD AUTO: 4.09 M/UL (ref 4.6–6.2)
SODIUM SERPL-SCNC: 147 MMOL/L (ref 136–145)
SODIUM SERPL-SCNC: 148 MMOL/L (ref 136–145)
WBC # BLD AUTO: 8.45 K/UL (ref 3.9–12.7)

## 2020-04-09 PROCEDURE — 99291 PR CRITICAL CARE, E/M 30-74 MINUTES: ICD-10-PCS | Mod: GC,,, | Performed by: INTERNAL MEDICINE

## 2020-04-09 PROCEDURE — 97530 THERAPEUTIC ACTIVITIES: CPT

## 2020-04-09 PROCEDURE — 97535 SELF CARE MNGMENT TRAINING: CPT

## 2020-04-09 PROCEDURE — 99900035 HC TECH TIME PER 15 MIN (STAT)

## 2020-04-09 PROCEDURE — 94640 AIRWAY INHALATION TREATMENT: CPT

## 2020-04-09 PROCEDURE — 25000242 PHARM REV CODE 250 ALT 637 W/ HCPCS: Performed by: STUDENT IN AN ORGANIZED HEALTH CARE EDUCATION/TRAINING PROGRAM

## 2020-04-09 PROCEDURE — 63600175 PHARM REV CODE 636 W HCPCS: Performed by: STUDENT IN AN ORGANIZED HEALTH CARE EDUCATION/TRAINING PROGRAM

## 2020-04-09 PROCEDURE — 80053 COMPREHEN METABOLIC PANEL: CPT | Mod: 91

## 2020-04-09 PROCEDURE — 93010 EKG 12-LEAD: ICD-10-PCS | Mod: ,,, | Performed by: INTERNAL MEDICINE

## 2020-04-09 PROCEDURE — 20000000 HC ICU ROOM

## 2020-04-09 PROCEDURE — 83735 ASSAY OF MAGNESIUM: CPT

## 2020-04-09 PROCEDURE — 25000003 PHARM REV CODE 250: Performed by: INTERNAL MEDICINE

## 2020-04-09 PROCEDURE — 25000003 PHARM REV CODE 250: Performed by: STUDENT IN AN ORGANIZED HEALTH CARE EDUCATION/TRAINING PROGRAM

## 2020-04-09 PROCEDURE — 94660 CPAP INITIATION&MGMT: CPT

## 2020-04-09 PROCEDURE — 97112 NEUROMUSCULAR REEDUCATION: CPT

## 2020-04-09 PROCEDURE — 99291 CRITICAL CARE FIRST HOUR: CPT | Mod: GC,,, | Performed by: INTERNAL MEDICINE

## 2020-04-09 PROCEDURE — 84100 ASSAY OF PHOSPHORUS: CPT

## 2020-04-09 PROCEDURE — 27000221 HC OXYGEN, UP TO 24 HOURS

## 2020-04-09 PROCEDURE — 93005 ELECTROCARDIOGRAM TRACING: CPT

## 2020-04-09 PROCEDURE — 85025 COMPLETE CBC W/AUTO DIFF WBC: CPT

## 2020-04-09 PROCEDURE — 94761 N-INVAS EAR/PLS OXIMETRY MLT: CPT

## 2020-04-09 PROCEDURE — 93010 ELECTROCARDIOGRAM REPORT: CPT | Mod: ,,, | Performed by: INTERNAL MEDICINE

## 2020-04-09 PROCEDURE — 82550 ASSAY OF CK (CPK): CPT

## 2020-04-09 PROCEDURE — 92526 ORAL FUNCTION THERAPY: CPT

## 2020-04-09 RX ORDER — QUETIAPINE FUMARATE 25 MG/1
25 TABLET, FILM COATED ORAL ONCE
Status: COMPLETED | OUTPATIENT
Start: 2020-04-09 | End: 2020-04-09

## 2020-04-09 RX ORDER — HALOPERIDOL 5 MG/ML
2 INJECTION INTRAMUSCULAR EVERY 6 HOURS PRN
Status: DISCONTINUED | OUTPATIENT
Start: 2020-04-09 | End: 2020-04-14

## 2020-04-09 RX ORDER — MORPHINE SULFATE 2 MG/ML
2 INJECTION, SOLUTION INTRAMUSCULAR; INTRAVENOUS ONCE
Status: COMPLETED | OUTPATIENT
Start: 2020-04-09 | End: 2020-04-09

## 2020-04-09 RX ORDER — LIDOCAINE 50 MG/G
1 PATCH TOPICAL
Status: DISCONTINUED | OUTPATIENT
Start: 2020-04-09 | End: 2020-04-18 | Stop reason: HOSPADM

## 2020-04-09 RX ORDER — MORPHINE SULFATE 2 MG/ML
2 INJECTION, SOLUTION INTRAMUSCULAR; INTRAVENOUS EVERY 6 HOURS PRN
Status: DISCONTINUED | OUTPATIENT
Start: 2020-04-09 | End: 2020-04-09

## 2020-04-09 RX ORDER — DEXTROSE MONOHYDRATE 50 MG/ML
INJECTION, SOLUTION INTRAVENOUS CONTINUOUS
Status: ACTIVE | OUTPATIENT
Start: 2020-04-09 | End: 2020-04-10

## 2020-04-09 RX ORDER — OXYCODONE AND ACETAMINOPHEN 10; 325 MG/1; MG/1
1 TABLET ORAL EVERY 6 HOURS PRN
Status: DISCONTINUED | OUTPATIENT
Start: 2020-04-09 | End: 2020-04-15

## 2020-04-09 RX ADMIN — OXYCODONE HYDROCHLORIDE AND ACETAMINOPHEN 1 TABLET: 10; 325 TABLET ORAL at 10:04

## 2020-04-09 RX ADMIN — VALPROATE SODIUM 250 MG: 100 INJECTION, SOLUTION INTRAVENOUS at 07:04

## 2020-04-09 RX ADMIN — DEXTROSE 50 ML/HR: 5 SOLUTION INTRAVENOUS at 01:04

## 2020-04-09 RX ADMIN — LAMOTRIGINE 200 MG: 100 TABLET ORAL at 07:04

## 2020-04-09 RX ADMIN — VALPROATE SODIUM 250 MG: 100 INJECTION, SOLUTION INTRAVENOUS at 05:04

## 2020-04-09 RX ADMIN — IPRATROPIUM BROMIDE AND ALBUTEROL SULFATE 3 ML: .5; 3 SOLUTION RESPIRATORY (INHALATION) at 07:04

## 2020-04-09 RX ADMIN — DEXMEDETOMIDINE HYDROCHLORIDE 0.7 MCG/KG/HR: 100 INJECTION, SOLUTION, CONCENTRATE INTRAVENOUS at 11:04

## 2020-04-09 RX ADMIN — INSULIN DETEMIR 11 UNITS: 100 INJECTION, SOLUTION SUBCUTANEOUS at 08:04

## 2020-04-09 RX ADMIN — LIDOCAINE 1 PATCH: 50 PATCH CUTANEOUS at 06:04

## 2020-04-09 RX ADMIN — QUETIAPINE FUMARATE 25 MG: 25 TABLET ORAL at 07:04

## 2020-04-09 RX ADMIN — ENOXAPARIN SODIUM 90 MG: 100 INJECTION SUBCUTANEOUS at 09:04

## 2020-04-09 RX ADMIN — IPRATROPIUM BROMIDE AND ALBUTEROL SULFATE 3 ML: .5; 3 SOLUTION RESPIRATORY (INHALATION) at 12:04

## 2020-04-09 RX ADMIN — IPRATROPIUM BROMIDE AND ALBUTEROL SULFATE 3 ML: .5; 3 SOLUTION RESPIRATORY (INHALATION) at 09:04

## 2020-04-09 RX ADMIN — IPRATROPIUM BROMIDE AND ALBUTEROL SULFATE 3 ML: .5; 3 SOLUTION RESPIRATORY (INHALATION) at 03:04

## 2020-04-09 RX ADMIN — ENOXAPARIN SODIUM 90 MG: 100 INJECTION SUBCUTANEOUS at 08:04

## 2020-04-09 RX ADMIN — VALPROATE SODIUM 250 MG: 100 INJECTION, SOLUTION INTRAVENOUS at 10:04

## 2020-04-09 RX ADMIN — DEXMEDETOMIDINE HYDROCHLORIDE 0.4 MCG/KG/HR: 100 INJECTION, SOLUTION, CONCENTRATE INTRAVENOUS at 12:04

## 2020-04-09 RX ADMIN — MORPHINE SULFATE 2 MG: 2 INJECTION, SOLUTION INTRAMUSCULAR; INTRAVENOUS at 12:04

## 2020-04-09 NOTE — ASSESSMENT & PLAN NOTE
Home medication includes jardiance 25mg.   -Persistently hyperglycemic secondary to D5  -Have been adjusting daily detemir based on SS requirements      Plan:  -BGs goal of 140-190  -Continue MDSSI  -Continue adjusting insulin   -Hold home medications

## 2020-04-09 NOTE — PROGRESS NOTES
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 CXR right lobe infiltrate with elevated , D-Dimer 1.03,  and Ferritin 331. Patient swabbed for COVID 19:positive. Patient started on IV Rocephin and Azithromycin and Plaquenil. Patient initially on 2 liters for mild hypoxia at 88% on room air but within 24 hours of admit patient had increasing oxygen requirements and intubated on evening of 3/30 (P/F then 170); CXR at the time suggested ARDS picture given bilateral patchy ground-glass infiltrates. Patient had mild JULIET with Cr of 1.5, but good UOP not requiring dialysis. Patient has been improving on vent with less requirement since intubation. Labs also showed worsening transaminitis with ,  likely due to COVID infection; history of treated hepatitis C infection in 2015. Patient not requiring vasopressors at [facility], on Fentanyl and Propofol for sedation.Transferred to [facility] on 4/1 due to capacity issues at [facility]     Full, itemized PMHx/SurgHx/Med list, per patient's medical records. Sees Dr. García Lechuga at [facility]  · HTN: on lisinopril 10mg daily  · 3-4yr Hx of T2DM with polyneuropathy, HBA1C 3/29 7.0%, non-insulin-dependent, on Metformin 500 BD, Glipizide 10 mg BD, Glucophage 1000 mg  BD.  Last eye exam Jan 2019, last foot exam Feb 2019  · HLP: Atorvastatin 80mg daily  · Prostate CA, S/P XRT and seed brachytherapy in 2015, followed by [facility], last PSA <0.1 2/5/19  · Cervical spondylopathy with myelopathy, S/P ACDF C4-5 by Dr. Le in 2012  · Bipolar disorder with depressive features, last psych admit 2014 at [facility], with Hx of suicide attempts. On home ziprazidone 80mg QD, Bupropion 150 BD, Lamotrigine 200mg QD, sees psychiatrist   · Insomnia: On 3mg melatonin QHS  · Polysubstance abuser: EtOH (unclear daily use), cocaine, amphetamines, LSD,   15-PY smoker, quit 2015, on nicotine patch    Hospital/ICU Course:  Patient arrived at [facility] on 4/1- intubated/ventilated CAP coverage with Azithro/Rocephin continued, as with plaquenil. Elevated LFTs trending down, likely due to COVID. JULIET resolved, never needed HD.  He was extubated to a venti mask on 04/04 but desaturated overnight and was placed on bipap. On 04/07 he was transitioned to HFNC and bipap qHS. Started working with PT/OT, who recommend SNF placement. Pending SLP eval for further recommendations.     Interval History/Significant Events: No acute events overnight. Tolerated bipap, transitioned to 12L HF NC in the morning. States breathing is comfortable and denies dyspnea. Only complaint is RUE pain and discomfort.     Review of Systems   Constitutional: Negative for chills and fever.   HENT: Positive for trouble swallowing. Negative for congestion and sore throat.    Respiratory: Negative for cough, choking, chest tightness and shortness of breath.    Gastrointestinal: Positive for constipation. Negative for abdominal pain, diarrhea, nausea and vomiting.   Genitourinary: Negative for dysuria and hematuria.   Musculoskeletal: Negative for back pain and myalgias.   Skin: Negative for color change and rash.   Neurological: Negative for light-headedness and headaches.     Objective:     Vital Signs (Most Recent):  Temp: 99.3 °F (37.4  °C) (04/09/20 0300)  Pulse: 102 (04/09/20 0800)  Resp: (!) 2 (04/09/20 0800)  BP: 130/80 (04/09/20 0800)  SpO2: 98 % (04/09/20 0800) Vital Signs (24h Range):  Temp:  [98.1 °F (36.7 °C)-99.3 °F (37.4 °C)] 99.3 °F (37.4 °C)  Pulse:  [] 102  Resp:  [2-37] 2  SpO2:  [86 %-100 %] 98 %  BP: ()/(55-80) 130/80   Weight: 87.4 kg (192 lb 10.9 oz)  Body mass index is 28.45 kg/m².      Intake/Output Summary (Last 24 hours) at 4/9/2020 0823  Last data filed at 4/9/2020 0701  Gross per 24 hour   Intake 1962.44 ml   Output 1255 ml   Net 707.44 ml       Physical Exam   Constitutional: He is oriented to person, place, and time. He appears well-developed and well-nourished. No distress.   HENT:   Head: Normocephalic and atraumatic.   Eyes: Pupils are equal, round, and reactive to light. Conjunctivae are normal. No scleral icterus.   Cardiovascular: Normal rate and regular rhythm.   Pulmonary/Chest: Effort normal and breath sounds normal.   Abdominal: Soft. Bowel sounds are normal.   Musculoskeletal: He exhibits edema and tenderness.   Warm, edematous, tense RUE. Tender to superficial palpation. Palpable radial pulse. Reduced ROM. Strength 1/5. Unable to abduct RUE by self.    Neurological: He is alert and oriented to person, place, and time. No cranial nerve deficit.   Skin: Skin is warm. He is not diaphoretic. No erythema. No pallor.     Vents:  Vent Mode: A/C (04/05/20 2300)  Ventilator Initiated: Yes (04/01/20 1551)  Set Rate: 24 BPM (04/05/20 2300)  Vt Set: 350 mL (04/05/20 2300)  Pressure Support: 5 cmH20 (04/03/20 1420)  PEEP/CPAP: 8 cmH20 (04/05/20 2300)  Oxygen Concentration (%): 40 (04/09/20 0701)  Peak Airway Pressure: 38 cmH2O (04/05/20 2300)  Plateau Pressure: 25 cmH20 (04/05/20 2300)  Total Ve: 9.31 mL (04/05/20 2300)  F/VT Ratio<105 (RSBI): 2750 (04/03/20 1420)  Lines/Drains/Airways     Drain                 Urethral Catheter 03/30/20 1657 9 days          Peripheral Intravenous Line                 Midline  Catheter Insertion/Assessment  - Single Lumen 04/06/20 1554 Left basilic vein (medial side of arm) 18g x 8cm 2 days         Peripheral IV - Single Lumen 04/07/20 1600 20 G;1 3/4 in Left Upper Arm 1 day              Significant Labs:    CBC/Anemia Profile:  Recent Labs   Lab 04/08/20  0306 04/09/20  0326   WBC 7.62 8.45   HGB 9.4* 9.3*   HCT 26.3* 31.8*    321   MCV 84 78*   RDW 21.0* 19.9*        Chemistries:  Recent Labs   Lab 04/08/20  0306  04/08/20  1852 04/09/20  0013 04/09/20  0326   *   < > 146* 147* 148*   K 3.8   < > 3.7 3.5 3.8   *   < > 111* 112* 113*   CO2 19*   < > 22* 23 23   BUN 37*   < > 28* 25* 23   CREATININE 0.9   < > 0.9 0.8 0.8   CALCIUM 7.9*   < > 7.9* 8.1* 8.0*   ALBUMIN 1.8*   < > 1.6* 1.7* 1.8*   PROT 6.7   < > 6.1 6.4 6.6   BILITOT 0.4   < > 0.4 0.4 0.4   ALKPHOS 115   < > 96 95 103   *   < > 155* 163* 161*   *   < > 137* 155* 154*   MG 2.8*  --   --   --  2.8*   PHOS 4.0  --   --   --  3.7    < > = values in this interval not displayed.       All pertinent labs within the past 24 hours have been reviewed.    Significant Imaging:  I have reviewed all pertinent imaging results/findings within the past 24 hours.      ABG  No results for input(s): PH, PO2, PCO2, HCO3, BE in the last 168 hours.  Assessment/Plan:     Psychiatric  Bipolar 1 disorder, depressed  Holding all home meds except lamotrigine 200 QHS- however, he has not been able to take it because he has been unable to swallow.   -Initially was agitated when precedex was titrated down but has been more alert and oriented, and much less agitated  -Currently on precedex, have been slowly weaning off and he has tolerated it well  -Haldol PRN for agitation but has not required its use    Plan:  -Continue weaning off precedex  -Haldol PRN for agitation    Cardiac/Vascular  Essential hypertension, benign  Home antihypertensive includes lisinopril 10mg daily.   Have been holding antihypertensives in the  setting of lower Bps.     Plan:  -continue holding home antihypertensives    Renal/  Hypernatremia  In the setting of no PO intake and inability to replete water losses.     Plan:  -Continue D5 infusion  -Monitor with daily CMP    JULIET (acute kidney injury)  -Cr peak of 1.5 (baseline 1.1) at [facility] [place], operated by [facility]. Has downtrended since and resolved.   Continues having adequate UOP, >1L per day.     Plan:  -Monitor w daily CMP  -Continue monitoring strict I/Os    Hematology  Acute deep vein thrombosis (DVT) of axillary vein of right upper extremity  RUE us 04/07 consistent with DVTs in the R IJ, subclavian, and axillary veins. Noted thrombosis of cephalic, brachial, and basilic veins. Started on therapeutic lovenox 90mg BID.     Has been having increasing pain in RUE and is unable to elevate it against gravity.     Plan:  -Continue with lovenox   -Morphine 2mg now; reassess for pain later  -Follow CK    Oncology  Anemia  Noted on labs. H/H stable.     Plan:  -Monitor w daily CBC  -Transfusion threshold HB <7    Endocrine  Type 2 diabetes mellitus with diabetic neuropathic arthropathy, without long-term current use of insulin  Home medication includes jardiance 25mg.   -Persistently hyperglycemic secondary to D5  -Have been adjusting daily detemir based on SS requirements      Plan:  -BGs goal of 140-190  -Continue MDSSI  -Continue adjusting insulin   -Hold home medications    GI  Elevated LFTs  -likely 2/2 to covid; however, does have a history of treated hep c in 2015  -AST//600s on transfer; have significantly downtrended since admission.  -Have stabilized in the low 100s. Likely in the setting of COVID-19.     Plan:  -Monitor w daily CMP  -Follow HCV RNA    Hepatitis C  Despite Harvoni treatment in 2015, no recent HCV RNA evidence of seronegativity.   Ordered HCV RNA to confirm seronegativity in the setting of transaminitis noted on labs, though it is most likely related to COVID-19.     Plan:  -Cont following HCV  RNA  -Cont monitoring     Other  * Acute respiratory distress syndrome (ARDS) due to COVID-19 virus  65 y/o male with Bipolar disorder, hepatitis C s/p harvoni, Prostate CA in remission s/p XRT, HTN, HLP, Non-insulin-dependent-T2DM with polyneuropathy, and polysubstance abuse,  was transferred from [facility] on 4/1 for acute respiratory failure 2/2 COVID+.     Completed a course of plaquenil, azithromycin, and ceftriaxone. Extubated 04/03 to bipap. Initially on bipap for several days following extubation but have transitioned to HFNC. Has not passed a formal swallow exam-- SLP will re-evaluate today; if he continues without enteral nutrition will consider NGTube.     Plan:  -Continue with isolation precautions  -Continue weaning off O2 as tolerated  -Follow SLP evaluation; NG tube if unable to swallow         Critical Care Daily Checklist:    A: Awake: RASS Goal/Actual Goal: RASS Goal: 0-->alert and calm  Actual: [place] Agitation Sedation Scale (RASS): Drowsy   B: Spontaneous Breathing Trial Performed? Spon. Breathing Trial Initiated?: Initiated (04/03/20 0852)   C: SAT & SBT Coordinated?  -                      D: Delirium: CAM-ICU Overall CAM-ICU: Negative   E: Early Mobility Performed? Yes   F: Feeding Goal: Goals: 1. Initiate nutrition intake by RD follow up.   Status: Nutrition Goal Status: goal not met   Current Diet Order   Procedures    Diet NPO      AS: Analgesia/Sedation precedex   T: Thromboembolic Prophylaxis lovenox   H: HOB > 300 Yes   U: Stress Ulcer Prophylaxis (if needed) -   G: Glucose Control detemir   B: Bowel Function Stool Occurrence: 0   I: Indwelling Catheter (Lines & Atkins) Necessity Atkins catheter   D: De-escalation of Antimicrobials/Pharmacotherapies S/p plaquenil, azithro, ctx    Plan for the day/ETD slp eval, wean off o2, control arm pain    Code Status:  Family/Goals of Care: Full Code         Critical secondary to Patient has a condition that poses threat to life and bodily  function: Hypoxic respiratory failure secondary to covid-19      Critical care was time spent personally by me on the following activities: development of treatment plan with patient or surrogate and bedside caregivers, discussions with consultants, evaluation of patient's response to treatment, examination of patient, ordering and performing treatments and interventions, ordering and review of laboratory studies, ordering and review of radiographic studies, pulse oximetry, re-evaluation of patient's condition. This critical care time did not overlap with that of any other provider or involve time for any procedures.     Anita Cervantes MD  Critical Care Medicine  [facility]

## 2020-04-09 NOTE — PLAN OF CARE
Problem: SLP Goal  Goal: SLP Goal  Description  Speech Language Pathology Goals  Goals expected to be met by 4/15/2020  1. Pt will participate in ongoing assessment of swallow fx to determine safest, least restrictive means of nutrition  2. Educate Pt and family on S/S aspiration     Outcome: Ongoing, Progressing     Pt seen for ongoing swallow assessment. Pt with improves management of oral secretions yet initiation of swallow still delayed. Risk of aspiration remains 2/2 weakness, pain and respiratory status. RN notified of Pt c/o R arm pain. REC: continue NPO. Should PO medications be required, safest means would be crushed in puree.  Occasional pleasure feeds puree ok for comfort (3-4 tsp bites, every 2-3 hours) provided VSS, 1:1 assist and strict aspiration precautions. ST to continue to follow.     ANTONIO Leonard., [facility]-SLP  Speech-Language Pathology  Pager: [phone]  4/9/2020

## 2020-04-09 NOTE — PT/OT/SLP PROGRESS
Occupational Therapy   Treatment    Name: Cash Crawford  MRN: [id number]  Admitting Diagnosis:  Acute respiratory distress syndrome (ARDS) due to COVID-19 virus       Recommendations:     Discharge Recommendations: nursing facility, skilled    Assessment:     Cash Crawford is a 64 y.o. male with a medical diagnosis of Acute respiratory distress syndrome (ARDS) due to COVID-19 virus.   Performance deficits affecting function are weakness, gait instability, impaired balance, impaired endurance, impaired self care skills, impaired functional mobilty. Pt tolerated session well with good effort and performance; however, elevated HR limiting session. OT anticipates pt will need post acute therapy prior to return home.     Rehab Prognosis:  Good; patient would benefit from acute skilled OT services to address these deficits and reach maximum level of function.       Plan:     Patient to be seen 4 x/week to address the above listed problems via self-care/home management, therapeutic activities, therapeutic exercises  · Plan of Care Expires:    · Plan of Care Reviewed with: patient    Subjective     Pain/Comfort:  · Pain Rating 1: (pt with pain throughout R UE but unable to rate pain. )  · Location - Side 1: Right  · Location 1: arm  · Pain Addressed 1: Reposition, Distraction, Nurse notified    Objective:     Communicated with: nsg prior to session.  Pt found supine in bed with 15 LPM oxygen with saturation remaining >91. HR elevated to 140 BPM with activity.   Pt with DVT R UE and cleared by medical team for mobilization as he is anticoagulated with Lovenox.     General Precautions: Standard, airborne, droplet, fall, contact       Occupational Performance:     Bed Mobility:    Supine<>sit with TOTAL A x 2     Activities of Daily Living:  Feeding: NPO  G/H: MAX A wash face with left hand  UE/LE dressing with TOTAL A     AMPAC 6 Click ADL: 7    Treatment & Education:  Pt found supine in bed and t/f to sitting  with TOTAL A. Pt tolerated sitting EOB approx 8 min with CGA for balance and cues needed for midline.   Sitting EOB terminated due to increased and sustained HR from 120 BPM to 140 BPM.  Education provided re: R UE positioning on pillows as well as AROM left UE throughout the day.  Education also provided re: neutral cervical positioning in bed including AROM lateral rotation throughout the day.  Education provided re: OT POC and safety with functional mobility/ADl skills.     Patient left with bed in chair position with all lines intact, call button in reach and nsg notifiedEducation:      GOALS:   Multidisciplinary Problems     Occupational Therapy Goals        Problem: Occupational Therapy Goal    Goal Priority Disciplines Outcome Interventions   Occupational Therapy Goal     OT, PT/OT Ongoing, Progressing    Description:  Goals to be met by: 4-14-20     Patient will increase functional independence with ADLs by performing:    Feeding with Minimal Assistance.  Grooming while EOB with Minimal Assistance.  Sitting at edge of bed x 15 minutes with Stand-by Assistance for functional ADL progress.  Tolerate sitting in bedside chair x 2-3 hours daily to increase endurance for functional activities.                    Time Tracking:     OT Date of Treatment: 04/09/20  OT Start Time: 1045  OT Stop Time: 1110  OT Total Time (min): 25 min    Billable Minutes:Self Care/Home Management 10  Therapeutic Activity 15    GREGG Mckeon  4/9/2020

## 2020-04-09 NOTE — ASSESSMENT & PLAN NOTE
RUE us 04/07 consistent with DVTs in the R IJ, subclavian, and axillary veins. Noted thrombosis of cephalic, brachial, and basilic veins. Started on therapeutic lovenox 90mg BID.     Has been having increasing pain in RUE and is unable to elevate it against gravity.     Plan:  -Continue with lovenox   -Morphine 2mg now; reassess for pain later  -Follow CK

## 2020-04-09 NOTE — ASSESSMENT & PLAN NOTE
-Cr peak of 1.5 (baseline 1.1) at [facility]. Has downtrended since and resolved.   Continues having adequate UOP, >1L per day.     Plan:  -Monitor w daily CMP  -Continue monitoring strict I/Os

## 2020-04-09 NOTE — CARE UPDATE
Internal Medicine Telemed Plan of Care Note:    Called Cash Crawford 's wife, Zeenat Crawford, to discuss the patient's current clinical status. Briefly, the patient is currently admitted for hypoxemic respiratory failure secondary to COVID-19. COVID-19 status: positive, 03/29.       Addressed 's questions and concerns. Discussed current O2 requirements (currently at 12L HFNC from 14L HFNC), SLP eval- will get PO meds through puree, and the addition of pain control medications for arm discomfort. She verbalized understanding of the situation and plan. Will continue ongoing communication.     Anita Cervantes MD  Internal Medicine PGY-1  [facility]

## 2020-04-09 NOTE — ASSESSMENT & PLAN NOTE
Home antihypertensive includes lisinopril 10mg daily.   Have been holding antihypertensives in the setting of lower Bps.     Plan:  -continue holding home antihypertensives

## 2020-04-09 NOTE — ASSESSMENT & PLAN NOTE
Despite Harvoni treatment in 2015, no recent HCV RNA evidence of seronegativity.   Ordered HCV RNA to confirm seronegativity in the setting of transaminitis noted on labs, though it is most likely related to COVID-19.     Plan:  -Cont following HCV RNA  -Cont monitoring

## 2020-04-09 NOTE — ASSESSMENT & PLAN NOTE
65 y/o male with Bipolar disorder, hepatitis C s/p harvoni, Prostate CA in remission s/p XRT, HTN, HLP, Non-insulin-dependent-T2DM with polyneuropathy, and polysubstance abuse,  was transferred from [facility] on 4/1 for acute respiratory failure 2/2 COVID+.     Completed a course of plaquenil, azithromycin, and ceftriaxone. Extubated 04/03 to bipap. Initially on bipap for several days following extubation but have transitioned to HFNC. Has not passed a formal swallow exam-- SLP will re-evaluate today; if he continues without enteral nutrition will consider NGTube.     Plan:  -Continue with isolation precautions  -Continue weaning off O2 as tolerated  -Follow SLP evaluation; NG tube if unable to swallow

## 2020-04-09 NOTE — NURSING
Pt tolerated day's care and was able to remain on hi flow for the day.  Pt did not pass swallow study per ST.  Pt had no further increase in droop pf L corner of mouth.  Pt spoke with his wife on the phone x2's today and conversations were approp.  Assessments as charted.  Lab pending - pm rn will follow up on results.

## 2020-04-09 NOTE — NURSING
Pt tolerated high flow nasal cannula at 14L at beginning of shift and O2 titrated to 10LPM and pt was then placed on BIPAP over night. Minimal desaturations noted throughout shift. Pt was given a biscodyl supp for abd pain at beginning of shift, no BMs on this shift. All VSS, pt currently now endorsing throbbing pain quality to RUE.

## 2020-04-09 NOTE — ASSESSMENT & PLAN NOTE
-likely 2/2 to covid; however, does have a history of treated hep c in 2015  -AST//600s on transfer; have significantly downtrended since admission.  -Have stabilized in the low 100s. Likely in the setting of COVID-19.     Plan:  -Monitor w daily CMP  -Follow HCV RNA

## 2020-04-09 NOTE — PLAN OF CARE
Goal and POC remain appropriate.   Problem: Occupational Therapy Goal  Goal: Occupational Therapy Goal  Description  Goals to be met by: 4-14-20     Patient will increase functional independence with ADLs by performing:    Feeding with Minimal Assistance.  Grooming while EOB with Minimal Assistance.  Sitting at edge of bed x 15 minutes with Stand-by Assistance for functional ADL progress.  Tolerate sitting in bedside chair x 2-3 hours daily to increase endurance for functional activities.   Outcome: Ongoing, Progressing

## 2020-04-09 NOTE — PLAN OF CARE
Patient currently remains on HFNC @ 14 and a precedex drip.  Recommendations from PT/OT for SNF.  [facility] team still rounding on patients currently.  Will continue to monitor for medical status changes and readiness for discharge/stepdown.       04/09/20 0832   Discharge Reassessment   Assessment Type Discharge Planning Reassessment   Do you have any problems affording any of your prescribed medications? No   Discharge Plan A Skilled Nursing Facility   Discharge Plan B Home with family;Home Health   DME Needed Upon Discharge  other (see comments)  (TBD)   Anticipated Discharge Disposition SNF   Can the patient/caregiver answer the patient profile reliably? Yes, cognitively intact       Jesús Black RN MSN  Critical Care-   Ext. [phone]

## 2020-04-09 NOTE — ASSESSMENT & PLAN NOTE
Holding all home meds except lamotrigine 200 QHS- however, he has not been able to take it because he has been unable to swallow.   -Initially was agitated when precedex was titrated down but has been more alert and oriented, and much less agitated  -Currently on precedex, have been slowly weaning off and he has tolerated it well  -Haldol PRN for agitation but has not required its use    Plan:  -Continue weaning off precedex  -Haldol PRN for agitation

## 2020-04-09 NOTE — PT/OT/SLP PROGRESS
"Speech Language Pathology Treatment    Patient Name:  Cash Crawford   MRN:  [id number]  Admitting Diagnosis: Acute respiratory distress syndrome (ARDS) due to COVID-19 virus    Recommendations:                 General Recommendations:  Dysphagia therapy and Pt/family education and training   Diet recommendations:  Consideration of temporary, alternative means nutrition/hydration advised at this time. Should PO medications be required, safest means would be crushed in puree. Occasional, pleasure feeds puree ok sparingly, provided strict aspiration precautions, to improve swallow function and efficiency with ongoing goal of increasing oral intake and reducing reliance on alternative means nutrition.   Aspiration Precautions: Meds crushed in puree, Puree for pleasure and Strict aspiration precautions including but not limited to only when Pt is awake/alert, upright at 90 degree angle, attentive to bites, HOB at 90 degree angle and remain upright at least 30 minutes following all PO. Please Continue to monitor for signs and symptoms of aspiration and discontinue oral feeding should you notice any of the following: watery eyes, reddened facial area, wet vocal quality, increased work of breathing, change in respiratory status, increased congestion, coughing, fever, or change in cognition.   General Precautions: Standard, airborne, aspiration, contact, droplet, fall  Communication strategies:  go to room if call light pushed    Subjective     SLP reviewed Pt with RN, RN explained Pt ok for tx  Pt presents calm, distracted by R arm pain  He explains, "Please see if I can get something else for this pain"      Pain/Comfort:  · Pain Rating 1: 10/10  · Location - Side 1: Right  · Location - Orientation 1: upper  · Location 1: arm  · Pain Addressed 1: Distraction, Reposition, Cessation of Activity, Nurse notified  · Pain Rating Post-Intervention 1: 10/10    Objective:     Has the patient been evaluated by SLP for " swallowing?   Yes  Keep patient NPO? No   Current Respiratory Status: other (comment)(HFNC, 12L)      Pt seen for ongoing swallow assessment. He was found awake in bed with midline catheter, peripheral IV, catheter, bowel management system, telemetry, pulse ox (continuous), blood pressure monitor and oxygen in place. HOB elevated. He was alert and oriented x3. He demonstrated mildly hoarse vocal quality with increased intensity this service day. He was easily distracted by R arm pain which required frequent redirection. Pt with improved control of oral secretions and increased labial/lingual coordination upon review of the oral mechanism. Strength of cough remains mildly reduced. He was seen with trials of thin liquids (ice chips x2), nectar-thickened liquids (cup edge sips x4) and puree (1/2 tsp bites x3, full tsp bites x4.) Pt with increased delay in initiation of swallow as assessment progressed and occasional, multiple swallows with trials of nectar-thickened liquids.  Pt with mildly wet change in vocal quality following trial of puree x1 and additional trials held 2/2 suspected residuals as efficiency of swallow decreased with fatigue. Pt cued to use multiple swallows to clear vocal quality.  SpO2 between 91-94% with HFNC in place t/o assessment. Pt was educated on aspiration precautions, swallow anatomy, SLP POC and recommendations for ongoing, temporary alternate means nutrition currently advised at this time due to concern of Patient's ability to meet nutritional needs adequately by mouth. SLP further reviewed role of pleasure feeds to improve swallow function and efficiency with ongoing goal of increasing oral intake and reducing reliance on alternative means nutrition. He verbalized partial understanding and would benefit from ongoing review/practice. He asked for SLP to see if any additional pain meds were due 2/2 persistent R arm pain. No additional questions noted. Whiteboard updated. Findings reviewed  with RN following session.     Assessment:     Cash Crawford is a 64 y.o. male with an SLP diagnosis of Dysphagia.  He presents with increased vocal quality and control of secretions this service day. Risk of aspiration remains 2/2 respiratory status, generalized weakness and decreased endurance.     Goals:   Multidisciplinary Problems     SLP Goals        Problem: SLP Goal    Goal Priority Disciplines Outcome   SLP Goal     SLP Ongoing, Progressing   Description:  Speech Language Pathology Goals  Goals expected to be met by 4/15/2020  1. Pt will participate in ongoing assessment of swallow fx to determine safest, least restrictive means of nutrition  2. Educate Pt and family on S/S aspiration                      Plan:     · Patient to be seen:  4 x/week   · Plan of Care expires:  05/08/20  · Plan of Care reviewed with:  patient   · SLP Follow-Up:  Yes       Discharge recommendations:  nursing facility, skilled   Barriers to Discharge: means of nutrition/hydration not yet established     Time Tracking:     SLP Treatment Date:   04/09/20  Speech Start Time:  1128  Speech Stop Time:  1144     Speech Total Time (min):  16 min    Billable Minutes: Treatment Swallowing Dysfunction 8 and Seld Care/Home Management Training 8    YVONNE Leonard, [facility]-SLP  Speech-Language Pathology  Pager: [phone]    04/09/2020

## 2020-04-09 NOTE — ASSESSMENT & PLAN NOTE
In the setting of no PO intake and inability to replete water losses.     Plan:  -Continue D5 infusion  -Monitor with daily CMP

## 2020-04-09 NOTE — SUBJECTIVE & OBJECTIVE
Interval History/Significant Events: No acute events overnight. Tolerated bipap, transitioned to 12L HF NC in the morning. States breathing is comfortable and denies dyspnea. Only complaint is RUE pain and discomfort.     Review of Systems   Constitutional: Negative for chills and fever.   HENT: Positive for trouble swallowing. Negative for congestion and sore throat.    Respiratory: Negative for cough, choking, chest tightness and shortness of breath.    Gastrointestinal: Positive for constipation. Negative for abdominal pain, diarrhea, nausea and vomiting.   Genitourinary: Negative for dysuria and hematuria.   Musculoskeletal: Negative for back pain and myalgias.   Skin: Negative for color change and rash.   Neurological: Negative for light-headedness and headaches.     Objective:     Vital Signs (Most Recent):  Temp: 99.3 °F (37.4 °C) (04/09/20 0300)  Pulse: 102 (04/09/20 0800)  Resp: (!) 2 (04/09/20 0800)  BP: 130/80 (04/09/20 0800)  SpO2: 98 % (04/09/20 0800) Vital Signs (24h Range):  Temp:  [98.1 °F (36.7 °C)-99.3 °F (37.4 °C)] 99.3 °F (37.4 °C)  Pulse:  [] 102  Resp:  [2-37] 2  SpO2:  [86 %-100 %] 98 %  BP: ()/(55-80) 130/80   Weight: 87.4 kg (192 lb 10.9 oz)  Body mass index is 28.45 kg/m².      Intake/Output Summary (Last 24 hours) at 4/9/2020 0823  Last data filed at 4/9/2020 0701  Gross per 24 hour   Intake 1962.44 ml   Output 1255 ml   Net 707.44 ml       Physical Exam   Constitutional: He is oriented to person, place, and time. He appears well-developed and well-nourished. No distress.   HENT:   Head: Normocephalic and atraumatic.   Eyes: Pupils are equal, round, and reactive to light. Conjunctivae are normal. No scleral icterus.   Cardiovascular: Normal rate and regular rhythm.   Pulmonary/Chest: Effort normal and breath sounds normal.   Abdominal: Soft. Bowel sounds are normal.   Musculoskeletal: He exhibits edema and tenderness.   Warm, edematous, tense RUE. Tender to superficial  palpation. Palpable radial pulse. Reduced ROM. Strength 1/5. Unable to abduct RUE by self.    Neurological: He is alert and oriented to person, place, and time. No cranial nerve deficit.   Skin: Skin is warm. He is not diaphoretic. No erythema. No pallor.     Vents:  Vent Mode: A/C (04/05/20 2300)  Ventilator Initiated: Yes (04/01/20 1551)  Set Rate: 24 BPM (04/05/20 2300)  Vt Set: 350 mL (04/05/20 2300)  Pressure Support: 5 cmH20 (04/03/20 1420)  PEEP/CPAP: 8 cmH20 (04/05/20 2300)  Oxygen Concentration (%): 40 (04/09/20 0701)  Peak Airway Pressure: 38 cmH2O (04/05/20 2300)  Plateau Pressure: 25 cmH20 (04/05/20 2300)  Total Ve: 9.31 mL (04/05/20 2300)  F/VT Ratio<105 (RSBI): 2750 (04/03/20 1420)  Lines/Drains/Airways     Drain                 Urethral Catheter 03/30/20 1657 9 days          Peripheral Intravenous Line                 Midline Catheter Insertion/Assessment  - Single Lumen 04/06/20 1554 Left basilic vein (medial side of arm) 18g x 8cm 2 days         Peripheral IV - Single Lumen 04/07/20 1600 20 G;1 3/4 in Left Upper Arm 1 day              Significant Labs:    CBC/Anemia Profile:  Recent Labs   Lab 04/08/20  0306 04/09/20  0326   WBC 7.62 8.45   HGB 9.4* 9.3*   HCT 26.3* 31.8*    321   MCV 84 78*   RDW 21.0* 19.9*        Chemistries:  Recent Labs   Lab 04/08/20  0306  04/08/20  1852 04/09/20  0013 04/09/20  0326   *   < > 146* 147* 148*   K 3.8   < > 3.7 3.5 3.8   *   < > 111* 112* 113*   CO2 19*   < > 22* 23 23   BUN 37*   < > 28* 25* 23   CREATININE 0.9   < > 0.9 0.8 0.8   CALCIUM 7.9*   < > 7.9* 8.1* 8.0*   ALBUMIN 1.8*   < > 1.6* 1.7* 1.8*   PROT 6.7   < > 6.1 6.4 6.6   BILITOT 0.4   < > 0.4 0.4 0.4   ALKPHOS 115   < > 96 95 103   *   < > 155* 163* 161*   *   < > 137* 155* 154*   MG 2.8*  --   --   --  2.8*   PHOS 4.0  --   --   --  3.7    < > = values in this interval not displayed.       All pertinent labs within the past 24 hours have been reviewed.    Significant  Imaging:  I have reviewed all pertinent imaging results/findings within the past 24 hours.

## 2020-04-09 NOTE — PT/OT/SLP PROGRESS
Physical Therapy Treatment    Patient Name:  Cash Crawford   MRN:  [id number]  Admit Date: 3/29/2020  Admitting Diagnosis:  Acute respiratory distress syndrome (ARDS) due to COVID-19 virus   Length of Stay: 11 days  Recent Surgery: * No surgery found *      Recommendations:     Discharge Recommendations:  nursing facility, skilled   Discharge Equipment Recommendations: walker, rolling, bedside commode    Plan:     During this hospitalization, patient to be seen 4 x/week to address the listed problems via gait training, therapeutic activities, therapeutic exercises, neuromuscular re-education  · Plan of Care Expires:  20   Plan of Care Reviewed with: patient    Assessment:     Cash Crawford is a 64 y.o. male admitted with a medical diagnosis of Acute respiratory distress syndrome (ARDS) due to COVID-19 virus. Pt with a large DVT in R UE. Per med team, pt is being anticoagulated orally at a therapeutic dose. Pt with severe R UE pain + swelling. Upon sitting EOB, pt SpO2 remained stable but HR increased to 140s. Pt returned to supine 2nd to medical intolerance to sitting EOB.      Problem List: weakness, impaired endurance, impaired self care skills, impaired functional mobilty, decreased upper extremity function, decreased lower extremity function, pain, impaired cardiopulmonary response to activity.  Rehab Prognosis: Good     GOALS:   Multidisciplinary Problems     Physical Therapy Goals        Problem: Physical Therapy Goal    Goal Priority Disciplines Outcome Goal Variances Interventions   Physical Therapy Goal     PT, PT/OT Ongoing, Progressing     Description:  Goals to be met by: 20    Patient will increase functional independence with mobility by performin. Supine to sit with Moderate Assistance -not met   2. Sit to stand transfer with Moderate Assistance with AD if needed. -not met  3. Bed to chair transfer with Moderate Assistance using AD if needed. -not met  4. Gait  x 30n   "feet with Moderate Assistance using AD if needed. -not met  5. Lower extremity exercise program x15 reps with assistance as needed-not met                    Subjective   Communicated with RN prior to session.  Patient found HOB elevated upon PT entry to room, agreeable to evaluation. Cash Crawford's alone during session.    Chief Complaint: pain   Patient/Family Comments/goals: to get better and return home   Pain/Comfort:  · Pain Rating 1: (R UE; unrated)  · Pain Addressed 1: Distraction, Cessation of Activity    Objective:   Patient found with: telemetry, pulse ox (continuous), blood pressure cuff, oxygen, peripheral IV, sommer catheter   General Precautions: Standard, Cardiac fall, droplet, contact, airborne(COVID (+))   Orthopedic Precautions:N/A   Braces: N/A   Oxygen Device: High Flow Nasal Cannula  Vitals: /81 (BP Location: Left arm, Patient Position: Lying)   Pulse (!) 112   Temp 98.9 °F (37.2 °C) (Oral)   Resp (!) 22   Ht 5' 9" (1.753 m)   Wt 87.4 kg (192 lb 10.9 oz)   SpO2 96%   BMI 28.45 kg/m²     Outcome Measures:  AM-PAC 6 CLICK MOBILITY  Turning over in bed (including adjusting bedclothes, sheets and blankets)?: 2  Sitting down on and standing up from a chair with arms (e.g., wheelchair, bedside commode, etc.): 1  Moving from lying on back to sitting on the side of the bed?: 2  Moving to and from a bed to a chair (including a wheelchair)?: 1  Need to walk in hospital room?: 1  Climbing 3-5 steps with a railing?: 1  Basic Mobility Total Score: 8     Cognition:   · Alert and Cooperative  · Oriented x 4  · Command following: Follows multistep commands  · Fluency: clear/fluent    Functional Mobility:  Additional staff present: OT  Bed Mobility:    Supine to Sit: total assistance and 2 persons; HOB elevated   Scooting anteriorly to EOB to have both feet planted on floor: total assistance x 2 persons   Sit to Supine: total assistance and 2 persons; HOB flat    Sitting Balance at Edge " of Bed:   Assistance Level Required: Contact Guard Assistance   Time: 8 minutes   Comments:   o R UE propped on pillow   o Focus: activity tolerance  o Worked extensively on head/neck in neutral    o HR increased to 140s; pt returned to supine for safety  o SpO2 remained >90%; VSS    Transfers:   Not performed 2nd to increased HR      Gait:  Not performed    Therapeutic Activities, Exercises, and Education:   Educated pt on PT role/POC  Provided daily orientation     Patient left with bed in chair position with all lines intact, call button in reach and RN notified..    Time Tracking:     PT Received On: 04/09/20  PT Start Time: 1045     PT Stop Time: 1110  PT Total Time (min): 25 min     Billable Minutes:   · Therapeutic Activity 15 and Neuromuscular Re-education 8    Treatment Type: Treatment  PT/PTA: PT       Jodee Bernardo PT, DPT  4/9/2020  [phone]

## 2020-04-10 LAB
ALBUMIN SERPL BCP-MCNC: 1.8 G/DL (ref 3.5–5.2)
ALBUMIN SERPL BCP-MCNC: 1.8 G/DL (ref 3.5–5.2)
ALP SERPL-CCNC: 89 U/L (ref 55–135)
ALP SERPL-CCNC: 95 U/L (ref 55–135)
ALT SERPL W/O P-5'-P-CCNC: 175 U/L (ref 10–44)
ALT SERPL W/O P-5'-P-CCNC: 185 U/L (ref 10–44)
ANION GAP SERPL CALC-SCNC: 11 MMOL/L (ref 8–16)
ANION GAP SERPL CALC-SCNC: 13 MMOL/L (ref 8–16)
AST SERPL-CCNC: 176 U/L (ref 10–40)
AST SERPL-CCNC: 191 U/L (ref 10–40)
BASOPHILS # BLD AUTO: 0.02 K/UL (ref 0–0.2)
BASOPHILS NFR BLD: 0.2 % (ref 0–1.9)
BILIRUB SERPL-MCNC: 0.5 MG/DL (ref 0.1–1)
BILIRUB SERPL-MCNC: 0.5 MG/DL (ref 0.1–1)
BUN SERPL-MCNC: 20 MG/DL (ref 8–23)
BUN SERPL-MCNC: 20 MG/DL (ref 8–23)
CALCIUM SERPL-MCNC: 7.7 MG/DL (ref 8.7–10.5)
CALCIUM SERPL-MCNC: 7.9 MG/DL (ref 8.7–10.5)
CHLORIDE SERPL-SCNC: 113 MMOL/L (ref 95–110)
CHLORIDE SERPL-SCNC: 113 MMOL/L (ref 95–110)
CO2 SERPL-SCNC: 21 MMOL/L (ref 23–29)
CO2 SERPL-SCNC: 23 MMOL/L (ref 23–29)
CREAT SERPL-MCNC: 0.8 MG/DL (ref 0.5–1.4)
CREAT SERPL-MCNC: 0.8 MG/DL (ref 0.5–1.4)
DIFFERENTIAL METHOD: ABNORMAL
EOSINOPHIL # BLD AUTO: 0 K/UL (ref 0–0.5)
EOSINOPHIL NFR BLD: 0.1 % (ref 0–8)
ERYTHROCYTE [DISTWIDTH] IN BLOOD BY AUTOMATED COUNT: 19.8 % (ref 11.5–14.5)
EST. GFR  (AFRICAN AMERICAN): >60 ML/MIN/1.73 M^2
EST. GFR  (AFRICAN AMERICAN): >60 ML/MIN/1.73 M^2
EST. GFR  (NON AFRICAN AMERICAN): >60 ML/MIN/1.73 M^2
EST. GFR  (NON AFRICAN AMERICAN): >60 ML/MIN/1.73 M^2
GLUCOSE SERPL-MCNC: 213 MG/DL (ref 70–110)
GLUCOSE SERPL-MCNC: 227 MG/DL (ref 70–110)
HCT VFR BLD AUTO: 27.6 % (ref 40–54)
HGB BLD-MCNC: 8.2 G/DL (ref 14–18)
IMM GRANULOCYTES # BLD AUTO: 0.08 K/UL (ref 0–0.04)
IMM GRANULOCYTES NFR BLD AUTO: 0.8 % (ref 0–0.5)
LYMPHOCYTES # BLD AUTO: 0.7 K/UL (ref 1–4.8)
LYMPHOCYTES NFR BLD: 6.9 % (ref 18–48)
MAGNESIUM SERPL-MCNC: 2.7 MG/DL (ref 1.6–2.6)
MCH RBC QN AUTO: 23.2 PG (ref 27–31)
MCHC RBC AUTO-ENTMCNC: 29.7 G/DL (ref 32–36)
MCV RBC AUTO: 78 FL (ref 82–98)
MONOCYTES # BLD AUTO: 1.4 K/UL (ref 0.3–1)
MONOCYTES NFR BLD: 14.4 % (ref 4–15)
NEUTROPHILS # BLD AUTO: 7.3 K/UL (ref 1.8–7.7)
NEUTROPHILS NFR BLD: 77.6 % (ref 38–73)
NRBC BLD-RTO: 0 /100 WBC
PHOSPHATE SERPL-MCNC: 2.9 MG/DL (ref 2.7–4.5)
PLATELET # BLD AUTO: 387 K/UL (ref 150–350)
PMV BLD AUTO: 9.4 FL (ref 9.2–12.9)
POCT GLUCOSE: 164 MG/DL (ref 70–110)
POCT GLUCOSE: 243 MG/DL (ref 70–110)
POCT GLUCOSE: 248 MG/DL (ref 70–110)
POCT GLUCOSE: 254 MG/DL (ref 70–110)
POCT GLUCOSE: 275 MG/DL (ref 70–110)
POTASSIUM SERPL-SCNC: 3.7 MMOL/L (ref 3.5–5.1)
POTASSIUM SERPL-SCNC: 3.7 MMOL/L (ref 3.5–5.1)
PROT SERPL-MCNC: 6.3 G/DL (ref 6–8.4)
PROT SERPL-MCNC: 6.7 G/DL (ref 6–8.4)
RBC # BLD AUTO: 3.53 M/UL (ref 4.6–6.2)
SODIUM SERPL-SCNC: 147 MMOL/L (ref 136–145)
SODIUM SERPL-SCNC: 147 MMOL/L (ref 136–145)
WBC # BLD AUTO: 9.44 K/UL (ref 3.9–12.7)

## 2020-04-10 PROCEDURE — C9399 UNCLASSIFIED DRUGS OR BIOLOG: HCPCS | Performed by: STUDENT IN AN ORGANIZED HEALTH CARE EDUCATION/TRAINING PROGRAM

## 2020-04-10 PROCEDURE — 99900035 HC TECH TIME PER 15 MIN (STAT)

## 2020-04-10 PROCEDURE — 25000003 PHARM REV CODE 250: Performed by: STUDENT IN AN ORGANIZED HEALTH CARE EDUCATION/TRAINING PROGRAM

## 2020-04-10 PROCEDURE — 99233 PR SUBSEQUENT HOSPITAL CARE,LEVL III: ICD-10-PCS | Mod: GC,,, | Performed by: INTERNAL MEDICINE

## 2020-04-10 PROCEDURE — 25000242 PHARM REV CODE 250 ALT 637 W/ HCPCS: Performed by: STUDENT IN AN ORGANIZED HEALTH CARE EDUCATION/TRAINING PROGRAM

## 2020-04-10 PROCEDURE — 80053 COMPREHEN METABOLIC PANEL: CPT

## 2020-04-10 PROCEDURE — 80053 COMPREHEN METABOLIC PANEL: CPT | Mod: 91

## 2020-04-10 PROCEDURE — 94761 N-INVAS EAR/PLS OXIMETRY MLT: CPT

## 2020-04-10 PROCEDURE — 27000221 HC OXYGEN, UP TO 24 HOURS

## 2020-04-10 PROCEDURE — 84100 ASSAY OF PHOSPHORUS: CPT

## 2020-04-10 PROCEDURE — 11000001 HC ACUTE MED/SURG PRIVATE ROOM

## 2020-04-10 PROCEDURE — 63600175 PHARM REV CODE 636 W HCPCS: Performed by: STUDENT IN AN ORGANIZED HEALTH CARE EDUCATION/TRAINING PROGRAM

## 2020-04-10 PROCEDURE — 25000003 PHARM REV CODE 250: Performed by: INTERNAL MEDICINE

## 2020-04-10 PROCEDURE — 94640 AIRWAY INHALATION TREATMENT: CPT

## 2020-04-10 PROCEDURE — 85025 COMPLETE CBC W/AUTO DIFF WBC: CPT

## 2020-04-10 PROCEDURE — 94660 CPAP INITIATION&MGMT: CPT

## 2020-04-10 PROCEDURE — 99233 SBSQ HOSP IP/OBS HIGH 50: CPT | Mod: GC,,, | Performed by: INTERNAL MEDICINE

## 2020-04-10 PROCEDURE — 83735 ASSAY OF MAGNESIUM: CPT

## 2020-04-10 PROCEDURE — 25000003 PHARM REV CODE 250: Performed by: NURSE PRACTITIONER

## 2020-04-10 RX ORDER — DIVALPROEX SODIUM 250 MG/1
250 TABLET, DELAYED RELEASE ORAL EVERY 8 HOURS
Status: DISCONTINUED | OUTPATIENT
Start: 2020-04-10 | End: 2020-04-14

## 2020-04-10 RX ORDER — TALC
3 POWDER (GRAM) TOPICAL NIGHTLY PRN
Status: DISCONTINUED | OUTPATIENT
Start: 2020-04-10 | End: 2020-04-18 | Stop reason: HOSPADM

## 2020-04-10 RX ORDER — DEXTROSE MONOHYDRATE 50 MG/ML
INJECTION, SOLUTION INTRAVENOUS CONTINUOUS
Status: DISCONTINUED | OUTPATIENT
Start: 2020-04-10 | End: 2020-04-11

## 2020-04-10 RX ADMIN — IPRATROPIUM BROMIDE AND ALBUTEROL SULFATE 3 ML: .5; 3 SOLUTION RESPIRATORY (INHALATION) at 11:04

## 2020-04-10 RX ADMIN — DEXTROSE MONOHYDRATE: 5 INJECTION, SOLUTION INTRAVENOUS at 09:04

## 2020-04-10 RX ADMIN — DIVALPROEX SODIUM 250 MG: 250 TABLET, DELAYED RELEASE ORAL at 01:04

## 2020-04-10 RX ADMIN — IPRATROPIUM BROMIDE AND ALBUTEROL SULFATE 3 ML: .5; 3 SOLUTION RESPIRATORY (INHALATION) at 03:04

## 2020-04-10 RX ADMIN — INSULIN ASPART 2 UNITS: 100 INJECTION, SOLUTION INTRAVENOUS; SUBCUTANEOUS at 12:04

## 2020-04-10 RX ADMIN — INSULIN ASPART 6 UNITS: 100 INJECTION, SOLUTION INTRAVENOUS; SUBCUTANEOUS at 09:04

## 2020-04-10 RX ADMIN — Medication 3 MG: at 09:04

## 2020-04-10 RX ADMIN — DIVALPROEX SODIUM 250 MG: 250 TABLET, DELAYED RELEASE ORAL at 09:04

## 2020-04-10 RX ADMIN — INSULIN DETEMIR 11 UNITS: 100 INJECTION, SOLUTION SUBCUTANEOUS at 09:04

## 2020-04-10 RX ADMIN — POLYETHYLENE GLYCOL 3350 17 G: 17 POWDER, FOR SOLUTION ORAL at 09:04

## 2020-04-10 RX ADMIN — VALPROATE SODIUM 250 MG: 100 INJECTION, SOLUTION INTRAVENOUS at 01:04

## 2020-04-10 RX ADMIN — DEXTROSE MONOHYDRATE: 5 INJECTION, SOLUTION INTRAVENOUS at 02:04

## 2020-04-10 RX ADMIN — OXYCODONE HYDROCHLORIDE AND ACETAMINOPHEN 1 TABLET: 10; 325 TABLET ORAL at 06:04

## 2020-04-10 RX ADMIN — INSULIN DETEMIR 13 UNITS: 100 INJECTION, SOLUTION SUBCUTANEOUS at 09:04

## 2020-04-10 RX ADMIN — DOCUSATE SODIUM 50 MG: 50 LIQUID ORAL at 09:04

## 2020-04-10 RX ADMIN — LIDOCAINE 1 PATCH: 50 PATCH CUTANEOUS at 09:04

## 2020-04-10 RX ADMIN — VALPROATE SODIUM 250 MG: 100 INJECTION, SOLUTION INTRAVENOUS at 05:04

## 2020-04-10 RX ADMIN — ENOXAPARIN SODIUM 90 MG: 100 INJECTION SUBCUTANEOUS at 09:04

## 2020-04-10 RX ADMIN — IPRATROPIUM BROMIDE AND ALBUTEROL SULFATE 3 ML: .5; 3 SOLUTION RESPIRATORY (INHALATION) at 08:04

## 2020-04-10 RX ADMIN — OXYCODONE HYDROCHLORIDE AND ACETAMINOPHEN 1 TABLET: 10; 325 TABLET ORAL at 12:04

## 2020-04-10 RX ADMIN — SENNOSIDES 5 ML: 8.8 SYRUP ORAL at 09:04

## 2020-04-10 RX ADMIN — LAMOTRIGINE 200 MG: 100 TABLET ORAL at 09:04

## 2020-04-10 RX ADMIN — ACETAMINOPHEN 650 MG: 325 TABLET ORAL at 02:04

## 2020-04-10 RX ADMIN — IPRATROPIUM BROMIDE AND ALBUTEROL SULFATE 3 ML: .5; 3 SOLUTION RESPIRATORY (INHALATION) at 09:04

## 2020-04-10 NOTE — PLAN OF CARE
Referral send to [facility] for snf admission.         04/09/20 1900   Post-Acute Status   Post-Acute Authorization Placement   Post-Acute Placement Status Referrals Sent   Discharge Plan   Discharge Plan A Skilled Nursing Facility   Discharge Plan B [facility]     Nader Naik LMSW  [facility]  [phone]

## 2020-04-10 NOTE — RESIDENT HANDOFF
ICU Transfer of Care Note  Critical Care Medicine    Admit Date: 3/29/2020  LOS: 12    CC: Acute respiratory distress syndrome (ARDS) due to COVID-19 virus    Code Status: Full Code     Transfer to Hospital Medicine discussed with Miri at Hospital Medicine .     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 CXR right lobe infiltrate with elevated , D-Dimer 1.03,  and Ferritin 331. Patient swabbed for COVID 19:positive. Patient started on IV Rocephin and Azithromycin and Plaquenil. Patient initially on 2 liters for mild hypoxia at 88% on room air but within 24 hours of admit patient had increasing oxygen requirements and intubated on evening of 3/30 (P/F then 170); CXR at the time suggested ARDS picture given bilateral patchy ground-glass infiltrates. Patient had mild JULIET with Cr of 1.5, but good UOP not requiring dialysis. Patient has been improving on vent with less requirement since intubation. Labs also showed worsening transaminitis with ,  likely due to COVID infection; history of treated hepatitis C infection in 2015. Patient not requiring vasopressors at [facility], on Fentanyl and Propofol for sedation.Transferred to [facility] on 4/1 due to capacity issues at [facility]     Full, itemized PMHx/SurgHx/Med list, per patient's medical records. Sees Dr. García Lechuga at [facility]  · HTN: on lisinopril 10mg daily  · 3-4yr Hx of T2DM with polyneuropathy, HBA1C 3/29 7.0%, non-insulin-dependent, on Metformin 500 BD, Glipizide 10 mg BD, Glucophage 1000 mg BD.  Last eye exam Jan 2019, last foot exam Feb 2019  · HLP: Atorvastatin 80mg daily  · Prostate CA, S/P XRT and  seed brachytherapy in 2015, followed by [facility], last PSA <0.1 2/5/19  · Cervical spondylopathy with myelopathy, S/P ACDF C4-5 by Dr. Le in 2012  · Bipolar disorder with depressive features, last psych admit 2014 at [facility], with Hx of suicide attempts. On home ziprazidone 80mg QD, Bupropion 150 BD, Lamotrigine 200mg QD, sees psychiatrist   · Insomnia: On 3mg melatonin QHS  · Polysubstance abuser: EtOH (unclear daily use), cocaine, amphetamines, LSD,   15-PY smoker, quit 2015, on nicotine patch    Hospital/ICU Course:  Patient arrived at [facility] main on 4/1- intubated/ventilated CAP coverage with Azithro/Rocephin continued, as with plaquenil. Elevated LFTs trending down, likely due to COVID. JULIET resolved, never needed HD.  He was extubated to a venti mask on 04/04 but desaturated overnight and was placed on bipap. On 04/07 he was transitioned to HFNC and bipap qHS. Started working with PT/OT, who recommend SNF placement. SLP recommended puree with crushed medication but still too weak for PO.       To Follow Up:     -Wean down oxygen  -Tolerate PO with SLP blessing  -Watch glucose off of D5, patient currently getting BID 13U Detemir. Watch for hypoglycemia once D5 is off.   - Stop Depakot with decrease agitation.  -Follow up on K+      Discharge Plan:     Per hospital medicine     Call with questions.

## 2020-04-10 NOTE — ASSESSMENT & PLAN NOTE
In the setting of no PO intake and inability to replete water losses.     Plan:  -Stop D5 infusion end today at noon.   -Monitor with daily CMP

## 2020-04-10 NOTE — PROGRESS NOTES
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 CXR right lobe infiltrate with elevated , D-Dimer 1.03,  and Ferritin 331. Patient swabbed for COVID 19:positive. Patient started on IV Rocephin and Azithromycin and Plaquenil. Patient initially on 2 liters for mild hypoxia at 88% on room air but within 24 hours of admit patient had increasing oxygen requirements and intubated on evening of 3/30 (P/F then 170); CXR at the time suggested ARDS picture given bilateral patchy ground-glass infiltrates. Patient had mild JULIET with Cr of 1.5, but good UOP not requiring dialysis. Patient has been improving on vent with less requirement since intubation. Labs also showed worsening transaminitis with ,  likely due to COVID infection; history of treated hepatitis C infection in 2015. Patient not requiring vasopressors at [facility], on Fentanyl and Propofol for sedation.Transferred to [facility] on 4/1 due to capacity issues at [facility]     Full, itemized PMHx/SurgHx/Med list, per patient's medical records. Sees Dr. García Lechuga at [facility]  · HTN: on lisinopril 10mg daily  · 3-4yr Hx of T2DM with polyneuropathy, HBA1C 3/29 7.0%, non-insulin-dependent, on Metformin 500 BD, Glipizide 10 mg BD, Glucophage 1000 mg  BD.  Last eye exam Jan 2019, last foot exam Feb 2019  · HLP: Atorvastatin 80mg daily  · Prostate CA, S/P XRT and seed brachytherapy in 2015, followed by [facility], last PSA <0.1 2/5/19  · Cervical spondylopathy with myelopathy, S/P ACDF C4-5 by Dr. Le in 2012  · Bipolar disorder with depressive features, last psych admit 2014 at [facility], with Hx of suicide attempts. On home ziprazidone 80mg QD, Bupropion 150 BD, Lamotrigine 200mg QD, sees psychiatrist   · Insomnia: On 3mg melatonin QHS  · Polysubstance abuser: EtOH (unclear daily use), cocaine, amphetamines, LSD,   15-PY smoker, quit 2015, on nicotine patch    Hospital/ICU Course:  Patient arrived at [facility] on 4/1- intubated/ventilated CAP coverage with Azithro/Rocephin continued, as with plaquenil. Elevated LFTs trending down, likely due to COVID. JULIET resolved, never needed HD.  He was extubated to a venti mask on 04/04 but desaturated overnight and was placed on bipap. On 04/07 he was transitioned to HFNC and bipap qHS. Started working with PT/OT, who recommend SNF placement. SLP recommended puree with crushed medication but still too weak for PO.     Interval History/Significant Events: NAEON, patient was on BiPAP overnight and tolerated. Patient is more alert and oriented than previous days. Complaining about right arm pain due to DVT and tylenol did not help him with the pain but patient received oxy this AM.     Review of Systems   Constitutional: Negative for activity change, chills and fever.   HENT: Negative for congestion and sore throat.    Eyes: Negative for visual disturbance.   Respiratory: Positive for shortness of breath. Negative for cough.    Cardiovascular: Negative for chest pain and leg swelling.   Gastrointestinal: Negative for abdominal distention, abdominal pain, diarrhea, nausea and vomiting.   Genitourinary: Negative for decreased urine volume, difficulty urinating and dysuria.   Musculoskeletal:        RIght arm pain   Skin:  Negative for color change.   Neurological: Negative for dizziness and headaches.   Psychiatric/Behavioral: Negative for agitation, behavioral problems and confusion.     Objective:     Vital Signs (Most Recent):  Temp: 98 °F (36.7 °C) (04/10/20 0300)  Pulse: 110 (04/10/20 0600)  Resp: (!) 24 (04/10/20 0600)  BP: 132/62 (04/10/20 0600)  SpO2: 96 % (04/10/20 0600) Vital Signs (24h Range):  Temp:  [97.9 °F (36.6 °C)-98.6 °F (37 °C)] 98 °F (36.7 °C)  Pulse:  [] 110  Resp:  [16-43] 24  SpO2:  [88 %-100 %] 96 %  BP: (101-150)/() 132/62   Weight: 87.4 kg (192 lb 10.9 oz)  Body mass index is 28.45 kg/m².      Intake/Output Summary (Last 24 hours) at 4/10/2020 0856  Last data filed at 4/10/2020 0600  Gross per 24 hour   Intake 1298.78 ml   Output 1235 ml   Net 63.78 ml       Physical Exam   Constitutional: He is oriented to person, place, and time. He appears well-developed and well-nourished. No distress.   HENT:   Head: Normocephalic and atraumatic.   Dry oral mucosa   Eyes: Pupils are equal, round, and reactive to light. Conjunctivae are normal. No scleral icterus.   Cardiovascular: Normal rate and regular rhythm.   Pulmonary/Chest: Effort normal and breath sounds normal.   Abdominal: Soft. Bowel sounds are normal. He exhibits no distension. There is no tenderness.   Musculoskeletal: He exhibits edema (right upper extremity ) and tenderness.   Warm, edematous, tense RUE. Tender to superficial palpation. Palpable radial pulse. Reduced ROM. Strength 1/5. Unable to abduct RUE by self.    Neurological: He is alert and oriented to person, place, and time. No cranial nerve deficit.   Skin: Skin is warm. He is not diaphoretic. No erythema. No pallor.       Vents:  Vent Mode: A/C (04/05/20 2300)  Ventilator Initiated: Yes (04/01/20 1551)  Set Rate: 24 BPM (04/05/20 2300)  Vt Set: 350 mL (04/05/20 2300)  Pressure Support: 5 cmH20 (04/03/20 1420)  PEEP/CPAP: 8 cmH20 (04/05/20 2300)  Oxygen Concentration (%): 40  (04/10/20 0600)  Peak Airway Pressure: 38 cmH2O (04/05/20 2300)  Plateau Pressure: 25 cmH20 (04/05/20 2300)  Total Ve: 9.31 mL (04/05/20 2300)  F/VT Ratio<105 (RSBI): 2750 (04/03/20 1420)  Lines/Drains/Airways     Drain                 Urethral Catheter 03/30/20 1657 10 days          Peripheral Intravenous Line                 Midline Catheter Insertion/Assessment  - Single Lumen 04/06/20 1554 Left basilic vein (medial side of arm) 18g x 8cm 3 days         Peripheral IV - Single Lumen 04/07/20 1600 20 G;1 3/4 in Left Upper Arm 2 days              Significant Labs:    CBC/Anemia Profile:  Recent Labs   Lab 04/09/20  0326 04/10/20  0351   WBC 8.45 9.44   HGB 9.3* 8.2*   HCT 31.8* 27.6*    387*   MCV 78* 78*   RDW 19.9* 19.8*        Chemistries:  Recent Labs   Lab 04/09/20 0326  04/09/20  1926 04/10/20  0058 04/10/20  0351 04/10/20  0554   *   < > 148* 147*  --  147*   K 3.8   < > 3.9 3.7  --  3.7   *   < > 114* 113*  --  113*   CO2 23   < > 22* 21*  --  23   BUN 23   < > 24* 20  --  20   CREATININE 0.8   < > 0.8 0.8  --  0.8   CALCIUM 8.0*   < > 7.8* 7.9*  --  7.7*   ALBUMIN 1.8*   < > 1.7* 1.8*  --  1.8*   PROT 6.6   < > 6.2 6.7  --  6.3   BILITOT 0.4   < > 0.5 0.5  --  0.5   ALKPHOS 103   < > 89 95  --  89   *   < > 164* 175*  --  185*   *   < > 161* 176*  --  191*   MG 2.8*  --   --   --  2.7*  --    PHOS 3.7  --   --   --  2.9  --     < > = values in this interval not displayed.       CMP:   Recent Labs   Lab 04/09/20  1926 04/10/20  0058 04/10/20  0554   * 147* 147*   K 3.9 3.7 3.7   * 113* 113*   CO2 22* 21* 23   * 213* 227*   BUN 24* 20 20   CREATININE 0.8 0.8 0.8   CALCIUM 7.8* 7.9* 7.7*   PROT 6.2 6.7 6.3   ALBUMIN 1.7* 1.8* 1.8*   BILITOT 0.5 0.5 0.5   ALKPHOS 89 95 89   * 176* 191*   * 175* 185*   ANIONGAP 12 13 11   EGFRNONAA >60.0 >60.0 >60.0     All pertinent labs within the past 24 hours have been reviewed.    Significant Imaging:  I  have reviewed all pertinent imaging results/findings within the past 24 hours.      ABG  No results for input(s): PH, PO2, PCO2, HCO3, BE in the last 168 hours.  Assessment/Plan:     Psychiatric  Bipolar 1 disorder, depressed  Holding all home meds except lamotrigine 200 QHS- however, he has not been able to take it because he has been unable to swallow.   -Initially was agitated when precedex was titrated down but has been more alert and oriented, and much less agitated  -Currently on precedex, have been slowly weaning off and he has tolerated it well  -Haldol PRN for agitation but has not required its use    Plan:  -off of sedation  -on Depakot for agitation Q8H 250mg   -Haldol PRN for agitation     Cardiac/Vascular  Essential hypertension, benign  Home antihypertensive includes lisinopril 10mg daily.   Have been holding antihypertensives in the setting of lower Bps.     Plan:  -continue holding home antihypertensives  - Monitor for HTN and restart him on home med if HTN    Renal/  Hypernatremia  In the setting of no PO intake and inability to replete water losses.     Plan:  -Stop D5 infusion end today at noon.   -Monitor with daily CMP     JULIET (acute kidney injury)  -Cr peak of 1.5 (baseline 1.1) at [facility]. Has downtrended since and resolved.   Continues having adequate UOP, >1L per day.     Plan:  -Monitor w daily CMP   -Continue monitoring strict I/Os   -Cr at baseline    Hematology  Acute deep vein thrombosis (DVT) of axillary vein of right upper extremity  RUE us 04/07 consistent with DVTs in the R IJ, subclavian, and axillary veins. Noted thrombosis of cephalic, brachial, and basilic veins. Started on therapeutic lovenox 90mg BID.     Has been having increasing pain in RUE and is unable to elevate it against gravity.     Plan:  -Continue with lovenox   -PRn oxy   -Follow CK    Oncology  Anemia  Noted on labs. H/H stable.     Plan:  -Monitor w daily CBC  -Transfusion threshold HB <7      Endocrine  Type 2 diabetes mellitus with diabetic neuropathic arthropathy, without long-term current use of insulin  Home medication includes jardiance 25mg.   -Persistently hyperglycemic secondary to D5  -Have been adjusting daily detemir based on SS requirements    Plan:  -BGs goal of 140-190  -Continue MDSSI  -Continue adjusting insulin to 13U *Watch the glucose after D5 complete.   -Hold home medications     GI  Elevated LFTs  -likely 2/2 to covid; however, does have a history of treated hep c in 2015  -AST//600s on transfer; have significantly downtrended since admission.  -Have stabilized in the low 100s. Likely in the setting of COVID-19.     Plan:  -Monitor w daily CMP  -Follow HCV RNA    Hepatitis C  Despite Harvoni treatment in 2015, no recent HCV RNA evidence of seronegativity.   Ordered HCV RNA to confirm seronegativity in the setting of transaminitis noted on labs, though it is most likely related to COVID-19.     Plan:  - HCV RNA not detected   -Cont monitoring      Other  * Acute respiratory distress syndrome (ARDS) due to COVID-19 virus  65 y/o male with Bipolar disorder, hepatitis C s/p harvoni, Prostate CA in remission s/p XRT, HTN, HLP, Non-insulin-dependent-T2DM with polyneuropathy, and polysubstance abuse,  was transferred from [facility] on 4/1 for acute respiratory failure 2/2 COVID+.     Completed a course of plaquenil, azithromycin, and ceftriaxone. Extubated 04/03 to bipap. Initially on bipap for several days following extubation but have transitioned to HFNC. Has not passed a formal swallow exam-- SLP Puree with crushed medication; if he continues without enteral nutrition will consider NGTube.     Plan:  -Continue with isolation precautions  -Continue weaning off O2 as tolerated  -SLP recommend puree for med but continue NPO; NG tube if unable to swallow         Critical Care Daily Checklist:    A: Awake: RASS Goal/Actual Goal: RASS Goal: 0-->alert and calm  Actual:  [place] Agitation Sedation Scale (RASS): Alert and calm   B: Spontaneous Breathing Trial Performed? Spon. Breathing Trial Initiated?: Initiated (04/03/20 0522)   C: SAT & SBT Coordinated?  YES                      D: Delirium: CAM-ICU Overall CAM-ICU: Negative   E: Early Mobility Performed? Yes   F: Feeding Goal: Goals: 1. Initiate nutrition intake by RD follow up.   Status: Nutrition Goal Status: goal not met   Current Diet Order   Procedures    Diet NPO      AS: Analgesia/Sedation Depakot and PRN haldol    T: Thromboembolic Prophylaxis Lovonox   H: HOB > 300 Yes   U: Stress Ulcer Prophylaxis (if needed) None   G: Glucose Control Determir 13U BID   B: Bowel Function Stool Occurrence: 1   I: Indwelling Catheter (Lines & Atkins) Necessity Midline and a perinephrial    D: De-escalation of Antimicrobials/Pharmacotherapies None    Plan for the day/ETD S/D     Code Status:  Family/Goals of Care: Full Code         Critical secondary to Patient has a condition that poses threat to life and bodily function: Severe Respiratory Distress      Critical care was time spent personally by me on the following activities: development of treatment plan with patient or surrogate and bedside caregivers, discussions with consultants, evaluation of patient's response to treatment, examination of patient, ordering and performing treatments and interventions, ordering and review of laboratory studies, ordering and review of radiographic studies, pulse oximetry, re-evaluation of patient's condition. This critical care time did not overlap with that of any other provider or involve time for any procedures.     MALIK Treadwell MD  Critical Care Medicine  [facility]

## 2020-04-10 NOTE — ASSESSMENT & PLAN NOTE
-Cr peak of 1.5 (baseline 1.1) at [facility]. Has downtrended since and resolved.   Continues having adequate UOP, >1L per day.     Plan:  -Monitor w daily CMP   -Continue monitoring strict I/Os   -Cr at baseline

## 2020-04-10 NOTE — ASSESSMENT & PLAN NOTE
Holding all home meds except lamotrigine 200 QHS- however, he has not been able to take it because he has been unable to swallow.   -Initially was agitated when precedex was titrated down but has been more alert and oriented, and much less agitated  -Currently on precedex, have been slowly weaning off and he has tolerated it well  -Haldol PRN for agitation but has not required its use    Plan:  -off of sedation  -on Depakot for agitation Q8H 250mg   -Haldol PRN for agitation

## 2020-04-10 NOTE — NURSING
After 2 calls from pt's wife, Maggie, today asking for belongings but this RN was not able to locate them in his room or in the pt lock box on the unit.     Wife was addiment that a family  member who works at [facility] packed the patient and his belongings up when he was transferred to [facility].  Jessica (dtr in law) said they were sent with the pt to us.      CNArgelia was notified that belongings (cell phone/eye glasses are all wife mentioned) were missing.  Pt said he did not know where they were but he knew he had them at [facility].    Call was just placed to pt's wife to let her know that patient's belongings were not able to be located in his room nor on our unit.  WIfe said that she had called [facility] after I mentioned it earlier this afternoon and they have his belongings locked in security.

## 2020-04-10 NOTE — SUBJECTIVE & OBJECTIVE
Interval History/Significant Events: NAEON, patient was on BiPAP overnight and tolerated. Patient is more alert and oriented than previous days. Complaining about right arm pain due to DVT and tylenol did not help him with the pain but patient received oxy this AM.     Review of Systems   Constitutional: Negative for activity change, chills and fever.   HENT: Negative for congestion and sore throat.    Eyes: Negative for visual disturbance.   Respiratory: Positive for shortness of breath. Negative for cough.    Cardiovascular: Negative for chest pain and leg swelling.   Gastrointestinal: Negative for abdominal distention, abdominal pain, diarrhea, nausea and vomiting.   Genitourinary: Negative for decreased urine volume, difficulty urinating and dysuria.   Musculoskeletal:        RIght arm pain   Skin: Negative for color change.   Neurological: Negative for dizziness and headaches.   Psychiatric/Behavioral: Negative for agitation, behavioral problems and confusion.     Objective:     Vital Signs (Most Recent):  Temp: 98 °F (36.7 °C) (04/10/20 0300)  Pulse: 110 (04/10/20 0600)  Resp: (!) 24 (04/10/20 0600)  BP: 132/62 (04/10/20 0600)  SpO2: 96 % (04/10/20 0600) Vital Signs (24h Range):  Temp:  [97.9 °F (36.6 °C)-98.6 °F (37 °C)] 98 °F (36.7 °C)  Pulse:  [] 110  Resp:  [16-43] 24  SpO2:  [88 %-100 %] 96 %  BP: (101-150)/() 132/62   Weight: 87.4 kg (192 lb 10.9 oz)  Body mass index is 28.45 kg/m².      Intake/Output Summary (Last 24 hours) at 4/10/2020 0856  Last data filed at 4/10/2020 0600  Gross per 24 hour   Intake 1298.78 ml   Output 1235 ml   Net 63.78 ml       Physical Exam   Constitutional: He is oriented to person, place, and time. He appears well-developed and well-nourished. No distress.   HENT:   Head: Normocephalic and atraumatic.   Dry oral mucosa   Eyes: Pupils are equal, round, and reactive to light. Conjunctivae are normal. No scleral icterus.   Cardiovascular: Normal rate and regular  rhythm.   Pulmonary/Chest: Effort normal and breath sounds normal.   Abdominal: Soft. Bowel sounds are normal. He exhibits no distension. There is no tenderness.   Musculoskeletal: He exhibits edema (right upper extremity ) and tenderness.   Warm, edematous, tense RUE. Tender to superficial palpation. Palpable radial pulse. Reduced ROM. Strength 1/5. Unable to abduct RUE by self.    Neurological: He is alert and oriented to person, place, and time. No cranial nerve deficit.   Skin: Skin is warm. He is not diaphoretic. No erythema. No pallor.       Vents:  Vent Mode: A/C (04/05/20 2300)  Ventilator Initiated: Yes (04/01/20 1551)  Set Rate: 24 BPM (04/05/20 2300)  Vt Set: 350 mL (04/05/20 2300)  Pressure Support: 5 cmH20 (04/03/20 1420)  PEEP/CPAP: 8 cmH20 (04/05/20 2300)  Oxygen Concentration (%): 40 (04/10/20 0600)  Peak Airway Pressure: 38 cmH2O (04/05/20 2300)  Plateau Pressure: 25 cmH20 (04/05/20 2300)  Total Ve: 9.31 mL (04/05/20 2300)  F/VT Ratio<105 (RSBI): 2750 (04/03/20 1420)  Lines/Drains/Airways     Drain                 Urethral Catheter 03/30/20 1657 10 days          Peripheral Intravenous Line                 Midline Catheter Insertion/Assessment  - Single Lumen 04/06/20 1554 Left basilic vein (medial side of arm) 18g x 8cm 3 days         Peripheral IV - Single Lumen 04/07/20 1600 20 G;1 3/4 in Left Upper Arm 2 days              Significant Labs:    CBC/Anemia Profile:  Recent Labs   Lab 04/09/20  0326 04/10/20  0351   WBC 8.45 9.44   HGB 9.3* 8.2*   HCT 31.8* 27.6*    387*   MCV 78* 78*   RDW 19.9* 19.8*        Chemistries:  Recent Labs   Lab 04/09/20  0326  04/09/20  1926 04/10/20  0058 04/10/20  0351 04/10/20  0554   *   < > 148* 147*  --  147*   K 3.8   < > 3.9 3.7  --  3.7   *   < > 114* 113*  --  113*   CO2 23   < > 22* 21*  --  23   BUN 23   < > 24* 20  --  20   CREATININE 0.8   < > 0.8 0.8  --  0.8   CALCIUM 8.0*   < > 7.8* 7.9*  --  7.7*   ALBUMIN 1.8*   < > 1.7* 1.8*  --   1.8*   PROT 6.6   < > 6.2 6.7  --  6.3   BILITOT 0.4   < > 0.5 0.5  --  0.5   ALKPHOS 103   < > 89 95  --  89   *   < > 164* 175*  --  185*   *   < > 161* 176*  --  191*   MG 2.8*  --   --   --  2.7*  --    PHOS 3.7  --   --   --  2.9  --     < > = values in this interval not displayed.       CMP:   Recent Labs   Lab 04/09/20  1926 04/10/20  0058 04/10/20  0554   * 147* 147*   K 3.9 3.7 3.7   * 113* 113*   CO2 22* 21* 23   * 213* 227*   BUN 24* 20 20   CREATININE 0.8 0.8 0.8   CALCIUM 7.8* 7.9* 7.7*   PROT 6.2 6.7 6.3   ALBUMIN 1.7* 1.8* 1.8*   BILITOT 0.5 0.5 0.5   ALKPHOS 89 95 89   * 176* 191*   * 175* 185*   ANIONGAP 12 13 11   EGFRNONAA >60.0 >60.0 >60.0     All pertinent labs within the past 24 hours have been reviewed.    Significant Imaging:  I have reviewed all pertinent imaging results/findings within the past 24 hours.

## 2020-04-10 NOTE — ASSESSMENT & PLAN NOTE
65 y/o male with Bipolar disorder, hepatitis C s/p harvoni, Prostate CA in remission s/p XRT, HTN, HLP, Non-insulin-dependent-T2DM with polyneuropathy, and polysubstance abuse,  was transferred from [facility] on 4/1 for acute respiratory failure 2/2 COVID+.     Completed a course of plaquenil, azithromycin, and ceftriaxone. Extubated 04/03 to bipap. Initially on bipap for several days following extubation but have transitioned to HFNC. Has not passed a formal swallow exam-- SLP Puree with crushed medication; if he continues without enteral nutrition will consider NGTube.     Plan:  -Continue with isolation precautions  -Continue weaning off O2 as tolerated  -SLP recommend puree for med but continue NPO; NG tube if unable to swallow

## 2020-04-10 NOTE — ASSESSMENT & PLAN NOTE
RUE us 04/07 consistent with DVTs in the R IJ, subclavian, and axillary veins. Noted thrombosis of cephalic, brachial, and basilic veins. Started on therapeutic lovenox 90mg BID.     Has been having increasing pain in RUE and is unable to elevate it against gravity.     Plan:  -Continue with lovenox   -PRn oxy   -Follow CK

## 2020-04-10 NOTE — ASSESSMENT & PLAN NOTE
Home medication includes jardiance 25mg.   -Persistently hyperglycemic secondary to D5  -Have been adjusting daily detemir based on SS requirements    Plan:  -BGs goal of 140-190  -Continue MDSSI  -Continue adjusting insulin to 13U *Watch the glucose after D5 complete.   -Hold home medications

## 2020-04-10 NOTE — ASSESSMENT & PLAN NOTE
Despite Harvoni treatment in 2015, no recent HCV RNA evidence of seronegativity.   Ordered HCV RNA to confirm seronegativity in the setting of transaminitis noted on labs, though it is most likely related to COVID-19.     Plan:  - HCV RNA not detected   -Cont monitoring

## 2020-04-10 NOTE — ASSESSMENT & PLAN NOTE
Home antihypertensive includes lisinopril 10mg daily.   Have been holding antihypertensives in the setting of lower Bps.     Plan:  -continue holding home antihypertensives  - Monitor for HTN and restart him on home med if HTN

## 2020-04-10 NOTE — CARE UPDATE
Internal Medicine Telemed Plan of Care Note:     Called Cash Crawford 's wife, Zeenat Crawford, to discuss the patient's current clinical status. Briefly, the patient is currently admitted for hypoxemic respiratory failure secondary to COVID-19. COVID-19 status: positive, 03/29.        Addressed 's questions and concerns. Discussed current O2 requirements currently at 10L HFNC, SLP eval- will get PO meds through puree, and the addition of pain control medications for arm discomfort. Reported to the wife that he will get step down from ICU. She verbalized understanding of the situation and plan. Will continue ongoing communication.     MALIK Treadwell M.D.  Internal Medicine PGY-2  [phone]

## 2020-04-10 NOTE — PROVIDER TRANSFER
Hospital Medicine ICU Acceptance Note    Date of Admit: 3/29/2020  Date of Transfer / Stepdown: 4/10/2020  Stephanie, C/J, L, Onc (IV chemo w/in 1 month), Gyn/Onc, or other special case?: no   ICU team stepping patient down: MICU   ICU team member giving verbal handoff: ICU resident Blaise Garcia.  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     Hospital/ICU Course:     Initial workup showed CXR right lobe infiltrate with elevated , D-Dimer 1.03,  and Ferritin 331. Patient swabbed for COVID 19:positive. Patient started on IV Rocephin and Azithromycin and Plaquenil. He had rapid decline in respiratory status within 24H of admission and was intubated 3/30/2020 c/b ARDS.  He completed Abx for empiric CAP treatment on 4/2 and course of hydroxychloroquine ended 4/5.  Extubated on 4/4/2020 and transitioned to bipap, now on NC but still NPO pending repeat swallow eval.      Now with hypernatremia on D5 drip and getting insulin for hyperglycemia.  Developed RUE DVT on therapeutic lovenox.  Started on Depakote for agitation, will need to d/c when improved.    Transfer Information:     Diet:  NPO pending repeat swallow eval    Physical Activity:  PT/OT following    To Do / Pending Studies / Follow ups:  -Wean down oxygen  -start diet if ready per SLP  - adjust insulin when D5 @ 50cc/hr stopped at 2:30 pm on 4/11, patient currently getting BID 13U (increased from 11 bid) Detemir  - Stop Depakot with decrease agitation (started IV on 4/5, changed to po 4/10)  -Follow up on K+, getting daily labs  -transition to oral  anticoagulation when possible    Patient has been accepted by Hospital Medicine Team I, who will assume care of the patient as he has be moved to stepdown.       Code Status: Full Code      Layo Elam MD   Hospital Medicine Team I (Endocrinologist)  Pager: [phone]  Spectralink: [phone]

## 2020-04-10 NOTE — NURSING
Pt tolerated day's care fairly well. His main complaint continued to be the pain in his entire R arm/shoulder.  Edema was slightly  less today but still extensive.  Radial pulses as charted and Dr Lopez even dopplered R radial pulse extremely easily to assure presence. R palm remains erythemous. Pt reported to MD being able to feel her touch even when his eyes were covered.  Pt is able to move is fingers slightly better in the evening.  Pt has kept his R arm elevated as ordered throughout the shift.    Pt is very compliant and cooperative.  Pt was able to tolerate well applesauce and meds in chocolate pudding.  No coughing nor desaturations were noted while eating.     Pt did require a FMS to be placed.  Pt had 2 very large, mucous BM's this am and given the pain in his arm, this RN felt it best to contain his stool to avoid have to be turned/pulled for cleaning.  FMS evacuated more normal loose brown stool as charted.    Pt aware of prece dex gtt being turned off and restarting PO meds as charted including the new rx for Seroquel.      Pt left in stable condition, care assumed by PM RN.

## 2020-04-10 NOTE — PROGRESS NOTES
"  [facility]  Adult Nutrition  Consult Note    SUMMARY     Recommendations    1. If/when able to advance diet, recommend Diabetic diet (texture per SLP).   2. If unable to advance diet, place NGT & resume enteral nutrition. Rec'd Glucerna 1.5 @ 55 mL/hr to provide 1980 kcals, 109 g of protein, 1002 mL fluid.  3. RD to monitor & follow-up.    Goals: Initiate nutrition intake by RD follow up.   Nutrition Goal Status: goal not met  Communication of RD Recs: reviewed with RN    Reason for Assessment    Reason For Assessment: RD follow-up  Diagnosis: (Acute respiratory disease due to COVID-19 virus )  Relevant Medical History: DM, HTN  Interdisciplinary Rounds: did not attend    General Information Comments: RD working remotely, +COVID19. Pt extubated 4/3, remains NPO per SLP recommendations. UBW 213lbs, -15lbs x 1 year (per previous RN note), unsure of PO intake PTA. NFPE not performed patient positive for COVID19 and has been placed on airborne and contact precautions. Will monitor.  Nutrition Discharge Planning: Unable to detemine at this time.     Nutrition/Diet History    Spiritual, Cultural Beliefs, Scientology Practices, Values that Affect Care: no  Factors Affecting Nutritional Intake: NPO    Anthropometrics    Temp: 99 °F (37.2 °C)  Height Method: Stated  Height: 5' 9" (175.3 cm)  Height (inches): 69 in  Weight Method: Standard Scale  Weight: 87.4 kg (192 lb 10.9 oz)  Weight (lb): 192.68 lb  Ideal Body Weight (IBW), Male: 160 lb  % Ideal Body Weight, Male (lb): 120.42 %  BMI (Calculated): 28.4  BMI Grade: 25 - 29.9 - overweight  Usual Body Weight (UBW), k.8 kg(Per chart review.)  % Usual Body Weight: 90.48  % Weight Change From Usual Weight: -9.71 %    Lab/Procedures/Meds    Pertinent Labs Reviewed: reviewed  Pertinent Labs Comments: Na 147, A1C 7  Pertinent Medications Reviewed: reviewed  Pertinent Medications Comments: D5    Estimated/Assessed Needs    Weight Used For Calorie " Calculations: 87.4 kg (192 lb 10.9 oz)     Energy Calorie Requirements (kcal): 2061 kcal/d  Energy Need Method: [place]-St Jeor(1.25 PAL)     Protein Requirements:  g/d (1-1.2 g/kg)  Weight Used For Protein Calculations: 87.4 kg (192 lb 10.9 oz)     Fluid Requirements (mL): 1mL/kcal or per MD     CHO Requirement: 50% total kcals    Nutrition Prescription Ordered    Current Diet Order: NPO    Evaluation of Received Nutrient/Fluid Intake    Comments: LBM: 4/10    Nutrition Risk    Level of Risk/Frequency of Follow-up: (2x/week)     Assessment and Plan    Contributing Nutrition Diagnosis  Inadequate energy intake      Related to (etiology):   Inability to consume sufficient energy      Signs and Symptoms (as evidenced by):   NPO, mechanically ventilated      Interventions (treatment strategy):  Collaboration with other providers        Nutrition Diagnosis Status:   Continues     Monitor and Evaluation    Food and Nutrient Intake: energy intake, food and beverage intake, enteral nutrition intake  Food and Nutrient Adminstration: diet order, enteral and parenteral nutrition administration  Knowledge/Beliefs/Attitudes: food and nutrition knowledge/skill  Physical Activity and Function: nutrition-related ADLs and IADLs  Anthropometric Measurements: weight, weight change  Biochemical Data, Medical Tests and Procedures: glucose/endocrine profile, inflammatory profile, lipid profile, gastrointestinal profile, electrolyte and renal panel  Nutrition-Focused Physical Findings: overall appearance     Nutrition Follow-Up    RD Follow-up?: Yes

## 2020-04-11 LAB
ALBUMIN SERPL BCP-MCNC: 1.8 G/DL (ref 3.5–5.2)
ALP SERPL-CCNC: 89 U/L (ref 55–135)
ALT SERPL W/O P-5'-P-CCNC: 174 U/L (ref 10–44)
ANION GAP SERPL CALC-SCNC: 11 MMOL/L (ref 8–16)
AST SERPL-CCNC: 146 U/L (ref 10–40)
BASOPHILS # BLD AUTO: 0.02 K/UL (ref 0–0.2)
BASOPHILS NFR BLD: 0.2 % (ref 0–1.9)
BILIRUB SERPL-MCNC: 0.4 MG/DL (ref 0.1–1)
BUN SERPL-MCNC: 14 MG/DL (ref 8–23)
CALCIUM SERPL-MCNC: 7.9 MG/DL (ref 8.7–10.5)
CHLORIDE SERPL-SCNC: 110 MMOL/L (ref 95–110)
CO2 SERPL-SCNC: 23 MMOL/L (ref 23–29)
CREAT SERPL-MCNC: 0.7 MG/DL (ref 0.5–1.4)
CRP SERPL-MCNC: 182.5 MG/L (ref 0–8.2)
DIFFERENTIAL METHOD: ABNORMAL
EOSINOPHIL # BLD AUTO: 0 K/UL (ref 0–0.5)
EOSINOPHIL NFR BLD: 0.1 % (ref 0–8)
ERYTHROCYTE [DISTWIDTH] IN BLOOD BY AUTOMATED COUNT: 21.2 % (ref 11.5–14.5)
EST. GFR  (AFRICAN AMERICAN): >60 ML/MIN/1.73 M^2
EST. GFR  (NON AFRICAN AMERICAN): >60 ML/MIN/1.73 M^2
GLUCOSE SERPL-MCNC: 213 MG/DL (ref 70–110)
HCT VFR BLD AUTO: 26.7 % (ref 40–54)
HGB BLD-MCNC: 8.1 G/DL (ref 14–18)
IMM GRANULOCYTES # BLD AUTO: 0.1 K/UL (ref 0–0.04)
IMM GRANULOCYTES NFR BLD AUTO: 1 % (ref 0–0.5)
LYMPHOCYTES # BLD AUTO: 0.7 K/UL (ref 1–4.8)
LYMPHOCYTES NFR BLD: 7.4 % (ref 18–48)
MAGNESIUM SERPL-MCNC: 2.8 MG/DL (ref 1.6–2.6)
MCH RBC QN AUTO: 24.8 PG (ref 27–31)
MCHC RBC AUTO-ENTMCNC: 30.3 G/DL (ref 32–36)
MCV RBC AUTO: 82 FL (ref 82–98)
MONOCYTES # BLD AUTO: 1.6 K/UL (ref 0.3–1)
MONOCYTES NFR BLD: 16.5 % (ref 4–15)
NEUTROPHILS # BLD AUTO: 7.2 K/UL (ref 1.8–7.7)
NEUTROPHILS NFR BLD: 74.8 % (ref 38–73)
NRBC BLD-RTO: 0 /100 WBC
PHOSPHATE SERPL-MCNC: 2.4 MG/DL (ref 2.7–4.5)
PLATELET # BLD AUTO: 445 K/UL (ref 150–350)
PMV BLD AUTO: 9.7 FL (ref 9.2–12.9)
POCT GLUCOSE: 130 MG/DL (ref 70–110)
POCT GLUCOSE: 155 MG/DL (ref 70–110)
POCT GLUCOSE: 166 MG/DL (ref 70–110)
POCT GLUCOSE: 167 MG/DL (ref 70–110)
POCT GLUCOSE: 210 MG/DL (ref 70–110)
POTASSIUM SERPL-SCNC: 4.1 MMOL/L (ref 3.5–5.1)
PROT SERPL-MCNC: 6.6 G/DL (ref 6–8.4)
RBC # BLD AUTO: 3.27 M/UL (ref 4.6–6.2)
SODIUM SERPL-SCNC: 144 MMOL/L (ref 136–145)
WBC # BLD AUTO: 9.69 K/UL (ref 3.9–12.7)

## 2020-04-11 PROCEDURE — 83735 ASSAY OF MAGNESIUM: CPT

## 2020-04-11 PROCEDURE — 97535 SELF CARE MNGMENT TRAINING: CPT

## 2020-04-11 PROCEDURE — 27100092 HC HIGH FLOW DELIVERY CANNULA

## 2020-04-11 PROCEDURE — 25000003 PHARM REV CODE 250: Performed by: INTERNAL MEDICINE

## 2020-04-11 PROCEDURE — 63600175 PHARM REV CODE 636 W HCPCS: Performed by: STUDENT IN AN ORGANIZED HEALTH CARE EDUCATION/TRAINING PROGRAM

## 2020-04-11 PROCEDURE — 99233 SBSQ HOSP IP/OBS HIGH 50: CPT | Mod: ,,, | Performed by: INTERNAL MEDICINE

## 2020-04-11 PROCEDURE — 94640 AIRWAY INHALATION TREATMENT: CPT

## 2020-04-11 PROCEDURE — 99233 PR SUBSEQUENT HOSPITAL CARE,LEVL III: ICD-10-PCS | Mod: ,,, | Performed by: INTERNAL MEDICINE

## 2020-04-11 PROCEDURE — 86140 C-REACTIVE PROTEIN: CPT

## 2020-04-11 PROCEDURE — 92526 ORAL FUNCTION THERAPY: CPT

## 2020-04-11 PROCEDURE — 27000221 HC OXYGEN, UP TO 24 HOURS

## 2020-04-11 PROCEDURE — 94660 CPAP INITIATION&MGMT: CPT

## 2020-04-11 PROCEDURE — 25000242 PHARM REV CODE 250 ALT 637 W/ HCPCS: Performed by: STUDENT IN AN ORGANIZED HEALTH CARE EDUCATION/TRAINING PROGRAM

## 2020-04-11 PROCEDURE — 94761 N-INVAS EAR/PLS OXIMETRY MLT: CPT

## 2020-04-11 PROCEDURE — 99900035 HC TECH TIME PER 15 MIN (STAT)

## 2020-04-11 PROCEDURE — 25000003 PHARM REV CODE 250: Performed by: STUDENT IN AN ORGANIZED HEALTH CARE EDUCATION/TRAINING PROGRAM

## 2020-04-11 PROCEDURE — 93010 EKG 12-LEAD: ICD-10-PCS | Mod: ,,, | Performed by: INTERNAL MEDICINE

## 2020-04-11 PROCEDURE — 85025 COMPLETE CBC W/AUTO DIFF WBC: CPT

## 2020-04-11 PROCEDURE — 27100171 HC OXYGEN HIGH FLOW UP TO 24 HOURS

## 2020-04-11 PROCEDURE — 11000001 HC ACUTE MED/SURG PRIVATE ROOM

## 2020-04-11 PROCEDURE — 84100 ASSAY OF PHOSPHORUS: CPT

## 2020-04-11 PROCEDURE — 93005 ELECTROCARDIOGRAM TRACING: CPT

## 2020-04-11 PROCEDURE — 80053 COMPREHEN METABOLIC PANEL: CPT

## 2020-04-11 PROCEDURE — 93010 ELECTROCARDIOGRAM REPORT: CPT | Mod: ,,, | Performed by: INTERNAL MEDICINE

## 2020-04-11 RX ORDER — INSULIN ASPART 100 [IU]/ML
1-10 INJECTION, SOLUTION INTRAVENOUS; SUBCUTANEOUS
Status: DISCONTINUED | OUTPATIENT
Start: 2020-04-11 | End: 2020-04-16

## 2020-04-11 RX ORDER — SODIUM CHLORIDE 450 MG/100ML
INJECTION, SOLUTION INTRAVENOUS CONTINUOUS
Status: DISCONTINUED | OUTPATIENT
Start: 2020-04-11 | End: 2020-04-11

## 2020-04-11 RX ORDER — LISINOPRIL 10 MG/1
10 TABLET ORAL DAILY
Status: DISCONTINUED | OUTPATIENT
Start: 2020-04-11 | End: 2020-04-18 | Stop reason: HOSPADM

## 2020-04-11 RX ADMIN — DOCUSATE SODIUM 50 MG: 50 LIQUID ORAL at 12:04

## 2020-04-11 RX ADMIN — LIDOCAINE 1 PATCH: 50 PATCH CUTANEOUS at 08:04

## 2020-04-11 RX ADMIN — OXYCODONE HYDROCHLORIDE AND ACETAMINOPHEN 1 TABLET: 10; 325 TABLET ORAL at 02:04

## 2020-04-11 RX ADMIN — INSULIN ASPART 2 UNITS: 100 INJECTION, SOLUTION INTRAVENOUS; SUBCUTANEOUS at 06:04

## 2020-04-11 RX ADMIN — INSULIN ASPART 2 UNITS: 100 INJECTION, SOLUTION INTRAVENOUS; SUBCUTANEOUS at 02:04

## 2020-04-11 RX ADMIN — INSULIN DETEMIR 13 UNITS: 100 INJECTION, SOLUTION SUBCUTANEOUS at 08:04

## 2020-04-11 RX ADMIN — LAMOTRIGINE 200 MG: 100 TABLET ORAL at 08:04

## 2020-04-11 RX ADMIN — OXYCODONE HYDROCHLORIDE AND ACETAMINOPHEN 1 TABLET: 10; 325 TABLET ORAL at 01:04

## 2020-04-11 RX ADMIN — DEXTROSE MONOHYDRATE: 5 INJECTION, SOLUTION INTRAVENOUS at 05:04

## 2020-04-11 RX ADMIN — DIVALPROEX SODIUM 250 MG: 250 TABLET, DELAYED RELEASE ORAL at 05:04

## 2020-04-11 RX ADMIN — DIVALPROEX SODIUM 250 MG: 250 TABLET, DELAYED RELEASE ORAL at 09:04

## 2020-04-11 RX ADMIN — SENNOSIDES 5 ML: 8.8 SYRUP ORAL at 09:04

## 2020-04-11 RX ADMIN — OXYCODONE HYDROCHLORIDE AND ACETAMINOPHEN 1 TABLET: 10; 325 TABLET ORAL at 10:04

## 2020-04-11 RX ADMIN — DIVALPROEX SODIUM 250 MG: 250 TABLET, DELAYED RELEASE ORAL at 02:04

## 2020-04-11 RX ADMIN — ENOXAPARIN SODIUM 90 MG: 100 INJECTION SUBCUTANEOUS at 08:04

## 2020-04-11 RX ADMIN — IPRATROPIUM BROMIDE AND ALBUTEROL SULFATE 3 ML: .5; 3 SOLUTION RESPIRATORY (INHALATION) at 08:04

## 2020-04-11 RX ADMIN — DEXTROSE MONOHYDRATE: 5 INJECTION, SOLUTION INTRAVENOUS at 12:04

## 2020-04-11 RX ADMIN — LISINOPRIL 10 MG: 10 TABLET ORAL at 02:04

## 2020-04-11 RX ADMIN — IPRATROPIUM BROMIDE AND ALBUTEROL SULFATE 3 ML: .5; 3 SOLUTION RESPIRATORY (INHALATION) at 04:04

## 2020-04-11 RX ADMIN — INSULIN ASPART 4 UNITS: 100 INJECTION, SOLUTION INTRAVENOUS; SUBCUTANEOUS at 09:04

## 2020-04-11 RX ADMIN — IPRATROPIUM BROMIDE AND ALBUTEROL SULFATE 3 ML: .5; 3 SOLUTION RESPIRATORY (INHALATION) at 06:04

## 2020-04-11 RX ADMIN — OXYCODONE HYDROCHLORIDE AND ACETAMINOPHEN 1 TABLET: 10; 325 TABLET ORAL at 08:04

## 2020-04-11 RX ADMIN — POLYETHYLENE GLYCOL 3350 17 G: 17 POWDER, FOR SOLUTION ORAL at 08:04

## 2020-04-11 NOTE — PT/OT/SLP PROGRESS
"Speech Language Pathology Treatment    Patient Name:  Cash Crawford   MRN:  [id number]  Admitting Diagnosis: Acute respiratory distress syndrome (ARDS) due to COVID-19 virus    Recommendations:                 General Recommendations:  Dysphagia therapy  Diet recommendations:  Puree, Liquid Diet Level: Nectar Thick   Aspiration Precautions: 1 bite/sip at a time, Assistance with meals and Assistance with thickening liquids, Feed only when awake/alert, HOB to 90 degrees, Meds crushed in puree, Small bites/sips and Strict aspiration precautions   General Precautions: Standard, fall, aspiration, contact, droplet, airborne(COVID+)  Communication strategies:  provide increased time to answer and go to room if call light pushed    Subjective     Patient awake and alert during session  "I'm always hungry."  Communicated with nurse prior to session    Pain/Comfort:  · Pain Rating 1: 0/10  · Pain Rating Post-Intervention 1: 0/10    Objective:     Has the patient been evaluated by SLP for swallowing?   Yes  Keep patient NPO? No   Current Respiratory Status: (high-flow nasal cannula)      Patient seen for ongoing swallow assessment. He was sitting up in bed with high-flow nasal cannula and continuous pulse ox in place. Patient with good vocal quality and intensity and he exhibited an adequate throat clear/cough prior to trials. He tolerated nectar-thick liquids x5 (self fed via cup-edge), puree x4 (tsp applesauce) and soft solid x1 (moist sai cracker) with no overt signs of aspiration. His O2 remained >96% throughout trials and his voice was consistent. During trials of thin liquid x2 (tsp and cup-edge), he demonstrated immediate throat clearing following cup-edge sip. During solid trials, he demonstrated significantly increased WOB so SLP recommending puree at this time. SLP educated him regarding recs, but he would benefit from ongoing instruction. Whiteboard updated. Patient left in bed with call light within reach. " Diet recs communicated to team.    Assessment:     Cash Crawford is a 64 y.o. male with an SLP diagnosis of Dysphagia.      Goals:   Multidisciplinary Problems     SLP Goals        Problem: SLP Goal    Goal Priority Disciplines Outcome   SLP Goal     SLP Ongoing, Progressing   Description:  Speech Language Pathology Goals  Goals expected to be met by 4/15/2020  1. Pt will tolerate a puree diet/nectar-thick liquids with no s/s of aspiration.  2. Pt will participate in ongoing assessment of swallow fx to determine safest, least restrictive means of nutrition  3. Educate Pt and family on S/S aspiration                       Plan:     · Patient to be seen:  4 x/week   · Plan of Care expires:  05/08/20  · Plan of Care reviewed with:  patient   · SLP Follow-Up:  Yes       Discharge recommendations:  nursing facility, skilled   Barriers to Discharge:  Level of Skilled Assistance Needed     Time Tracking:     SLP Treatment Date:   04/11/20  Speech Start Time:  1211  Speech Stop Time:  1227     Speech Total Time (min):  16 min    Billable Minutes: Treatment Swallowing Dysfunction 8 and Self Care/Home Management Training 8    Salbador Hernandez CCC-SLP  Speech-Language Pathology  Pager: [phone]   04/11/2020

## 2020-04-11 NOTE — PLAN OF CARE
Problem: SLP Goal  Goal: SLP Goal  Description  Speech Language Pathology Goals  Goals expected to be met by 4/15/2020  1. Pt will tolerate a puree diet/nectar-thick liquids with no s/s of aspiration.  2. Pt will participate in ongoing assessment of swallow fx to determine safest, least restrictive means of nutrition  3. Educate Pt and family on S/S aspiration    Outcome: Ongoing, Progressing     Patient seen for ongoing swallow assessment. SLP recommending a puree diet/nectar-thick liquids with strict aspiration precautions.     Salbador Hernandez CCC-SLP  Speech-Language Pathology  Pager: [phone]

## 2020-04-11 NOTE — RESPIRATORY THERAPY
Pt  Had a  Stat  Order// pt  Not in distress// sat holding   95% )titrated  To  8  Flow liters  Post  Stat duo neb tx

## 2020-04-11 NOTE — PROGRESS NOTES
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     Hospital Course:  Initial workup showed CXR right lobe infiltrate with elevated , D-Dimer 1.03,  and Ferritin 331. Patient swabbed for COVID 19:positive. Patient started on IV Rocephin and Azithromycin and Plaquenil. He had rapid decline in respiratory status within 24H of admission and was intubated 3/30/2020 c/b ARDS.  He completed Abx for empiric CAP treatment on 4/2 and course of hydroxychloroquine ended 4/5.  Extubated on 4/4/2020 and transitioned to bipap/NC.   Developed hypernatremia which resolved on D5 drip requiring insulin for hyperglycemia.  Developed RUE DVT on therapeutic lovenox.  Started on Depakote for agitation    Interval History:     No acute events overnight, on bipap overnight and now on 11 L NC  Na now normal, still NPO and on D5W at 50 cc/hr  Still hyperglycemic but did not get overnight sliding scale insulin  Having some RUE and neck pain improved with pain meds  pending repeat swallow eval.      Review of Systems:  Respiratory:  No dysnea on 11L NC, mild cough  Cardiovascular: denies chest pain/palpitations  GI: no abd pain, no N/V.  He is hungry    Inpatient Medications:    Current Facility-Administered Medications:     0.45% NaCl infusion, , Intravenous,  Continuous, Layo Elam MD    acetaminophen tablet 650 mg, 650 mg, Oral, Q4H PRN, Emilee Mandel MD, 650 mg at 04/10/20 0217    albuterol-ipratropium 2.5 mg-0.5 mg/3 mL nebulizer solution 3 mL, 3 mL, Nebulization, Q6H, Gurpreet Godinez MD, 3 mL at 04/11/20 0606    bisacodyL suppository 10 mg, 10 mg, Rectal, Daily PRN, Anila Daily MD, 10 mg at 04/08/20 2205    dextrose 5 % infusion, , Intravenous, Continuous, Blaise Treadwell MD, Last Rate: 50 mL/hr at 04/11/20 0503    divalproex EC tablet 250 mg, 250 mg, Oral, Q8H, Blaise Treadwell MD, 250 mg at 04/11/20 0502    sennosides 8.8 mg/5 ml syrup 5 mL, 5 mL, Per NG tube, Daily, 5 mL at 04/11/20 0902 **AND** docusate 50 mg/5 mL liquid 50 mg, 50 mg, Per NG tube, Daily, Ari Black MD, 50 mg at 04/10/20 0916    enoxaparin injection 90 mg, 1 mg/kg, Subcutaneous, Q12H, Vickey Mccray MD, 90 mg at 04/11/20 0855    glucose chewable tablet 16 g, 16 g, Oral, PRN, Emilee Mandel MD    glucose chewable tablet 24 g, 24 g, Oral, PRN, Emilee Mandel MD    haloperidol lactate injection 2 mg, 2 mg, Intravenous, Q6H PRN, Anita Cervantes MD    heparin (porcine) injection 10,000 Units, 10,000 Units, Intravenous, PRN, Mervin Meyers MD, 10,000 Units at 03/30/20 1647    insulin aspart U-100 pen 1-10 Units, 1-10 Units, Subcutaneous, Q6H PRN, Anita Cervantes MD, 4 Units at 04/11/20 0914    insulin detemir U-100 pen 13 Units, 13 Units, Subcutaneous, BID, Blaise Treadwell MD, 13 Units at 04/11/20 0855    labetalol 20 mg/4 mL (5 mg/mL) IV syring, 15 mg, Intravenous, Q4H PRN, Usman Bal MD, 15 mg at 04/04/20 1932    lamoTRIgine tablet 200 mg, 200 mg, Oral, QHS, Emilee Mandel MD, 200 mg at 04/10/20 2128    lidocaine 5 % patch 1 patch, 1 patch, Transdermal, Q24H, Waleska Muñoz MD, 1 patch at 04/11/20 0854    melatonin tablet 3 mg, 3 mg, Oral, Nightly PRN, Mary Boston NP, 3 mg at 04/10/20 2128    ondansetron disintegrating tablet 8 mg, 8 mg,  "Oral, Q8H PRN, Emilee Mandel MD    oxyCODONE-acetaminophen  mg per tablet 1 tablet, 1 tablet, Oral, Q6H PRN, Anita Cervantes MD, 1 tablet at 04/11/20 0855    polyethylene glycol packet 17 g, 17 g, Per NG tube, Daily, Ari Black MD, 17 g at 04/11/20 0855    sodium chloride 0.9% flush 10 mL, 10 mL, Intravenous, PRN, Emilee Mandel MD    sodium chloride 0.9% flush 10 mL, 10 mL, Intravenous, PRN, Emilee Mandel MD      Physical Exam:      Intake/Output Summary (Last 24 hours) at 4/11/2020 1224  Last data filed at 4/11/2020 1100  Gross per 24 hour   Intake 60 ml   Output 1175 ml   Net -1115 ml     Wt Readings from Last 3 Encounters:   04/10/20 87.4 kg (192 lb 10.9 oz)   04/01/20 87.1 kg (192 lb)   02/05/19 96.8 kg (213 lb 6.4 oz)       BP (!) 168/73   Pulse 84   Temp 97.3 °F (36.3 °C)   Resp 18   Ht 5' 9" (1.753 m)   Wt 87.4 kg (192 lb 10.9 oz)   SpO2 97%   BMI 28.45 kg/m²     GEN: NAD, drowsy, MMM  Resp: normal work of breathing   CV:no LE edema  Neuro: AAOx3    Laboratory:  Lab Results   Component Value Date    [id number] Detected (A) 03/29/2020       Recent Labs   Lab 04/09/20  0326 04/10/20  0351 04/11/20  0434   WBC 8.45 9.44 9.69   LYMPH 7.2*  0.6* 6.9*  0.7* 7.4*  0.7*   HGB 9.3* 8.2* 8.1*   HCT 31.8* 27.6* 26.7*    387* 445*     Recent Labs   Lab 04/09/20  0326  04/10/20  0058 04/10/20  0351 04/10/20  0554 04/11/20  0435   *   < > 147*  --  147* 144   K 3.8   < > 3.7  --  3.7 4.1   *   < > 113*  --  113* 110   CO2 23   < > 21*  --  23 23   BUN 23   < > 20  --  20 14   CREATININE 0.8   < > 0.8  --  0.8 0.7   *   < > 213*  --  227* 213*   CALCIUM 8.0*   < > 7.9*  --  7.7* 7.9*   MG 2.8*  --   --  2.7*  --  2.8*   PHOS 3.7  --   --  2.9  --  2.4*    < > = values in this interval not displayed.     Recent Labs   Lab 04/10/20  0058 04/10/20  0554 04/11/20  0435   ALKPHOS 95 89 89   * 185* 174*   * 191* 146*   ALBUMIN 1.8* 1.8* 1.8*   PROT 6.7 " 6.3 6.6   BILITOT 0.5 0.5 0.4        Recent Labs     04/08/20  1852 04/09/20  1118   .6*  --    CPK  --  408*       All labs within the last 24 hours were reviewed.     Microbiology:  Microbiology Results (last 7 days)     ** No results found for the last 168 hours. **            Imaging  ECG Results          EKG 12-lead (Final result)  Result time 04/02/20 08:08:36    Final result by Interface, Lab In [facility] (04/02/20 08:08:36)                 Narrative:    Test Reason : R68.89,    Vent. Rate : 097 BPM     Atrial Rate : 097 BPM     P-R Int : 150 ms          QRS Dur : 072 ms      QT Int : 332 ms       P-R-T Axes : 065 025 039 degrees     QTc Int : 421 ms    Age and gender specific analysis  Normal sinus rhythm  Normal ECG  When compared with ECG of 30-MAR-2020 15:09,    No significant change was found  Confirmed by CORY DURAND MD ([id number]) on 4/2/2020 8:08:25 AM    Referred By: [email or web address]   SELF           Confirmed By:CORY DURAND MD                             EKG 12-lead (Final result)  Result time 03/31/20 09:55:44    Final result by Interface, Lab In [facility] (03/31/20 09:55:44)                 Narrative:    Test Reason : J22,B97.29,    Vent. Rate : 105 BPM     Atrial Rate : 104 BPM     P-R Int : 000 ms          QRS Dur : 068 ms      QT Int : 338 ms       P-R-T Axes : 000 -06 006 degrees     QTc Int : 446 ms    Atrial pacing.  Confirmed by Musa Padron MD ([id number]) on 3/31/2020 9:55:37 AM    Referred By: [email or web address]   SELF           Confirmed By:Musa Padron MD                             EKG 12-lead (Final result)  Result time 03/30/20 14:45:25    Final result by Interface, Lab In [facility] (03/30/20 14:45:25)                 Narrative:    Test Reason : R06.00,    Vent. Rate : 109 BPM     Atrial Rate : 109 BPM     P-R Int : 146 ms          QRS Dur : 070 ms      QT Int : 324 ms       P-R-T Axes : 063 023 036 degrees     QTc Int : 436 ms    Sinus tachycardia  Otherwise normal ECG    Confirmed by  Musa Padron MD ([id number]) on 3/30/2020 2:45:14 PM    Referred By: IRMA   SELF           Confirmed By:Musa Padron MD                              No results found for this or any previous visit.    US Upper Extremity Veins Right  Narrative: EXAMINATION:  US UPPER EXTREMITY VEINS RIGHT    CLINICAL HISTORY:  r/o DVT;    TECHNIQUE:  Duplex and color flow Doppler evaluation and dynamic compression was performed of the right upper extremity veins.    COMPARISON:  Chest radiograph 04/01/2020, 03/30/2020, 03/29/2020.    FINDINGS:  Central veins: There is deep vein thrombosis of the right internal jugular, subclavian, and axillary veins.    Arm veins: thrombosis of the right brachial, basilic, and cephalic veins.    Contralateral subclavian/internal jugular veins: The left subclavian and internal jugular veins are patent and free of thrombus.  Impression: Deep vein thrombosis of multiple veins in the right upper extremity as above.    This report was flagged in Epic as abnormal.    COMMUNICATION  This critical result was discovered/received at 14:29.  The critical information above was relayed directly by Cash Peter by telephone to Dr. Mccray On 04/07/2020 at 14:32.    Electronically signed by resident: Cash Peter  Date:    04/07/2020  Time:    14:28    Electronically signed by: Estefanía Mancuso MD  Date:    04/07/2020  Time:    14:42      All imaging within the last 24 hours was reviewed.     Assessment and Plan:    Active Hospital Problems    Diagnosis  POA    *Acute respiratory distress syndrome (ARDS) due to COVID-19 virus [U07.1, J80]  Yes    Hypernatremia [E87.0]  Unknown    Acute deep vein thrombosis (DVT) of axillary vein of right upper extremity [I82.A11]  No    Bipolar 1 disorder, depressed [F31.9]  Yes     - Per PCP Dr. Lechuga notes, sees psychiatrist, on Ziprasidone 80 mg QD, Buproprion 150 mg TID, Lamotrigine 200mg QD.  - Patient currently sedated, and holding home meds EXCEPT  for lamotrigine.  4/1.  - Will consider re-adding his home medications once extubated and out of ICU.      Anemia [D64.9]  No    JULIET (acute kidney injury) [N17.9]  Unknown    Elevated LFTs [R94.5]  Yes     - likely 2/2 to covid; however, does have a history of treated hep c in 2015  - AST//600s on transfer, now downtrending to 400s/480s. Continuing to trend CMPs  - Despite downtrending AST/ALTs, patient has Hx of treated HCV but no recent HCV RNA, and continued IVDU/substance use.      Type 2 diabetes mellitus with diabetic neuropathic arthropathy, without long-term current use of insulin [E11.610]  Yes    Essential hypertension, benign [I10]  Yes    Hepatitis C [B19.20]  Yes      Resolved Hospital Problems   No resolved problems to display.       Covid-19 Virus Infection  - COVID-19 testing: Collection Date: 3/29/2020 Collection Time:  10:02 PM  - Infection Control notified    - Isolation:   - Airborne and Droplet Precautions  - Surgical mask on patient   - N95 masks must be fit tested, wear eye protection  - 20 second hand hygiene   - Limit visitors per hospital policy   - Consolidate lab draws, nursing care, and interventions    - Diagnostics: (Lymphopenia, hyponatremia, hyperferritinemia, elevated troponin, elevated d-dimer, age, and comorbidities are significant predictors of poor clinical outcome)   - CBC: Hb stable 8.1, normal WBC  trend Q48hrs  - CMP:    hypoNa-> hyperNa--> Na 144   trend Q48hrs  - Procalcitonin: 0.13  - D-dimer: 1.03    repeat prior to discharge  - Ferritin: 331->369    repeat prior to discharge   - CRP:  122-->peak 306-->146      trend Q48hrs  - LDH: 789->987    repeat prior to discharge  - Troponin: 0.011   - ECG: normal   - rapid Flu: negative   - CXR: RLL consolidation   - UA : no infection   - CPK: 143->699->408    - Management:   Bundle care as able to maintain isolation & minimize in/out of room   - Supplemental O2 to maintain SpO2 92%-96%   if requiring 6L NC or  higher, place on nonrebreather and discuss case with MICU   - Telemetry & continuous pulse oximetry    - If wheezing   - albuterol inhaler 2-4 puff Q6hr PRN    - ipratropium daily    - acetaminophen 650mg PO Q6hr PRN fever   - loperamide PRN for viral diarrhea   - Empiric antibiotics and hydroxychloroquine completed    - not on statin 2/2 elevated CPK   - Investigational Treatment Protocol: (if patient meets criteria)   [email or web address]       Safety notes:   - okay to use BiPAP overnight with appropirate precautions in neg pressure room   - Cautious use of NSAIDS for fever per WHO recommendations (3/16/2020)   - No new ACEi/ARB start or discontinuation of chronic med unless hypotensive (Esler et al. Journal of Hypertension 2020, 38:000-000)   - Careful use of steroids in the absence of other indications (shock, ARDS)   - Fluid sparing resuscitation, avoid maintenance fluids    Hypernatremia- resolved  In the setting of no PO intake and inability to replete water losses. Resolved on D5 infusion.    - cleared for puree diet w/ nectar thick liquids so will stop IVF  - daily BMP until stable       Acute deep vein thrombosis (DVT) of axillary vein of right upper extremity  RUE us 04/07 consistent with DVTs in the R IJ, subclavian, and axillary veins. Noted thrombosis of cephalic, brachial, and basilic veins. Started on therapeutic lovenox 90mg BID.    -Continue with lovenox - will work w/ pharmacy to see if we can transition to NOAC  -PRn oxy   -CK trending done, recheck tomorrow      Type 2 diabetes mellitus with diabetic neuropathic arthropathy, without long-term current use of insulin  Home medication includes jardiance 25mg (on hold)   -stop D5 and start diet today.   -decrease to levemir 10 units BID until eating well  - sliding scale aspart TID AC for now, may need scheduled insulin w/ meals if eating  well     Bipolar 1 disorder, depressed  Home meds include lamotrigine 200 QHS and ziprasidone but now on lamotrigine and depakote per ICU team, off precedex (started for agitation in ICU)  -Haldol PRN for agitation but has not required its use  -telemed consult placed to psychiatry to assist w/ med adjustment or transition back to home regimen    Essential hypertension, benign  home lisinopril 10mg daily. Was on hold for hypotension but this has resolved, now with hypertension and normal kidney function  - resume lisinopril 10 mg daily    Advance Care Planning  Goals of care, counseling/discussion- Full code    Advance Care Planning       VTE High Risk Prophylaxis:   VTE Risk Mitigation (From admission, onward)         Ordered     enoxaparin injection 90 mg  Every 12 hours (non-standard times)      04/07/20 1441     heparin (porcine) injection 10,000 Units  Use PRN      03/30/20 1647     heparin (porcine) 1,000 unit/mL injection      03/30/20 1640     Place sequential compression device  Until discontinued      03/30/20 0015     IP VTE HIGH RISK PATIENT  Once      03/30/20 0015                Patient's chronic/stable medical conditions noted in the problem list above will be managed with the patient's home medications as tolerated.        Subsequent Inpatient Hospital Care  Level 3 [id number] Total visit time was 35 minutes or greater with greater than 50% of time spent in counseling and coordination of care.     Layo Elam MD   Hospital Medicine Team I (Endocrinologist)  Pager: [phone]  Spectralink: [phone]

## 2020-04-11 NOTE — PLAN OF CARE
Pt turned and repositioned x 2 person assist, supported by wedge and pillow. Pt blood glucose monitored, coverage given, see MAR. Pt pain and safety monitored every 1-2 hours this shift. PRN pain medication administered at 0127 for c/o of pain to right arm, pain level 8/10 per pt report, pain medication effective, per pt report. RUE elevated on pillow at time. Bed locked and in lowest position. Bed rails elevated x2. 02 sat 97% on high flow 12LPM continuous. HOB elevated. No c/o of sob or pain at time. Personal belongings and call light within reach. Will cont. to monitor.

## 2020-04-12 LAB
ALBUMIN SERPL BCP-MCNC: 1.7 G/DL (ref 3.5–5.2)
ALP SERPL-CCNC: 81 U/L (ref 55–135)
ALT SERPL W/O P-5'-P-CCNC: 170 U/L (ref 10–44)
ANION GAP SERPL CALC-SCNC: 9 MMOL/L (ref 8–16)
AST SERPL-CCNC: 149 U/L (ref 10–40)
BASOPHILS # BLD AUTO: 0.02 K/UL (ref 0–0.2)
BASOPHILS NFR BLD: 0.3 % (ref 0–1.9)
BILIRUB SERPL-MCNC: 0.3 MG/DL (ref 0.1–1)
BUN SERPL-MCNC: 12 MG/DL (ref 8–23)
CALCIUM SERPL-MCNC: 7.8 MG/DL (ref 8.7–10.5)
CHLORIDE SERPL-SCNC: 109 MMOL/L (ref 95–110)
CK SERPL-CCNC: 175 U/L (ref 20–200)
CO2 SERPL-SCNC: 27 MMOL/L (ref 23–29)
CREAT SERPL-MCNC: 0.7 MG/DL (ref 0.5–1.4)
DIFFERENTIAL METHOD: ABNORMAL
EOSINOPHIL # BLD AUTO: 0 K/UL (ref 0–0.5)
EOSINOPHIL NFR BLD: 0.1 % (ref 0–8)
ERYTHROCYTE [DISTWIDTH] IN BLOOD BY AUTOMATED COUNT: 25 % (ref 11.5–14.5)
EST. GFR  (AFRICAN AMERICAN): >60 ML/MIN/1.73 M^2
EST. GFR  (NON AFRICAN AMERICAN): >60 ML/MIN/1.73 M^2
GLUCOSE SERPL-MCNC: 144 MG/DL (ref 70–110)
HCT VFR BLD AUTO: 29.1 % (ref 40–54)
HGB BLD-MCNC: 8.6 G/DL (ref 14–18)
IMM GRANULOCYTES # BLD AUTO: 0.1 K/UL (ref 0–0.04)
IMM GRANULOCYTES NFR BLD AUTO: 1.3 % (ref 0–0.5)
LYMPHOCYTES # BLD AUTO: 0.7 K/UL (ref 1–4.8)
LYMPHOCYTES NFR BLD: 9.4 % (ref 18–48)
MCH RBC QN AUTO: 23.6 PG (ref 27–31)
MCHC RBC AUTO-ENTMCNC: 29.6 G/DL (ref 32–36)
MCV RBC AUTO: 80 FL (ref 82–98)
MONOCYTES # BLD AUTO: 1.1 K/UL (ref 0.3–1)
MONOCYTES NFR BLD: 14.6 % (ref 4–15)
NEUTROPHILS # BLD AUTO: 5.6 K/UL (ref 1.8–7.7)
NEUTROPHILS NFR BLD: 74.3 % (ref 38–73)
NRBC BLD-RTO: 0 /100 WBC
PLATELET # BLD AUTO: 378 K/UL (ref 150–350)
PMV BLD AUTO: 12.8 FL (ref 9.2–12.9)
POCT GLUCOSE: 150 MG/DL (ref 70–110)
POCT GLUCOSE: 161 MG/DL (ref 70–110)
POCT GLUCOSE: 162 MG/DL (ref 70–110)
POCT GLUCOSE: 223 MG/DL (ref 70–110)
POTASSIUM SERPL-SCNC: 3.5 MMOL/L (ref 3.5–5.1)
PROT SERPL-MCNC: 6.3 G/DL (ref 6–8.4)
RBC # BLD AUTO: 3.64 M/UL (ref 4.6–6.2)
SODIUM SERPL-SCNC: 145 MMOL/L (ref 136–145)
WBC # BLD AUTO: 7.47 K/UL (ref 3.9–12.7)

## 2020-04-12 PROCEDURE — 25000003 PHARM REV CODE 250: Performed by: STUDENT IN AN ORGANIZED HEALTH CARE EDUCATION/TRAINING PROGRAM

## 2020-04-12 PROCEDURE — 27100092 HC HIGH FLOW DELIVERY CANNULA

## 2020-04-12 PROCEDURE — 93010 ELECTROCARDIOGRAM REPORT: CPT | Mod: ,,, | Performed by: INTERNAL MEDICINE

## 2020-04-12 PROCEDURE — 99232 SBSQ HOSP IP/OBS MODERATE 35: CPT | Mod: ,,, | Performed by: INTERNAL MEDICINE

## 2020-04-12 PROCEDURE — 82550 ASSAY OF CK (CPK): CPT

## 2020-04-12 PROCEDURE — 27000221 HC OXYGEN, UP TO 24 HOURS

## 2020-04-12 PROCEDURE — 94660 CPAP INITIATION&MGMT: CPT

## 2020-04-12 PROCEDURE — 25000003 PHARM REV CODE 250: Performed by: INTERNAL MEDICINE

## 2020-04-12 PROCEDURE — 63600175 PHARM REV CODE 636 W HCPCS: Performed by: STUDENT IN AN ORGANIZED HEALTH CARE EDUCATION/TRAINING PROGRAM

## 2020-04-12 PROCEDURE — 93010 EKG 12-LEAD: ICD-10-PCS | Mod: ,,, | Performed by: INTERNAL MEDICINE

## 2020-04-12 PROCEDURE — 99900035 HC TECH TIME PER 15 MIN (STAT)

## 2020-04-12 PROCEDURE — 25000242 PHARM REV CODE 250 ALT 637 W/ HCPCS: Performed by: STUDENT IN AN ORGANIZED HEALTH CARE EDUCATION/TRAINING PROGRAM

## 2020-04-12 PROCEDURE — 85025 COMPLETE CBC W/AUTO DIFF WBC: CPT

## 2020-04-12 PROCEDURE — 94640 AIRWAY INHALATION TREATMENT: CPT

## 2020-04-12 PROCEDURE — 80053 COMPREHEN METABOLIC PANEL: CPT

## 2020-04-12 PROCEDURE — 11000001 HC ACUTE MED/SURG PRIVATE ROOM

## 2020-04-12 PROCEDURE — 94761 N-INVAS EAR/PLS OXIMETRY MLT: CPT

## 2020-04-12 PROCEDURE — 25000003 PHARM REV CODE 250: Performed by: NURSE PRACTITIONER

## 2020-04-12 PROCEDURE — 99232 PR SUBSEQUENT HOSPITAL CARE,LEVL II: ICD-10-PCS | Mod: ,,, | Performed by: INTERNAL MEDICINE

## 2020-04-12 PROCEDURE — 93005 ELECTROCARDIOGRAM TRACING: CPT

## 2020-04-12 PROCEDURE — 36415 COLL VENOUS BLD VENIPUNCTURE: CPT

## 2020-04-12 RX ADMIN — IPRATROPIUM BROMIDE AND ALBUTEROL SULFATE 3 ML: .5; 3 SOLUTION RESPIRATORY (INHALATION) at 07:04

## 2020-04-12 RX ADMIN — IPRATROPIUM BROMIDE AND ALBUTEROL SULFATE 3 ML: .5; 3 SOLUTION RESPIRATORY (INHALATION) at 03:04

## 2020-04-12 RX ADMIN — SENNOSIDES 5 ML: 8.8 SYRUP ORAL at 08:04

## 2020-04-12 RX ADMIN — ENOXAPARIN SODIUM 90 MG: 100 INJECTION SUBCUTANEOUS at 08:04

## 2020-04-12 RX ADMIN — DIVALPROEX SODIUM 250 MG: 250 TABLET, DELAYED RELEASE ORAL at 09:04

## 2020-04-12 RX ADMIN — OXYCODONE HYDROCHLORIDE AND ACETAMINOPHEN 1 TABLET: 10; 325 TABLET ORAL at 12:04

## 2020-04-12 RX ADMIN — LAMOTRIGINE 200 MG: 100 TABLET ORAL at 09:04

## 2020-04-12 RX ADMIN — DOCUSATE SODIUM 50 MG: 50 LIQUID ORAL at 08:04

## 2020-04-12 RX ADMIN — OXYCODONE HYDROCHLORIDE AND ACETAMINOPHEN 1 TABLET: 10; 325 TABLET ORAL at 09:04

## 2020-04-12 RX ADMIN — LIDOCAINE 1 PATCH: 50 PATCH CUTANEOUS at 08:04

## 2020-04-12 RX ADMIN — INSULIN ASPART 4 UNITS: 100 INJECTION, SOLUTION INTRAVENOUS; SUBCUTANEOUS at 12:04

## 2020-04-12 RX ADMIN — DIVALPROEX SODIUM 250 MG: 250 TABLET, DELAYED RELEASE ORAL at 02:04

## 2020-04-12 RX ADMIN — APIXABAN 10 MG: 5 TABLET, FILM COATED ORAL at 09:04

## 2020-04-12 RX ADMIN — DIVALPROEX SODIUM 250 MG: 250 TABLET, DELAYED RELEASE ORAL at 05:04

## 2020-04-12 RX ADMIN — LISINOPRIL 10 MG: 10 TABLET ORAL at 08:04

## 2020-04-12 RX ADMIN — POLYETHYLENE GLYCOL 3350 17 G: 17 POWDER, FOR SOLUTION ORAL at 08:04

## 2020-04-12 RX ADMIN — INSULIN ASPART 2 UNITS: 100 INJECTION, SOLUTION INTRAVENOUS; SUBCUTANEOUS at 08:04

## 2020-04-12 RX ADMIN — IPRATROPIUM BROMIDE AND ALBUTEROL SULFATE 3 ML: .5; 3 SOLUTION RESPIRATORY (INHALATION) at 12:04

## 2020-04-12 RX ADMIN — OXYCODONE HYDROCHLORIDE AND ACETAMINOPHEN 1 TABLET: 10; 325 TABLET ORAL at 05:04

## 2020-04-12 RX ADMIN — Medication 3 MG: at 12:04

## 2020-04-12 NOTE — PROGRESS NOTES
AAO X4 assisted pt to sit at the side of the bed today, he complained of pain to his neck and right arm. PRN oxycodone gives per his request and it is helpful. While sitting at the bedside his right arm seemed to place him off balance. Right arm remains swollen pulse palpated and the extremity is warm to touch. Atkins catheter remains in place with dark danni urine draining to gravity, cath completed, RT working with him and decreased his oxygen to 5 LPM via nasal canula his saturation is remaining > 92%. Fed his meals per staff as he is right handed and having trouble managing the utensil with his left hand.Incontinent of bowels today with care given accordingly.

## 2020-04-12 NOTE — PROGRESS NOTES
"Pt offered pudding and apple sauce as he stated "I can eat, my Dr said I can eat now". Liquids thickened to nectar Pt tolerated well Reminded to take small bites and sips between to clear his oral cavity, He tolerated the food well any took small bites and sips of water between. Oxygen on and continues at 11 LPM high flow nasal canula. Right arm elevated on pillow, Pain medication given as pt requested.  "

## 2020-04-12 NOTE — PROGRESS NOTES
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     Hospital Course:  Initial workup showed CXR right lobe infiltrate with elevated , D-Dimer 1.03,  and Ferritin 331. Patient swabbed for COVID 19:positive. Patient started on IV Rocephin and Azithromycin and Plaquenil. He had rapid decline in respiratory status within 24H of admission and was intubated 3/30/2020 c/b ARDS.  He completed Abx for empiric CAP treatment on 4/2 and course of hydroxychloroquine ended 4/5.  Extubated on 4/4/2020 and transitioned to bipap/NC.   Developed hypernatremia which resolved on D5 drip requiring insulin for hyperglycemia.  Developed RUE DVT on therapeutic lovenox.  Started on Depakote for agitation    Interval History:     No acute events overnight, refused bipap overnight and now down to 8 L NC, afebrile  Passed swallow eval yesterday, diet started, ate dinner  BG improved off D5  Pain controled in neck and RUE with pain meds  No complaints today    Review of Systems:  Respiratory:  No dysnea on 8L NC, denies cough  Cardiovascular: denies chest pain/palpitations  GI: no abd pain, no N/V.     Inpatient Medications:    Current Facility-Administered Medications:     acetaminophen tablet 650 mg, 650 mg, Oral, Q4H PRN, Emilee Mandel MD, 650 mg at 04/10/20  0217    albuterol-ipratropium 2.5 mg-0.5 mg/3 mL nebulizer solution 3 mL, 3 mL, Nebulization, Q6H, Gurpreet Godinez MD, 3 mL at 04/12/20 0739    apixaban tablet 10 mg, 10 mg, Oral, BID **FOLLOWED BY** [START ON 4/19/2020] apixaban tablet 5 mg, 5 mg, Oral, BID, Layo Elam MD    bisacodyL suppository 10 mg, 10 mg, Rectal, Daily PRN, Anila Daily MD, 10 mg at 04/08/20 2205    divalproex EC tablet 250 mg, 250 mg, Oral, Q8H, Blaise Treadwell MD, 250 mg at 04/12/20 0511    sennosides 8.8 mg/5 ml syrup 5 mL, 5 mL, Per NG tube, Daily, 5 mL at 04/12/20 0846 **AND** docusate 50 mg/5 mL liquid 50 mg, 50 mg, Per NG tube, Daily, Ari Black MD, 50 mg at 04/12/20 0846    glucose chewable tablet 16 g, 16 g, Oral, PRN, Emilee Mandel MD    glucose chewable tablet 24 g, 24 g, Oral, PRN, Emilee Mandel MD    haloperidol lactate injection 2 mg, 2 mg, Intravenous, Q6H PRN, Anita Cervantes MD    heparin (porcine) injection 10,000 Units, 10,000 Units, Intravenous, PRN, Mervin Meyers MD, 10,000 Units at 03/30/20 1647    insulin aspart U-100 pen 1-10 Units, 1-10 Units, Subcutaneous, TIDWM, Layo Elam MD, 4 Units at 04/12/20 1251    insulin detemir U-100 pen 10 Units, 10 Units, Subcutaneous, BID, Layo Elam MD, 10 Units at 04/12/20 0844    labetalol 20 mg/4 mL (5 mg/mL) IV syring, 15 mg, Intravenous, Q4H PRN, Usman Bal MD, 15 mg at 04/04/20 1932    lamoTRIgine tablet 200 mg, 200 mg, Oral, QHS, Emilee Mandel MD, 200 mg at 04/11/20 2049    lidocaine 5 % patch 1 patch, 1 patch, Transdermal, Q24H, Waleska Muñoz MD, 1 patch at 04/12/20 0845    lisinopriL tablet 10 mg, 10 mg, Oral, Daily, Layo Elam MD, 10 mg at 04/12/20 0845    melatonin tablet 3 mg, 3 mg, Oral, Nightly PRN, Mary Boston NP, 3 mg at 04/12/20 0055    ondansetron disintegrating tablet 8 mg, 8 mg, Oral, Q8H PRN, Emilee Mandel MD    oxyCODONE-acetaminophen  mg per tablet 1 tablet, 1 tablet, Oral, Q6H  "PRN, Anita Cervantes MD, 1 tablet at 04/12/20 1252    polyethylene glycol packet 17 g, 17 g, Per NG tube, Daily, Ari Black MD, 17 g at 04/12/20 0845    sodium chloride 0.9% flush 10 mL, 10 mL, Intravenous, PRN, Emilee Mandel MD    sodium chloride 0.9% flush 10 mL, 10 mL, Intravenous, PRN, Emilee Mandel MD      Physical Exam:      Intake/Output Summary (Last 24 hours) at 4/12/2020 1401  Last data filed at 4/11/2020 2035  Gross per 24 hour   Intake 240 ml   Output 475 ml   Net -235 ml     Wt Readings from Last 3 Encounters:   04/10/20 87.4 kg (192 lb 10.9 oz)   04/01/20 87.1 kg (192 lb)   02/05/19 96.8 kg (213 lb 6.4 oz)       /75   Pulse 91   Temp 97 °F (36.1 °C) (Oral)   Resp 19   Ht 5' 9" (1.753 m)   Wt 87.4 kg (192 lb 10.9 oz)   SpO2 96%   BMI 28.45 kg/m²     Limited exam 2/2 minimizing exposure to COVID-19.  GEN: NAD, drowsy, MMM  Resp: normal work of breathing   Neuro: AAOx3    Laboratory:  Lab Results   Component Value Date    [id number] Detected (A) 03/29/2020       Recent Labs   Lab 04/10/20  0351 04/11/20  0434 04/12/20  0055   WBC 9.44 9.69 7.47   LYMPH 6.9*  0.7* 7.4*  0.7* 9.4*  0.7*   HGB 8.2* 8.1* 8.6*   HCT 27.6* 26.7* 29.1*   * 445* 378*     Recent Labs   Lab 04/09/20  0326  04/10/20  0351 04/10/20  0554 04/11/20  0435 04/12/20  0421   *   < >  --  147* 144 145   K 3.8   < >  --  3.7 4.1 3.5   *   < >  --  113* 110 109   CO2 23   < >  --  23 23 27   BUN 23   < >  --  20 14 12   CREATININE 0.8   < >  --  0.8 0.7 0.7   *   < >  --  227* 213* 144*   CALCIUM 8.0*   < >  --  7.7* 7.9* 7.8*   MG 2.8*  --  2.7*  --  2.8*  --    PHOS 3.7  --  2.9  --  2.4*  --     < > = values in this interval not displayed.     Recent Labs   Lab 04/10/20  0554 04/11/20  0435 04/12/20 0421   ALKPHOS 89 89 81   * 174* 170*   * 146* 149*   ALBUMIN 1.8* 1.8* 1.7*   PROT 6.3 6.6 6.3   BILITOT 0.5 0.4 0.3        Recent Labs     04/11/20  1446 04/12/20 0421 "   .5*  --    CPK  --  175       All labs within the last 24 hours were reviewed.     Microbiology:  Microbiology Results (last 7 days)     ** No results found for the last 168 hours. **            Imaging  ECG Results          EKG 12-lead (Final result)  Result time 04/02/20 08:08:36    Final result by Interface, Lab In [facility] (04/02/20 08:08:36)                 Narrative:    Test Reason : R68.89,    Vent. Rate : 097 BPM     Atrial Rate : 097 BPM     P-R Int : 150 ms          QRS Dur : 072 ms      QT Int : 332 ms       P-R-T Axes : 065 025 039 degrees     QTc Int : 421 ms    Age and gender specific analysis  Normal sinus rhythm  Normal ECG  When compared with ECG of 30-MAR-2020 15:09,    No significant change was found  Confirmed by CORY DURAND MD ([id number]) on 4/2/2020 8:08:25 AM    Referred By: IRMA   SELF           Confirmed By:CORY DURAND MD                             EKG 12-lead (Final result)  Result time 03/31/20 09:55:44    Final result by Interface, Lab In [facility] (03/31/20 09:55:44)                 Narrative:    Test Reason : J22,B97.29,    Vent. Rate : 105 BPM     Atrial Rate : 104 BPM     P-R Int : 000 ms          QRS Dur : 068 ms      QT Int : 338 ms       P-R-T Axes : 000 -06 006 degrees     QTc Int : 446 ms    Atrial pacing.  Confirmed by Musa Padron MD ([id number]) on 3/31/2020 9:55:37 AM    Referred By: [email or web address]   SELF           Confirmed By:Musa Padron MD                             EKG 12-lead (Final result)  Result time 03/30/20 14:45:25    Final result by Interface, Lab In [facility] (03/30/20 14:45:25)                 Narrative:    Test Reason : R06.00,    Vent. Rate : 109 BPM     Atrial Rate : 109 BPM     P-R Int : 146 ms          QRS Dur : 070 ms      QT Int : 324 ms       P-R-T Axes : 063 023 036 degrees     QTc Int : 436 ms    Sinus tachycardia  Otherwise normal ECG    Confirmed by Musa Padron MD ([id number]) on 3/30/2020 2:45:14 PM    Referred By: [email or web address]   SELF            Confirmed By:Musa Padron MD                              No results found for this or any previous visit.    US Upper Extremity Veins Right  Narrative: EXAMINATION:  US UPPER EXTREMITY VEINS RIGHT    CLINICAL HISTORY:  r/o DVT;    TECHNIQUE:  Duplex and color flow Doppler evaluation and dynamic compression was performed of the right upper extremity veins.    COMPARISON:  Chest radiograph 04/01/2020, 03/30/2020, 03/29/2020.    FINDINGS:  Central veins: There is deep vein thrombosis of the right internal jugular, subclavian, and axillary veins.    Arm veins: thrombosis of the right brachial, basilic, and cephalic veins.    Contralateral subclavian/internal jugular veins: The left subclavian and internal jugular veins are patent and free of thrombus.  Impression: Deep vein thrombosis of multiple veins in the right upper extremity as above.    This report was flagged in Epic as abnormal.    COMMUNICATION  This critical result was discovered/received at 14:29.  The critical information above was relayed directly by Cash Peter by telephone to Dr. Mccray On 04/07/2020 at 14:32.    Electronically signed by resident: Cash Peter  Date:    04/07/2020  Time:    14:28    Electronically signed by: Estefanía Mancuso MD  Date:    04/07/2020  Time:    14:42      All imaging within the last 24 hours was reviewed.     Assessment and Plan:    Active Hospital Problems    Diagnosis  POA    *Acute respiratory distress syndrome (ARDS) due to COVID-19 virus [U07.1, J80]  Yes    Hypernatremia [E87.0]  Unknown    Acute deep vein thrombosis (DVT) of axillary vein of right upper extremity [I82.A11]  No    Bipolar 1 disorder, depressed [F31.9]  Yes     - Per PCP Dr. Lechuga notes, sees psychiatrist, on Ziprasidone 80 mg QD, Buproprion 150 mg TID, Lamotrigine 200mg QD.  - Patient currently sedated, and holding home meds EXCEPT for lamotrigine.  4/1.  - Will consider re-adding his home medications once  extubated and out of ICU.      Anemia [D64.9]  No    JULIET (acute kidney injury) [N17.9]  Unknown    Elevated LFTs [R94.5]  Yes     - likely 2/2 to covid; however, does have a history of treated hep c in 2015  - AST//600s on transfer, now downtrending to 400s/480s. Continuing to trend CMPs  - Despite downtrending AST/ALTs, patient has Hx of treated HCV but no recent HCV RNA, and continued IVDU/substance use.      Type 2 diabetes mellitus with diabetic neuropathic arthropathy, without long-term current use of insulin [E11.610]  Yes    Essential hypertension, benign [I10]  Yes    Hepatitis C [B19.20]  Yes      Resolved Hospital Problems   No resolved problems to display.       Covid-19 Virus Infection  - COVID-19 testing: Collection Date: 3/29/2020 Collection Time:  10:02 PM  - Infection Control notified    - Isolation:   - Airborne and Droplet Precautions  - Surgical mask on patient   - N95 masks must be fit tested, wear eye protection  - 20 second hand hygiene   - Limit visitors per hospital policy   - Consolidate lab draws, nursing care, and interventions    - Diagnostics: (Lymphopenia, hyponatremia, hyperferritinemia, elevated troponin, elevated d-dimer, age, and comorbidities are significant predictors of poor clinical outcome)   - CBC: Hb stable , normal WBC  trend Q48hrs  - CMP:    hypoNa-> hyperNa--> Na 145   trend Q48hrs  - Procalcitonin: 0.13  - D-dimer: 1.03    repeat prior to discharge  - Ferritin: 331->369    repeat prior to discharge   - CRP:  122-->peak 306-->146->182   trend Q48hrs  - LDH: 789->987    repeat prior to discharge  - Troponin: 0.011   - ECG: normal   - rapid Flu: negative   - CXR: RLL consolidation   - UA : no infection   - CPK: 143->699->408    - Management:   Bundle care as able to maintain isolation & minimize in/out of room   - Supplemental O2 to maintain SpO2 92%-96%   if requiring 6L NC or higher, place on nonrebreather and discuss case with MICU   - Telemetry & continuous  pulse oximetry    - If wheezing   - albuterol inhaler 2-4 puff Q6hr PRN    - ipratropium daily    - acetaminophen 650mg PO Q6hr PRN fever   - loperamide PRN for viral diarrhea   - Empiric antibiotics and hydroxychloroquine completed    - not on statin 2/2 elevated CPK   - Investigational Treatment Protocol: (if patient meets criteria)   [email or web address]       Safety notes:   - d/c bipap as he is no longer requiring it   - Cautious use of NSAIDS for fever per WHO recommendations (3/16/2020)   - No new ACEi/ARB start or discontinuation of chronic med unless hypotensive (Esler et al. Journal of Hypertension 2020, 38:000-000)   - Careful use of steroids in the absence of other indications (shock, ARDS)   - Fluid sparing resuscitation, avoid maintenance fluids    Hypernatremia- resolved  Na remains normal off D5 infusion and he is tolerating po intake  - stop daily checks, cont CMP EOD     Acute deep vein thrombosis (DVT) of axillary vein of right upper extremity  RUE us 04/07 consistent with DVTs in the R IJ, subclavian, and axillary veins. Noted thrombosis of cephalic, brachial, and basilic veins.    -transition from therapeutic lovenox to apixabian  -PRn oxy   -CPK normalized      Type 2 diabetes mellitus with diabetic neuropathic arthropathy, without long-term current use of insulin  Home medication includes jardiance 25mg (on hold)   -continue levemir 10 units BID   - sliding scale aspart TID AC for now, may need scheduled insulin w/ meals if intake improves     Bipolar 1 disorder, depressed  Home meds include lamotrigine 200 QHS and ziprasidone but now on lamotrigine and depakote per ICU team  -Haldol PRN for agitation but has not required its use  -telemed consult placed to psychiatry to assist w/ med adjustment or transition back to home regimen (consult pending)    Essential hypertension, benign  BP at goal,  continue home lisinopril 10 mg daily    Advance Care Planning  Goals of care, counseling/discussion- Full code    Advance Care Planning       VTE High Risk Prophylaxis:   VTE Risk Mitigation (From admission, onward)         Ordered     apixaban tablet 5 mg  2 times daily      04/12/20 1231     apixaban tablet 10 mg  2 times daily      04/12/20 1231     heparin (porcine) injection 10,000 Units  Use PRN      03/30/20 1647     heparin (porcine) 1,000 unit/mL injection      03/30/20 1640     Place sequential compression device  Until discontinued      03/30/20 0015     IP VTE HIGH RISK PATIENT  Once      03/30/20 0015                Patient's chronic/stable medical conditions noted in the problem list above will be managed with the patient's home medications as tolerated.        Subsequent Inpatient Hospital Care  Level 2 73704 Total visit time was 25 minutes or greater with greater than 50% of time spent in counseling and coordination of care.     Layo Elam MD   Hospital Medicine Team I (Endocrinologist)  Pager: [phone]  Spectralink: [phone]

## 2020-04-12 NOTE — PLAN OF CARE
Pt repositioned in bed x2 assist. Pt monitored for pain or c/o of sob q 1-2 hours. 02 sat 99% on 11 LPM high flow nasal canula. Frequent non productive cough noted. Pt afebrile. Blood glucose monitored, coverage given as indicated by MAR, see MAR. LILY continues to be swollen, elevated on pillow, Pain medication given as needed. Bed in lowest position with wheels locked. Personal belongings and call light within reach. Will continue to monitor.

## 2020-04-13 LAB
POCT GLUCOSE: 127 MG/DL (ref 70–110)
POCT GLUCOSE: 163 MG/DL (ref 70–110)
POCT GLUCOSE: 211 MG/DL (ref 70–110)
POCT GLUCOSE: 258 MG/DL (ref 70–110)

## 2020-04-13 PROCEDURE — 25000003 PHARM REV CODE 250: Performed by: STUDENT IN AN ORGANIZED HEALTH CARE EDUCATION/TRAINING PROGRAM

## 2020-04-13 PROCEDURE — 94761 N-INVAS EAR/PLS OXIMETRY MLT: CPT

## 2020-04-13 PROCEDURE — 25000242 PHARM REV CODE 250 ALT 637 W/ HCPCS: Performed by: STUDENT IN AN ORGANIZED HEALTH CARE EDUCATION/TRAINING PROGRAM

## 2020-04-13 PROCEDURE — 97535 SELF CARE MNGMENT TRAINING: CPT

## 2020-04-13 PROCEDURE — 11000001 HC ACUTE MED/SURG PRIVATE ROOM

## 2020-04-13 PROCEDURE — 27000221 HC OXYGEN, UP TO 24 HOURS

## 2020-04-13 PROCEDURE — 25000003 PHARM REV CODE 250: Performed by: INTERNAL MEDICINE

## 2020-04-13 PROCEDURE — 97530 THERAPEUTIC ACTIVITIES: CPT

## 2020-04-13 PROCEDURE — 92526 ORAL FUNCTION THERAPY: CPT

## 2020-04-13 PROCEDURE — 99232 SBSQ HOSP IP/OBS MODERATE 35: CPT | Mod: ,,, | Performed by: INTERNAL MEDICINE

## 2020-04-13 PROCEDURE — 97110 THERAPEUTIC EXERCISES: CPT

## 2020-04-13 PROCEDURE — 99232 PR SUBSEQUENT HOSPITAL CARE,LEVL II: ICD-10-PCS | Mod: ,,, | Performed by: INTERNAL MEDICINE

## 2020-04-13 PROCEDURE — 94640 AIRWAY INHALATION TREATMENT: CPT

## 2020-04-13 RX ADMIN — INSULIN ASPART 6 UNITS: 100 INJECTION, SOLUTION INTRAVENOUS; SUBCUTANEOUS at 12:04

## 2020-04-13 RX ADMIN — DIVALPROEX SODIUM 250 MG: 250 TABLET, DELAYED RELEASE ORAL at 05:04

## 2020-04-13 RX ADMIN — INSULIN ASPART 4 UNITS: 100 INJECTION, SOLUTION INTRAVENOUS; SUBCUTANEOUS at 06:04

## 2020-04-13 RX ADMIN — DIVALPROEX SODIUM 250 MG: 250 TABLET, DELAYED RELEASE ORAL at 03:04

## 2020-04-13 RX ADMIN — IPRATROPIUM BROMIDE AND ALBUTEROL SULFATE 3 ML: .5; 3 SOLUTION RESPIRATORY (INHALATION) at 08:04

## 2020-04-13 RX ADMIN — OXYCODONE HYDROCHLORIDE AND ACETAMINOPHEN 1 TABLET: 10; 325 TABLET ORAL at 03:04

## 2020-04-13 RX ADMIN — LAMOTRIGINE 200 MG: 100 TABLET ORAL at 09:04

## 2020-04-13 RX ADMIN — APIXABAN 10 MG: 5 TABLET, FILM COATED ORAL at 09:04

## 2020-04-13 RX ADMIN — IPRATROPIUM BROMIDE AND ALBUTEROL SULFATE 3 ML: .5; 3 SOLUTION RESPIRATORY (INHALATION) at 01:04

## 2020-04-13 RX ADMIN — APIXABAN 10 MG: 5 TABLET, FILM COATED ORAL at 08:04

## 2020-04-13 RX ADMIN — LIDOCAINE 1 PATCH: 50 PATCH CUTANEOUS at 08:04

## 2020-04-13 RX ADMIN — INSULIN ASPART 2 UNITS: 100 INJECTION, SOLUTION INTRAVENOUS; SUBCUTANEOUS at 08:04

## 2020-04-13 RX ADMIN — DIVALPROEX SODIUM 250 MG: 250 TABLET, DELAYED RELEASE ORAL at 09:04

## 2020-04-13 RX ADMIN — LISINOPRIL 10 MG: 10 TABLET ORAL at 08:04

## 2020-04-13 RX ADMIN — IPRATROPIUM BROMIDE AND ALBUTEROL SULFATE 3 ML: .5; 3 SOLUTION RESPIRATORY (INHALATION) at 12:04

## 2020-04-13 NOTE — MEDICAL/APP STUDENT
Contacted patient's wife, Zeenat.     We discussed vitals (mainly hypertension and oxygen saturation levels), concerns of Mr. Crawford's blood clot in the right arm, and current medications.    Mrs. Epperson would like to know if her  is continuing physical therapy and/or currently walking around.    She also asked for information regarding discharge.    I informed her that I'd call back tomorrow with any further updates.

## 2020-04-13 NOTE — PT/OT/SLP PROGRESS
"Speech Language Pathology Treatment    Patient Name:  Cash Crawford   MRN:  [id number]  Admitting Diagnosis: Acute respiratory distress syndrome (ARDS) due to COVID-19 virus    Recommendations:                 General Recommendations:  monitor diet tolerance/ongoing swallowing assessment  Diet recommendations:  Puree, Liquid Diet Level: Thin   Aspiration Precautions: 1 bite/sip at a time, Alternating bites/sips, HOB to 90 degrees, Meds whole 1 at a time, Monitor for s/s of aspiration, Remain upright 30 minutes post meal, Small bites/sips and Strict aspiration precautions   General Precautions: Standard, airborne, aspiration, contact, droplet, fall  Communication strategies:  go to room if call light pushed    Subjective     "I just want to go home tonight."     Pain/Comfort:  · Pain Rating 1: 0/10    Objective:     Has the patient been evaluated by SLP for swallowing?   Yes  Keep patient NPO? No   Current Respiratory Status: nasal cannula(4L; SPO2 91-95% )      Pt seen for ongoing swallowing assessment while sitting up at EOB.  Pt on 4L O2 via NC and SPO2 noted to fluctuate between 91-95%.  SPO2 seemed to decreased with exertion or after connected speech, but did not appear to decreased in relation to PO intake.  Pt observed consuming thin water via cup and straw (approx 5oz) and 1/4 cracker x 1.  Pt commented while chewing solid trial "I got steel rods. I can't chew." Pt stated preference to remain on pureed diet until able to have dental plates placed again.  Education provided to pt regarding role of SLP, purpose of ongoing swallowing assessment, aspiration, overt s/s of aspiration, pureed diet vs soft solid diet, and recommendations to discontinue use of thickener at this time. SLP also discussed plan to follow up to ensure diet tolerance post liquid advancement and determine if pt would like to reconsider further diet advancement to soft solids soon.  Pt expressed understanding, but was also very focused " on desire to go home.  Team notified of recommendations and pt's focus on going home.     Assessment:     Cash Crawford is a 64 y.o. male with an SLP diagnosis of Dysphagia.      Goals:   Multidisciplinary Problems     SLP Goals        Problem: SLP Goal    Goal Priority Disciplines Outcome   SLP Goal     SLP Ongoing, Progressing   Description:  Speech Language Pathology Goals  Goals expected to be met by 4/15/2020  1. Pt will tolerate a puree diet/nectar-thick liquids with no s/s of aspiration. ADVANCED TO THIN LIQUIDS ON 4/13.  2. Pt will participate in ongoing assessment of swallow fx to determine safest, least restrictive means of nutrition  3. Educate Pt and family on S/S aspiration                        Plan:     · Patient to be seen:  4 x/week   · Plan of Care expires:  05/08/20  · Plan of Care reviewed with:  patient   · SLP Follow-Up:  Yes       Discharge recommendations:  nursing facility, skilled     Time Tracking:     SLP Treatment Date:   04/13/20  Speech Start Time:  1042  Speech Stop Time:  1107     Speech Total Time (min):  25 min    Billable Minutes: Treatment Swallowing Dysfunction 17 and Seld Care/Home Management Training 8    LOC Bustamante, CCC-SLP  04/13/2020     LOC Bustamante, CCC-SLP  Speech Language Pathologist  [phone]  4/13/2020

## 2020-04-13 NOTE — PLAN OF CARE
04/13/20 0945   Discharge Reassessment   Assessment Type Discharge Planning Reassessment   Discharge Plan A Skilled Nursing Facility   Discharge Plan B Home Health   Post-Acute Status   Post-Acute Authorization Placement   Post-Acute Placement Status Awaiting Internal Medical Clearance   Discharge Delays None known at this time     Referral under review by OSNF.

## 2020-04-13 NOTE — PT/OT/SLP PROGRESS
Occupational Therapy   Treatment    Name: Cash Crawford  MRN: [id number]  Admitting Diagnosis:  Acute respiratory distress syndrome (ARDS) due to COVID-19 virus       Recommendations:     Discharge Recommendations: nursing facility, skilled  Discharge Equipment Recommendations:  walker, rolling, bedside commode  Barriers to discharge:       Assessment:     Cash Crawford is a 64 y.o. male with a medical diagnosis of Acute respiratory distress syndrome (ARDS) due to COVID-19 virus.  He presents with performance deficits affecting function are weakness, impaired endurance, impaired self care skills, impaired functional mobilty, gait instability, impaired balance, decreased upper extremity function, decreased lower extremity function, impaired cardiopulmonary response to activity. Pt would benefit from continued skilled acute OT services in order to maximize independence and safety with ADLs and functional mobility to ensure safe return to PLOF in the least restrictive environment.    Rehab Prognosis:  Good; patient would benefit from acute skilled OT services to address these deficits and reach maximum level of function.       Plan:     Patient to be seen 4 x/week to address the above listed problems via self-care/home management, therapeutic activities, therapeutic exercises  · Plan of Care Expires: 05/12/20  · Plan of Care Reviewed with: patient    Subjective     Pain/Comfort:  · Pain Rating 1: 0/10  · Pain Rating Post-Intervention 1: 0/10    Objective:     Communicated with: RN prior to session.  Patient found HOB elevated with telemetry, oxygen upon OT entry to room. Pt agreeable to therapy session.     General Precautions: Standard, airborne, contact, aspiration, fall, droplet(COVID+)   Orthopedic Precautions:N/A   Braces: N/A     Occupational Performance:     Bed Mobility:    · Patient completed Scooting/Bridging with contact guard assistance and for anterior scooting towards EOB   · Patient  completed Supine to Sit with contact guard assistance, with side rail and HOB elevated   · Patient completed Sit to Supine with minimum assistance and with leg lift     Functional Mobility/Transfers:  · Patient completed x 3 reps Sit <> Stand Transfer from EOB with  · 1st trial: moderate assistance and of 2 persons  with  hand-held assist   · Pt reported dizziness upon standing and required to sit back down on bed   · 2nd and 3rd trial: min A x 2 persons with HHA   · Improved sit<>stand quality   · On 3 trial pt took lateral side steps to the left with min A using BHHA and verbal cues for step sequence.     Activities of Daily Living:  · Grooming: moderate assistance Pt required assistance to apply toothpaste to toothbrush. Pt brused teeth using L UE with verbal cues for upright posture. Assistance provided to bring basin and cup of water to mouth while sitting EOB . Pt washed face while sitting EOB     [facility] 6 Click ADL: 10    Treatment & Education:  - Pt educated on role of OT, POC, and goals for therapy.    - Pt tolerated sitting EOB for ~10 mins with CGA <> SBA for sitting balance   - Therapeutic listening provided as pt expressed frustration of care provided by nursing staff and is ready to discharge from hospital and return home with wife.   - Educated pt on being appropriate to transfer with nsg and PCT with x2 persons for safety.   - Time provided for therapeutic counseling and discussion of health disposition.   Co-tx with PT performed due to need for education and assistance from two skilled therapy disciplines at pt's current functional level.   - Pt completed ADLs and functional mobility for treatment session as noted above   - Pt verbalized understanding. Pt expressed no further concerns/questions.    Patient left HOB elevated with all lines intact, call button in reach and RN notifiedEducation:      GOALS:   Multidisciplinary Problems     Occupational Therapy Goals        Problem: Occupational Therapy  Goal    Goal Priority Disciplines Outcome Interventions   Occupational Therapy Goal     OT, PT/OT Ongoing, Progressing    Description:  Goals to be met by: 4-17-20     Patient will increase functional independence with ADLs by performing:    Feeding with Minimal Assistance.  Grooming while EOB with Minimal Assistance.  Sitting at edge of bed x 15 minutes with Stand-by Assistance for functional ADL progress.  Tolerate sitting in bedside chair x 2-3 hours daily to increase endurance for functional activities.                     Time Tracking:     OT Date of Treatment: 04/13/20  OT Start Time: 1154  OT Stop Time: 1221  OT Total Time (min): 27 min (co-tx with PT)    Billable Minutes:Self Care/Home Management 11  Therapeutic Activity 12    Ladi Damon OT  4/13/2020

## 2020-04-13 NOTE — PLAN OF CARE
Problem: Occupational Therapy Goal  Goal: Occupational Therapy Goal  Description  Goals to be met by: 4-17-20     Patient will increase functional independence with ADLs by performing:    Feeding with Minimal Assistance.  Grooming while EOB with Minimal Assistance.  Sitting at edge of bed x 15 minutes with Stand-by Assistance for functional ADL progress.  Tolerate sitting in bedside chair x 2-3 hours daily to increase endurance for functional activities.    Outcome: Ongoing, Progressing    Pt is progressing well towards all goals   Ladi Damon, OTR/L  Pager: [phone]  4/13/2020

## 2020-04-13 NOTE — PROGRESS NOTES
Patient constantly removing his visi monitor leads as well as his tele leads, he was incontinent of feces and had it smeared all over his buttocks in the palm of the right hand and all over the leads and monitors. Patient and all devices cleaned and I asked him how did this happen and he replied I knew this would get you in here. I told him to just push the call button and I would come to the room, He replied that is the only way he knew to get me to the room. Conversation redirected I told him that this behavior is not acceptable and he voiced his understanding.made comfortable, call light placed in easy reach pt instructed on how to use the light, he said he was well aware.

## 2020-04-13 NOTE — CONSULTS
[facility] consulted for  Cash Crawford to be followed through telemedicine modalities.    The patient is currently not appropriate for virtual visits due to altered mental status with behaviorial problems - psychiatry consult pending.    Please re-consult once the patient is appropriate for virtual visits.    Dayanna Khalil

## 2020-04-13 NOTE — PT/OT/SLP PROGRESS
"Physical Therapy Treatment  Co-Treat with OT    Patient Name:  Cash Crawford   MRN:  [id number]    Recommendations:     Discharge Recommendations:  nursing facility, skilled   Discharge Equipment Recommendations: bedside commode, walker, rolling   Barriers to discharge: Decreased caregiver support and and current level of assistance needed for mobility    Assessment:     Cash Crawford is a 64 y.o. male admitted with a medical diagnosis of Acute respiratory distress syndrome (ARDS) due to COVID-19 virus.  He presents with the following impairments/functional limitations:  weakness, gait instability, impaired cardiopulmonary response to activity, impaired endurance, impaired balance, impaired functional mobilty. Pt tolerated session fairly well today but perseverated throughout session regarding his desire to get out of the hospital. When PT and OT were able to redirect pt to focus on tasks being performed, pt produced good effort. Pt remains limited by fatigue and decreased activity tolerance, as well as functional weakness.  Pt currently requires 2 helper assistance to stand from bedside, though he should continue to progress here.    Rehab Prognosis: Fair; patient would benefit from acute skilled PT services to address these deficits and reach maximum level of function.    Recent Surgery: * No surgery found *      Plan:     During this hospitalization, patient to be seen 4 x/week to address the identified rehab impairments via gait training, therapeutic activities, therapeutic exercises and progress toward the following goals:    · Plan of Care Expires:  05/03/20    Subjective     Chief Complaint: " I want to get out of here."  " They don't help me at all at night time."  " I'll get up and walk out of here."  Patient/Family Comments/goals: Pt mostly reports that his main goal is to get out of the hospital.  Pain/Comfort:  · Pain Rating 1: 0/10      Objective:     Communicated with nurse prior to " session.  Patient found supine with pulse ox (continuous), oxygen, telemetry upon PT entry to room.     General Precautions: Standard, airborne, contact, droplet, aspiration, fall   Orthopedic Precautions:N/A   Braces: N/A     Functional Mobility:  · Bed Mobility:     · Supine to Sit: stand by assistance  · Sit to Supine: minimum assistance  · Transfers:     · Sit to Stand:  maximal assistance with 2 helper assist for safety  · Balance: Fair+ static sit balance and Poor static standing balance      AM-PAC 6 CLICK MOBILITY  Turning over in bed (including adjusting bedclothes, sheets and blankets)?: 3  Sitting down on and standing up from a chair with arms (e.g., wheelchair, bedside commode, etc.): 2  Moving from lying on back to sitting on the side of the bed?: 3  Moving to and from a bed to a chair (including a wheelchair)?: 2  Need to walk in hospital room?: 1  Climbing 3-5 steps with a railing?: 1  Basic Mobility Total Score: 12       Therapeutic Activities and Exercises:  Supine> sit with SBA  Supine AP x 20 reps  Pt sits edge of bed while OT assists pt with oral care and face washing( PT guards pt from behind)~ total time in sitting approximately 10 minutes  PT also assists pt to perform B LE AA LAQ from edge of bed  Pt performs sit<> stand x 3 trials with assist from PT and OT, mostly requiring max A overall to achieve, though assistance decreased over trials closer to mod/max A~ pt only able to stand about 30 seconds per trial and demonstrates post lean throughout  Pt able to side step to head of bed during last standing trial with assist of PT and OT  Sit> supine with min A for LE management  Once pt returns to supine, PT assists pt to perform B hip abd/add x 10 reps as well as hip and knee flex/ext with mild resistance given during extension  Pt educated regarding all exercises performed and purpose, as well as progressions to mobility. PT and OT verbalized understanding of pt's frustrations regarding his  feeling that care declines at night.      Patient left supine with all lines intact and call button in reach..    GOALS:   Multidisciplinary Problems     Physical Therapy Goals        Problem: Physical Therapy Goal    Goal Priority Disciplines Outcome Goal Variances Interventions   Physical Therapy Goal     PT, PT/OT Ongoing, Progressing     Description:  Goals to be met by: 20    Patient will increase functional independence with mobility by performin. Supine to sit with Moderate Assistance -met  2. Sit to stand transfer with Moderate Assistance with AD if needed. -not met but progressing  3. Bed to chair transfer with Moderate Assistance using AD if needed. -not met  4. Gait  x 30n  feet with Moderate Assistance using AD if needed. -not met  5. Lower extremity exercise program x15 reps with assistance as needed-not met but progressing                    Time Tracking:     PT Received On: 20  PT Start Time: 1154     PT Stop Time: 1225  PT Total Time (min): 31 min     Billable Minutes: Therapeutic Activity 18 and Therapeutic Exercise 13    Treatment Type: Treatment  PT/PTA: PT     PTA Visit Number: 0     Flip Arango, PT  2020

## 2020-04-13 NOTE — PROGRESS NOTES
Bed alarm sounding, patient noted sitting on the side of the bed using the urinal. I explained to him that he should call for assistance when needing to sit up. He said im getting out of here today and I need to do it myself. I explained to him for safety reasons he must call for assistance, he agreed.

## 2020-04-13 NOTE — PROGRESS NOTES
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     Hospital Course:  Initial workup showed CXR right lobe infiltrate with elevated , D-Dimer 1.03,  and Ferritin 331. Patient swabbed for COVID 19:positive. Patient started on IV Rocephin and Azithromycin and Plaquenil. He had rapid decline in respiratory status within 24H of admission and was intubated 3/30/2020 c/b ARDS.  He completed Abx for empiric CAP treatment on 4/2 and course of hydroxychloroquine ended 4/5.  Extubated on 4/4/2020 and transitioned to bipap/NC.   Developed hypernatremia which resolved on D5 drip requiring insulin for hyperglycemia.  Developed RUE DVT on therapeutic lovenox.  Started on Depakote for agitation    4/12: Started apixaban, down to 8L NC, pending psych consult  4/13: Down to 4L NC, feeling well     Interval History:     No acute events overnight, intermittently taking off oxygen  Eating 25-50% of tray, BG well controled  Pain controled in neck and RUE with pain meds  No bleeding  No complaints today, wants to go home    Review of Systems:  Respiratory:  No dysnea on 4L NC, denies cough  Cardiovascular: denies chest pain/palpitations  GI: no abd pain, no N/V.      Inpatient Medications:    Current Facility-Administered Medications:     acetaminophen  tablet 650 mg, 650 mg, Oral, Q4H PRN, Emilee Mandel MD, 650 mg at 04/10/20 0217    albuterol-ipratropium 2.5 mg-0.5 mg/3 mL nebulizer solution 3 mL, 3 mL, Nebulization, Q6H, Gurpreet Godinez MD, 3 mL at 04/13/20 0020    apixaban tablet 10 mg, 10 mg, Oral, BID, 10 mg at 04/12/20 2109 **FOLLOWED BY** [START ON 4/19/2020] apixaban tablet 5 mg, 5 mg, Oral, BID, Layo Elam MD    bisacodyL suppository 10 mg, 10 mg, Rectal, Daily PRN, Anila Daily MD, 10 mg at 04/08/20 2205    divalproex EC tablet 250 mg, 250 mg, Oral, Q8H, Blaise Treadwell MD, 250 mg at 04/13/20 0544    sennosides 8.8 mg/5 ml syrup 5 mL, 5 mL, Per NG tube, Daily, 5 mL at 04/12/20 0846 **AND** docusate 50 mg/5 mL liquid 50 mg, 50 mg, Per NG tube, Daily, Ari Black MD, 50 mg at 04/12/20 0846    glucose chewable tablet 16 g, 16 g, Oral, PRN, Emilee Mandel MD    glucose chewable tablet 24 g, 24 g, Oral, PRN, Emilee Mandel MD    haloperidol lactate injection 2 mg, 2 mg, Intravenous, Q6H PRN, Anita Cervantes MD    heparin (porcine) injection 10,000 Units, 10,000 Units, Intravenous, PRN, Mervin Meyers MD, 10,000 Units at 03/30/20 1647    insulin aspart U-100 pen 1-10 Units, 1-10 Units, Subcutaneous, TIDWM, Layo Elam MD, 4 Units at 04/12/20 1251    insulin detemir U-100 pen 10 Units, 10 Units, Subcutaneous, BID, Layo Elam MD, 10 Units at 04/12/20 2111    labetalol 20 mg/4 mL (5 mg/mL) IV syring, 15 mg, Intravenous, Q4H PRN, Usman Bal MD, 15 mg at 04/04/20 1932    lamoTRIgine tablet 200 mg, 200 mg, Oral, QHS, Emilee Mandel MD, 200 mg at 04/12/20 2110    lidocaine 5 % patch 1 patch, 1 patch, Transdermal, Q24H, Waleska Muñoz MD, 1 patch at 04/12/20 0845    lisinopriL tablet 10 mg, 10 mg, Oral, Daily, Layo Elam MD, 10 mg at 04/12/20 0845    melatonin tablet 3 mg, 3 mg, Oral, Nightly PRN, Mary Boston NP, 3 mg at 04/12/20 0055    ondansetron disintegrating tablet 8 mg, 8 mg, Oral, Q8H PRN,  "Emilee Mandel MD    oxyCODONE-acetaminophen  mg per tablet 1 tablet, 1 tablet, Oral, Q6H PRN, Anita Cervantes MD, 1 tablet at 04/13/20 0347    polyethylene glycol packet 17 g, 17 g, Per NG tube, Daily, Ari Black MD, 17 g at 04/12/20 0845    sodium chloride 0.9% flush 10 mL, 10 mL, Intravenous, PRN, Emilee Mandel MD    sodium chloride 0.9% flush 10 mL, 10 mL, Intravenous, PRN, Emilee Mandel MD      Physical Exam:      Intake/Output Summary (Last 24 hours) at 4/13/2020 0752  Last data filed at 4/12/2020 1800  Gross per 24 hour   Intake 1020 ml   Output 600 ml   Net 420 ml     Wt Readings from Last 3 Encounters:   04/10/20 87.4 kg (192 lb 10.9 oz)   04/01/20 87.1 kg (192 lb)   02/05/19 96.8 kg (213 lb 6.4 oz)       BP (!) 128/59   Pulse 88   Temp 98.1 °F (36.7 °C)   Resp 17   Ht 5' 9" (1.753 m)   Wt 87.4 kg (192 lb 10.9 oz)   SpO2 98%   BMI 28.45 kg/m²     Limited exam 2/2 minimizing exposure to COVID-19.  GEN: NAD, MMM  Resp: normal work of breathing   Neuro: AAOx3    Laboratory:  Lab Results   Component Value Date    [id number] Detected (A) 03/29/2020       Recent Labs   Lab 04/10/20  0351 04/11/20  0434 04/12/20  0055   WBC 9.44 9.69 7.47   LYMPH 6.9*  0.7* 7.4*  0.7* 9.4*  0.7*   HGB 8.2* 8.1* 8.6*   HCT 27.6* 26.7* 29.1*   * 445* 378*     Recent Labs   Lab 04/09/20  0326  04/10/20  0351 04/10/20  0554 04/11/20  0435 04/12/20  0421   *   < >  --  147* 144 145   K 3.8   < >  --  3.7 4.1 3.5   *   < >  --  113* 110 109   CO2 23   < >  --  23 23 27   BUN 23   < >  --  20 14 12   CREATININE 0.8   < >  --  0.8 0.7 0.7   *   < >  --  227* 213* 144*   CALCIUM 8.0*   < >  --  7.7* 7.9* 7.8*   MG 2.8*  --  2.7*  --  2.8*  --    PHOS 3.7  --  2.9  --  2.4*  --     < > = values in this interval not displayed.     Recent Labs   Lab 04/10/20  0554 04/11/20  0435 04/12/20  0421   ALKPHOS 89 89 81   * 174* 170*   * 146* 149*   ALBUMIN 1.8* 1.8* 1.7* "   PROT 6.3 6.6 6.3   BILITOT 0.5 0.4 0.3        Recent Labs     04/11/20  1446 04/12/20  0421   .5*  --    CPK  --  175       All labs within the last 24 hours were reviewed.     Microbiology:  Microbiology Results (last 7 days)     ** No results found for the last 168 hours. **            Imaging  ECG Results          EKG 12-lead (Final result)  Result time 04/02/20 08:08:36    Final result by Interface, Lab In [facility] (04/02/20 08:08:36)                 Narrative:    Test Reason : R68.89,    Vent. Rate : 097 BPM     Atrial Rate : 097 BPM     P-R Int : 150 ms          QRS Dur : 072 ms      QT Int : 332 ms       P-R-T Axes : 065 025 039 degrees     QTc Int : 421 ms    Age and gender specific analysis  Normal sinus rhythm  Normal ECG  When compared with ECG of 30-MAR-2020 15:09,    No significant change was found  Confirmed by CORY DURAND MD ([id number]) on 4/2/2020 8:08:25 AM    Referred By: [email or web address]   SELF           Confirmed By:CORY DURAND MD                             EKG 12-lead (Final result)  Result time 03/31/20 09:55:44    Final result by Interface, Lab In [facility] (03/31/20 09:55:44)                 Narrative:    Test Reason : J22,B97.29,    Vent. Rate : 105 BPM     Atrial Rate : 104 BPM     P-R Int : 000 ms          QRS Dur : 068 ms      QT Int : 338 ms       P-R-T Axes : 000 -06 006 degrees     QTc Int : 446 ms    Atrial pacing.  Confirmed by Musa Padron MD ([id number]) on 3/31/2020 9:55:37 AM    Referred By: [email or web address]   SELF           Confirmed By:Musa Padron MD                             EKG 12-lead (Final result)  Result time 03/30/20 14:45:25    Final result by Interface, Lab In [facility] (03/30/20 14:45:25)                 Narrative:    Test Reason : R06.00,    Vent. Rate : 109 BPM     Atrial Rate : 109 BPM     P-R Int : 146 ms          QRS Dur : 070 ms      QT Int : 324 ms       P-R-T Axes : 063 023 036 degrees     QTc Int : 436 ms    Sinus tachycardia  Otherwise normal  ECG    Confirmed by Musa Padron MD ([id number]) on 3/30/2020 2:45:14 PM    Referred By: [email or web address]   SELF           Confirmed By:Musa Padron MD                              No results found for this or any previous visit.    US Upper Extremity Veins Right  Narrative: EXAMINATION:  US UPPER EXTREMITY VEINS RIGHT    CLINICAL HISTORY:  r/o DVT;    TECHNIQUE:  Duplex and color flow Doppler evaluation and dynamic compression was performed of the right upper extremity veins.    COMPARISON:  Chest radiograph 04/01/2020, 03/30/2020, 03/29/2020.    FINDINGS:  Central veins: There is deep vein thrombosis of the right internal jugular, subclavian, and axillary veins.    Arm veins: thrombosis of the right brachial, basilic, and cephalic veins.    Contralateral subclavian/internal jugular veins: The left subclavian and internal jugular veins are patent and free of thrombus.  Impression: Deep vein thrombosis of multiple veins in the right upper extremity as above.    This report was flagged in Epic as abnormal.    COMMUNICATION  This critical result was discovered/received at 14:29.  The critical information above was relayed directly by Cash Peter by telephone to Dr. Mccray On 04/07/2020 at 14:32.    Electronically signed by resident: Cash Peter  Date:    04/07/2020  Time:    14:28    Electronically signed by: Estefanía Mancuso MD  Date:    04/07/2020  Time:    14:42      All imaging within the last 24 hours was reviewed.     Assessment and Plan:    Active Hospital Problems    Diagnosis  POA    *Acute respiratory distress syndrome (ARDS) due to COVID-19 virus [U07.1, J80]  Yes    Hypernatremia [E87.0]  Unknown    Acute deep vein thrombosis (DVT) of axillary vein of right upper extremity [I82.A11]  No    Bipolar 1 disorder, depressed [F31.9]  Yes     - Per PCP Dr. Lechuga notes, sees psychiatrist, on Ziprasidone 80 mg QD, Buproprion 150 mg TID, Lamotrigine 200mg QD.  - Patient currently sedated, and  holding home meds EXCEPT for lamotrigine.  4/1.  - Will consider re-adding his home medications once extubated and out of ICU.      Anemia [D64.9]  No    JULIET (acute kidney injury) [N17.9]  Unknown    Elevated LFTs [R94.5]  Yes     - likely 2/2 to covid; however, does have a history of treated hep c in 2015  - AST//600s on transfer, now downtrending to 400s/480s. Continuing to trend CMPs  - Despite downtrending AST/ALTs, patient has Hx of treated HCV but no recent HCV RNA, and continued IVDU/substance use.      Type 2 diabetes mellitus with diabetic neuropathic arthropathy, without long-term current use of insulin [E11.610]  Yes    Essential hypertension, benign [I10]  Yes    Hepatitis C [B19.20]  Yes      Resolved Hospital Problems   No resolved problems to display.       Covid-19 Virus Infection  - COVID-19 testing: Collection Date: 3/29/2020 Collection Time:  10:02 PM  - Infection Control notified    - Isolation:   - Airborne and Droplet Precautions  - Surgical mask on patient   - N95 masks must be fit tested, wear eye protection  - 20 second hand hygiene   - Limit visitors per hospital policy   - Consolidate lab draws, nursing care, and interventions    - Diagnostics: (Lymphopenia, hyponatremia, hyperferritinemia, elevated troponin, elevated d-dimer, age, and comorbidities are significant predictors of poor clinical outcome)   - CBC: Hb stable , normal WBC  trend Q48hrs  - CMP:    hypoNa-> hyperNa--> Na 145   trend Q48hrs  - Procalcitonin: 0.13  - D-dimer: 1.03    repeat prior to discharge  - Ferritin: 331->369    repeat prior to discharge   - CRP:  122-->peak 306-->146->182   trend Q48hrs  - LDH: 789->987    repeat prior to discharge  - Troponin: 0.011   - ECG: normal   - rapid Flu: negative   - CXR: RLL consolidation   - UA : no infection   - CPK: 143->699->408    - Management:   Bundle care as able to maintain isolation & minimize in/out of room   - Supplemental O2 to maintain SpO2 92%-96%    if requiring 6L NC or higher, place on nonrebreather and discuss case with MICU   - Telemetry & continuous pulse oximetry    - If wheezing   - albuterol inhaler 2-4 puff Q6hr PRN    - ipratropium daily    - acetaminophen 650mg PO Q6hr PRN fever   - loperamide PRN for viral diarrhea   - Empiric antibiotics and hydroxychloroquine completed    - not on statin 2/2 elevated CPK   - Investigational Treatment Protocol: (if patient meets criteria)   [email or web address]       Safety notes:   - d/c bipap as he is no longer requiring it   - Cautious use of NSAIDS for fever per WHO recommendations (3/16/2020)   - No new ACEi/ARB start or discontinuation of chronic med unless hypotensive (Esler et al. Journal of Hypertension 2020, 38:000-000)   - Careful use of steroids in the absence of other indications (shock, ARDS)   - Fluid sparing resuscitation, avoid maintenance fluids    Hypernatremia- resolved  Na remains normal off D5 infusion and he is tolerating po intake  - stop daily checks, cont CMP EOD     Acute deep vein thrombosis (DVT) of axillary vein of right upper extremity  RUE us 04/07 consistent with DVTs in the R IJ, subclavian, and axillary veins. Noted thrombosis of cephalic, brachial, and basilic veins.    -transitioned from therapeutic lovenox to apixabian 4/12, no bleeding   Pharmacy working on outpatient medication  -PRn oxy   -CPK normalized    Type 2 diabetes mellitus with diabetic neuropathic arthropathy, without long-term current use of insulin  Home medication includes jardiance 25mg (on hold)   -continue levemir 10 units BID   - sliding scale aspart TID AC for now, may need scheduled insulin w/ meals if intake improves     Bipolar 1 disorder, depressed  Home meds include lamotrigine 200 QHS and ziprasidone but now on lamotrigine and depakote per ICU team  -Haldol PRN for agitation but has not required its  use  -telemed consult placed to psychiatry to assist w/ med adjustment or transition back to home regimen (consult pending)    Essential hypertension, benign  BP at goal, continue home lisinopril 10 mg daily       Diet: Pureed (per patient preference 2/2 not having dentures), thin liquids per SLP    Advance Care Planning  Goals of care, counseling/discussion- Full code  telemed consult placed.  Will recheck d dimer, ferritin and LDH tomorrow in anticipation of d/c.  Will need [facility] follow up w/ [facility] after [facility]/c    Advance Care Planning       VTE High Risk Prophylaxis:   VTE Risk Mitigation (From admission, onward)         Ordered     apixaban tablet 5 mg  2 times daily      04/12/20 1231     apixaban tablet 10 mg  2 times daily      04/12/20 1231     heparin (porcine) 1,000 unit/mL injection      03/30/20 1640     Place sequential compression device  Until discontinued      03/30/20 0015     IP VTE HIGH RISK PATIENT  Once      03/30/20 0015                Patient's chronic/stable medical conditions noted in the problem list above will be managed with the patient's home medications as tolerated.        Subsequent Inpatient Hospital Care  Level 2 02905 Total visit time was 25 minutes or greater with greater than 50% of time spent in counseling and coordination of care.     Layo Elam MD   Hospital Medicine Team I (Endocrinologist)  Pager: [phone]  Spectralink: [phone]

## 2020-04-13 NOTE — NURSING
Pt sitting up in bed watching TV. Pt was asked several times to keep O2 on. Respirations even and unlabored at 22. Pt is in no apparent distress. Bed locked and in lowest postion, call light within reach, no c/o pain at this time.- SHELDON Langston RN

## 2020-04-13 NOTE — PROGRESS NOTES
Working to decrease oxygen, pt is currently down to 4 LPM per nasal canula with O2 saturation at 94-95%. Resting in bed with call light in reach, no further behaviors noted.

## 2020-04-13 NOTE — PLAN OF CARE
Problem: SLP Goal  Goal: SLP Goal  Description  Speech Language Pathology Goals  Goals expected to be met by 4/15/2020  1. Pt will tolerate a puree diet/nectar-thick liquids with no s/s of aspiration. ADVANCED TO THIN LIQUIDS ON 4/13.  2. Pt will participate in ongoing assessment of swallow fx to determine safest, least restrictive means of nutrition  3. Educate Pt and family on S/S aspiration       4/13/2020 1539 by LOC Craig, CCC-SLP  Outcome: Ongoing, Progressing  Recommending advancement to thin liquids, but continue pureed diet at this time per pt preference given lack of dental plate in place.  LOC Bustamante, CCC-SLP  Speech Language Pathologist  [phone]  4/13/2020

## 2020-04-13 NOTE — PHYSICIAN QUERY
PT Name: Cash Crawford  MR #: [id number]    Physician Query Form - Nutrition Clarification     CDS/: Tono Clarke Jr RN               Contact information:[email or web address]     This form is a permanent document in the medical record.     Query Date: 2020    By submitting this query, we are merely seeking further clarification of documentation.. Please utilize your independent clinical judgment when addressing the question(s) below.    The Medical record contains the following:   Indicators  Supporting Clinical Findings Location in Medical Record   x % of Estimated Energy Intake over a time frame from p.o., TF, or TPN  On day three of intubation recommend Peptamen VHP @ 30ml/hr   to provide 720 kcals, 66 g protein, 605 g water.     Eating 25-50% of tray, BG well controled 3/31 Nutrition Consult Note         Hospital Medicine Progress Note   x Weight Status over a time frame -15lbs x 1 year (per previous RN note), unsure of PO intake PTA   4/10 Nutrition Progress Note    Subcutaneous Fat and/or Muscle Loss      Fluid Accumulation or Edema      Reduced  Strength     x Wt / BMI / Usual Body Weight BMI (Calculated): 28.4  Weight: 87.4 kg (192 lb 10.9 oz)  Usual Body Weight (UBW), k.8 kg(Per chart review.)   4/10 Nutrition Progress Note    Delayed Wound Healing / Failure to Thrive     x Acute or Chronic Illness Acute respiratory distress syndrome (ARDS) due to COVID-19 virus   4/10 Critical Care Progress Note    Medication      Treatment     x Other Malnutrition: successful took meds with pureed yesterday.  Will plan for repeat swallow eval today    He appears well-developed and well-nourished   4/10 Critical Care Progress Note    4/10 Critical Care Progress Note     AND / ASPEN Clinical Characteristics (2011)  A minimum of two characteristics is recommended for diagnosing either moderate or severe malnutrition   Mild Malnutrition Moderate Malnutrition Severe  Malnutrition   Energy Intake from p.o., TF or TPN. < 75% intake of estimated energy needs for less than 7 days < 75% intake of estimated energy needs for greater than 7 days < 50% intake of estimated energy needs for > 5 days   Weight Loss 1-2% in 1 month  5% in 3 months  7.5% in 6 months  10% in 1 year 1-2 % in 1 week  5% in 1 month  7.5% in 3 months  10% in 6 months  20% in 1 year > 2% in 1 week  > 5% in 1 month  > 7.5% in 3 months  > 10% in 6 months  > 20% in 1 year   Physical Findings     None *Mild subcutaneous fat and/or muscle loss  *Mild fluid accumulation  *Stage II decubitus  *Surgical wound or non-healing wound *Mod/severe subcutaneous fat and/or muscle loss  *Mod/severe fluid accumulation  *Stage III or IV decubitus  *Non-healing surgical wound     Provider, please specify diagnosis or diagnoses associated with above clinical findings.  Specify the Degree of Malnutrition     [  ] Mild Protein-Calorie Malnutrition     [ x ] Patient is Well Nourished      [  ] Other:      [  ] Clinically Undetermined       Please document in your progress notes daily for the duration of treatment until resolved and include in your discharge summary.

## 2020-04-13 NOTE — PLAN OF CARE
Problem: Physical Therapy Goal  Goal: Physical Therapy Goal  Description  Goals to be met by: 20    Patient will increase functional independence with mobility by performin. Supine to sit with Moderate Assistance -met  2. Sit to stand transfer with Moderate Assistance with AD if needed. -not met but progressing  3. Bed to chair transfer with Moderate Assistance using AD if needed. -not met  4. Gait  x 30n  feet with Moderate Assistance using AD if needed. -not met  5. Lower extremity exercise program x15 reps with assistance as needed-not met but progressing   Outcome: Ongoing, Progressing       Flip Arnago, PT  2020

## 2020-04-14 LAB
CRP SERPL-MCNC: 78.6 MG/L (ref 0–8.2)
POCT GLUCOSE: 123 MG/DL (ref 70–110)
POCT GLUCOSE: 157 MG/DL (ref 70–110)
POCT GLUCOSE: 170 MG/DL (ref 70–110)
POCT GLUCOSE: 212 MG/DL (ref 70–110)

## 2020-04-14 PROCEDURE — 27000221 HC OXYGEN, UP TO 24 HOURS

## 2020-04-14 PROCEDURE — 11000001 HC ACUTE MED/SURG PRIVATE ROOM

## 2020-04-14 PROCEDURE — 99233 SBSQ HOSP IP/OBS HIGH 50: CPT | Mod: 95,,, | Performed by: PSYCHIATRY & NEUROLOGY

## 2020-04-14 PROCEDURE — 99900035 HC TECH TIME PER 15 MIN (STAT)

## 2020-04-14 PROCEDURE — 97530 THERAPEUTIC ACTIVITIES: CPT

## 2020-04-14 PROCEDURE — 92526 ORAL FUNCTION THERAPY: CPT

## 2020-04-14 PROCEDURE — 25000242 PHARM REV CODE 250 ALT 637 W/ HCPCS: Performed by: STUDENT IN AN ORGANIZED HEALTH CARE EDUCATION/TRAINING PROGRAM

## 2020-04-14 PROCEDURE — 25000003 PHARM REV CODE 250: Performed by: NURSE PRACTITIONER

## 2020-04-14 PROCEDURE — 25000003 PHARM REV CODE 250: Performed by: INTERNAL MEDICINE

## 2020-04-14 PROCEDURE — 63600175 PHARM REV CODE 636 W HCPCS: Performed by: INTERNAL MEDICINE

## 2020-04-14 PROCEDURE — 94761 N-INVAS EAR/PLS OXIMETRY MLT: CPT

## 2020-04-14 PROCEDURE — 36415 COLL VENOUS BLD VENIPUNCTURE: CPT

## 2020-04-14 PROCEDURE — 25000003 PHARM REV CODE 250: Performed by: STUDENT IN AN ORGANIZED HEALTH CARE EDUCATION/TRAINING PROGRAM

## 2020-04-14 PROCEDURE — 97116 GAIT TRAINING THERAPY: CPT

## 2020-04-14 PROCEDURE — 99233 PR SUBSEQUENT HOSPITAL CARE,LEVL III: ICD-10-PCS | Mod: 95,,, | Performed by: PSYCHIATRY & NEUROLOGY

## 2020-04-14 PROCEDURE — 97535 SELF CARE MNGMENT TRAINING: CPT

## 2020-04-14 PROCEDURE — 86140 C-REACTIVE PROTEIN: CPT

## 2020-04-14 PROCEDURE — 99232 PR SUBSEQUENT HOSPITAL CARE,LEVL II: ICD-10-PCS | Mod: ,,, | Performed by: INTERNAL MEDICINE

## 2020-04-14 PROCEDURE — 94640 AIRWAY INHALATION TREATMENT: CPT

## 2020-04-14 PROCEDURE — 99232 SBSQ HOSP IP/OBS MODERATE 35: CPT | Mod: ,,, | Performed by: INTERNAL MEDICINE

## 2020-04-14 RX ORDER — DOCUSATE SODIUM 50 MG/5ML
50 LIQUID ORAL DAILY
Status: DISCONTINUED | OUTPATIENT
Start: 2020-04-15 | End: 2020-04-18 | Stop reason: HOSPADM

## 2020-04-14 RX ORDER — OLANZAPINE 2.5 MG/1
5 TABLET ORAL EVERY 6 HOURS PRN
Status: DISCONTINUED | OUTPATIENT
Start: 2020-04-14 | End: 2020-04-18 | Stop reason: HOSPADM

## 2020-04-14 RX ORDER — SENNOSIDES 8.8 MG/5ML
5 LIQUID ORAL DAILY
Status: DISCONTINUED | OUTPATIENT
Start: 2020-04-15 | End: 2020-04-18 | Stop reason: HOSPADM

## 2020-04-14 RX ORDER — POLYETHYLENE GLYCOL 3350 17 G/17G
17 POWDER, FOR SOLUTION ORAL DAILY
Status: DISCONTINUED | OUTPATIENT
Start: 2020-04-15 | End: 2020-04-18 | Stop reason: HOSPADM

## 2020-04-14 RX ORDER — DIVALPROEX SODIUM 250 MG/1
250 TABLET, DELAYED RELEASE ORAL EVERY 12 HOURS
Status: COMPLETED | OUTPATIENT
Start: 2020-04-14 | End: 2020-04-15

## 2020-04-14 RX ORDER — ZIPRASIDONE HYDROCHLORIDE 20 MG/1
20 CAPSULE ORAL NIGHTLY
Status: DISCONTINUED | OUTPATIENT
Start: 2020-04-14 | End: 2020-04-18 | Stop reason: HOSPADM

## 2020-04-14 RX ADMIN — OXYCODONE HYDROCHLORIDE AND ACETAMINOPHEN 1 TABLET: 10; 325 TABLET ORAL at 10:04

## 2020-04-14 RX ADMIN — LAMOTRIGINE 200 MG: 100 TABLET ORAL at 09:04

## 2020-04-14 RX ADMIN — Medication 3 MG: at 02:04

## 2020-04-14 RX ADMIN — DIVALPROEX SODIUM 250 MG: 250 TABLET, DELAYED RELEASE ORAL at 09:04

## 2020-04-14 RX ADMIN — ZIPRASIDONE HYDROCHLORIDE 20 MG: 20 CAPSULE ORAL at 09:04

## 2020-04-14 RX ADMIN — LIDOCAINE 1 PATCH: 50 PATCH CUTANEOUS at 09:04

## 2020-04-14 RX ADMIN — IPRATROPIUM BROMIDE AND ALBUTEROL SULFATE 3 ML: .5; 3 SOLUTION RESPIRATORY (INHALATION) at 05:04

## 2020-04-14 RX ADMIN — INSULIN ASPART 4 UNITS: 100 INJECTION, SOLUTION INTRAVENOUS; SUBCUTANEOUS at 10:04

## 2020-04-14 RX ADMIN — APIXABAN 10 MG: 5 TABLET, FILM COATED ORAL at 09:04

## 2020-04-14 RX ADMIN — DIVALPROEX SODIUM 250 MG: 250 TABLET, DELAYED RELEASE ORAL at 05:04

## 2020-04-14 RX ADMIN — OXYCODONE HYDROCHLORIDE AND ACETAMINOPHEN 1 TABLET: 10; 325 TABLET ORAL at 02:04

## 2020-04-14 RX ADMIN — LISINOPRIL 10 MG: 10 TABLET ORAL at 09:04

## 2020-04-14 RX ADMIN — IPRATROPIUM BROMIDE AND ALBUTEROL SULFATE 3 ML: .5; 3 SOLUTION RESPIRATORY (INHALATION) at 07:04

## 2020-04-14 RX ADMIN — INSULIN ASPART 2 UNITS: 100 INJECTION, SOLUTION INTRAVENOUS; SUBCUTANEOUS at 04:04

## 2020-04-14 RX ADMIN — IPRATROPIUM BROMIDE AND ALBUTEROL SULFATE 3 ML: .5; 3 SOLUTION RESPIRATORY (INHALATION) at 01:04

## 2020-04-14 RX ADMIN — IPRATROPIUM BROMIDE AND ALBUTEROL SULFATE 3 ML: .5; 3 SOLUTION RESPIRATORY (INHALATION) at 02:04

## 2020-04-14 RX ADMIN — INSULIN ASPART 2 UNITS: 100 INJECTION, SOLUTION INTRAVENOUS; SUBCUTANEOUS at 02:04

## 2020-04-14 RX ADMIN — OXYCODONE HYDROCHLORIDE AND ACETAMINOPHEN 1 TABLET: 10; 325 TABLET ORAL at 04:04

## 2020-04-14 NOTE — PLAN OF CARE
Problem: Occupational Therapy Goal  Goal: Occupational Therapy Goal  Description  Goals to be met by: 4-17-20     Patient will increase functional independence with ADLs by performing:    Feeding with Minimal Assistance.  Grooming while EOB with Minimal Assistance.  Sitting at edge of bed x 15 minutes with Stand-by Assistance for functional ADL progress.  Tolerate sitting in bedside chair x 2-3 hours daily to increase endurance for functional activities.    Outcome: Ongoing, Progressing    Continue with POC.  Luly James OT  4/14/2020
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
## 2020-04-14 NOTE — PROGRESS NOTES
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     Hospital Course:  Initial workup showed CXR right lobe infiltrate with elevated , D-Dimer 1.03,  and Ferritin 331. Patient swabbed for COVID 19:positive. Patient started on IV Rocephin and Azithromycin and Plaquenil. He had rapid decline in respiratory status within 24H of admission and was intubated 3/30/2020 c/b ARDS.  He completed Abx for empiric CAP treatment on 4/2 and course of hydroxychloroquine ended 4/5.  Extubated on 4/4/2020 and transitioned to bipap/NC.   Developed hypernatremia which resolved on D5 drip requiring insulin for hyperglycemia.  Developed RUE DVT on therapeutic lovenox.  Started on Depakote for agitation    4/12: Started apixaban, down to 8L NC, pending psych consult  4/13: Down to 4L NC, feeling well, behavioral problems w/ nursing (smeared feces all over lines)  4/14: Down to 3L NC, resume home ziprasidone     Interval History:     No acute events overnight, labs not drawn yet  Yesterday had incident w/ nursing where he purposefully smeared feces on equipment  Wants to go home, was recommended SNF    Eating % of tray, BG fairly well controled for inconsistent intake  Pain controled in neck and RUE with pain meds  No bleeding    Review of  Systems:  Respiratory:  No dysnea, denies cough  Cardiovascular: denies chest pain/palpitations  GI: no abd pain, no N/V.      Inpatient Medications:    Current Facility-Administered Medications:     acetaminophen tablet 650 mg, 650 mg, Oral, Q4H PRN, Emilee Mandel MD, 650 mg at 04/10/20 0217    albuterol-ipratropium 2.5 mg-0.5 mg/3 mL nebulizer solution 3 mL, 3 mL, Nebulization, Q6H, Gurpreet Godinez MD, 3 mL at 04/14/20 0708    apixaban tablet 10 mg, 10 mg, Oral, BID, 10 mg at 04/13/20 2143 **FOLLOWED BY** [START ON 4/19/2020] apixaban tablet 5 mg, 5 mg, Oral, BID, Layo Elam MD    bisacodyL suppository 10 mg, 10 mg, Rectal, Daily PRN, Anila Daily MD, 10 mg at 04/08/20 2205    divalproex EC tablet 250 mg, 250 mg, Oral, Q8H, Blaise Treadwell MD, 250 mg at 04/14/20 0540    sennosides 8.8 mg/5 ml syrup 5 mL, 5 mL, Per NG tube, Daily, 5 mL at 04/12/20 0846 **AND** docusate 50 mg/5 mL liquid 50 mg, 50 mg, Per NG tube, Daily, Ari Black MD, 50 mg at 04/12/20 0846    glucose chewable tablet 16 g, 16 g, Oral, PRN, Emilee Mandel MD    glucose chewable tablet 24 g, 24 g, Oral, PRN, Emilee Mandel MD    haloperidol lactate injection 2 mg, 2 mg, Intravenous, Q6H PRN, Anita Cervantes MD    insulin aspart U-100 pen 1-10 Units, 1-10 Units, Subcutaneous, TIDWM, Layo Elam MD, 4 Units at 04/13/20 1821    insulin detemir U-100 pen 10 Units, 10 Units, Subcutaneous, BID, Layo Elam MD, 10 Units at 04/13/20 2144    labetalol 20 mg/4 mL (5 mg/mL) IV syring, 15 mg, Intravenous, Q4H PRN, Usman Bal MD, 15 mg at 04/04/20 1932    lamoTRIgine tablet 200 mg, 200 mg, Oral, QHS, Emilee Mandel MD, 200 mg at 04/13/20 2143    lidocaine 5 % patch 1 patch, 1 patch, Transdermal, Q24H, Waleska Muñoz MD, 1 patch at 04/13/20 0847    lisinopriL tablet 10 mg, 10 mg, Oral, Daily, Layo Elam MD, 10 mg at 04/13/20 0848    melatonin tablet 3 mg, 3 mg, Oral, Nightly PRN, Mary Boston, ARUN, 3  "mg at 04/14/20 0229    ondansetron disintegrating tablet 8 mg, 8 mg, Oral, Q8H PRN, Emilee Mandel MD    oxyCODONE-acetaminophen  mg per tablet 1 tablet, 1 tablet, Oral, Q6H PRN, Anita Cervantes MD, 1 tablet at 04/14/20 0229    polyethylene glycol packet 17 g, 17 g, Per NG tube, Daily, Ari Black MD, 17 g at 04/12/20 0845    sodium chloride 0.9% flush 10 mL, 10 mL, Intravenous, PRN, Emilee Mandel MD    sodium chloride 0.9% flush 10 mL, 10 mL, Intravenous, PRN, Emilee Mandel MD      Physical Exam:      Intake/Output Summary (Last 24 hours) at 4/14/2020 0813  Last data filed at 4/13/2020 1730  Gross per 24 hour   Intake 720 ml   Output 250 ml   Net 470 ml     Wt Readings from Last 3 Encounters:   04/10/20 87.4 kg (192 lb 10.9 oz)   04/01/20 87.1 kg (192 lb)   02/05/19 96.8 kg (213 lb 6.4 oz)       /75   Pulse 91   Temp 99.4 °F (37.4 °C) (Oral)   Resp 17   Ht 5' 9" (1.753 m)   Wt 87.4 kg (192 lb 10.9 oz)   SpO2 (!) 93%   BMI 28.45 kg/m²     Limited exam 2/2 minimizing exposure to COVID-19.  GEN: NAD, MMM  Resp: normal work of breathing   Neuro: AAOx3    Laboratory:  Lab Results   Component Value Date    [id number] Detected (A) 03/29/2020       Recent Labs   Lab 04/10/20  0351 04/11/20  0434 04/12/20  0055   WBC 9.44 9.69 7.47   LYMPH 6.9*  0.7* 7.4*  0.7* 9.4*  0.7*   HGB 8.2* 8.1* 8.6*   HCT 27.6* 26.7* 29.1*   * 445* 378*     Recent Labs   Lab 04/09/20  0326  04/10/20  0351 04/10/20  0554 04/11/20  0435 04/12/20 0421   *   < >  --  147* 144 145   K 3.8   < >  --  3.7 4.1 3.5   *   < >  --  113* 110 109   CO2 23   < >  --  23 23 27   BUN 23   < >  --  20 14 12   CREATININE 0.8   < >  --  0.8 0.7 0.7   *   < >  --  227* 213* 144*   CALCIUM 8.0*   < >  --  7.7* 7.9* 7.8*   MG 2.8*  --  2.7*  --  2.8*  --    PHOS 3.7  --  2.9  --  2.4*  --     < > = values in this interval not displayed.     Recent Labs   Lab 04/10/20  0554 04/11/20  0435 04/12/20 0421 "   ALKPHOS 89 89 81   * 174* 170*   * 146* 149*   ALBUMIN 1.8* 1.8* 1.7*   PROT 6.3 6.6 6.3   BILITOT 0.5 0.4 0.3        Recent Labs     04/11/20  1446 04/12/20  0421   .5*  --    CPK  --  175       All labs within the last 24 hours were reviewed.     Microbiology:  Microbiology Results (last 7 days)     ** No results found for the last 168 hours. **            Imaging  ECG Results          EKG 12-lead (Final result)  Result time 04/02/20 08:08:36    Final result by Interface, Lab In [facility] (04/02/20 08:08:36)                 Narrative:    Test Reason : R68.89,    Vent. Rate : 097 BPM     Atrial Rate : 097 BPM     P-R Int : 150 ms          QRS Dur : 072 ms      QT Int : 332 ms       P-R-T Axes : 065 025 039 degrees     QTc Int : 421 ms    Age and gender specific analysis  Normal sinus rhythm  Normal ECG  When compared with ECG of 30-MAR-2020 15:09,    No significant change was found  Confirmed by CORY DURAND MD ([id number]) on 4/2/2020 8:08:25 AM    Referred By: [email or web address]   SELF           Confirmed By:CORY DURAND MD                             EKG 12-lead (Final result)  Result time 03/31/20 09:55:44    Final result by Interface, Lab In [facility] (03/31/20 09:55:44)                 Narrative:    Test Reason : J22,B97.29,    Vent. Rate : 105 BPM     Atrial Rate : 104 BPM     P-R Int : 000 ms          QRS Dur : 068 ms      QT Int : 338 ms       P-R-T Axes : 000 -06 006 degrees     QTc Int : 446 ms    Atrial pacing.  Confirmed by Musa Padron MD ([id number]) on 3/31/2020 9:55:37 AM    Referred By: [email or web address]   SELF           Confirmed By:Muas Padron MD                             EKG 12-lead (Final result)  Result time 03/30/20 14:45:25    Final result by Interface, Lab In [facility] (03/30/20 14:45:25)                 Narrative:    Test Reason : R06.00,    Vent. Rate : 109 BPM     Atrial Rate : 109 BPM     P-R Int : 146 ms          QRS Dur : 070 ms      QT Int : 324 ms       P-R-T Axes : 063 023  036 degrees     QTc Int : 436 ms    Sinus tachycardia  Otherwise normal ECG    Confirmed by Musa Padron MD ([id number]) on 3/30/2020 2:45:14 PM    Referred By: IRMA   SELF           Confirmed By:Musa Padron MD                              No results found for this or any previous visit.    US Upper Extremity Veins Right  Narrative: EXAMINATION:  US UPPER EXTREMITY VEINS RIGHT    CLINICAL HISTORY:  r/o DVT;    TECHNIQUE:  Duplex and color flow Doppler evaluation and dynamic compression was performed of the right upper extremity veins.    COMPARISON:  Chest radiograph 04/01/2020, 03/30/2020, 03/29/2020.    FINDINGS:  Central veins: There is deep vein thrombosis of the right internal jugular, subclavian, and axillary veins.    Arm veins: thrombosis of the right brachial, basilic, and cephalic veins.    Contralateral subclavian/internal jugular veins: The left subclavian and internal jugular veins are patent and free of thrombus.  Impression: Deep vein thrombosis of multiple veins in the right upper extremity as above.    This report was flagged in Epic as abnormal.    COMMUNICATION  This critical result was discovered/received at 14:29.  The critical information above was relayed directly by Cash Peter by telephone to Dr. Mccray On 04/07/2020 at 14:32.    Electronically signed by resident: Cash Peter  Date:    04/07/2020  Time:    14:28    Electronically signed by: Estefanía Mancuso MD  Date:    04/07/2020  Time:    14:42      All imaging within the last 24 hours was reviewed.     Assessment and Plan:    Active Hospital Problems    Diagnosis  POA    *Acute respiratory distress syndrome (ARDS) due to COVID-19 virus [U07.1, J80]  Yes    Hypernatremia [E87.0]  Unknown    Acute deep vein thrombosis (DVT) of axillary vein of right upper extremity [I82.A11]  No    Bipolar 1 disorder, depressed [F31.9]  Yes     - Per PCP Dr. Lechuga notes, sees psychiatrist, on Ziprasidone 80 mg QD, Buproprion  150 mg TID, Lamotrigine 200mg QD.  - Patient currently sedated, and holding home meds EXCEPT for lamotrigine.  4/1.  - Will consider re-adding his home medications once extubated and out of ICU.      Anemia [D64.9]  No    JULIET (acute kidney injury) [N17.9]  Unknown    Elevated LFTs [R94.5]  Yes     - likely 2/2 to covid; however, does have a history of treated hep c in 2015  - AST//600s on transfer, now downtrending to 400s/480s. Continuing to trend CMPs  - Despite downtrending AST/ALTs, patient has Hx of treated HCV but no recent HCV RNA, and continued IVDU/substance use.      Type 2 diabetes mellitus with diabetic neuropathic arthropathy, without long-term current use of insulin [E11.610]  Yes    Essential hypertension, benign [I10]  Yes    Hepatitis C [B19.20]  Yes      Resolved Hospital Problems   No resolved problems to display.       Covid-19 Virus Infection  - COVID-19 testing: Collection Date: 3/29/2020 Collection Time:  10:02 PM  - Infection Control notified    - Isolation:   - Airborne and Droplet Precautions  - Surgical mask on patient   - N95 masks must be fit tested, wear eye protection  - 20 second hand hygiene   - Limit visitors per hospital policy   - Consolidate lab draws, nursing care, and interventions    - Diagnostics: (Lymphopenia, hyponatremia, hyperferritinemia, elevated troponin, elevated d-dimer, age, and comorbidities are significant predictors of poor clinical outcome)   - CBC: Hb stable , normal WBC  trend Q48hrs  - CMP:    hypoNa-> hyperNa--> Na 145   trend Q48hrs  - Procalcitonin: 0.13  - D-dimer: 1.03    repeat prior to discharge  - Ferritin: 331->369    repeat prior to discharge   - CRP:  122-->peak 306-->146->182   trend Q48hrs  - LDH: 789->987    repeat prior to discharge  - Troponin: 0.011   - ECG: normal   - rapid Flu: negative   - CXR: RLL consolidation   - UA : no infection   - CPK: 143->699->408    - Management:   Bundle care as able to maintain isolation &  minimize in/out of room   - Supplemental O2 to maintain SpO2 92%-96%   if requiring 6L NC or higher, place on nonrebreather and discuss case with MICU   - Telemetry & continuous pulse oximetry    - If wheezing   - albuterol inhaler 2-4 puff Q6hr PRN    - ipratropium daily    - acetaminophen 650mg PO Q6hr PRN fever   - loperamide PRN for viral diarrhea   - Empiric antibiotics and hydroxychloroquine completed    - not on statin 2/2 elevated CPK   - Investigational Treatment Protocol: (if patient meets criteria)   [email or web address]       Safety notes:   - d/c bipap as he is no longer requiring it   - Cautious use of NSAIDS for fever per WHO recommendations (3/16/2020)   - No new ACEi/ARB start or discontinuation of chronic med unless hypotensive (Esler et al. Journal of Hypertension 2020, 38:000-000)   - Careful use of steroids in the absence of other indications (shock, ARDS)   - Fluid sparing resuscitation, avoid maintenance fluids    Acute deep vein thrombosis (DVT) of axillary vein of right upper extremity  RUE us 04/07 consistent with DVTs in the R IJ, subclavian, and axillary veins. Noted thrombosis of cephalic, brachial, and basilic veins.    -transitioned from therapeutic lovenox to apixabian 4/12, no bleeding  -PRn oxy   -CPK normalized    Type 2 diabetes mellitus with diabetic neuropathic arthropathy, without long-term current use of insulin  Home medication includes jardiance 25mg (on hold)   -continue levemir 10 units BID   - sliding scale aspart TID AC for now, may need scheduled insulin w/ meals if intake improves     Bipolar 1 disorder, depressed  Home meds include lamotrigine 200 QHS and ziprasidone but now on lamotrigine and depakote per ICU team  -per psych recommendations:   - resume home ziprasidone 20 mg nightly tonight   - continue home lamotrigine 200 mg qHS   - give depakote BID today then  once tomorrow to taper off   - will change from haldol to 5 mg zyprexa po for agitation (no benzos within 1 hr of zyprexa)    Essential hypertension, benign  BP at goal, continue home lisinopril 10 mg daily       Diet: Pureed (per patient preference 2/2 not having dentures) + bloost glucose control, thin liquids     Advance Care Planning  Goals of care, counseling/discussion- Full code  When medically ready will need SNF.  Will need [facility] up w/ [facility] after [facility]    Advance Care Planning       VTE High Risk Prophylaxis:   VTE Risk Mitigation (From admission, onward)         Ordered     apixaban tablet 5 mg  2 times daily      04/12/20 1231     apixaban tablet 10 mg  2 times daily      04/12/20 1231     heparin (porcine) 1,000 unit/mL injection      03/30/20 1640     Place sequential compression device  Until discontinued      03/30/20 0015     IP VTE HIGH RISK PATIENT  Once      03/30/20 0015                Patient's chronic/stable medical conditions noted in the problem list above will be managed with the patient's home medications as tolerated.        Subsequent Inpatient Hospital Care  Level 2 97006 Total visit time was 25 minutes or greater with greater than 50% of time spent in counseling and coordination of care.     Layo Elam MD   Hospital Medicine Team I (Endocrinologist)  Pager: [phone]  Spectralink: [phone]

## 2020-04-14 NOTE — PT/OT/SLP PROGRESS
"Speech Language Pathology Treatment    Patient Name:  Cash Crawford   MRN:  [id number]  Admitting Diagnosis: Acute respiratory distress syndrome (ARDS) due to COVID-19 virus    Recommendations:                 General Recommendations:  monitor PO tolerance  Diet recommendations:  Puree, Liquid Diet Level: Thin   Aspiration Precautions: 1 bite/sip at a time, Alternating bites/sips, Avoid talking while eating, HOB to 90 degrees, Meds whole 1 at a time, Monitor for s/s of aspiration, Small bites/sips, Strict aspiration precautions and Wear oxygen during intake   General Precautions: Standard, airborne, aspiration, contact, droplet, fall, pureed diet  Communication strategies:  none    Subjective     "What is rehab?" Education provided to pt regarding purpose and benefits of rehab s/p debility and deconditioning.     Pain/Comfort:  · Pain Rating 1: 0/10    Objective:     Has the patient been evaluated by SLP for swallowing?   Yes  Keep patient NPO? No   Current Respiratory Status: nasal cannula(3L)      Pt seen for ongoing swallowing assessment and monitoring of tolerance of thin liquids following advancement on 4/13.  Pt noted to have been on 3L NC, but pt removed it to blow nose.  After blowing nose and being on RA for approx 2 minutes, pt's SPO2 sats noted to be 91%.  NC replaced and pt encouraged to take deep breaths through nose.  SPO2 increased to 94%.  Pt accepted trials of thin water via cup and straw (approx 3oz) without s/s of aspiration.  SPO2 was maintained at 94%.  Pt denies overt s/s of aspiration with thin liquids since advancement.  Pt declined trials of solids and stated continued preference to remain on a pureed diet at this time due to lack of dental plates.  Of note, pt with steel rods and plans to have dental plates replaced in the future.      Assessment:     Cash Crawford is a 64 y.o. male with an SLP diagnosis of Dysphagia.      Goals:   Multidisciplinary Problems     SLP Goals     "    Problem: SLP Goal    Goal Priority Disciplines Outcome   SLP Goal     SLP Ongoing, Progressing   Description:  Speech Language Pathology Goals  Goals expected to be met by 4/15/2020  1. Pt will tolerate a puree diet/nectar-thick liquids with no s/s of aspiration. ADVANCED TO THIN LIQUIDS ON 4/13.  2. Pt will participate in ongoing assessment of swallow fx to determine safest, least restrictive means of nutrition  3. Educate Pt and family on S/S aspiration                        Plan:     · Patient to be seen:  3 x/week   · Plan of Care expires:  05/08/20  · Plan of Care reviewed with:  patient   · SLP Follow-Up:  Yes       Discharge recommendations:  nursing facility, skilled     Time Tracking:     SLP Treatment Date:   04/14/20  Speech Start Time:  1122  Speech Stop Time:  1130     Speech Total Time (min):  8 min    Billable Minutes: Treatment Swallowing Dysfunction 8    LOC Bustamante, CCC-SLP  04/14/2020     LOC Bustamante, CCC-SLP  Speech Language Pathologist  [phone]  4/14/2020

## 2020-04-14 NOTE — PLAN OF CARE
Problem: SLP Goal  Goal: SLP Goal  Description  Speech Language Pathology Goals  Goals expected to be met by 4/15/2020  1. Pt will tolerate a puree diet/nectar-thick liquids with no s/s of aspiration. ADVANCED TO THIN LIQUIDS ON 4/13.  2. Pt will participate in ongoing assessment of swallow fx to determine safest, least restrictive means of nutrition  3. Educate Pt and family on S/S aspiration       Outcome: Ongoing, Progressing  Pt tolerating thin liquids well.  Declined trials of solids and wishes to remain on pureed diet at this time due to lack of dental plates.   LOC Bustamante, CCC-SLP  Speech Language Pathologist  [phone]  4/14/2020

## 2020-04-14 NOTE — PROGRESS NOTES
"[facility]  Adult Nutrition  Progress Note    SUMMARY       Recommendations    1.) Continue texture modifed diet per SLP.   2.) Add Boost Glucose Control TID.   3.) Suggest MVI, ascorbic acid, and zinc.   4.) Daily weights    Goals: 1.) Pt to consume/tolerate >75% EEN and EPN by follow up  Nutrition Goal Status: progressing towards goal  Communication of RD Recs: reviewed with RN    Reason for Assessment    Reason For Assessment: RD follow-up  Diagnosis: (Acute respiratory disease due to COVID-19 virus )  Relevant Medical History: DM, HTN  Interdisciplinary Rounds: did not attend  General Information Comments: Remote access: Pt answered phone, then hung up. Attempted to call again with no answer. Per chart, pt consuming ~25% of meals. Will send ONS to aid in intake. No GI distress. Wt stable since last RD visit. SLP following for possible texture changes. Pt does not meet malnutrition criteria. NFPE not performed, patient has been screened for possible COVID-19 and has been placed on airborne and contact precautions. Patient is noted as being positive for COVID-19.    Nutrition Discharge Planning: Adequate intake to meet nutritional needs.     Nutrition Risk Screen    Nutrition Risk Screen: dysphagia or difficulty swallowing    Nutrition/Diet History    Spiritual, Cultural Beliefs, Confucianist Practices, Values that Affect Care: no  Factors Affecting Nutritional Intake: difficulty/impaired swallowing, chewing difficulties/inability to chew food    Anthropometrics    Temp: 99.4 °F (37.4 °C)  Height Method: Stated  Height: 5' 9" (175.3 cm)  Height (inches): 69 in  Weight Method: Standard Scale  Weight: 87.4 kg (192 lb 10.9 oz)  Weight (lb): 192.68 lb  Ideal Body Weight (IBW), Male: 160 lb  % Ideal Body Weight, Male (lb): 120.42 %  BMI (Calculated): 28.4  BMI Grade: 25 - 29.9 - overweight  Usual Body Weight (UBW), k.8 kg(Per chart review.)  % Usual Body Weight: 90.48  % Weight Change From Usual " Weight: -9.71 %       Lab/Procedures/Meds    Pertinent Labs Reviewed: reviewed  Pertinent Labs Comments: Glu 144, Ca 7.8, albumin 1.7, , , .5  Pertinent Medications Reviewed: reviewed  Pertinent Medications Comments: nebulizer, sennosides, docusate, miralax      Estimated/Assessed Needs    Weight Used For Calorie Calculations: 87.4 kg (192 lb 10.9 oz)  Energy Calorie Requirements (kcal): 2061 kcal/d  Energy Need Method: Dickenson-St Jeor(1.25 PAL)  Protein Requirements:  g/d (1-1.2 g/kg)  Weight Used For Protein Calculations: 87.4 kg (192 lb 10.9 oz)  Fluid Requirements (mL): 1mL/kcal or per MD  Estimated Fluid Requirement Method: RDA Method  RDA Method (mL): 2061  CHO Requirement: 50% total kcals      Nutrition Prescription Ordered    Current Diet Order: puree  Current Nutrition Support Formula Ordered: Other (Comment)(TF discontinued)  Current Nutrition Support Rate Ordered: 0 (ml)  Current Nutrition Support Frequency Ordered: mL/hr    Evaluation of Received Nutrient/Fluid Intake    Enteral Calories (kcal): 0  Enteral Protein (gm): 0  Enteral (Free Water) Fluid (mL): 0  Other Calories (kcal): 0  Total Calories (kcal): 0  % Kcal Needs: 0  % Protein Needs: 0  I/O: +0.4L since 3/31  Energy Calories Required: not meeting needs  Protein Required: not meeting needs  Fluid Required: other (see comments)(Per MD or 1 mL/kcal)  Comments: LBM 4/13  Tolerance: tolerating  % Intake of Estimated Energy Needs: 0 - 25 %  % Meal Intake: 0 - 25 %    Nutrition Risk    Level of Risk/Frequency of Follow-up: low(x1/week)     Assessment and Plan   Contributing Nutrition Diagnosis  Inadequate energy intake      Related to (etiology):   Inability to consume sufficient energy      Signs and Symptoms (as evidenced by):   NPO, mechanically ventilated      Interventions (treatment strategy):  Collaboration with other providers        Nutrition Diagnosis Status:    Discontinued        Nutrition Problem  Inadequate  energy intake    Related to (etiology):   Poor appetite    Signs and Symptoms (as evidenced by):   Pt consuming <75% of nutritional needs    Interventions(treatment strategy):  Collaboration of care with providers  Modified diet-puree  Vitamin/minerals-MVI, zinc, and ascorbic acid  Commercial beverage-Boost Glucose Control    Nutrition Diagnosis Status:   New      Monitor and Evaluation    Food and Nutrient Intake: energy intake, food and beverage intake, enteral nutrition intake  Food and Nutrient Adminstration: diet order, enteral and parenteral nutrition administration  Knowledge/Beliefs/Attitudes: food and nutrition knowledge/skill  Physical Activity and Function: nutrition-related ADLs and IADLs  Anthropometric Measurements: weight, weight change  Biochemical Data, Medical Tests and Procedures: glucose/endocrine profile, inflammatory profile, lipid profile, gastrointestinal profile, electrolyte and renal panel  Nutrition-Focused Physical Findings: overall appearance     Nutrition Follow-Up    RD Follow-up?: Yes

## 2020-04-14 NOTE — PT/OT/SLP PROGRESS
Physical Therapy Treatment  Co-Treat with OT    Patient Name:  Cash Crawford   MRN:  [id number]    Recommendations:     Discharge Recommendations:  nursing facility, skilled   Discharge Equipment Recommendations: bedside commode, walker, rolling   Barriers to discharge: Decreased caregiver support and current level of assistance pt requires for mobility    Assessment:     Cash Crawford is a 64 y.o. male admitted with a medical diagnosis of Acute respiratory distress syndrome (ARDS) due to COVID-19 virus.  He presents with the following impairments/functional limitations:  weakness, gait instability, decreased upper extremity function, impaired cardiopulmonary response to activity, impaired endurance, impaired balance, decreased lower extremity function, impaired functional mobilty Pt performed well in today's session, improving his ability to perform sit<> stand transfers from bed and progressing to begin limited gait training at bedside. Pt remains most limited by decreased activity tolerance and muscle weakness.  Pt requires at least moderate assistance for transfers at this time.    Rehab Prognosis: Good; patient would benefit from acute skilled PT services to address these deficits and reach maximum level of function.    Recent Surgery: * No surgery found *      Plan:     During this hospitalization, patient to be seen 4 x/week to address the identified rehab impairments via gait training, therapeutic activities, therapeutic exercises and progress toward the following goals:    · Plan of Care Expires:  05/03/20    Subjective     Chief Complaint: fatigue  Patient/Family Comments/goals: pt reports he wants to get home but does not perseverate on this topic as he did the previous session  Pain/Comfort:  · Pain Rating 1: 7/10  · Location - Side 1: Bilateral  · Location - Orientation 1: midline  · Location 1: back  · Pain Addressed 1: Nurse notified(OT notified nurse at end of session)  · Pain Rating  Post-Intervention 1: 7/10      Objective:     Communicated with nurse prior to session.  Patient found left sidelying with oxygen, telemetry, pulse ox (continuous) upon PT entry to room.     General Precautions: Standard, airborne, contact, fall, droplet   Orthopedic Precautions:N/A   Braces: N/A     Functional Mobility:  · Bed Mobility:     · Supine to Sit: stand by assistance  · Sit to Supine: moderate assistance and for management of LEs  · Transfers:     · Sit to Stand:  moderate assistance with no AD  · Gait: Pt performs 2 steps forward and backward x 2 trials with HHA from OT and PT( seated rest breaks between trials). Pt also performs 3 lateral steps toward L side( HOB) when preparing to sit.  · Balance: Fair(-)/Fair static sitting and Poor static standing      AM-PAC 6 CLICK MOBILITY  Turning over in bed (including adjusting bedclothes, sheets and blankets)?: 3  Sitting down on and standing up from a chair with arms (e.g., wheelchair, bedside commode, etc.): 2  Moving from lying on back to sitting on the side of the bed?: 2  Moving to and from a bed to a chair (including a wheelchair)?: 2  Need to walk in hospital room?: 2  Climbing 3-5 steps with a railing?: 1  Basic Mobility Total Score: 12       Therapeutic Activities and Exercises:   Pt performs the following supine exercises with assist from PT: AP and hip abd/add x 10 reps  Supine> sit with SBA and increased time to achieve position  Pt sits edge of bed 10 minutes or > while OT has pt perform grooming tasks and attempt donning socks~ PT sits behind pt to guard during these activities, with pt requiring CGA/SBA for balance  Sit<> stand with PT assisting and pt requires mod A for hip elevation and lowering~ pt stands about 20 seconds, demonstrating posterior lean and at least mod A to maintain balance  Pt ambulates 2 trials of 2 steps forward and backward as indicated above, as well as 3 lateral side steps to head of bed  Sit> supine with mod A for LE  management  Pt educated regarding the importance of beginning to attempt ambulation during therapy and pt verbalizes understanding    Patient left left sidelying with all lines intact and call button in reach..    GOALS:   Multidisciplinary Problems     Physical Therapy Goals        Problem: Physical Therapy Goal    Goal Priority Disciplines Outcome Goal Variances Interventions   Physical Therapy Goal     PT, PT/OT Ongoing, Progressing     Description:  Goals to be met by: 20    Patient will increase functional independence with mobility by performin. Supine to sit with Moderate Assistance -met  2. Sit to stand transfer with Moderate Assistance with AD if needed. -met  3. Bed to chair transfer with Moderate Assistance using AD if needed. -not met  4. Gait  x 30  feet with Moderate Assistance using AD if needed. -not met  5. Lower extremity exercise program x15 reps with assistance as needed-not met but progressing                     Time Tracking:     PT Received On: 20  PT Start Time: 1022     PT Stop Time: 1047  PT Total Time (min): 25 min     Billable Minutes: Gait Training 8 and Therapeutic Activity 17    Treatment Type: Treatment  PT/PTA: PT     PTA Visit Number: 0     Flip Arango, PT  2020

## 2020-04-14 NOTE — ASSESSMENT & PLAN NOTE
ASSESSMENT     Cash Crawford is a 64 y.o. male with a past psychiatric history of bipolar 1 d/o, PTSD, hx of polysubstance use d/o, currently presenting with Acute respiratory distress syndrome (ARDS) due to COVID-19 virus.  Psychiatry was originally consulted to address the patient's symptoms of medication management, one episode of bizarre behavior. Pt reports dysphoria d/t his illness and hospital stay but states that otherwise, his mood has been fine. He denies vegetative symptoms consistent with a derangement of affect, including sleep or appetite disturbances, and there are no evidence of perceptual disturbances or delusions. Denies SI or sx concerning for severe episode of depression. Is still taking his outpatient lamotrigine, but the other medications that were maintaining his wellbeing were stopped.     Pt's most recent QTC 4/12 was 442.     IMPRESSION  Hx of bipolar 1 d/o, currently well controlled  R/o hyperactive delirium   Hx of polysubstance abuse    RECOMMENDATION(S)      1. Scheduled Medication(s):  - continue lamotrigine 200 mg daily  - initiate ziprasidone 20 mg qam. Continue to monitor EKG while hospitalized.  - will hold buproprion for now.       2. PRN Medication(s):  - may offer vistaril 25 mg PRN for anxiety  - recommend melatonin 3 mg QHS prn for insomnia    3.  Monitor:  Please obtain daily EKG to monitor QTc    4. Legal Status/Precaution(s):  Pt does not meet criteria for PEC at this time, as he is not gravely disabled, nor is he an acute danger to himself or others.

## 2020-04-14 NOTE — SUBJECTIVE & OBJECTIVE
"  OBJECTIVE     Vital Signs:  Temp:  [98.5 °F (36.9 °C)-99.4 °F (37.4 °C)]   Pulse:  []   Resp:  [16-20]   BP: (109-134)/(63-75)   SpO2:  [92 %-98 %]     +inattention, confusion    Mental Status Exam:  Level of Consciousness: alert, awake   Behavior/Cooperation: cooperative  Psychomotor: slowed speech  Speech: slowed, tremulous, normal tone/volume   Language: english, fluid  Orientation: person, place, situation, year, disoriented to month and date  Attention Span/Concentration: had difficulty but ultimately was able to spell WORLD forwards/backwards  Memory: Recent impaired  Mood: "depressed"  Affect: blunted  Thought Process: linear, normal and logical  Associations: normal and logical  Thought Content: normal, no suicidality, no homicidality, delusions, or paranoia  Fund of Knowledge: Intact  Insight: fair  Judgment: fair    Laboratory Data:  Recent Results (from the past 48 hour(s))   POCT glucose    Collection Time: 04/12/20 12:00 PM   Result Value Ref Range    POCT Glucose 223 (H) 70 - 110 mg/dL   POCT glucose    Collection Time: 04/12/20  5:00 PM   Result Value Ref Range    POCT Glucose 150 (H) 70 - 110 mg/dL   POCT glucose    Collection Time: 04/12/20  9:08 PM   Result Value Ref Range    POCT Glucose 162 (H) 70 - 110 mg/dL   POCT glucose    Collection Time: 04/13/20  8:12 AM   Result Value Ref Range    POCT Glucose 163 (H) 70 - 110 mg/dL   POCT glucose    Collection Time: 04/13/20 12:39 PM   Result Value Ref Range    POCT Glucose 258 (H) 70 - 110 mg/dL   POCT glucose    Collection Time: 04/13/20  5:09 PM   Result Value Ref Range    POCT Glucose 211 (H) 70 - 110 mg/dL   POCT glucose    Collection Time: 04/13/20  9:42 PM   Result Value Ref Range    POCT Glucose 127 (H) 70 - 110 mg/dL      No results found for: PHENYTOIN, PHENOBARB, VALPROATE, CBMZ  Imaging:  Imaging Results          X-Ray Chest AP Portable (Final result)  Result time 04/01/20 17:23:42    Final result by Robert Bey MD (04/01/20 " 17:23:42)                 Impression:      Satisfactory position of endotracheal tube tip.    Enteric tube tip coiled up within the proximal aspect of the stomach.    Unchanged diffuse ground-glass opacities.      Electronically signed by: Robert Bey MD  Date:    04/01/2020  Time:    17:23             Narrative:    EXAMINATION:  XR CHEST AP PORTABLE    CLINICAL HISTORY:  ETT after txf;    TECHNIQUE:  Single frontal view of the chest was performed.    COMPARISON:  03/30/2020.    FINDINGS:  There is satisfactory location of the endotracheal tube tip 6 cm from the miguel.  The right-sided internal jugular access catheter remains in stable position.  The tip of the enteric tube appears to be coiled within the proximal aspect of the stomach.  Monitoring EKG leads are present.    The cardiac silhouette is unchanged.  The hemidiaphragms are unremarkable.  There is no evidence of free air beneath the hemidiaphragms.  There are no pleural effusions.  There is no evidence of a pneumothorax.  There is no evidence of pneumomediastinum.  There are unchanged diffuse interstitial ground-glass opacities with lower lung zone predominance.  The osseous structures demonstrate degenerative changes.                               X-Ray Chest AP Portable (Final result)  Result time 03/30/20 17:20:22    Final result by Vickey Lares MD (03/30/20 17:20:22)                 Impression:      As above.      Electronically signed by: Vickey Lares MD  Date:    03/30/2020  Time:    17:20             Narrative:    EXAMINATION:  XR CHEST AP PORTABLE    CLINICAL HISTORY:  ET tube placement;    TECHNIQUE:  Single frontal view of the chest was performed.    COMPARISON:  Chest radiograph 03/29/2020    FINDINGS:  Monitoring leads, tubing and ventilator apparatus overlie the chest.  Additionally, the lateral most aspect of the right mid to lower lung zone are not included in field of view limiting evaluation of these regions.    Interval placement of  endotracheal tube with tip approximately 3.8 cm above the miguel.  Interval placement of enteric tube with distal portion coiled upon itself cephalad with tip near the GE junction at the medial left upper quadrant.  Interval placement of right IJ CVC with tip overlying the mid SVC.  No large pneumothorax.    Interval increased opacities at the right greater than left mid to lower lung zones.  Cardiomediastinal silhouette is stable.  Osseous structures are unchanged.                               X-Ray Chest 1 View (Final result)  Result time 03/29/20 22:27:03    Final result by Norma Antunez MD (03/29/20 22:27:03)                 Impression:      Consolidation seen within the medial aspect of the right lung base most suggestive for developing pneumonia.      Electronically signed by: Norma Antunez MD  Date:    03/29/2020  Time:    22:27             Narrative:    EXAMINATION:  XR CHEST 1 VIEW    CLINICAL HISTORY:  Hypoxemia    TECHNIQUE:  Single frontal view of the chest was performed.    COMPARISON:  September 2014.    FINDINGS:  Cardiac silhouette is stable in size.  Lungs are symmetrically expanded.  Consolidative changes are visualized within the medial aspect of the right lung base.  No evidence of pneumothorax or significant pleural effusion.  No acute osseous abnormality identified.

## 2020-04-14 NOTE — PT/OT/SLP PROGRESS
Occupational Therapy   Treatment (Co-treat with PT)    Name: Cash Crawford  MRN: [id number]  Admitting Diagnosis:  Acute respiratory distress syndrome (ARDS) due to COVID-19 virus      * No surgery found *      Recommendations:     Discharge Recommendations: nursing facility, skilled  Discharge Equipment Recommendations:  bedside commode, walker, rolling  Barriers to discharge:  None    Assessment:     Cash Crafword is a 64 y.o. male with a medical diagnosis of Acute respiratory distress syndrome (ARDS) due to COVID-19 virus.  He presents with improvement in functional mobility in comparison to prior sessions with patient tolerating therapy fairly well this day. Patient is agreeable throughout but reports back pain which limits participation. Patient desat to 87-89% requiring verbal cueing for pursed lip breathing but mostly remained above 90%. Performance deficits affecting function are weakness, impaired endurance, impaired self care skills, impaired functional mobilty, gait instability, impaired balance, impaired cardiopulmonary response to activity, decreased lower extremity function, decreased upper extremity function, decreased coordination.     Rehab Prognosis:  Good; patient would benefit from acute skilled OT services to address these deficits and reach maximum level of function.       Plan:     Patient to be seen 4 x/week to address the above listed problems via self-care/home management, therapeutic activities, therapeutic exercises  · Plan of Care Expires: 05/12/20  · Plan of Care Reviewed with: patient    Subjective     Pain/Comfort:  · Pain Rating 1: 7/10  · Location - Orientation 1: midline  · Location 1: back  · Pain Addressed 1: Nurse notified  · Pain Rating Post-Intervention 1: 7/10    Objective:     Communicated with: RN prior to session.  Patient found left sidelying with oxygen, bed alarm, pulse ox (continuous), telemetry upon OT entry to room.    General Precautions: Standard,  airborne, contact, droplet, fall   Orthopedic Precautions:N/A   Braces: N/A     Occupational Performance:     Bed Mobility:    · Patient completed Supine to Sit to L side EOB with stand by assistance  · Patient completed Sit to Supine with moderate assist for BLE placement onto bed 2' fatigue     Functional Mobility/Transfers:  · Patient completed Sit <> Stand Transfer x3 trials from EOB with moderate assistance with hand-held assist   · Functional Mobility: Patient completed functional mobility x3 trials with mod-max assist/HHA: ~4 steps forwards<>backwards x2 trials and 3 lateral steps to his left towards the HOB x1 trial. Patient required verbal cueing for step sequencing.    Activities of Daily Living:  · Grooming: moderate assistance Patient participated in oral hygiene and face wash with a washcloth while sitting EOB with mod assist for managing supplies and thoroughness.   · Lower Body Dressing: total assistance Patient donned non-slip socks while sitting EOB with total assist.      [facility] 6 Click ADL: 10    Treatment & Education:  Role of OT/POC  Educated on pursed lip breathing technique   Call button for assistance    Patient left left sidelying with all lines intact, call button in reach and RN notifiedEducation:      GOALS:   Multidisciplinary Problems     Occupational Therapy Goals        Problem: Occupational Therapy Goal    Goal Priority Disciplines Outcome Interventions   Occupational Therapy Goal     OT, PT/OT Ongoing, Progressing    Description:  Goals to be met by: 4-17-20     Patient will increase functional independence with ADLs by performing:    Feeding with Minimal Assistance.  Grooming while EOB with Minimal Assistance.  Sitting at edge of bed x 15 minutes with Stand-by Assistance for functional ADL progress.  Tolerate sitting in bedside chair x 2-3 hours daily to increase endurance for functional activities.                     Time Tracking:     OT Date of Treatment: 04/14/20  OT Start  Time: 1022  OT Stop Time: 1047  OT Total Time (min): 25 min    Billable Minutes:Self Care/Home Management 10 minutes  Therapeutic Activity 15 minutes    Luly James OT  4/14/2020

## 2020-04-14 NOTE — PLAN OF CARE
Problem: Oral Intake Inadequate (Acute Kidney Injury/Impairment)  Goal: Optimal Nutrition Intake  Outcome: Ongoing, Progressing  Intervention: Promote and Optimize Nutrition  Flowsheets (Taken 4/14/2020 1133)  Oral Nutrition Promotion: calorie dense foods provided; calorie dense liquids provided; nutritional therapy counseling provided; other (see comments)     Problem: Oral Intake Inadequate  Goal: Improved Oral Intake  Outcome: Ongoing, Progressing  Intervention: Promote and Optimize Oral Intake  Flowsheets (Taken 4/14/2020 1133)  Oral Nutrition Promotion: calorie dense foods provided; calorie dense liquids provided; nutritional therapy counseling provided; other (see comments)     Problem: Nutrition Impairment (Mechanical Ventilation, Invasive)  Goal: Optimal Nutrition Delivery  Outcome: Met  Intervention: Optimize Nutrition Delivery  Flowsheets (Taken 4/14/2020 1133)  Nutrition Support Management: other (see comments); weight trending reviewed     Recommendations     1.) Continue texture modifed diet per SLP.   2.) Add Boost Glucose Control TID.   3.) Suggest MVI, ascorbic acid, and zinc.   4.) Daily weights     Goals: 1.) Pt to consume/tolerate >75% EEN and EPN by follow up  Nutrition Goal Status: progressing towards goal  Communication of RD Recs: reviewed with RN

## 2020-04-14 NOTE — PLAN OF CARE
Problem: Physical Therapy Goal  Goal: Physical Therapy Goal  Description  Goals to be met by: 20    Patient will increase functional independence with mobility by performin. Supine to sit with Moderate Assistance -met  2. Sit to stand transfer with Moderate Assistance with AD if needed. -met  3. Bed to chair transfer with Moderate Assistance using AD if needed. -not met  4. Gait  x 30  feet with Moderate Assistance using AD if needed. -not met  5. Lower extremity exercise program x15 reps with assistance as needed-not met but progressing    Outcome: Ongoing, Progressing       Flip Arango, PT  2020

## 2020-04-14 NOTE — PLAN OF CARE
04/14/20 1042   Post-Acute Status   Post-Acute Authorization Placement   Post-Acute Placement Status Referrals Sent   Discharge Delays None known at this time   Discharge Plan   Discharge Plan A Skilled Nursing Facility     Additional SNF referrals sent to [facility], [facility], [facility] and [facility].

## 2020-04-14 NOTE — PLAN OF CARE
FRANCHESKA f/u on ref under review:  1) [facility]-no bed  2) [facility]-no bed  3) [facility]-ref received, not yet reviewed.    FRANCHESKA reviewed doc from med student which indicated pts family may want to have pt d/c home rather than SNF. FRANCHESKA notified primary MD.    Sonya Escalera, LCSW [id number]
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
## 2020-04-15 LAB
ALBUMIN SERPL BCP-MCNC: 1.9 G/DL (ref 3.5–5.2)
ALP SERPL-CCNC: 78 U/L (ref 55–135)
ALT SERPL W/O P-5'-P-CCNC: 164 U/L (ref 10–44)
ANION GAP SERPL CALC-SCNC: 11 MMOL/L (ref 8–16)
AST SERPL-CCNC: 168 U/L (ref 10–40)
BASOPHILS # BLD AUTO: 0.02 K/UL (ref 0–0.2)
BASOPHILS NFR BLD: 0.3 % (ref 0–1.9)
BILIRUB SERPL-MCNC: 0.3 MG/DL (ref 0.1–1)
BUN SERPL-MCNC: 8 MG/DL (ref 8–23)
CALCIUM SERPL-MCNC: 8.5 MG/DL (ref 8.7–10.5)
CHLORIDE SERPL-SCNC: 107 MMOL/L (ref 95–110)
CO2 SERPL-SCNC: 26 MMOL/L (ref 23–29)
CREAT SERPL-MCNC: 0.7 MG/DL (ref 0.5–1.4)
D DIMER PPP IA.FEU-MCNC: 4.98 MG/L FEU
DIFFERENTIAL METHOD: ABNORMAL
EOSINOPHIL # BLD AUTO: 0 K/UL (ref 0–0.5)
EOSINOPHIL NFR BLD: 0.6 % (ref 0–8)
ERYTHROCYTE [DISTWIDTH] IN BLOOD BY AUTOMATED COUNT: 21.9 % (ref 11.5–14.5)
EST. GFR  (AFRICAN AMERICAN): >60 ML/MIN/1.73 M^2
EST. GFR  (NON AFRICAN AMERICAN): >60 ML/MIN/1.73 M^2
FERRITIN SERPL-MCNC: 259 NG/ML (ref 20–300)
GLUCOSE SERPL-MCNC: 117 MG/DL (ref 70–110)
HCT VFR BLD AUTO: 26.1 % (ref 40–54)
HGB BLD-MCNC: 7.8 G/DL (ref 14–18)
IMM GRANULOCYTES # BLD AUTO: 0.06 K/UL (ref 0–0.04)
IMM GRANULOCYTES NFR BLD AUTO: 0.9 % (ref 0–0.5)
LDH SERPL L TO P-CCNC: 569 U/L (ref 110–260)
LYMPHOCYTES # BLD AUTO: 0.7 K/UL (ref 1–4.8)
LYMPHOCYTES NFR BLD: 11.2 % (ref 18–48)
MCH RBC QN AUTO: 24.7 PG (ref 27–31)
MCHC RBC AUTO-ENTMCNC: 29.9 G/DL (ref 32–36)
MCV RBC AUTO: 83 FL (ref 82–98)
MONOCYTES # BLD AUTO: 1.2 K/UL (ref 0.3–1)
MONOCYTES NFR BLD: 19.1 % (ref 4–15)
NEUTROPHILS # BLD AUTO: 4.4 K/UL (ref 1.8–7.7)
NEUTROPHILS NFR BLD: 67.9 % (ref 38–73)
NRBC BLD-RTO: 0 /100 WBC
PLATELET # BLD AUTO: 715 K/UL (ref 150–350)
PMV BLD AUTO: 9 FL (ref 9.2–12.9)
POCT GLUCOSE: 110 MG/DL (ref 70–110)
POCT GLUCOSE: 120 MG/DL (ref 70–110)
POCT GLUCOSE: 121 MG/DL (ref 70–110)
POCT GLUCOSE: 129 MG/DL (ref 70–110)
POTASSIUM SERPL-SCNC: 3.8 MMOL/L (ref 3.5–5.1)
PROT SERPL-MCNC: 6.4 G/DL (ref 6–8.4)
RBC # BLD AUTO: 3.16 M/UL (ref 4.6–6.2)
SODIUM SERPL-SCNC: 144 MMOL/L (ref 136–145)
WBC # BLD AUTO: 6.43 K/UL (ref 3.9–12.7)

## 2020-04-15 PROCEDURE — 83615 LACTATE (LD) (LDH) ENZYME: CPT

## 2020-04-15 PROCEDURE — 99900035 HC TECH TIME PER 15 MIN (STAT)

## 2020-04-15 PROCEDURE — 99232 PR SUBSEQUENT HOSPITAL CARE,LEVL II: ICD-10-PCS | Mod: ,,, | Performed by: INTERNAL MEDICINE

## 2020-04-15 PROCEDURE — 25000003 PHARM REV CODE 250: Performed by: STUDENT IN AN ORGANIZED HEALTH CARE EDUCATION/TRAINING PROGRAM

## 2020-04-15 PROCEDURE — 11000001 HC ACUTE MED/SURG PRIVATE ROOM

## 2020-04-15 PROCEDURE — 85025 COMPLETE CBC W/AUTO DIFF WBC: CPT

## 2020-04-15 PROCEDURE — 25000242 PHARM REV CODE 250 ALT 637 W/ HCPCS: Performed by: STUDENT IN AN ORGANIZED HEALTH CARE EDUCATION/TRAINING PROGRAM

## 2020-04-15 PROCEDURE — 94761 N-INVAS EAR/PLS OXIMETRY MLT: CPT

## 2020-04-15 PROCEDURE — 27000221 HC OXYGEN, UP TO 24 HOURS

## 2020-04-15 PROCEDURE — 93010 ELECTROCARDIOGRAM REPORT: CPT | Mod: ,,, | Performed by: INTERNAL MEDICINE

## 2020-04-15 PROCEDURE — 97110 THERAPEUTIC EXERCISES: CPT

## 2020-04-15 PROCEDURE — 36415 COLL VENOUS BLD VENIPUNCTURE: CPT

## 2020-04-15 PROCEDURE — 97535 SELF CARE MNGMENT TRAINING: CPT

## 2020-04-15 PROCEDURE — 25000003 PHARM REV CODE 250: Performed by: INTERNAL MEDICINE

## 2020-04-15 PROCEDURE — 94640 AIRWAY INHALATION TREATMENT: CPT

## 2020-04-15 PROCEDURE — 82728 ASSAY OF FERRITIN: CPT

## 2020-04-15 PROCEDURE — 99232 SBSQ HOSP IP/OBS MODERATE 35: CPT | Mod: ,,, | Performed by: INTERNAL MEDICINE

## 2020-04-15 PROCEDURE — 25000003 PHARM REV CODE 250: Performed by: NURSE PRACTITIONER

## 2020-04-15 PROCEDURE — 80053 COMPREHEN METABOLIC PANEL: CPT

## 2020-04-15 PROCEDURE — U0002 COVID-19 LAB TEST NON-CDC: HCPCS

## 2020-04-15 PROCEDURE — 93005 ELECTROCARDIOGRAM TRACING: CPT

## 2020-04-15 PROCEDURE — 85379 FIBRIN DEGRADATION QUANT: CPT

## 2020-04-15 PROCEDURE — 97116 GAIT TRAINING THERAPY: CPT

## 2020-04-15 PROCEDURE — 93010 EKG 12-LEAD: ICD-10-PCS | Mod: ,,, | Performed by: INTERNAL MEDICINE

## 2020-04-15 RX ORDER — OXYCODONE AND ACETAMINOPHEN 5; 325 MG/1; MG/1
1 TABLET ORAL EVERY 6 HOURS PRN
Status: DISCONTINUED | OUTPATIENT
Start: 2020-04-15 | End: 2020-04-18 | Stop reason: HOSPADM

## 2020-04-15 RX ADMIN — IPRATROPIUM BROMIDE AND ALBUTEROL SULFATE 3 ML: .5; 3 SOLUTION RESPIRATORY (INHALATION) at 07:04

## 2020-04-15 RX ADMIN — DIVALPROEX SODIUM 250 MG: 250 TABLET, DELAYED RELEASE ORAL at 09:04

## 2020-04-15 RX ADMIN — LAMOTRIGINE 200 MG: 100 TABLET ORAL at 08:04

## 2020-04-15 RX ADMIN — APIXABAN 10 MG: 5 TABLET, FILM COATED ORAL at 09:04

## 2020-04-15 RX ADMIN — IPRATROPIUM BROMIDE AND ALBUTEROL SULFATE 3 ML: .5; 3 SOLUTION RESPIRATORY (INHALATION) at 12:04

## 2020-04-15 RX ADMIN — DOCUSATE SODIUM 50 MG: 50 LIQUID ORAL at 09:04

## 2020-04-15 RX ADMIN — ACETAMINOPHEN 650 MG: 325 TABLET ORAL at 10:04

## 2020-04-15 RX ADMIN — LISINOPRIL 10 MG: 10 TABLET ORAL at 10:04

## 2020-04-15 RX ADMIN — OXYCODONE HYDROCHLORIDE AND ACETAMINOPHEN 1 TABLET: 5; 325 TABLET ORAL at 08:04

## 2020-04-15 RX ADMIN — Medication 3 MG: at 08:04

## 2020-04-15 RX ADMIN — SENNOSIDES 5 ML: 8.8 SYRUP ORAL at 09:04

## 2020-04-15 RX ADMIN — OXYCODONE HYDROCHLORIDE AND ACETAMINOPHEN 1 TABLET: 10; 325 TABLET ORAL at 05:04

## 2020-04-15 RX ADMIN — ZIPRASIDONE HYDROCHLORIDE 20 MG: 20 CAPSULE ORAL at 08:04

## 2020-04-15 RX ADMIN — LIDOCAINE 1 PATCH: 50 PATCH CUTANEOUS at 09:04

## 2020-04-15 RX ADMIN — POLYETHYLENE GLYCOL 3350 17 G: 17 POWDER, FOR SOLUTION ORAL at 09:04

## 2020-04-15 RX ADMIN — APIXABAN 10 MG: 5 TABLET, FILM COATED ORAL at 08:04

## 2020-04-15 NOTE — PLAN OF CARE
Problem: Occupational Therapy Goal  Goal: Occupational Therapy Goal  Description  Goals to be met by: 4-17-20     Patient will increase functional independence with ADLs by performing:    Feeding with Minimal Assistance.  Grooming while EOB with Minimal Assistance.  Sitting at edge of bed x 15 minutes with Stand-by Assistance for functional ADL progress.  Tolerate sitting in bedside chair x 2-3 hours daily to increase endurance for functional activities.    Outcome: Ongoing, Progressing     Goals reviewed and remain appropriate, continue POC    QUYEN Luther/L  4/15/2020   Pager #: [phone]

## 2020-04-15 NOTE — NURSING
Pt lying in bed watching TV. Pain medication give at 0400. No c/o pain at this time. O2 via nasal cannula. Pt shows no signs of distress. Call light within reach, bed locked and in lowest position-ALESHA Ha
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
## 2020-04-15 NOTE — HOSPITAL COURSE
"04/15/2020  Pt frustrated with his prolonged hospital stay; repeatedly tells me he wishes to go home. Fully oriented -- volunteers all orientation questions immediately. States that his mood is "ready to go," but later clarifies that it is "good." Relieved to have ziprasidone restarted. States that his sleep and appetite have been fine. Denies any perceptual disturbances, thoughts of suicide or self harm, or HI.   "

## 2020-04-15 NOTE — PT/OT/SLP PROGRESS
Occupational Therapy   Treatment    Name: Cash Crawford  MRN: [id number]  Admitting Diagnosis:  Acute respiratory distress syndrome (ARDS) due to COVID-19 virus       Recommendations:     Discharge Recommendations: nursing facility, skilled  Discharge Equipment Recommendations:  bedside commode, walker, rolling  Barriers to discharge:  None    Assessment:     Cash Crawford is a 64 y.o. male with a medical diagnosis of Acute respiratory distress syndrome (ARDS) due to COVID-19 virus.  He presents with fatigue upon OT arrival. Pt agreeable to participating in ADL session. Pt on 3L NC O2 throughout session. SpO2 95% at beginning of session pt supine. Pt completed ADLs seated EOB with set-up A. After sitting ~5 minutes, pt SpO2 started to down trend to 85%. Pt educated and performed pursed lip breathing, returned supine and SpO2 abiola to 92%. Performance deficits affecting function are weakness, impaired endurance, impaired functional mobilty, impaired self care skills, impaired balance, gait instability, impaired cardiopulmonary response to activity. Pt would benefit from skilled OT services in order to maximize independence with ADLs and facilitate safe discharge. Pt would benefit from SNF upon discharge to return to [facility] and decrease burden of care.    Rehab Prognosis:  Good; patient would benefit from acute skilled OT services to address these deficits and reach maximum level of function.       Plan:     Patient to be seen 4 x/week to address the above listed problems via self-care/home management, therapeutic activities, therapeutic exercises  · Plan of Care Expires: 05/12/20  · Plan of Care Reviewed with: patient    Subjective     Pain/Comfort:  · Pain Rating 1: 0/10  · Pain Rating Post-Intervention 1: 0/10    Objective:     Communicated with: RN prior to session.  Patient found right sidelying with oxygen, telemetry, pulse ox (continuous)(VISI monitor) upon OT entry to room.    General Precautions:  Standard, airborne, aspiration, contact, fall, droplet, pureed diet   Orthopedic Precautions:N/A   Braces: N/A     Occupational Performance:     Bed Mobility:    · Patient completed Rolling/Turning to Left with  stand by assistance  · Patient completed Scooting/Bridging with stand by assistance  · Patient completed Supine to Sit with stand by assistance  · Patient completed Sit to Supine with stand by assistance     Activities of Daily Living:  · Grooming: stand by assistance sitting balance EOB with set-up A to wash face and brush teeth      AMPA 6 Click ADL: 15    Treatment & Education:  -Therapist provided facilitation and instruction of proper body mechanics, energy conservation, and fall prevention strategies during tasks listed above.  -Pt educated on role of OT, POC and goals for therapy  -Pt educated on pursed lip breathing and how to monitor SpO2 from VISI monitor   -Pt educated on importance of OOB/EOB activities with staff member assistance and sitting OOB majority of the day.   -Pt verbalized understanding. Pt expressed no further concerns/questions  -Whiteboard updated      Patient left left sidelying with all lines intact and call button in reachEducation:      GOALS:   Multidisciplinary Problems     Occupational Therapy Goals        Problem: Occupational Therapy Goal    Goal Priority Disciplines Outcome Interventions   Occupational Therapy Goal     OT, PT/OT Ongoing, Progressing    Description:  Goals to be met by: 4-17-20     Patient will increase functional independence with ADLs by performing:    Feeding with Minimal Assistance.  Grooming while EOB with Minimal Assistance.  Sitting at edge of bed x 15 minutes with Stand-by Assistance for functional ADL progress.  Tolerate sitting in bedside chair x 2-3 hours daily to increase endurance for functional activities.                     Time Tracking:     OT Date of Treatment: 04/15/20  OT Start Time: 1353  OT Stop Time: 1408  OT Total Time (min): 15  min    Billable Minutes:Self Care/Home Management 15    Reina Geronimo OT  4/15/2020

## 2020-04-15 NOTE — SUBJECTIVE & OBJECTIVE
"Interval History: see hospital course    Family History     Problem Relation (Age of Onset)    Alcohol abuse Mother, Father, Maternal Uncle    Arthritis Mother    Drug abuse Maternal Uncle    Hypertension Mother    Suicide Maternal Uncle        Tobacco Use    Smoking status: Current Every Day Smoker     Packs/day: 0.50     Years: 30.00     Pack years: 15.00     Types: Cigarettes     Last attempt to quit: 2015     Years since quittin.3    Smokeless tobacco: Never Used    Tobacco comment: currently using nicotine patches 21 mg   Substance and Sexual Activity    Alcohol use: No     Alcohol/week: 0.0 standard drinks     Comment: stoped 17 months ago but says that he is an alcoholic    Drug use: Not Currently     Comment: stopped 17 months ago; used to used heavy drugs, particularly marijuana, acid, amphetamines, cocaine, IV crack cocaine    Sexual activity: Not on file     Psychotherapeutics (From admission, onward)    Start     Stop Route Frequency Ordered    20 2100  ziprasidone capsule 20 mg      -- Oral Nightly 20 1257    20 1425  OLANZapine tablet 5 mg      -- Oral Every 6 hours PRN 20 1326           Review of Systems   Psychiatric/Behavioral: Negative for agitation, behavioral problems, confusion, decreased concentration, dysphoric mood, hallucinations, sleep disturbance and suicidal ideas.     Objective:     Vital Signs (Most Recent):  Temp: 98.8 °F (37.1 °C) (04/15/20 1132)  Pulse: 98 (04/15/20 1132)  Resp: 17 (04/15/20 0713)  BP: 134/75 (20 1624)  SpO2: 96 % (04/15/20 0713) Vital Signs (24h Range):  Temp:  [97.5 °F (36.4 °C)-99.8 °F (37.7 °C)] 98.8 °F (37.1 °C)  Pulse:  [] 98  Resp:  [17-20] 17  SpO2:  [93 %-96 %] 96 %     Height: 5' 9" (175.3 cm)  Weight: 87.4 kg (192 lb 10.9 oz)  Body mass index is 28.45 kg/m².    No intake or output data in the 24 hours ending 04/15/20 1235    Physical Exam        Behavior:  calm, cooperative  Speech:  normal rate, tone, " "and volume  Mood: "ready to go... good"  Affect: full  Thought Process:  ruminative  Thought Perceptions:  denied AVH  Thought Content:  denied SI, HI; no delusions apparent  Sensorium:  awake, alert  Attention/Concentration:  intact to conversation  Orientation:  person, place, time, and situation  Memory:  intact (recent, remote)  Abstraction:  intact   Insight:  fair  Judgment:  good    Significant Labs: All pertinent labs within the past 24 hours have been reviewed.    Significant Imaging: I have reviewed all pertinent imaging results/findings within the past 24 hours.  "

## 2020-04-15 NOTE — PLAN OF CARE
SW made contact w/ [facility]. Not able to accept pt.     SW attempted contact w/ [facility] facilities. No answer from [facility] or [facility].    Will con't to f/u    Lory Molina LMSW  Case Management Social Worker   [facility]

## 2020-04-15 NOTE — PT/OT/SLP PROGRESS
Physical Therapy Treatment    Patient Name:  Cash Crawford   MRN:  [id number]    Recommendations:     Discharge Recommendations:  nursing facility, skilled   Discharge Equipment Recommendations: walker, rolling, bedside commode   Barriers to discharge: current level of assistance needed for functional mobility    Assessment:     Cash Crawford is a 64 y.o. male admitted with a medical diagnosis of Acute respiratory distress syndrome (ARDS) due to COVID-19 virus.  He presents with the following impairments/functional limitations:  weakness, gait instability, edema, impaired cardiopulmonary response to activity, impaired endurance, impaired balance, decreased lower extremity function, pain, impaired functional mobilty. Pt continues to progress with functional mobility skills, but remains most limited by weakness and fatigue. However, pt did seem to have less exertional breathing with bedside sitting today.  Pt still needs mod A for sit<> stand transfers at this time and is only minimally ambulatory with therapy.    Rehab Prognosis: Good; patient would benefit from acute skilled PT services to address these deficits and reach maximum level of function.    Recent Surgery: * No surgery found *      Plan:     During this hospitalization, patient to be seen 4 x/week to address the identified rehab impairments via gait training, therapeutic activities, therapeutic exercises and progress toward the following goals:    · Plan of Care Expires:  05/03/20    Subjective     Chief Complaint: back pain( nurse notified at end of session)  Patient/Family Comments/goals: Pt wants to go home.  Pain/Comfort:  · Pain Rating 1: 7/10  · Location - Side 1: Bilateral  · Location - Orientation 1: midline  · Location 1: back  · Pain Addressed 1: Nurse notified  · Pain Rating Post-Intervention 1: 7/10      Objective:     Communicated with nurse prior to session.  Patient found HOB elevated with oxygen, pulse ox (continuous),  telemetry upon PT entry to room.     General Precautions: Standard, airborne, aspiration, contact, droplet, fall, pureed diet   Orthopedic Precautions:N/A   Braces: N/A     Functional Mobility:  · Bed Mobility:     · Supine to Sit: supervision  · Sit to Supine: minimum assistance  · Transfers:     · Sit to Stand:  moderate assistance with no AD and hand-held assist  · Gait: Pt ambulates 3-4 steps forward and backward x 2 trials with seated rest break between. Pt also moves 3 lateral step to L toward head of bed.  · Balance: Fair + static sitting; Poor static and dynamic standing      AM-PAC 6 CLICK MOBILITY  Turning over in bed (including adjusting bedclothes, sheets and blankets)?: 3  Sitting down on and standing up from a chair with arms (e.g., wheelchair, bedside commode, etc.): 2  Moving from lying on back to sitting on the side of the bed?: 3  Moving to and from a bed to a chair (including a wheelchair)?: 2  Need to walk in hospital room?: 2  Climbing 3-5 steps with a railing?: 1  Basic Mobility Total Score: 13       Therapeutic Activities and Exercises:   Pt found supine in bed with HOB elevated~ pt devoid of any clothing( gown or socks) ~ pt assisted with donning these items.  Supine> sit with supervision  In sitting, PT assists pt to perform the following AA exercises: 10 reps each of AP, LAQ and hip flexion~ rest breaks taken between each exercise.  Sit<> stand x 2 trials with mod A for hip elevation and lowering  Pt performs gait training as described above, mod A with pt holding onto therapist's forearms.  Sit> L sidelying with min A for LE management  PT monitors Sp02 throughout session and pt fluctuates between 92 % and 84 %~ PT does not begin any activity again until levels reach close to 90 %.  Pt educated regarding the importance of current mobility training in PT sessions as a bridge to help him return home. Pt verbalizes understanding.    Patient left left sidelying with all lines intact and call  button in reach..    GOALS:   Multidisciplinary Problems     Physical Therapy Goals        Problem: Physical Therapy Goal    Goal Priority Disciplines Outcome Goal Variances Interventions   Physical Therapy Goal     PT, PT/OT Ongoing, Progressing     Description:  Goals to be met by: 20    Patient will increase functional independence with mobility by performin. Supine to sit with Moderate Assistance -met  2. Sit to stand transfer with Moderate Assistance with AD if needed. -met  3. Bed to chair transfer with Moderate Assistance using AD if needed. -not met  4. Gait  x 30  feet with Moderate Assistance using AD if needed. -not met  5. Lower extremity exercise program x15 reps with assistance as needed-not met but progressing                     Time Tracking:     PT Received On: 04/15/20  PT Start Time: 831     PT Stop Time: 857  PT Total Time (min): 26 min     Billable Minutes: Gait Training 13 and Therapeutic Exercise 13    Treatment Type: Treatment  PT/PTA: PT     PTA Visit Number: 0     Flip Arango, PT  04/15/2020

## 2020-04-15 NOTE — PLAN OF CARE
Patient received from Reina @1:45pm  Problem: Adult Inpatient Plan of Care  Goal: Optimal Comfort and Wellbeing  Outcome: Ongoing, Progressing   AAOx4. POC reviewed with patient. Vitals stable. Afebrile. Medications given as ordered. Tolerated well. Patient was retested for COVID19 test results pending. No complaints of pain or discomfort. Will continue to monitor

## 2020-04-15 NOTE — PROGRESS NOTES
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     Hospital Course:  Initial workup showed CXR right lobe infiltrate with elevated , D-Dimer 1.03,  and Ferritin 331. Patient swabbed for COVID 19:positive. Patient started on IV Rocephin and Azithromycin and Plaquenil. He had rapid decline in respiratory status within 24H of admission and was intubated 3/30/2020 c/b ARDS.  He completed Abx for empiric CAP treatment on 4/2 and course of hydroxychloroquine ended 4/5.  Extubated on 4/4/2020 and transitioned to bipap/NC.   Developed hypernatremia which resolved on D5 drip requiring insulin for hyperglycemia.  Developed RUE DVT on therapeutic lovenox.  Started on Depakote for agitation    4/12: Started apixaban, down to 8L NC, pending psych consult  4/13: Down to 4L NC, feeling well, behavioral problems w/ nursing (smeared feces all over lines)  4/14: Down to 3L NC, resume home ziprasidone, weaning depakote  4/15: On 3L NC, H/H drifting down to 7.8, decreased oxycodone     Interval History:     No acute events overnight, labs not drawn yesterday  Today w/ downward trend in Hb  Still w/ inconsistent intake but no N/V/abd pain  Pain controled in neck and RUE with pain meds (required 3 doses oxycodone in 24 H)  No bleeding per patient    Review  of Systems:  Respiratory:  No dysnea, denies cough  Cardiovascular: denies chest pain/palpitations  GI: no abd pain, no N/V.      Inpatient Medications:    Current Facility-Administered Medications:     acetaminophen tablet 650 mg, 650 mg, Oral, Q4H PRN, Emilee Mandel MD, 650 mg at 04/10/20 0217    albuterol-ipratropium 2.5 mg-0.5 mg/3 mL nebulizer solution 3 mL, 3 mL, Nebulization, Q6H, Gurpreet Godinez MD, 3 mL at 04/15/20 0713    apixaban tablet 10 mg, 10 mg, Oral, BID, 10 mg at 04/14/20 2111 **FOLLOWED BY** [START ON 4/19/2020] apixaban tablet 5 mg, 5 mg, Oral, BID, Layo Elam MD    bisacodyL suppository 10 mg, 10 mg, Rectal, Daily PRN, Anila Daily MD, 10 mg at 04/08/20 2205    divalproex EC tablet 250 mg, 250 mg, Oral, Q12H, Layo Elam MD, 250 mg at 04/14/20 2112    sennosides 8.8 mg/5 ml syrup 5 mL, 5 mL, Oral, Daily **AND** docusate 50 mg/5 mL liquid 50 mg, 50 mg, Oral, Daily, Crow Chery MD    glucose chewable tablet 16 g, 16 g, Oral, PRN, Emilee Mandel MD    glucose chewable tablet 24 g, 24 g, Oral, PRN, Emilee Mandel MD    insulin aspart U-100 pen 1-10 Units, 1-10 Units, Subcutaneous, TIDWM, Layo Elam MD, 2 Units at 04/14/20 1659    insulin detemir U-100 pen 10 Units, 10 Units, Subcutaneous, BID, Layo Elam MD, 10 Units at 04/14/20 0948    labetalol 20 mg/4 mL (5 mg/mL) IV syring, 15 mg, Intravenous, Q4H PRN, Usman Bal MD, 15 mg at 04/04/20 1932    lamoTRIgine tablet 200 mg, 200 mg, Oral, QHS, Emilee Mandel MD, 200 mg at 04/14/20 2111    lidocaine 5 % patch 1 patch, 1 patch, Transdermal, Q24H, Waleska Muñoz MD, 1 patch at 04/14/20 0944    lisinopriL tablet 10 mg, 10 mg, Oral, Daily, Layo Elam MD, 10 mg at 04/14/20 0944    melatonin tablet 3 mg, 3 mg, Oral, Nightly PRN, Mary Boston NP, 3 mg at 04/14/20 0229    OLANZapine tablet 5 mg, 5 mg, Oral, Q6H PRN, Layo Elam MD    ondansetron disintegrating tablet 8 mg, 8 mg, Oral, Q8H  "PRN, Emilee Mandel MD    oxyCODONE-acetaminophen  mg per tablet 1 tablet, 1 tablet, Oral, Q6H PRN, Anita Cervantes MD, 1 tablet at 04/15/20 0524    polyethylene glycol packet 17 g, 17 g, Oral, Daily, Crow Chery MD    sodium chloride 0.9% flush 10 mL, 10 mL, Intravenous, PRN, Emilee Mandel MD    sodium chloride 0.9% flush 10 mL, 10 mL, Intravenous, PRN, Emilee Mandel MD    ziprasidone capsule 20 mg, 20 mg, Oral, QHS, Layo Elam MD, 20 mg at 04/14/20 2111      Physical Exam:    No intake or output data in the 24 hours ending 04/15/20 0747  Wt Readings from Last 3 Encounters:   04/10/20 87.4 kg (192 lb 10.9 oz)   04/01/20 87.1 kg (192 lb)   02/05/19 96.8 kg (213 lb 6.4 oz)       /75   Pulse 99   Temp 99.8 °F (37.7 °C) (Oral)   Resp 17   Ht 5' 9" (1.753 m)   Wt 87.4 kg (192 lb 10.9 oz)   SpO2 96%   BMI 28.45 kg/m²     Limited exam 2/2 minimizing exposure to COVID-19.  GEN: NAD, MMM  Resp: normal work of breathing   Neuro: AAOx3    Laboratory:  Lab Results   Component Value Date    [id number] Detected (A) 03/29/2020       Recent Labs   Lab 04/10/20  0351 04/11/20  0434 04/12/20  0055   WBC 9.44 9.69 7.47   LYMPH 6.9*  0.7* 7.4*  0.7* 9.4*  0.7*   HGB 8.2* 8.1* 8.6*   HCT 27.6* 26.7* 29.1*   * 445* 378*     Recent Labs   Lab 04/09/20  0326  04/10/20  0351 04/10/20  0554 04/11/20  0435 04/12/20  0421   *   < >  --  147* 144 145   K 3.8   < >  --  3.7 4.1 3.5   *   < >  --  113* 110 109   CO2 23   < >  --  23 23 27   BUN 23   < >  --  20 14 12   CREATININE 0.8   < >  --  0.8 0.7 0.7   *   < >  --  227* 213* 144*   CALCIUM 8.0*   < >  --  7.7* 7.9* 7.8*   MG 2.8*  --  2.7*  --  2.8*  --    PHOS 3.7  --  2.9  --  2.4*  --     < > = values in this interval not displayed.     Recent Labs   Lab 04/10/20  0554 04/11/20  0435 04/12/20  0421   ALKPHOS 89 89 81   * 174* 170*   * 146* 149*   ALBUMIN 1.8* 1.8* 1.7*   PROT 6.3 6.6 6.3   BILITOT 0.5 " 0.4 0.3        Recent Labs     04/14/20  1741   CRP 78.6*       All labs within the last 24 hours were reviewed.     Microbiology:  Microbiology Results (last 7 days)     ** No results found for the last 168 hours. **            Imaging  ECG Results          EKG 12-lead (Final result)  Result time 04/02/20 08:08:36    Final result by Interface, Lab In [facility] (04/02/20 08:08:36)                 Narrative:    Test Reason : R68.89,    Vent. Rate : 097 BPM     Atrial Rate : 097 BPM     P-R Int : 150 ms          QRS Dur : 072 ms      QT Int : 332 ms       P-R-T Axes : 065 025 039 degrees     QTc Int : 421 ms    Age and gender specific analysis  Normal sinus rhythm  Normal ECG  When compared with ECG of 30-MAR-2020 15:09,    No significant change was found  Confirmed by CORY DURAND MD ([id number]) on 4/2/2020 8:08:25 AM    Referred By: [email or web address]   SELF           Confirmed By:CORY DURAND MD                             EKG 12-lead (Final result)  Result time 03/31/20 09:55:44    Final result by Interface, Lab In [facility] (03/31/20 09:55:44)                 Narrative:    Test Reason : J22,B97.29,    Vent. Rate : 105 BPM     Atrial Rate : 104 BPM     P-R Int : 000 ms          QRS Dur : 068 ms      QT Int : 338 ms       P-R-T Axes : 000 -06 006 degrees     QTc Int : 446 ms    Atrial pacing.  Confirmed by Musa Padron MD ([id number]) on 3/31/2020 9:55:37 AM    Referred By: [email or web address]   SELF           Confirmed By:Musa Padron MD                             EKG 12-lead (Final result)  Result time 03/30/20 14:45:25    Final result by Interface, Lab In [facility] (03/30/20 14:45:25)                 Narrative:    Test Reason : R06.00,    Vent. Rate : 109 BPM     Atrial Rate : 109 BPM     P-R Int : 146 ms          QRS Dur : 070 ms      QT Int : 324 ms       P-R-T Axes : 063 023 036 degrees     QTc Int : 436 ms    Sinus tachycardia  Otherwise normal ECG    Confirmed by Musa Padron MD ([id number]) on 3/30/2020 2:45:14  PM    Referred By: IRMA   SELF           Confirmed By:Musa Padron MD                              No results found for this or any previous visit.    US Upper Extremity Veins Right  Narrative: EXAMINATION:  US UPPER EXTREMITY VEINS RIGHT    CLINICAL HISTORY:  r/o DVT;    TECHNIQUE:  Duplex and color flow Doppler evaluation and dynamic compression was performed of the right upper extremity veins.    COMPARISON:  Chest radiograph 04/01/2020, 03/30/2020, 03/29/2020.    FINDINGS:  Central veins: There is deep vein thrombosis of the right internal jugular, subclavian, and axillary veins.    Arm veins: thrombosis of the right brachial, basilic, and cephalic veins.    Contralateral subclavian/internal jugular veins: The left subclavian and internal jugular veins are patent and free of thrombus.  Impression: Deep vein thrombosis of multiple veins in the right upper extremity as above.    This report was flagged in Epic as abnormal.    COMMUNICATION  This critical result was discovered/received at 14:29.  The critical information above was relayed directly by Cash Peter by telephone to Dr. Mccray On 04/07/2020 at 14:32.    Electronically signed by resident: Cash Peter  Date:    04/07/2020  Time:    14:28    Electronically signed by: Estefanía Mancuso MD  Date:    04/07/2020  Time:    14:42      All imaging within the last 24 hours was reviewed.     Assessment and Plan:    Active Hospital Problems    Diagnosis  POA    *Acute respiratory distress syndrome (ARDS) due to COVID-19 virus [U07.1, J80]  Yes    Hypernatremia [E87.0]  Unknown    Acute deep vein thrombosis (DVT) of axillary vein of right upper extremity [I82.A11]  No    Bipolar 1 disorder, depressed [F31.9]  Yes     - Per PCP Dr. Lechuga notes, sees psychiatrist, on Ziprasidone 80 mg QD, Buproprion 150 mg TID, Lamotrigine 200mg QD.  - Patient currently sedated, and holding home meds EXCEPT for lamotrigine.  4/1.  - Will consider  re-adding his home medications once extubated and out of ICU.      Anemia [D64.9]  No    JULIET (acute kidney injury) [N17.9]  Unknown    Elevated LFTs [R94.5]  Yes     - likely 2/2 to covid; however, does have a history of treated hep c in 2015  - AST//600s on transfer, now downtrending to 400s/480s. Continuing to trend CMPs  - Despite downtrending AST/ALTs, patient has Hx of treated HCV but no recent HCV RNA, and continued IVDU/substance use.      Type 2 diabetes mellitus with diabetic neuropathic arthropathy, without long-term current use of insulin [E11.610]  Yes    Essential hypertension, benign [I10]  Yes    Hepatitis C [B19.20]  Yes      Resolved Hospital Problems   No resolved problems to display.       Covid-19 Virus Infection  - COVID-19 testing: Collection Date: 3/29/2020 Collection Time:  10:02 PM  - Infection Control notified    - Isolation:   - Airborne and Droplet Precautions  - Surgical mask on patient   - N95 masks must be fit tested, wear eye protection  - 20 second hand hygiene   - Limit visitors per hospital policy   - Consolidate lab draws, nursing care, and interventions    - Diagnostics: (Lymphopenia, hyponatremia, hyperferritinemia, elevated troponin, elevated d-dimer, age, and comorbidities are significant predictors of poor clinical outcome)   - CBC: Hb stable , normal WBC  trend Q48hrs  - CMP:    hypoNa-> hyperNa--> Na 144   trend Q48hrs  - Procalcitonin: 0.13  - D-dimer: 1.03->4.98    repeat prior to discharge  - Ferritin: 331->369->259   repeat prior to discharge   - CRP:  122-->peak 306-->146->182->78   trend Q48hrs  - LDH: 789->987->569   repeat prior to discharge  - Troponin: 0.011   - ECG: normal   - rapid Flu: negative   - CXR: RLL consolidation   - UA : no infection   - CPK: 143->699->408    - Management:   Bundle care as able to maintain isolation & minimize in/out of room   - Supplemental O2 to maintain SpO2 92%-96%   if requiring 6L NC or higher, place on nonrebreather  and discuss case with MICU   - Telemetry & continuous pulse oximetry    - If wheezing   - albuterol inhaler 2-4 puff Q6hr PRN    - ipratropium daily    - acetaminophen 650mg PO Q6hr PRN fever   - loperamide PRN for viral diarrhea   - Empiric antibiotics and hydroxychloroquine completed    - not on statin 2/2 elevated CPK   - Investigational Treatment Protocol: (if patient meets criteria)   [email or web address]       Safety notes:   - d/c bipap as he is no longer requiring it   - Cautious use of NSAIDS for fever per WHO recommendations (3/16/2020)   - No new ACEi/ARB start or discontinuation of chronic med unless hypotensive (Esler et al. Journal of Hypertension 2020, 38:000-000)   - Careful use of steroids in the absence of other indications (shock, ARDS)   - Fluid sparing resuscitation, avoid maintenance fluids    Anemia of chronic disease:  H/H slowly trending down but no signs of bleeding or rise in BUN  - continue apixaban and monitor for bleeding    Acute deep vein thrombosis (DVT) of axillary vein of right upper extremity  RUE us 04/07 consistent with DVTs in the R IJ, subclavian, and axillary veins. Noted thrombosis of cephalic, brachial, and basilic veins.    -transitioned from therapeutic lovenox to apixabian 4/12, no bleeding  -PRN oxy decreased from  -> 5mg q6H prn on 4/15  -CPK normal    Type 2 diabetes mellitus with diabetic neuropathic arthropathy, without long-term current use of insulin  Home medication includes jardiance 25mg (on hold)   -continue levemir 10 units BID   - sliding scale aspart TID AC for now, may need scheduled insulin w/ meals if intake improves     Bipolar 1 disorder, depressed  Home meds include lamotrigine 200 QHS and ziprasidone but now on lamotrigine and depakote per ICU team  -per psych recommendations: (appreciate recs on resuming bupropion)   - resume home ziprasidone 20 mg  nightly tonight   - continue home lamotrigine 200 mg qHS   - got last dose of depakote this am, monitor mood/behavior   - zyprexa prn po for agitation (no benzos within 1 hr of zyprexa)    Essential hypertension, benign  BP at goal, continue home lisinopril 10 mg daily    Diet: Pureed (per patient preference 2/2 not having dentures) + bloost glucose control, thin liquids     Advance Care Planning  Goals of care, counseling/discussion- Full code  Not medically ready for d/c as he is still requiring 3L O2, Hb trending down, adjusting psych medications.  Will need [facility] follow up w/ [facility] after [facility]    4/15 12pm - had long discussion w/ wife regarding patient's level of deconditioning and amount of assistance he requires now for all activities including transfers.  She reports that there are 4 people living in her home, one of which is her daughter who is a nurse.  They feel confident that they will be able to provide safe care for Mr. Crawford and home and do not want him to go to SNF.  No one at home has had + COVID or signs of COVID.  Will retest patient to ensure it is safe for family to be in close contact with him    Anticipated DME needs:    Advance Care Planning        VTE High Risk Prophylaxis:   VTE Risk Mitigation (From admission, onward)         Ordered     apixaban tablet 5 mg  2 times daily      04/12/20 1231     apixaban tablet 10 mg  2 times daily      04/12/20 1231     heparin (porcine) 1,000 unit/mL injection      03/30/20 1640     Place sequential compression device  Until discontinued      03/30/20 0015     IP VTE HIGH RISK PATIENT  Once      03/30/20 0015                Patient's chronic/stable medical conditions noted in the problem list above will be managed with the patient's home medications as tolerated.     Subsequent Inpatient Hospital Care  Level 2 [id number] Total visit time was 25 minutes or greater with greater than 50% of time spent in counseling and coordination of care.      Layo Elam MD   [facility] Medicine Team I (Endocrinologist)  Pager: [phone]  Spectralink: [phone]

## 2020-04-15 NOTE — ASSESSMENT & PLAN NOTE
ASSESSMENT     Cash Crawford is a 64 y.o. male with a past psychiatric history of bipolar 1 d/o, PTSD, hx of polysubstance use d/o, currently presenting with Acute respiratory distress syndrome (ARDS) due to COVID-19 virus.  Psychiatry was originally consulted to address the patient's symptoms of medication management, one episode of bizarre behavior. Pt reports dysphoria d/t his illness and hospital stay but states that otherwise, his mood has been fine. He denies vegetative symptoms consistent with a derangement of affect, including sleep or appetite disturbances, and there are no evidence of perceptual disturbances or delusions. Denies SI or sx concerning for severe episode of depression. Is still taking his outpatient lamotrigine, but the other medications that were maintaining his wellbeing were stopped.     Pt's most recent QTC 4/12 was 442.     IMPRESSION  Hx of bipolar 1 d/o, currently well controlled  R/o hyperactive delirium   Hx of polysubstance abuse    RECOMMENDATION(S)      1. Scheduled Medication(s):  - continue lamotrigine 200 mg daily  - initiate ziprasidone 20 mg qhs with dinner. Continue to monitor EKG while hospitalized.  - will hold buproprion for now. As pt has had sx  Of confusion and no depression is evident, best to hold dopaminergic medications at this point. He may restart outpatient.       2. PRN Medication(s):  - may offer vistaril 25 mg PRN for anxiety  - recommend melatonin 3 mg QHS prn for insomnia  - zyprexa5 mg IM/PO q8 not within one hour of benzodiazepines     3.  Monitor:  Please obtain daily EKG to monitor QTc    4. Legal Status/Precaution(s):  Pt does not meet criteria for PEC at this time, as he is not gravely disabled, nor is he an acute danger to himself or others.     Psychiatry will sign off.

## 2020-04-15 NOTE — PLAN OF CARE
Problem: Physical Therapy Goal  Goal: Physical Therapy Goal  Description  Goals to be met by: 20    Patient will increase functional independence with mobility by performin. Supine to sit with Moderate Assistance -met  2. Sit to stand transfer with Moderate Assistance with AD if needed. -met  3. Bed to chair transfer with Moderate Assistance using AD if needed. -not met  4. Gait  x 30  feet with Moderate Assistance using AD if needed. -not met  5. Lower extremity exercise program x15 reps with assistance as needed-not met but progressing    Outcome: Ongoing, Progressing      Flip Arango, PT  4/15/2020

## 2020-04-16 LAB
ALBUMIN SERPL BCP-MCNC: 1.7 G/DL (ref 3.5–5.2)
ALP SERPL-CCNC: 76 U/L (ref 55–135)
ALT SERPL W/O P-5'-P-CCNC: 177 U/L (ref 10–44)
ANION GAP SERPL CALC-SCNC: 9 MMOL/L (ref 8–16)
AST SERPL-CCNC: 208 U/L (ref 10–40)
BASOPHILS # BLD AUTO: 0.01 K/UL (ref 0–0.2)
BASOPHILS NFR BLD: 0.2 % (ref 0–1.9)
BILIRUB SERPL-MCNC: 0.3 MG/DL (ref 0.1–1)
BUN SERPL-MCNC: 7 MG/DL (ref 8–23)
CALCIUM SERPL-MCNC: 8 MG/DL (ref 8.7–10.5)
CHLORIDE SERPL-SCNC: 107 MMOL/L (ref 95–110)
CO2 SERPL-SCNC: 28 MMOL/L (ref 23–29)
CREAT SERPL-MCNC: 0.7 MG/DL (ref 0.5–1.4)
CRP SERPL-MCNC: 86.8 MG/L (ref 0–8.2)
DIFFERENTIAL METHOD: ABNORMAL
EOSINOPHIL # BLD AUTO: 0.1 K/UL (ref 0–0.5)
EOSINOPHIL NFR BLD: 1.1 % (ref 0–8)
ERYTHROCYTE [DISTWIDTH] IN BLOOD BY AUTOMATED COUNT: 23.2 % (ref 11.5–14.5)
EST. GFR  (AFRICAN AMERICAN): >60 ML/MIN/1.73 M^2
EST. GFR  (NON AFRICAN AMERICAN): >60 ML/MIN/1.73 M^2
GLUCOSE SERPL-MCNC: 78 MG/DL (ref 70–110)
HCT VFR BLD AUTO: 21.9 % (ref 40–54)
HGB BLD-MCNC: 7.2 G/DL (ref 14–18)
IMM GRANULOCYTES # BLD AUTO: 0.04 K/UL (ref 0–0.04)
IMM GRANULOCYTES NFR BLD AUTO: 0.8 % (ref 0–0.5)
LYMPHOCYTES # BLD AUTO: 0.7 K/UL (ref 1–4.8)
LYMPHOCYTES NFR BLD: 12.9 % (ref 18–48)
MCH RBC QN AUTO: 29 PG (ref 27–31)
MCHC RBC AUTO-ENTMCNC: 32.9 G/DL (ref 32–36)
MCV RBC AUTO: 88 FL (ref 82–98)
MONOCYTES # BLD AUTO: 1.2 K/UL (ref 0.3–1)
MONOCYTES NFR BLD: 22.1 % (ref 4–15)
NEUTROPHILS # BLD AUTO: 3.3 K/UL (ref 1.8–7.7)
NEUTROPHILS NFR BLD: 62.9 % (ref 38–73)
NRBC BLD-RTO: 0 /100 WBC
PLATELET # BLD AUTO: 627 K/UL (ref 150–350)
PMV BLD AUTO: 8.7 FL (ref 9.2–12.9)
POCT GLUCOSE: 125 MG/DL (ref 70–110)
POCT GLUCOSE: 130 MG/DL (ref 70–110)
POCT GLUCOSE: 167 MG/DL (ref 70–110)
POCT GLUCOSE: 178 MG/DL (ref 70–110)
POTASSIUM SERPL-SCNC: 3.8 MMOL/L (ref 3.5–5.1)
PROT SERPL-MCNC: 6 G/DL (ref 6–8.4)
RBC # BLD AUTO: 2.48 M/UL (ref 4.6–6.2)
SARS-COV-2 RNA RESP QL NAA+PROBE: NOT DETECTED
SODIUM SERPL-SCNC: 144 MMOL/L (ref 136–145)
WBC # BLD AUTO: 5.26 K/UL (ref 3.9–12.7)

## 2020-04-16 PROCEDURE — 36415 COLL VENOUS BLD VENIPUNCTURE: CPT

## 2020-04-16 PROCEDURE — 11000001 HC ACUTE MED/SURG PRIVATE ROOM

## 2020-04-16 PROCEDURE — 93010 ELECTROCARDIOGRAM REPORT: CPT | Mod: ,,, | Performed by: INTERNAL MEDICINE

## 2020-04-16 PROCEDURE — 25000003 PHARM REV CODE 250: Performed by: STUDENT IN AN ORGANIZED HEALTH CARE EDUCATION/TRAINING PROGRAM

## 2020-04-16 PROCEDURE — 92526 ORAL FUNCTION THERAPY: CPT

## 2020-04-16 PROCEDURE — 94640 AIRWAY INHALATION TREATMENT: CPT

## 2020-04-16 PROCEDURE — 93005 ELECTROCARDIOGRAM TRACING: CPT

## 2020-04-16 PROCEDURE — 25000003 PHARM REV CODE 250: Performed by: INTERNAL MEDICINE

## 2020-04-16 PROCEDURE — 99900035 HC TECH TIME PER 15 MIN (STAT)

## 2020-04-16 PROCEDURE — 94761 N-INVAS EAR/PLS OXIMETRY MLT: CPT

## 2020-04-16 PROCEDURE — 27000221 HC OXYGEN, UP TO 24 HOURS

## 2020-04-16 PROCEDURE — 86140 C-REACTIVE PROTEIN: CPT

## 2020-04-16 PROCEDURE — 97110 THERAPEUTIC EXERCISES: CPT

## 2020-04-16 PROCEDURE — 85025 COMPLETE CBC W/AUTO DIFF WBC: CPT

## 2020-04-16 PROCEDURE — 25000242 PHARM REV CODE 250 ALT 637 W/ HCPCS: Performed by: STUDENT IN AN ORGANIZED HEALTH CARE EDUCATION/TRAINING PROGRAM

## 2020-04-16 PROCEDURE — 97530 THERAPEUTIC ACTIVITIES: CPT

## 2020-04-16 PROCEDURE — 93010 EKG 12-LEAD: ICD-10-PCS | Mod: ,,, | Performed by: INTERNAL MEDICINE

## 2020-04-16 PROCEDURE — 80053 COMPREHEN METABOLIC PANEL: CPT

## 2020-04-16 RX ORDER — INSULIN ASPART 100 [IU]/ML
0-5 INJECTION, SOLUTION INTRAVENOUS; SUBCUTANEOUS
Status: DISCONTINUED | OUTPATIENT
Start: 2020-04-16 | End: 2020-04-18 | Stop reason: HOSPADM

## 2020-04-16 RX ADMIN — IPRATROPIUM BROMIDE AND ALBUTEROL SULFATE 3 ML: .5; 3 SOLUTION RESPIRATORY (INHALATION) at 07:04

## 2020-04-16 RX ADMIN — APIXABAN 10 MG: 5 TABLET, FILM COATED ORAL at 09:04

## 2020-04-16 RX ADMIN — LAMOTRIGINE 200 MG: 100 TABLET ORAL at 09:04

## 2020-04-16 RX ADMIN — OXYCODONE HYDROCHLORIDE AND ACETAMINOPHEN 1 TABLET: 5; 325 TABLET ORAL at 03:04

## 2020-04-16 RX ADMIN — OXYCODONE HYDROCHLORIDE AND ACETAMINOPHEN 1 TABLET: 5; 325 TABLET ORAL at 09:04

## 2020-04-16 RX ADMIN — IPRATROPIUM BROMIDE AND ALBUTEROL SULFATE 3 ML: .5; 3 SOLUTION RESPIRATORY (INHALATION) at 01:04

## 2020-04-16 RX ADMIN — ACETAMINOPHEN 650 MG: 325 TABLET ORAL at 03:04

## 2020-04-16 RX ADMIN — LISINOPRIL 10 MG: 10 TABLET ORAL at 08:04

## 2020-04-16 RX ADMIN — ZIPRASIDONE HYDROCHLORIDE 20 MG: 20 CAPSULE ORAL at 09:04

## 2020-04-16 RX ADMIN — LIDOCAINE 1 PATCH: 50 PATCH CUTANEOUS at 08:04

## 2020-04-16 RX ADMIN — APIXABAN 10 MG: 5 TABLET, FILM COATED ORAL at 08:04

## 2020-04-16 NOTE — PT/OT/SLP PROGRESS
"Speech Language Pathology Treatment    Patient Name:  Cash Crawford   MRN:  [id number]  Admitting Diagnosis: Acute respiratory distress syndrome (ARDS) due to COVID-19 virus    Recommendations:                 General Recommendations:  Monitor diet tolerance  Diet recommendations:  Mechanical soft, Liquid Diet Level: Thin   Aspiration Precautions: 1 bite/sip at a time, Alternating bites/sips, Check for pocketing/oral residue, Feed only when awake/alert, HOB to 90 degrees, Meds whole 1 at a time, Small bites/sips and Strict aspiration precautions   General Precautions: Standard, aspiration, airborne, contact, droplet, fall  Communication strategies:  none    Subjective     Patient awake and alert during session  "I would like to have them (dental plates), but I'll try without them."  "No, I don't like that stuff (puree food)."  Communicated with nurse prior to session     Pain/Comfort:  · Pain Rating 1: 0/10  · Pain Rating Post-Intervention 1: 0/10    Objective:     Has the patient been evaluated by SLP for swallowing?   Yes  Keep patient NPO? No   Current Respiratory Status: nasal cannula      Patient seen for ongoing swallow assessment. He was sitting up in bed during session. Patient reported no difficulties with current diet though he did endorse disliking puree consistency. He was agreeable to solid trials despite not having dental plates in hospital. Prior to PO trials, he demonstrated a strong, but mildly congested, throat clear/cough. He tolerated thin liquids x6 (via straw) and solids x5 (sai crackers) with no overt signs of aspiration. He exhibited prolonged mastication of solids, but had good oral clearance across trials. His O2 remained >95% throughout trials. SLP educated patient regarding aspiration precautions and he verbalized understanding. Patient left in bed with call light within reach. Diet recs communicated to treatment team.     Assessment:     Cash Crawford is a 64 y.o. male " with an SLP diagnosis of Dysphagia.      Goals:   Multidisciplinary Problems     SLP Goals        Problem: SLP Goal    Goal Priority Disciplines Outcome   SLP Goal     SLP Ongoing, Progressing   Description:  Speech Language Pathology Goals  Goals expected to be met by 4/22/2020  1. Pt will tolerate a mechanical soft diet/thin liquids with no s/s of aspiration.   2. Pt will participate in ongoing assessment of swallow fx to determine safest, least restrictive means of nutrition  3. Educate Pt and family on S/S aspiration                         Plan:     · Patient to be seen:  3 x/week   · Plan of Care expires:  05/08/20  · Plan of Care reviewed with:  patient   · SLP Follow-Up:  Yes       Discharge recommendations:  nursing facility, skilled   Barriers to Discharge:  None    Time Tracking:     SLP Treatment Date:   04/16/20  Speech Start Time:  1117  Speech Stop Time:  1129     Speech Total Time (min):  12 min    Billable Minutes: Treatment Swallowing Dysfunction 12    Salbador Hernandez CCC-SLP  Speech-Language Pathology  Pager: [phone]   04/16/2020

## 2020-04-16 NOTE — PT/OT/SLP PROGRESS
"Physical Therapy Treatment    Patient Name:  Cash Crawford   MRN:  [id number]    Recommendations:     Discharge Recommendations:  nursing facility, skilled   Discharge Equipment Recommendations: wheelchair, walker, rolling, bedside commode   Barriers to discharge: current level of assistance needed for functional mobility    Assessment:     Cash Crawford is a 64 y.o. male admitted with a medical diagnosis of Acute respiratory distress syndrome (ARDS) due to COVID-19 virus.  He presents with the following impairments/functional limitations:  weakness, impaired functional mobilty, impaired endurance, gait instability, decreased lower extremity function, pain, impaired cardiopulmonary response to activity. Pt did not tolerate today's session well due to new onset of L ankle pain. Pt unsure what caused this pain, but he is unable to perform any weight bearing activity today. Pt even had difficulty performing active ankle movements on L due to pain. PT had even brought walker to room to attempt ambulation with device for the first time.     Rehab Prognosis: Fair; patient would benefit from acute skilled PT services to address these deficits and reach maximum level of function.    Recent Surgery: * No surgery found *      Plan:     During this hospitalization, patient to be seen 4 x/week to address the identified rehab impairments via gait training, therapeutic activities, therapeutic exercises and progress toward the following goals:    · Plan of Care Expires:  05/03/20    Subjective     Chief Complaint: L ankle pain  Patient/Family Comments/goals: " Please don't make me do anything today."  Pain/Comfort:  · Pain Rating 1: 8/10  · Location - Side 1: Left  · Location 1: ankle  · Pain Addressed 1: Pre-medicate for activity(pt reports taking meds before session)  · Pain Rating Post-Intervention 1: 8/10      Objective:     Communicated with nurse prior to session.  Patient found supine with pulse ox " (continuous), oxygen, telemetry(1.5 L 02) upon PT entry to room.     General Precautions: Standard, aspiration, airborne, contact, droplet, fall   Orthopedic Precautions:(pt unable to weight bear through L LE today due to ankle pain)   Braces: N/A     Functional Mobility:  · Bed Mobility:     · Scooting: supervision and with use of R LE to help reposition head higher in bed  · Supine to Sit: modified independence  · Sit to Supine: modified independence  · Balance: Good static sitting balance at edge of bed      AM-PAC 6 CLICK MOBILITY  Turning over in bed (including adjusting bedclothes, sheets and blankets)?: 3  Sitting down on and standing up from a chair with arms (e.g., wheelchair, bedside commode, etc.): 1  Moving from lying on back to sitting on the side of the bed?: 4  Moving to and from a bed to a chair (including a wheelchair)?: 1  Need to walk in hospital room?: 1  Climbing 3-5 steps with a railing?: 1  Basic Mobility Total Score: 11       Therapeutic Activities and Exercises:   -Pt attempts to perform supine B APs x 15 but has difficulty on L due to ankle pain  -Supine> sit with mod I  -In sitting, pt performs AA B hip flex and LAQ, 2 x 10 reps  -pt tolerates sitting position for about 8 minutes during performance of LE exercise~ Sp02 fluctuates between 91 and 96 %  -Sit> supine with mod I  - scooting to head of bed with use of R LE only and supervision  -Supine AA B LE hip abd/add and hip and knee flex/ext( partial range) x 15 reps  -PT props B LEs on pillows to float heels  -PT educated pt on importance of continuing therapy even with current ankle pain; plans may have to be modified a bit moving forward if pt is unable to stand due to pain. Pt verbalized understanding.        Patient left HOB elevated with all lines intact and call button in reach..    GOALS:   Multidisciplinary Problems     Physical Therapy Goals        Problem: Physical Therapy Goal    Goal Priority Disciplines Outcome Goal Variances  Interventions   Physical Therapy Goal     PT, PT/OT Ongoing, Progressing     Description:  Goals to be met by: 20    Patient will increase functional independence with mobility by performin. Supine to sit with Moderate Assistance -met  2. Sit to stand transfer with Moderate Assistance with AD if needed. -met  3. Bed to chair transfer with Moderate Assistance using AD if needed. -not met  4. Gait  x 30  feet with Moderate Assistance using AD if needed. -not met  5. Lower extremity exercise program x15 reps with assistance as needed-not met but progressing                     Time Tracking:     PT Received On: 20  PT Start Time: 1017     PT Stop Time: 1041  PT Total Time (min): 24 min     Billable Minutes: Therapeutic Activity 12 and Therapeutic Exercise 12    Treatment Type: Treatment  PT/PTA: PT     PTA Visit Number: 0     Flip Arango, PT  2020

## 2020-04-16 NOTE — PLAN OF CARE
[facility] followed up w/ [facility] who is reviewing pt for potential acceptance to facility.     [facility] declined pt due to hd chair needs.     [facility] will con't to f/u on referrals for pt.    Lory Molina LMSW  Case Management Social Worker   [facility], [address]

## 2020-04-16 NOTE — PLAN OF CARE
Problem: SLP Goal  Goal: SLP Goal  Description  Speech Language Pathology Goals  Goals expected to be met by 4/22/2020  1. Pt will tolerate a mechanical soft diet/thin liquids with no s/s of aspiration.   2. Pt will participate in ongoing assessment of swallow fx to determine safest, least restrictive means of nutrition  3. Educate Pt and family on S/S aspiration      Outcome: Ongoing, Progressing     Patient seen for ongoing swallow assessment. SLP recommending a mechanical soft diet/thin liquids at this time.     Salbador Hernandez CCC-SLP  Speech-Language Pathology  Pager: [phone]

## 2020-04-16 NOTE — PROGRESS NOTES
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     Hospital Course:  Initial workup showed CXR right lobe infiltrate with elevated , D-Dimer 1.03,  and Ferritin 331. Patient swabbed for COVID 19:positive. Patient started on IV Rocephin and Azithromycin and Plaquenil. He had rapid decline in respiratory status within 24H of admission and was intubated 3/30/2020 c/b ARDS.  He completed Abx for empiric CAP treatment on 4/2 and course of hydroxychloroquine ended 4/5.  Extubated on 4/4/2020 and transitioned to bipap/NC.   Developed hypernatremia which resolved on D5 drip requiring insulin for hyperglycemia.  Developed RUE DVT on therapeutic lovenox.  Started on Depakote for agitation    4/12: Started apixaban, down to 8L NC, pending psych consult  4/13: Down to 4L NC, feeling well, behavioral problems w/ nursing (smeared feces all over lines)  4/14: Down to 3L NC, resume home ziprasidone, weaning depakote  4/15: On 3L NC, H/H drifting down to 7.8, decreased oxycodone  4/16;    On 2L NC, H/H is stable at 7.2      Interval History:     No acute events overnight,   Hgb at 7.2, VS are stable  no N/V/abd pain, eating mechanical diet due to lack of dentures  No bleeding per patient  On 2L NC    Review of Systems:  Respiratory:  No dysnea, denies  cough  Cardiovascular: denies chest pain/palpitations  GI: no abd pain, no N/V.      Inpatient Medications:    Current Facility-Administered Medications:     acetaminophen tablet 650 mg, 650 mg, Oral, Q4H PRN, Emilee Mandel MD, 650 mg at 04/15/20 1031    albuterol-ipratropium 2.5 mg-0.5 mg/3 mL nebulizer solution 3 mL, 3 mL, Nebulization, Q6H, Gurpreet Godinez MD, 3 mL at 04/16/20 0118    apixaban tablet 10 mg, 10 mg, Oral, BID, 10 mg at 04/16/20 0842 **FOLLOWED BY** [START ON 4/19/2020] apixaban tablet 5 mg, 5 mg, Oral, BID, Layo Elam MD    bisacodyL suppository 10 mg, 10 mg, Rectal, Daily PRN, Anila Daily MD, 10 mg at 04/08/20 2205    sennosides 8.8 mg/5 ml syrup 5 mL, 5 mL, Oral, Daily, 5 mL at 04/15/20 0932 **AND** docusate 50 mg/5 mL liquid 50 mg, 50 mg, Oral, Daily, Crow Chery MD, 50 mg at 04/15/20 0932    glucose chewable tablet 16 g, 16 g, Oral, PRN, Emilee Mandel MD    glucose chewable tablet 24 g, 24 g, Oral, PRN, Emilee Mandel MD    insulin aspart U-100 pen 0-5 Units, 0-5 Units, Subcutaneous, QID (AC + HS) PRN, Rob Alejandro MD    insulin detemir U-100 pen 10 Units, 10 Units, Subcutaneous, QHS, Rob Alejandro MD    labetalol 20 mg/4 mL (5 mg/mL) IV syring, 15 mg, Intravenous, Q4H PRN, Usman Bal MD, 15 mg at 04/04/20 1932    lamoTRIgine tablet 200 mg, 200 mg, Oral, QHS, Emilee Mandel MD, 200 mg at 04/15/20 2004    lidocaine 5 % patch 1 patch, 1 patch, Transdermal, Q24H, Waleska Muñoz MD, 1 patch at 04/16/20 0842    lisinopriL tablet 10 mg, 10 mg, Oral, Daily, Layo Elam MD, 10 mg at 04/16/20 0842    melatonin tablet 3 mg, 3 mg, Oral, Nightly PRN, Mary Boston NP, 3 mg at 04/15/20 2006    OLANZapine tablet 5 mg, 5 mg, Oral, Q6H PRN, Layo Elam MD    ondansetron disintegrating tablet 8 mg, 8 mg, Oral, Q8H PRN, Emilee Mandel MD    oxyCODONE-acetaminophen 5-325 mg per tablet 1 tablet, 1 tablet, Oral, Q6H PRN, Layo Elam MD, 1 tablet at  "04/16/20 0341    polyethylene glycol packet 17 g, 17 g, Oral, Daily, Crow Chery MD, 17 g at 04/15/20 0932    sodium chloride 0.9% flush 10 mL, 10 mL, Intravenous, PRN, Emilee Mandel MD    sodium chloride 0.9% flush 10 mL, 10 mL, Intravenous, PRN, Emilee Mandel MD    ziprasidone capsule 20 mg, 20 mg, Oral, QHS, Layo Elam MD, 20 mg at 04/15/20 2006      Physical Exam:      Intake/Output Summary (Last 24 hours) at 4/16/2020 0920  Last data filed at 4/16/2020 0500  Gross per 24 hour   Intake 240 ml   Output --   Net 240 ml     Wt Readings from Last 3 Encounters:   04/10/20 87.4 kg (192 lb 10.9 oz)   04/01/20 87.1 kg (192 lb)   02/05/19 96.8 kg (213 lb 6.4 oz)       BP (!) 142/74   Pulse 92   Temp 98.2 °F (36.8 °C) (Oral)   Resp (!) 21   Ht 5' 9" (1.753 m)   Wt 87.4 kg (192 lb 10.9 oz)   SpO2 (!) 94%   BMI 28.45 kg/m²     Limited exam 2/2 minimizing exposure to COVID-19.  GEN: NAD, MMM  Resp: normal work of breathing   Neuro: AAOx3    Laboratory:  Lab Results   Component Value Date    [id number] Not Detected 04/15/2020       Recent Labs   Lab 04/12/20  0055 04/15/20  1011 04/16/20  0016   WBC 7.47 6.43 5.26   LYMPH 9.4*  0.7* 11.2*  0.7* 12.9*  0.7*   HGB 8.6* 7.8* 7.2*   HCT 29.1* 26.1* 21.9*   * 715* 627*     Recent Labs   Lab 04/10/20  0351  04/11/20  0435 04/12/20  0421 04/15/20  1011 04/16/20  0344   NA  --    < > 144 145 144 144   K  --    < > 4.1 3.5 3.8 3.8   CL  --    < > 110 109 107 107   CO2  --    < > 23 27 26 28   BUN  --    < > 14 12 8 7*   CREATININE  --    < > 0.7 0.7 0.7 0.7   GLU  --    < > 213* 144* 117* 78   CALCIUM  --    < > 7.9* 7.8* 8.5* 8.0*   MG 2.7*  --  2.8*  --   --   --    PHOS 2.9  --  2.4*  --   --   --     < > = values in this interval not displayed.     Recent Labs   Lab 04/12/20  0421 04/15/20  1011 04/16/20  0344   ALKPHOS 81 78 76   * 164* 177*   * 168* 208*   ALBUMIN 1.7* 1.9* 1.7*   PROT 6.3 6.4 6.0   BILITOT 0.3 0.3 0.3    "     Recent Labs     04/14/20  1741 04/15/20  1011   DDIMER  --  4.98*   FERRITIN  --  259   CRP 78.6*  --    LDH  --  569*       All labs within the last 24 hours were reviewed.     Microbiology:  Microbiology Results (last 7 days)     ** No results found for the last 168 hours. **            Imaging  ECG Results          EKG 12-lead (Final result)  Result time 04/02/20 08:08:36    Final result by Interface, Lab In [facility] (04/02/20 08:08:36)                 Narrative:    Test Reason : R68.89,    Vent. Rate : 097 BPM     Atrial Rate : 097 BPM     P-R Int : 150 ms          QRS Dur : 072 ms      QT Int : 332 ms       P-R-T Axes : 065 025 039 degrees     QTc Int : 421 ms    Age and gender specific analysis  Normal sinus rhythm  Normal ECG  When compared with ECG of 30-MAR-2020 15:09,    No significant change was found  Confirmed by CORY DURAND MD ([id number]) on 4/2/2020 8:08:25 AM    Referred By: [email or web address]   SELF           Confirmed By:CORY DURAND MD                             EKG 12-lead (Final result)  Result time 03/31/20 09:55:44    Final result by Interface, Lab In [facility] (03/31/20 09:55:44)                 Narrative:    Test Reason : J22,B97.29,    Vent. Rate : 105 BPM     Atrial Rate : 104 BPM     P-R Int : 000 ms          QRS Dur : 068 ms      QT Int : 338 ms       P-R-T Axes : 000 -06 006 degrees     QTc Int : 446 ms    Atrial pacing.  Confirmed by Musa Padron MD ([id number]) on 3/31/2020 9:55:37 AM    Referred By: [email or web address]   SELF           Confirmed By:Musa Padron MD                             EKG 12-lead (Final result)  Result time 03/30/20 14:45:25    Final result by Interface, Lab In [facility] (03/30/20 14:45:25)                 Narrative:    Test Reason : R06.00,    Vent. Rate : 109 BPM     Atrial Rate : 109 BPM     P-R Int : 146 ms          QRS Dur : 070 ms      QT Int : 324 ms       P-R-T Axes : 063 023 036 degrees     QTc Int : 436 ms    Sinus tachycardia  Otherwise normal ECG    Confirmed  by Musa Padron MD ([id number]) on 3/30/2020 2:45:14 PM    Referred By: IRMA   SELF           Confirmed By:Musa Padron MD                              No results found for this or any previous visit.    US Upper Extremity Veins Right  Narrative: EXAMINATION:  US UPPER EXTREMITY VEINS RIGHT    CLINICAL HISTORY:  r/o DVT;    TECHNIQUE:  Duplex and color flow Doppler evaluation and dynamic compression was performed of the right upper extremity veins.    COMPARISON:  Chest radiograph 04/01/2020, 03/30/2020, 03/29/2020.    FINDINGS:  Central veins: There is deep vein thrombosis of the right internal jugular, subclavian, and axillary veins.    Arm veins: thrombosis of the right brachial, basilic, and cephalic veins.    Contralateral subclavian/internal jugular veins: The left subclavian and internal jugular veins are patent and free of thrombus.  Impression: Deep vein thrombosis of multiple veins in the right upper extremity as above.    This report was flagged in Epic as abnormal.    COMMUNICATION  This critical result was discovered/received at 14:29.  The critical information above was relayed directly by Cash Peter by telephone to Dr. Mccray On 04/07/2020 at 14:32.    Electronically signed by resident: Cash Peter  Date:    04/07/2020  Time:    14:28    Electronically signed by: Estefanía Mancuso MD  Date:    04/07/2020  Time:    14:42      All imaging within the last 24 hours was reviewed.     Assessment and Plan:    Active Hospital Problems    Diagnosis  POA    *Acute respiratory distress syndrome (ARDS) due to COVID-19 virus [U07.1, J80]  Yes    Hypernatremia [E87.0]  Unknown    Acute deep vein thrombosis (DVT) of axillary vein of right upper extremity [I82.A11]  No    Bipolar 1 disorder, depressed [F31.9]  Yes     - Per PCP Dr. Lechuga notes, sees psychiatrist, on Ziprasidone 80 mg QD, Buproprion 150 mg TID, Lamotrigine 200mg QD.  - Patient currently sedated, and holding home meds  EXCEPT for lamotrigine.  4/1.  - Will consider re-adding his home medications once extubated and out of ICU.      Anemia [D64.9]  No    JULIET (acute kidney injury) [N17.9]  Unknown    Elevated LFTs [R94.5]  Yes     - likely 2/2 to covid; however, does have a history of treated hep c in 2015  - AST//600s on transfer, now downtrending to 400s/480s. Continuing to trend CMPs  - Despite downtrending AST/ALTs, patient has Hx of treated HCV but no recent HCV RNA, and continued IVDU/substance use.      Type 2 diabetes mellitus with diabetic neuropathic arthropathy, without long-term current use of insulin [E11.610]  Yes    Essential hypertension, benign [I10]  Yes    Hepatitis C [B19.20]  Yes      Resolved Hospital Problems   No resolved problems to display.       Covid-19 Virus Infection  - COVID-19 testing: Collection Date: 4/15/2020 Collection Time:   4:07 PM  - Infection Control notified    - Isolation:   - Airborne and Droplet Precautions  - Surgical mask on patient   - N95 masks must be fit tested, wear eye protection  - 20 second hand hygiene   - Limit visitors per hospital policy   - Consolidate lab draws, nursing care, and interventions    - Diagnostics: (Lymphopenia, hyponatremia, hyperferritinemia, elevated troponin, elevated d-dimer, age, and comorbidities are significant predictors of poor clinical outcome)   - CBC: Hb stable , normal WBC  trend Q48hrs  - CMP:    hypoNa-> hyperNa--> Na 144   trend Q48hrs  - Procalcitonin: 0.13  - D-dimer: 1.03->4.98    repeat prior to discharge  - Ferritin: 331->369->259   repeat prior to discharge   - CRP:  122-->peak 306-->146->182->78   trend Q48hrs  - LDH: 789->987->569   repeat prior to discharge  - Troponin: 0.011   - ECG: normal   - rapid Flu: negative   - CXR: RLL consolidation   - UA : no infection   - CPK: 143->699->408    - Management:   Bundle care as able to maintain isolation & minimize in/out of room   - Supplemental O2 to maintain SpO2 92%-96%    if requiring 6L NC or higher, place on nonrebreather and discuss case with MICU   - Telemetry & continuous pulse oximetry    - If wheezing   - albuterol inhaler 2-4 puff Q6hr PRN    - ipratropium daily    - acetaminophen 650mg PO Q6hr PRN fever   - loperamide PRN for viral diarrhea   - Empiric antibiotics and hydroxychloroquine completed    - not on statin 2/2 elevated CPK   - Investigational Treatment Protocol: (if patient meets criteria)   [email or web address]       Safety notes:   - d/c bipap as he is no longer requiring it   - Cautious use of NSAIDS for fever per WHO recommendations (3/16/2020)   - No new ACEi/ARB start or discontinuation of chronic med unless hypotensive (Esler et al. Journal of Hypertension 2020, 38:000-000)   - Careful use of steroids in the absence of other indications (shock, ARDS)   - Fluid sparing resuscitation, avoid maintenance fluids      Anemia of chronic disease:  H/H slowly trending down but no signs of bleeding or rise in BUN  - continue apixaban and monitor for bleeding  - so far no signs of bleeding      Acute deep vein thrombosis (DVT) of axillary vein of right upper extremity  RUE us 04/07 consistent with DVTs in the R IJ, subclavian, and axillary veins. Noted thrombosis of cephalic, brachial, and basilic veins.    -transitioned from therapeutic lovenox to apixabian 4/12, no bleeding  -PRN oxy decreased from  -> 5mg q6H prn on 4/15  -CPK normal      Type 2 diabetes mellitus with diabetic neuropathic arthropathy, without long-term current use of insulin  Home medication includes jardiance 25mg (on hold)   -Change to levemir 10 units QHS, to prevent low, was on BID  -Low dose sliding scale aspart TID AC for now  -If DC home can consider restarting Jardiance, given A1C of 7, no need for insulin      Bipolar 1 disorder, depressed  Home meds include lamotrigine 200 QHS and  ziprasidone but now on lamotrigine and depakote per ICU team  -per psych recommendations: (appreciate recs on resuming bupropion)   - resume home ziprasidone 20 mg nightly tonight   - continue home lamotrigine 200 mg qHS   - got last dose of depakote this am, monitor mood/behavior   - zyprexa prn po for agitation (no benzos within 1 hr of zyprexa)      Essential hypertension, benign  BP at goal, continue home lisinopril 10 mg daily      Diet: mech soft (per patient preference 2/2 not having dentures) + bloost glucose control, thin liquids       Advance Care Planning  Goals of care, counseling/discussion- Full code    4/15 12pm - had long discussion w/ wife regarding patient's level of deconditioning and amount of assistance he requires now for all activities including transfers.  She reports that there are 4 people living in her home, one of which is her daughter who is a nurse.  They feel confident that they will be able to provide safe care for Mr. Crawford and home and do not want him to go to SNF.       Follow up with PT/OT rec, last note rec SNF  Consult Telemedicine for transfer    Possible D/C tommorow, may need Oxygen on D/C at 2 L NC      Anticipated DME needs:    Advance Care Planning        VTE High Risk Prophylaxis:   VTE Risk Mitigation (From admission, onward)         Ordered     apixaban tablet 5 mg  2 times daily      04/12/20 1231     apixaban tablet 10 mg  2 times daily      04/12/20 1231     heparin (porcine) 1,000 unit/mL injection      03/30/20 1640     Place sequential compression device  Until discontinued      03/30/20 0015     IP VTE HIGH RISK PATIENT  Once      03/30/20 0015                Patient's chronic/stable medical conditions noted in the problem list above will be managed with the patient's home medications as tolerated.     Subsequent Inpatient Hospital Care  Level 2 [id number] Total visit time was 25 minutes or greater with greater than 50% of time spent in counseling and coordination of  care.     Rob Alejandro MD   [facility] Medicine Team I (Endocrinologist)

## 2020-04-16 NOTE — CARE UPDATE
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 COVID Positive. Oxygen has been wean down to 2L NC at this time. Psych was consulted for mood outburst, now stable. On  apixaban for DVT R UE. Monitor Hgb at this time but no overt signs of bleeding. Tolerating diet    Medication lists reviewed and stable  No new medication changes       Special needs/discharge   - Home (pt preference) Vs SNF, once medically stable  - May need oxygen on discharge  - Recommend checking Hgb tomorrow      Rob Alejandro MD   [facility]

## 2020-04-16 NOTE — CONSULTS
Cash Crawford  has been accepted for transfer to [facility] and will be followed through telemedicine services beginning 04/17/20  at 7am.    Maddy Mackenzie

## 2020-04-16 NOTE — PLAN OF CARE
Patient remains free from fall or injury and pain med given for complaint of pain with moderate relief.  Patient assists to reposition self and turning encouraged.  Patient maintained >92% o2 sat on 2 L throughout the night.  Will continue to monitor.

## 2020-04-17 VITALS
TEMPERATURE: 100 F | RESPIRATION RATE: 26 BRPM | BODY MASS INDEX: 28.54 KG/M2 | DIASTOLIC BLOOD PRESSURE: 67 MMHG | SYSTOLIC BLOOD PRESSURE: 132 MMHG | OXYGEN SATURATION: 94 % | HEART RATE: 96 BPM | HEIGHT: 69 IN | WEIGHT: 192.69 LBS

## 2020-04-17 LAB
BASOPHILS # BLD AUTO: 0.01 K/UL (ref 0–0.2)
BASOPHILS NFR BLD: 0.1 % (ref 0–1.9)
DIFFERENTIAL METHOD: ABNORMAL
EOSINOPHIL # BLD AUTO: 0.1 K/UL (ref 0–0.5)
EOSINOPHIL NFR BLD: 0.7 % (ref 0–8)
ERYTHROCYTE [DISTWIDTH] IN BLOOD BY AUTOMATED COUNT: 21.8 % (ref 11.5–14.5)
HCT VFR BLD AUTO: 25.4 % (ref 40–54)
HGB BLD-MCNC: 7.4 G/DL (ref 14–18)
IMM GRANULOCYTES # BLD AUTO: 0.03 K/UL (ref 0–0.04)
IMM GRANULOCYTES NFR BLD AUTO: 0.4 % (ref 0–0.5)
LYMPHOCYTES # BLD AUTO: 0.8 K/UL (ref 1–4.8)
LYMPHOCYTES NFR BLD: 10.8 % (ref 18–48)
MCH RBC QN AUTO: 23.7 PG (ref 27–31)
MCHC RBC AUTO-ENTMCNC: 29.1 G/DL (ref 32–36)
MCV RBC AUTO: 81 FL (ref 82–98)
MONOCYTES # BLD AUTO: 1.4 K/UL (ref 0.3–1)
MONOCYTES NFR BLD: 19.6 % (ref 4–15)
NEUTROPHILS # BLD AUTO: 5 K/UL (ref 1.8–7.7)
NEUTROPHILS NFR BLD: 68.4 % (ref 38–73)
NRBC BLD-RTO: 0 /100 WBC
PLATELET # BLD AUTO: 696 K/UL (ref 150–350)
PMV BLD AUTO: 8.6 FL (ref 9.2–12.9)
POCT GLUCOSE: 109 MG/DL (ref 70–110)
POCT GLUCOSE: 147 MG/DL (ref 70–110)
POCT GLUCOSE: 160 MG/DL (ref 70–110)
RBC # BLD AUTO: 3.12 M/UL (ref 4.6–6.2)
WBC # BLD AUTO: 7.34 K/UL (ref 3.9–12.7)

## 2020-04-17 PROCEDURE — 27000221 HC OXYGEN, UP TO 24 HOURS

## 2020-04-17 PROCEDURE — 97530 THERAPEUTIC ACTIVITIES: CPT

## 2020-04-17 PROCEDURE — 99239 PR HOSPITAL DISCHARGE DAY,>30 MIN: ICD-10-PCS | Mod: ,,, | Performed by: HOSPITALIST

## 2020-04-17 PROCEDURE — 93010 EKG 12-LEAD: ICD-10-PCS | Mod: ,,, | Performed by: INTERNAL MEDICINE

## 2020-04-17 PROCEDURE — 99239 HOSP IP/OBS DSCHRG MGMT >30: CPT | Mod: ,,, | Performed by: HOSPITALIST

## 2020-04-17 PROCEDURE — 97110 THERAPEUTIC EXERCISES: CPT

## 2020-04-17 PROCEDURE — 93010 ELECTROCARDIOGRAM REPORT: CPT | Mod: ,,, | Performed by: INTERNAL MEDICINE

## 2020-04-17 PROCEDURE — 97116 GAIT TRAINING THERAPY: CPT

## 2020-04-17 PROCEDURE — 25000003 PHARM REV CODE 250: Performed by: INTERNAL MEDICINE

## 2020-04-17 PROCEDURE — 25000003 PHARM REV CODE 250: Performed by: STUDENT IN AN ORGANIZED HEALTH CARE EDUCATION/TRAINING PROGRAM

## 2020-04-17 PROCEDURE — 93005 ELECTROCARDIOGRAM TRACING: CPT

## 2020-04-17 PROCEDURE — 85025 COMPLETE CBC W/AUTO DIFF WBC: CPT

## 2020-04-17 PROCEDURE — 94761 N-INVAS EAR/PLS OXIMETRY MLT: CPT

## 2020-04-17 RX ADMIN — OXYCODONE HYDROCHLORIDE AND ACETAMINOPHEN 1 TABLET: 5; 325 TABLET ORAL at 04:04

## 2020-04-17 RX ADMIN — APIXABAN 10 MG: 5 TABLET, FILM COATED ORAL at 08:04

## 2020-04-17 RX ADMIN — LIDOCAINE 1 PATCH: 50 PATCH CUTANEOUS at 08:04

## 2020-04-17 RX ADMIN — OXYCODONE HYDROCHLORIDE AND ACETAMINOPHEN 1 TABLET: 5; 325 TABLET ORAL at 01:04

## 2020-04-17 RX ADMIN — LISINOPRIL 10 MG: 10 TABLET ORAL at 08:04

## 2020-04-17 NOTE — PLAN OF CARE
Problem: Physical Therapy Goal  Goal: Physical Therapy Goal  Description  Goals to be met by: 20    Patient will increase functional independence with mobility by performin. Supine to sit with Moderate Assistance -met  2. Sit to stand transfer with Moderate Assistance with AD if needed. -met  3. Bed to chair transfer with Moderate Assistance using AD if needed. -not met  4. Gait  x 30  feet with Moderate Assistance using AD if needed. -not met but progressing  5. Lower extremity exercise program x15 reps with assistance as needed- met     Outcome: Ongoing, Progressing       Flip Arango, PT  2020

## 2020-04-17 NOTE — PLAN OF CARE
[facility]    HOME HEALTH ORDERS  FACE TO FACE ENCOUNTER    Patient Name: Cash Crawford  YOB: 1955    PCP: Briana Gonzalez MD (Inactive)   PCP Address: [address] [place] [id number]  PCP Phone Number: [phone]  PCP Fax: [phone]    Encounter Date: 04/17/2020    Admit to Home Health    Diagnoses:  Active Hospital Problems    Diagnosis  POA    *Acute respiratory distress syndrome (ARDS) due to COVID-19 virus [U07.1, J80]  Yes    Hypernatremia [E87.0]  Yes    Acute deep vein thrombosis (DVT) of axillary vein of right upper extremity [I82.A11]  No    Bipolar 1 disorder, depressed [F31.9]  Yes     - Per PCP Dr. Lechuga notes, sees psychiatrist, on Ziprasidone 80 mg QD, Buproprion 150 mg TID, Lamotrigine 200mg QD.  - Patient currently sedated, and holding home meds EXCEPT for lamotrigine.  4/1.  - Will consider re-adding his home medications once extubated and out of ICU.      Anemia [D64.9]  No    JULIET (acute kidney injury) [N17.9]  Yes    Elevated LFTs [R94.5]  Yes     - likely 2/2 to covid; however, does have a history of treated hep c in 2015  - AST//600s on transfer, now downtrending to 400s/480s. Continuing to trend CMPs  - Despite downtrending AST/ALTs, patient has Hx of treated HCV but no recent HCV RNA, and continued IVDU/substance use.      Type 2 diabetes mellitus with diabetic neuropathic arthropathy, without long-term current use of insulin [E11.610]  Yes    Essential hypertension, benign [I10]  Yes    Hepatitis C [B19.20]  Yes      Resolved Hospital Problems   No resolved problems to display.       No future appointments.  Follow-up Information     Briana Gonzalez MD.    Specialty:  Family Medicine  Contact information:  [address] [place] [address] [id number] [phone]                     I have seen and examined this patient face to face today. My clinical findings that support the need for the home health skilled services  and home bound status are the following:  Weakness/numbness causing balance and gait disturbance due to Infection making it taxing to leave home.    Allergies:Review of patient's allergies indicates:  No Known Allergies    Diet: diabetic diet: 2000 calorie    Activities: activity as tolerated    Nursing:   SN to complete comprehensive assessment including routine vital signs. Instruct on disease process and s/s of complications to report to MD. Review/verify medication list sent home with the patient at time of discharge  and instruct patient/caregiver as needed. Frequency may be adjusted depending on start of care date.    Notify MD if SBP > 160 or < 90; DBP > 90 or < 50; HR > 120 or < 50; Temp > 101; Other:         CONSULTS:    Physical Therapy to evaluate and treat. Evaluate for home safety and equipment needs; Establish/upgrade home exercise program. Perform / instruct on therapeutic exercises, gait training, transfer training, and Range of Motion.  Occupational Therapy to evaluate and treat. Evaluate home environment for safety and equipment needs. Perform/Instruct on transfers, ADL training, ROM, and therapeutic exercises.  Aide to provide assistance with personal care, ADLs, and vital signs.    MISCELLANEOUS CARE:  Diabetic Care:   Fingerstick blood sugar AC and HS and Report CBG < 60 or > 350 to physician.  Home Oxygen:  Oxygen at 1-2 L/min nasal canula to be used:  Continuously and if sates decrease less valeri 89%.      Medications: Review discharge medications with patient and family and provide education.      Current Discharge Medication List      START taking these medications    Details   apixaban (ELIQUIS) 5 mg Tab Take 1 tablet (5 mg total) by mouth 2 (two) times daily.  Qty: 60 tablet, Refills: 1         CONTINUE these medications which have NOT CHANGED    Details   BUPROPION HCL (BUPROBAN ORAL) Take 450 mg by mouth once daily.       diclofenac sodium (VOLTAREN) 1 % Gel Apply topically 4 (four) times  daily as needed.      empagliflozin (JARDIANCE) 25 mg Tab Take by mouth.      gabapentin (NEURONTIN) 100 MG capsule Take 1 capsule (100 mg total) by mouth 2 (two) times daily.  Qty: 180 capsule, Refills: 3    Associated Diagnoses: Diabetic polyneuropathy associated with diabetes mellitus due to underlying condition      gluc-kenneth-msm3-welc-wwxone-dti 500-450-0.67 mg Cap Take by mouth.      lamotrigine (LAMICTAL) 200 MG tablet Take 200 mg by mouth once daily.      lisinopril 10 MG tablet Take 1 tablet (10 mg total) by mouth once daily.  Qty: 90 tablet, Refills: 3    Associated Diagnoses: HTN, goal below 140/90      melatonin (MELATIN) 3 mg tablet Take 9 mg by mouth nightly as needed for Insomnia.      metFORMIN (GLUCOPHAGE) 1000 MG tablet Take 1,000 mg by mouth 2 (two) times daily with meals.      !! multivitamin capsule Take 1 capsule by mouth once daily.      pantoprazole (PROTONIX) 40 MG tablet Take 40 mg by mouth once daily.      peg 400-propylene glycol, PF, (LUBRICANT EYE, PG-,,PF,) 0.4-0.3 % Dpet Apply 1 drop to eye 4 (four) times daily as needed.      simethicone (MYLICON) 80 MG chewable tablet Take 80 mg by mouth every 6 (six) hours as needed for Flatulence.      ziprasidone (GEODON) 80 MG capsule Take 80 mg by mouth nightly.      B-complex with vitamin C (Z-BEC OR EQUIV) tablet Take 1 tablet by mouth once daily.      !! multivitamin capsule Take 1 capsule by mouth once daily.      nicotine (NICODERM CQ) 21 mg/24 hr Place 1 patch onto the skin every 24 hours.      terbinafine HCl (LAMISIL) 1 % cream Apply 1 % topically daily as needed.       !! - Potential duplicate medications found. Please discuss with provider.      STOP taking these medications       megestrol (MEGACE) 40 MG Tab Comments:   Reason for Stopping:         modafinil (PROVIGIL) 200 MG Tab Comments:   Reason for Stopping:               I certify that this patient is confined to his home and needs intermittent skilled nursing care,  physical therapy and occupational therapy.

## 2020-04-17 NOTE — PT/OT/SLP PROGRESS
Occupational Therapy      Patient Name:  Cash Crawford   MRN:  [id number]    Patient not seen today secondary to (pt scheduled to d/c home). Will follow-up as scheduled if pt remains in house.    Reina Geronimo OT  4/17/2020

## 2020-04-17 NOTE — DISCHARGE SUMMARY
[facility] Hospital Medicine  Discharge Summary      Patient Name: Cash Crawford  MRN: [id number]  Admission Date: 3/29/2020  Hospital Length of Stay: 19 days  Discharge Date and Time: No discharge date for patient encounter.  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     Hospital Course:  Initial workup showed CXR right lobe infiltrate with elevated , D-Dimer 1.03,  and Ferritin 331. Patient swabbed for COVID 19:positive. Patient started on IV Rocephin and Azithromycin and Plaquenil. He had rapid decline in respiratory status within 24H of admission and was intubated 3/30/2020 c/b ARDS.  He completed Abx for empiric CAP treatment on 4/2 and course of hydroxychloroquine ended 4/5.  Extubated on 4/4/2020 and transitioned to bipap/NC.   Developed hypernatremia which resolved on D5 drip requiring insulin for hyperglycemia.  Developed RUE DVT on therapeutic lovenox.  Started on Depakote for agitation    4/12: Started apixaban, down to 8L NC, pending psych consult  4/13: Down to 4L NC, feeling well, behavioral problems w/ nursing (smeared  feces all over lines)  4/14: Down to 3L NC, resume home ziprasidone, weaning depakote  4/15: On 3L NC, H/H drifting down to 7.8, decreased oxycodone  4/16;    On 2L NC, H/H is stable at 7.2      Interval History:     No acute events overnight,   Hgb at 7.2, VS are stable  no N/V/abd pain, eating mechanical diet due to lack of dentures  No bleeding per patient  On 2L NC    4/17 -  96% on RA,  Took a few steps w the walker.  Reported ankle pain - history of trauma, previous surgery.  Used a walker.   Xray - no fracture,     Medically ready for dc, to home under family's care.   F/u pcp.  Cold packs/ stretch to ankle.     Review of Systems:  Gen  - working w therapists and walker  Respiratory:  No dysnea, denies cough  Cardiovascular: denies chest pain/palpitations  GI: no abd pain, no N/V.      Inpatient Medications:    Current Facility-Administered Medications:     acetaminophen tablet 650 mg, 650 mg, Oral, Q4H PRN, Emilee Mandel MD, 650 mg at 04/16/20 1553    apixaban tablet 10 mg, 10 mg, Oral, BID, 10 mg at 04/17/20 0843 **FOLLOWED BY** [START ON 4/19/2020] apixaban tablet 5 mg, 5 mg, Oral, BID, Layo Elam MD    bisacodyL suppository 10 mg, 10 mg, Rectal, Daily PRN, Anila Daily MD, 10 mg at 04/08/20 2205    sennosides 8.8 mg/5 ml syrup 5 mL, 5 mL, Oral, Daily, 5 mL at 04/15/20 0932 **AND** docusate 50 mg/5 mL liquid 50 mg, 50 mg, Oral, Daily, Crow Chery MD, 50 mg at 04/15/20 0932    glucose chewable tablet 16 g, 16 g, Oral, PRN, Emilee Mandel MD    glucose chewable tablet 24 g, 24 g, Oral, PRN, Emilee Mandel MD    insulin aspart U-100 pen 0-5 Units, 0-5 Units, Subcutaneous, QID (AC + HS) PRN, Rob Alejandro MD    insulin detemir U-100 pen 10 Units, 10 Units, Subcutaneous, QHS, Rob Alejandro MD, 10 Units at 04/16/20 2123    labetalol 20 mg/4 mL (5 mg/mL) IV syring, 15 mg, Intravenous, Q4H PRN, Usman Bal MD, 15 mg at 04/04/20 1932    lamoTRIgine tablet 200 mg, 200 mg, Oral,  "QHS, Emilee Mandel MD, 200 mg at 04/16/20 2122    lidocaine 5 % patch 1 patch, 1 patch, Transdermal, Q24H, Waleska Muñoz MD, 1 patch at 04/17/20 0843    lisinopriL tablet 10 mg, 10 mg, Oral, Daily, Layo Elam MD, 10 mg at 04/17/20 0843    melatonin tablet 3 mg, 3 mg, Oral, Nightly PRN, Mary Boston NP, 3 mg at 04/15/20 2006    OLANZapine tablet 5 mg, 5 mg, Oral, Q6H PRN, Layo Elam MD    ondansetron disintegrating tablet 8 mg, 8 mg, Oral, Q8H PRN, Emilee Mandel MD    oxyCODONE-acetaminophen 5-325 mg per tablet 1 tablet, 1 tablet, Oral, Q6H PRN, Layo Elam MD, 1 tablet at 04/17/20 0421    polyethylene glycol packet 17 g, 17 g, Oral, Daily, Crow Chery MD, 17 g at 04/15/20 0932    sodium chloride 0.9% flush 10 mL, 10 mL, Intravenous, PRN, Emilee Mandel MD    sodium chloride 0.9% flush 10 mL, 10 mL, Intravenous, PRN, Emilee Mandel MD    ziprasidone capsule 20 mg, 20 mg, Oral, QHS, Layo Elam MD, 20 mg at 04/16/20 2122      Physical Exam:      Intake/Output Summary (Last 24 hours) at 4/17/2020 1159  Last data filed at 4/17/2020 0612  Gross per 24 hour   Intake 597 ml   Output 775 ml   Net -178 ml     Wt Readings from Last 3 Encounters:   04/10/20 87.4 kg (192 lb 10.9 oz)   04/01/20 87.1 kg (192 lb)   02/05/19 96.8 kg (213 lb 6.4 oz)       /67   Pulse 96   Temp 98.9 °F (37.2 °C) (Oral)   Resp (!) 26   Ht 5' 9" (1.753 m)   Wt 87.4 kg (192 lb 10.9 oz)   SpO2 (!) 94%   BMI 28.45 kg/m²     Limited exam 2/2 minimizing exposure to COVID-19.  GEN: NAD, MMM  Resp: normal work of breathing   Neuro: AAOx3    Laboratory:  Lab Results   Component Value Date    [id number] Not Detected 04/15/2020       Recent Labs   Lab 04/15/20  1011 04/16/20  0016 04/17/20  1112   WBC 6.43 5.26 7.34   LYMPH 11.2*  0.7* 12.9*  0.7* 10.8*  0.8*   HGB 7.8* 7.2* 7.4*   HCT 26.1* 21.9* 25.4*   * 627* 696*     Recent Labs   Lab 04/11/20  0435 04/12/20  0421 04/15/20  1011 " 04/16/20  0344    145 144 144   K 4.1 3.5 3.8 3.8    109 107 107   CO2 23 27 26 28   BUN 14 12 8 7*   CREATININE 0.7 0.7 0.7 0.7   * 144* 117* 78   CALCIUM 7.9* 7.8* 8.5* 8.0*   MG 2.8*  --   --   --    PHOS 2.4*  --   --   --      Recent Labs   Lab 04/12/20  0421 04/15/20  1011 04/16/20  0344   ALKPHOS 81 78 76   * 164* 177*   * 168* 208*   ALBUMIN 1.7* 1.9* 1.7*   PROT 6.3 6.4 6.0   BILITOT 0.3 0.3 0.3        Recent Labs     04/14/20  1741 04/15/20  1011 04/16/20  1744   DDIMER  --  4.98*  --    FERRITIN  --  259  --    CRP 78.6*  --  86.8*   LDH  --  569*  --        All labs within the last 24 hours were reviewed.     Microbiology:  Microbiology Results (last 7 days)     ** No results found for the last 168 hours. **            Imaging  ECG Results          EKG 12-lead (Final result)  Result time 04/02/20 08:08:36    Final result by Interface, Lab In [facility] (04/02/20 08:08:36)                 Narrative:    Test Reason : R68.89,    Vent. Rate : 097 BPM     Atrial Rate : 097 BPM     P-R Int : 150 ms          QRS Dur : 072 ms      QT Int : 332 ms       P-R-T Axes : 065 025 039 degrees     QTc Int : 421 ms    Age and gender specific analysis  Normal sinus rhythm  Normal ECG  When compared with ECG of 30-MAR-2020 15:09,    No significant change was found  Confirmed by CORY DURAND MD ([id number]) on 4/2/2020 8:08:25 AM    Referred By: [email or web address]   SELF           Confirmed By:CORY DURAND MD                             EKG 12-lead (Final result)  Result time 03/31/20 09:55:44    Final result by Interface, Lab In [facility] (03/31/20 09:55:44)                 Narrative:    Test Reason : J22,B97.29,    Vent. Rate : 105 BPM     Atrial Rate : 104 BPM     P-R Int : 000 ms          QRS Dur : 068 ms      QT Int : 338 ms       P-R-T Axes : 000 -06 006 degrees     QTc Int : 446 ms    Atrial pacing.  Confirmed by Musa Padron MD ([id number]) on 3/31/2020 9:55:37 AM    Referred By: IRMA   SELF            Confirmed By:Musa Padron MD                             EKG 12-lead (Final result)  Result time 03/30/20 14:45:25    Final result by Interface, Lab In [facility] (03/30/20 14:45:25)                 Narrative:    Test Reason : R06.00,    Vent. Rate : 109 BPM     Atrial Rate : 109 BPM     P-R Int : 146 ms          QRS Dur : 070 ms      QT Int : 324 ms       P-R-T Axes : 063 023 036 degrees     QTc Int : 436 ms    Sinus tachycardia  Otherwise normal ECG    Confirmed by Musa Padron MD ([id number]) on 3/30/2020 2:45:14 PM    Referred By: [email or web address]   SELF           Confirmed By:Musa Padron MD                              No results found for this or any previous visit.    X-Ray Ankle Complete Left  Narrative: EXAMINATION:  XR ANKLE COMPLETE 3 VIEW LEFT    CLINICAL HISTORY:  New On set acute L ankle pain;    TECHNIQUE:  AP, lateral and oblique views of the left ankle were performed.    COMPARISON:  None    FINDINGS:  Three views left ankle.    No acute displaced fracture or dislocation of the ankle.  No radiopaque foreign body.  The ankle mortise is intact.  The distal aspect of tibial naren and screw construct noted, no findings to suggest loosening.  Impression: 1. No acute displaced fracture or dislocation of the ankle.    Electronically signed by: Brannon Cardona MD  Date:    04/16/2020  Time:    17:28      All imaging within the last 24 hours was reviewed.     Assessment and Plan:    Active Hospital Problems    Diagnosis  POA    *Acute respiratory distress syndrome (ARDS) due to COVID-19 virus [U07.1, J80]  Yes    Hypernatremia [E87.0]  Unknown    Acute deep vein thrombosis (DVT) of axillary vein of right upper extremity [I82.A11]  No    Bipolar 1 disorder, depressed [F31.9]  Yes     - Per PCP Dr. Lechuga notes, sees psychiatrist, on Ziprasidone 80 mg QD, Buproprion 150 mg TID, Lamotrigine 200mg QD.  - Patient currently sedated, and holding home meds EXCEPT for lamotrigine.  4/1.  - Will  consider re-adding his home medications once extubated and out of ICU.      Anemia [D64.9]  No    JULIET (acute kidney injury) [N17.9]  Unknown    Elevated LFTs [R94.5]  Yes     - likely 2/2 to covid; however, does have a history of treated hep c in 2015  - AST//600s on transfer, now downtrending to 400s/480s. Continuing to trend CMPs  - Despite downtrending AST/ALTs, patient has Hx of treated HCV but no recent HCV RNA, and continued IVDU/substance use.      Type 2 diabetes mellitus with diabetic neuropathic arthropathy, without long-term current use of insulin [E11.610]  Yes    Essential hypertension, benign [I10]  Yes    Hepatitis C [B19.20]  Yes      Resolved Hospital Problems   No resolved problems to display.       Covid-19 Virus Infection  - COVID-19 testing: Collection Date: 4/15/2020 Collection Time:   4:07 PM  - Infection Control notified    - Isolation:   - Airborne and Droplet Precautions  - Surgical mask on patient   - N95 masks must be fit tested, wear eye protection  - 20 second hand hygiene   - Limit visitors per hospital policy   - Consolidate lab draws, nursing care, and interventions    - Diagnostics: (Lymphopenia, hyponatremia, hyperferritinemia, elevated troponin, elevated d-dimer, age, and comorbidities are significant predictors of poor clinical outcome)   - CBC: Hb stable , normal WBC  trend Q48hrs  - CMP:    hypoNa-> hyperNa--> Na 144   trend Q48hrs  - Procalcitonin: 0.13  - D-dimer: 1.03->4.98    repeat prior to discharge  - Ferritin: 331->369->259   repeat prior to discharge   - CRP:  122-->peak 306-->146->182->78   trend Q48hrs  - LDH: 789->987->569   repeat prior to discharge  - Troponin: 0.011   - ECG: normal   - rapid Flu: negative   - CXR: RLL consolidation   - UA : no infection   - CPK: 143->699->408    - Management:   Bundle care as able to maintain isolation & minimize in/out of room   - Supplemental O2 to maintain SpO2 92%-96%   if requiring 6L NC or higher, place on  nonrebreather and discuss case with MICU   - Telemetry & continuous pulse oximetry    - If wheezing   - albuterol inhaler 2-4 puff Q6hr PRN    - ipratropium daily    - acetaminophen 650mg PO Q6hr PRN fever   - loperamide PRN for viral diarrhea   - Empiric antibiotics and hydroxychloroquine completed    - not on statin 2/2 elevated CPK   - Investigational Treatment Protocol: (if patient meets criteria)   [email or web address]       Safety notes:   - d/c bipap as he is no longer requiring it   - Cautious use of NSAIDS for fever per WHO recommendations (3/16/2020)   - No new ACEi/ARB start or discontinuation of chronic med unless hypotensive (Esler et al. Journal of Hypertension 2020, 38:000-000)   - Careful use of steroids in the absence of other indications (shock, ARDS)   - Fluid sparing resuscitation, avoid maintenance fluids    5/17 - resolving,     Anemia of chronic disease:  H/H slowly trending down but no signs of bleeding or rise in BUN  - continue apixaban and monitor for bleeding  - so far no signs of bleeding  4/17 - stable      Acute deep vein thrombosis (DVT) of axillary vein of right upper extremity  RUE us 04/07 consistent with DVTs in the R IJ, subclavian, and axillary veins. Noted thrombosis of cephalic, brachial, and basilic veins.    -transitioned from therapeutic lovenox to apixabian 4/12, no bleeding  -PRN oxy decreased from  -> 5mg q 6H prn on 4/15  -CPK normal    4/17 - on apixaban x 5 days, will need about 6 months.  F/u pcp.       Type 2 diabetes mellitus with diabetic neuropathic arthropathy, without long-term current use of insulin  Home medication includes jardiance 25mg (on hold)   -Change to levemir 10 units QHS, to prevent low, was on BID  -Low dose sliding scale aspart TID AC for now  -If DC home can consider restarting Jardiance, given A1C of 7, no need for insulin  Recent Labs      04/16/20  0725 04/16/20  1110 04/16/20  1528 04/16/20  2119 04/17/20  0753 04/17/20  1113   POCTGLUCOSE 125* 130* 178* 167* 109 160*           Bipolar 1 disorder, depressed  Home meds include lamotrigine 200 QHS and ziprasidone but now on lamotrigine and depakote per ICU team  -per psych recommendations: (appreciate recs on resuming bupropion)   - resume home ziprasidone 20 mg nightly tonight   - continue home lamotrigine 200 mg qHS   - got last dose of depakote this am, monitor mood/behavior   - zyprexa prn po for agitation (no benzos within 1 hr of zyprexa)      Essential hypertension, benign  BP at goal, continue home lisinopril 10 mg daily      Diet: mech soft (per patient preference 2/2 not having dentures) + bloost glucose control, thin liquids       Advance Care Planning  Goals of care, counseling/discussion- Full code    4/15 12pm - had long discussion w/ wife regarding patient's level of deconditioning and amount of assistance he requires now for all activities including transfers.  She reports that there are 4 people living in her home, one of which is her daughter who is a nurse.  They feel confident that they will be able to provide safe care for Mr. Crawford and home and do not want him to go to SNF.       4/17 - called wife, Zeenat,  [phone].   Discussed home isolation. One more week.          Anticipated DME needs:    Advance Care Planning        VTE High Risk Prophylaxis:   VTE Risk Mitigation (From admission, onward)         Ordered     apixaban tablet 5 mg  2 times daily      04/12/20 1231     apixaban tablet 10 mg  2 times daily      04/12/20 1231     heparin (porcine) 1,000 unit/mL injection      03/30/20 1640     Place sequential compression device  Until discontinued      03/30/20 0015     IP VTE HIGH RISK PATIENT  Once      03/30/20 0015                Patient's chronic/stable medical conditions noted in the problem list above will be managed with the patient's home medications as tolerated.        Maddy Mackenzie MD   Hospital Medicine          * No surgery found *      Hospital Course:   No notes on file     Consults:   Consults (From admission, onward)        Status Ordering Provider     Inpatient consult to Critical Care Medicine  Once     Provider:  (Not yet assigned)    Completed ALIYAH ESCALERA     Inpatient consult to Hospital Medicine-General  Once     Provider:  (Not yet assigned)    Completed ALYSSIA CONWAY     Inpatient consult to Midline team  Once     Provider:  (Not yet assigned)    Completed BOBY GAY     Inpatient consult to Midline team  Once     Provider:  (Not yet assigned)    Completed AVIVA PINEDA     Inpatient consult to Psychiatry  Once     Provider:  (Not yet assigned)    Completed ALYSSIA CONWAY     Inpatient consult to Registered Dietitian/Nutritionist  Once     Provider:  (Not yet assigned)    Completed MAIKEL DENNIS     Inpatient consult to SNF  Once     Provider:  (Not yet assigned)    Acknowledged MARSHALL LAO     Inpatient consult to Telemedicine - Psych  Once     Provider:  (Not yet assigned)    Acknowledged ALYSSIA CONWAY     Inpatient virtual consult to Hospital Medicine  Once     Provider:  (Not yet assigned)    Completed GABRIEL, JOSE NAM          No new Assessment & Plan notes have been filed under this hospital service since the last note was generated.  Service: Hospital Medicine    Final Active Diagnoses:    Diagnosis Date Noted POA    PRINCIPAL PROBLEM:  Acute respiratory distress syndrome (ARDS) due to COVID-19 virus [U07.1, J80] 03/29/2020 Yes    Hypernatremia [E87.0] 04/09/2020 Yes    Acute deep vein thrombosis (DVT) of axillary vein of right upper extremity [I82.A11] 04/07/2020 No    Bipolar 1 disorder, depressed [F31.9] 04/02/2020 Yes    Anemia [D64.9] 04/02/2020 No    JULIET (acute kidney injury) [N17.9] 04/01/2020 Yes    Elevated LFTs [R94.5] 03/30/2020 Yes    Type 2 diabetes mellitus with diabetic neuropathic arthropathy, without  long-term current use of insulin [E11.610] 07/05/2018 Yes    Essential hypertension, benign [I10] 12/08/2016 Yes    Hepatitis C [B19.20] 11/11/2015 Yes      Problems Resolved During this Admission:       Discharged Condition: good    Disposition: Home or Self Care    Follow Up:    Patient Instructions:      COVID-19 Home Symptom Monitoring  - Duration (days): 14     Order Specific Question Answer Comments   Duration (days) 14        Significant Diagnostic Studies: see above    Pending Diagnostic Studies:     Procedure Component Value Units Date/Time    EKG 12-lead [[id number]] Collected:  04/17/20 0644    Order Status:  Sent Lab Status:  In process Updated:  04/17/20 0919    Narrative:       Test Reason : R94.31,    Vent. Rate : 096 BPM     Atrial Rate : 096 BPM     P-R Int : 146 ms          QRS Dur : 072 ms      QT Int : 344 ms       P-R-T Axes : 047 001 022 degrees     QTc Int : 434 ms    Normal sinus rhythm  Normal ECG  When compared with ECG of 16-APR-2020 04:18,  No significant change was found    Referred By: AAAREFERR   SELF           Confirmed By:     EKG 12-lead [[id number]]     Order Status:  Sent Lab Status:  No result     EKG 12-lead [[id number]]     Order Status:  Sent Lab Status:  No result     EKG 12-lead [[id number]]     Order Status:  Sent Lab Status:  No result     EKG 12-lead [[id number]]     Order Status:  Sent Lab Status:  No result     EKG 12-lead [[id number]]     Order Status:  Sent Lab Status:  No result     EKG 12-lead [[id number]]     Order Status:  Sent Lab Status:  No result     EKG 12-lead [[id number]]     Order Status:  Sent Lab Status:  No result     EKG 12-lead [[id number]]     Order Status:  Sent Lab Status:  No result     EKG 12-lead [[id number]]     Order Status:  Sent Lab Status:  No result     EKG 12-lead [[id number]]     Order Status:  Sent Lab Status:  No result          Medications:  Reconciled Home Medications:      Medication List      START taking these medications    apixaban 5 mg  Tab  Commonly known as:  ELIQUIS  Take 1 tablet (5 mg total) by mouth 2 (two) times daily.  Start taking on:  April 18, 2020        CONTINUE taking these medications    B-complex with vitamin C tablet  Commonly known as:  Z-Bec or Equiv  Take 1 tablet by mouth once daily.     BUPROBAN ORAL  Take 450 mg by mouth once daily.     diclofenac sodium 1 % Gel  Commonly known as:  VOLTAREN  Apply topically 4 (four) times daily as needed.     empagliflozin 25 mg Tab  Commonly known as:  JARDIANCE  Take by mouth.     gabapentin 100 MG capsule  Commonly known as:  NEURONTIN  Take 1 capsule (100 mg total) by mouth 2 (two) times daily.     gluc-kenneth-msm3-ilzd-umstfm-dmp 500-450-0.67 mg Cap  Take by mouth.     lamoTRIgine 200 MG tablet  Commonly known as:  LAMICTAL  Take 200 mg by mouth once daily.     lisinopriL 10 MG tablet  Take 1 tablet (10 mg total) by mouth once daily.     LUBRICANT EYE (PG-)(PF) 0.4-0.3 % Dpet  Generic drug:  peg 400-propylene glycol (PF)  Apply 1 drop to eye 4 (four) times daily as needed.     melatonin 3 mg tablet  Commonly known as:  MELATIN  Take 9 mg by mouth nightly as needed for Insomnia.     metFORMIN 1000 MG tablet  Commonly known as:  GLUCOPHAGE  Take 1,000 mg by mouth 2 (two) times daily with meals.     * multivitamin capsule  Take 1 capsule by mouth once daily.     * multivitamin capsule  Take 1 capsule by mouth once daily.     nicotine 21 mg/24 hr  Commonly known as:  NICODERM CQ  Place 1 patch onto the skin every 24 hours.     pantoprazole 40 MG tablet  Commonly known as:  PROTONIX  Take 40 mg by mouth once daily.     simethicone 80 MG chewable tablet  Commonly known as:  MYLICON  Take 80 mg by mouth every 6 (six) hours as needed for Flatulence.     terbinafine HCL 1 % cream  Commonly known as:  LAMISIL  Apply 1 % topically daily as needed.     ziprasidone 80 MG capsule  Commonly known as:  GEODON  Take 80 mg by mouth nightly.         * This list has 2 medication(s) that are the same  as other medications prescribed for you. Read the directions carefully, and ask your doctor or other care provider to review them with you.            STOP taking these medications    megestroL 40 MG Tab  Commonly known as:  MEGACE     modafiniL 200 MG Tab  Commonly known as:  PROVIGIL            Indwelling Lines/Drains at time of discharge:   Lines/Drains/Airways     None                 Time spent on the discharge of patient: 35 minutes  Patient was seen and examined on the date of discharge and determined to be suitable for discharge.         Maddy Mackenzie MD  Department of Hospital Medicine  [facility]

## 2020-04-17 NOTE — PT/OT/SLP PROGRESS
"Physical Therapy Treatment    Patient Name:  Cash Crawford   MRN:  [id number]    Recommendations:     Discharge Recommendations:  home with home health   Discharge Equipment Recommendations: wheelchair, walker, rolling, bedside commode   Barriers to discharge: None and provided family is able to provide light physical assistance    Assessment:     Cash Crawford is a 64 y.o. male admitted with a medical diagnosis of Acute respiratory distress syndrome (ARDS) due to COVID-19 virus.  He presents with the following impairments/functional limitations:  weakness, gait instability, impaired cardiopulmonary response to activity, impaired endurance, impaired balance, impaired functional mobilty. Pt demonstrated improved ability to perform sit<> stand transfers with use of RW and pt did not complain of any L ankle pain with standing and limited gait training. Pt did demonstrate a considerable drop in his Sp02 with gait training( 93% to as low as 80 %). Pt allowed to rest in sitting until numbers returned to near 90 %. Pt remains anxious to return home and appears that this is doable when pt medically cleared for discharge.    Rehab Prognosis: Good; patient would benefit from acute skilled PT services to address these deficits and reach maximum level of function.    Recent Surgery: * No surgery found *      Plan:     During this hospitalization, patient to be seen 4 x/week to address the identified rehab impairments via gait training, therapeutic activities, therapeutic exercises and progress toward the following goals:    · Plan of Care Expires:  05/03/20    Subjective     Chief Complaint: pt does not have any complaints of L ankle pain today.  Patient/Family Comments/goals: " That must mean I'm going home," when pt sees RW PT brings to room.  Pain/Comfort:  · Pain Rating 1: 0/10      Objective:     Communicated with nurse prior to session.  Patient found supine with pulse ox (continuous), oxygen, telemetry(02 at " 2.5 L) upon PT entry to room.     General Precautions: Standard, aspiration, airborne, contact, droplet, fall   Orthopedic Precautions:N/A   Braces: N/A     Functional Mobility:  · Bed Mobility:     · Supine to Sit: modified independence  · Sit to Supine: minimum assistance and for LE management  · Transfers:     · Sit to Stand:  minimum assistance with rolling walker  · Gait: Pt ambulates 5 feet forward and backward near edge of bed, min A for safety and guidance of AD. Pt demonstrates slow pattern, step-to in nature and very small step length.  · Balance: Good seated and Fair standing with RW( SBA)      AM-PAC 6 CLICK MOBILITY  Turning over in bed (including adjusting bedclothes, sheets and blankets)?: 3  Sitting down on and standing up from a chair with arms (e.g., wheelchair, bedside commode, etc.): 3  Moving from lying on back to sitting on the side of the bed?: 4  Moving to and from a bed to a chair (including a wheelchair)?: 3  Need to walk in hospital room?: 3  Climbing 3-5 steps with a railing?: 1  Basic Mobility Total Score: 17       Therapeutic Activities and Exercises:   -Pt performs supine exercises of B APs and hip abd/add x 20 reps  -Supine> sit with mod I and use of rail( pt also requires increased time to complete  -Seated LE exercise of B AA LAQ x 20 reps  -Sit<> stand from elevated bed height to RW x 2 trials, min A for hip elevation and lowering  -Pt ambulates with RW x 5 feet forward and backward as described above  -Pt also stands to use urinal( pt holds walker and PT holds urinal)  -Due to drop in Sp02 saturation, PT ended session after use of urinal  -Sit> supine with min A for LE management  -PT educated pt regarding his improvement with sit<> stand and that walking minimally with RW is a huge step in the right direction    Patient left supine with all lines intact, call button in reach and LEs elevated on pillows to float heels..    GOALS:   Multidisciplinary Problems     Physical Therapy  Goals        Problem: Physical Therapy Goal    Goal Priority Disciplines Outcome Goal Variances Interventions   Physical Therapy Goal     PT, PT/OT Ongoing, Progressing     Description:  Goals to be met by: 20    Patient will increase functional independence with mobility by performin. Supine to sit with Moderate Assistance -met  2. Sit to stand transfer with Moderate Assistance with AD if needed. -met  3. Bed to chair transfer with Moderate Assistance using AD if needed. -not met  4. Gait  x 30  feet with Moderate Assistance using AD if needed. -not met but progressing  5. Lower extremity exercise program x15 reps with assistance as needed- met                      Time Tracking:     PT Received On: 20  PT Start Time: 0842     PT Stop Time: 0910  PT Total Time (min): 28 min     Billable Minutes: Therapeutic Activity 18 and Therapeutic Exercise 10    Treatment Type: Treatment  PT/PTA: PT     PTA Visit Number: 0     Flip Arango, PT  2020

## 2020-04-17 NOTE — PT/OT/SLP PROGRESS
Speech Language Pathology      Cash Crawford  MRN: [id number]    SLP met with Patient for Dysphagia therapy. Upon ST attempt, Patient awake in bed on telephone and politely declined PO trials and explained he wanted to go home. His vocal quality was clear with mildly decreased intensity.  He denied difficulty with meals and politely declined SLP attempts to continue session or initiate PO trials. SLP explained she would re-attempt later service day pending Pt acceptance and therapy availability. No questions noted. No questions noted. Pt remained upright in bed upon SLP exit from room.  Findings reviewed with RN, RN confirmed Pt tolerating current,. Mechanical soft textures w/o difficulty.     ANTONIO Leonard., [facility]-SLP  Speech-Language Pathology  Pager: [phone]  4/17/2020

## 2020-04-21 ENCOUNTER — PATIENT OUTREACH (OUTPATIENT)
Dept: ADMINISTRATIVE | Facility: CLINIC | Age: 65
End: 2020-04-21

## 2020-04-21 DIAGNOSIS — J80 ACUTE RESPIRATORY DISTRESS SYNDROME (ARDS) DUE TO COVID-19 VIRUS: Primary | ICD-10-CM

## 2020-04-21 DIAGNOSIS — U07.1 ACUTE RESPIRATORY DISTRESS SYNDROME (ARDS) DUE TO COVID-19 VIRUS: Primary | ICD-10-CM

## 2020-04-21 DIAGNOSIS — I82.A11 ACUTE DEEP VEIN THROMBOSIS (DVT) OF AXILLARY VEIN OF RIGHT UPPER EXTREMITY: ICD-10-CM

## 2020-04-21 DIAGNOSIS — E11.610 TYPE 2 DIABETES MELLITUS WITH DIABETIC NEUROPATHIC ARTHROPATHY, WITHOUT LONG-TERM CURRENT USE OF INSULIN: ICD-10-CM

## 2020-04-21 NOTE — PATIENT INSTRUCTIONS
Your test was POSITIVE for COVID-19 (coronavirus).       Prevention steps for patients with confirmed COVID-19       Stay home and stay away from family members and friends. The CDC says, you can leave home after these three things have happened: 1) You have had no fever for at least 72 hours (that is three full days of no fever without the use of medicine that reduces fevers) 2) AND other symptoms have improved (for example, when your cough or shortness of breath have improved) 3) AND at least 7 days have passed since your symptoms first appeared.   Separate yourself from other people and animals in your home.   Call ahead before visiting your doctor.   Wear a facemask.   Cover your coughs and sneezes.   Wash your hands often with soap and water; hand  can be used, too.   Avoid sharing personal household items.   Wipe down surfaces used daily.   Monitor your symptoms. Seek prompt medical attention if your illness is worsening (e.g., difficulty breathing).    Before seeking care, call your healthcare provider.   If you have a medical emergency and need to call 911, notify the dispatch personnel that you have, or are being evaluated for COVID-19. If possible, put on a facemask before emergency medical services arrive.        Recommended precautions for household members, intimate partners, and caregivers in a home setting of a patient with symptomatic laboratory-confirmed COVID-19 or a patient under investigation.  Household members, intimate partners, and caregivers in the home setting awaiting tests results have close contact with a person with symptomatic, laboratory-confirmed COVID-19 or a person under investigation. Close contacts should monitor their health; they should call their provider right away if they develop symptoms suggestive of COVID-19 (e.g., fever, cough, shortness of breath).    Close contacts should also follow these recommendations:   Make sure that you understand and can  help the patient follow their provider's instructions for medication(s) and care. You should help the patient with basic needs in the home and provide support for getting groceries, prescriptions, and other personal needs.   Monitor the patient's symptoms. If the patient is getting sicker, call his or her healthcare provider and tell them that the patient has laboratory-confirmed COVID-19. If the patient has a medical emergency and you need to call 911, notify the dispatch personnel that the patient has, or is being evaluated for COVID-19.   Household members should stay in another room or be  from the patient. Household members should use a separate bedroom and bathroom, if available.   Prohibit visitors.   Household members should care for any pets in the home.   Make sure that shared spaces in the home have good air flow, such as by an air conditioner or an opened window, weather permitting.   Perform hand hygiene frequently. Wash your hands often with soap and water for at least 20 seconds or use an alcohol-based hand  (that contains > 60% alcohol) covering all surfaces of your hands and rubbing them together until they feel dry. Soap and water should be used preferentially.   Avoid touching your eyes, nose, and mouth.   The patient should wear a facemask. If the patient is not able to wear a facemask (for example, because it causes trouble breathing), caregivers should wear a mask when they are in the same room as the patient.   Wear a disposable facemask and gloves when you touch or have contact with the patient's blood, stool, or body fluids, such as saliva, sputum, nasal mucus, vomit, urine.  o Throw out disposable facemasks and gloves after using them. Do not reuse.  o When removing personal protective equipment, first remove and dispose of gloves. Then, immediately clean your hands with soap and water or alcohol-based hand . Next, remove and dispose of facemask, and  immediately clean your hands again with soap and water or alcohol-based hand .   You should not share dishes, drinking glasses, cups, eating utensils, towels, bedding, or other items with the patient. After the patient uses these items, you should wash them thoroughly (see below Wash laundry thoroughly).   Clean all high-touch surfaces, such as counters, tabletops, doorknobs, bathroom fixtures, toilets, phones, keyboards, tablets, and bedside tables, every day. Also, clean any surfaces that may have blood, stool, or body fluids on them.   Use a household cleaning spray or wipe, according to the label instructions. Labels contain instructions for safe and effective use of the cleaning product including precautions you should take when applying the product, such as wearing gloves and making sure you have good ventilation during use of the product.   Wash laundry thoroughly.  o Immediately remove and wash clothes or bedding that have blood, stool, or body fluids on them.  o Wear disposable gloves while handling soiled items and keep soiled items away from your body. Clean your hands (with soap and water or an alcohol-based hand ) immediately after removing your gloves.  o Read and follow directions on labels of laundry or clothing items and detergent. In general, using a normal laundry detergent according to washing machine instructions and dry thoroughly using the warmest temperatures recommended on the clothing label.   Place all used disposable gloves, facemasks, and other contaminated items in a lined container before disposing of them with other household waste. Clean your hands (with soap and water or an alcohol-based hand ) immediately after handling these items. Soap and water should be used preferentially if hands are visibly dirty.   Discuss any additional questions with your state or local health department or healthcare provider. Check available hours when contacting  your local health department.    For more information see CDC link below.      https://www.cdc.gov/coronavirus/2019-ncov/hcp/guidance-prevent-spread.html#precautions        Sources:  [facility], [place] [facility]    Discharge Instructions for Deep Vein Thrombosis (DVT)  A blood clot or thrombus that forms in a large, deep vein is called a deep vein thrombosis (DVT). If a DVT is not treated, part of the clot (embolus) can break off and travel to your lungs. This is called a pulmonary embolus (PE). This can cut off the flow of blood to part or all of the lung. PE is a medical emergency and may cause death.   Healthcare providers use the term venous thromboembolism (VTE) to describe these two conditions: DVT and PE. They use the term VTE because the two conditions are very closely related. And because their prevention and treatment are also closely related.   Make sure you follow all instructions for taking your medicine, follow-up care, and diet and lifestyle changes.  Medicine  Your healthcare provider will usually prescribe an anticoagulant medicine. This medicine is a blood thinner that helps to prevent new blood clots. Anticoagulants can be given by mouth (oral), by injection, or into your vein (intravenous or IV). Commonly used anticoagulants include warfarin and heparin. Newer anticoagulants may also be used. They include rivaroxaban, apixaban, dabigatran, and enoxaparin. Your healthcare provider will provide specific instructions on how to take your anticoagulant. You may take more than one type for a period of time.  Make sure to take your anticoagulant exactly as directed. If you miss a dose, call your healthcare provider to find out what you should do. These medicines increase the chance of bleeding. So it is very important to take them correctly. Be sure to tell all of your healthcare providers, including dentists, that you are taking an anticoagulant.  Follow-up monitoring  If you are  taking warfarin, you will need regular blood tests to see how it is working. These blood tests include a prothrombin time (PT), also reported as an international normalized ratio (INR). Your dose of warfarin may be changed based on the test results. It is very important that you keep your testing appointments after you leave the hospital.  Be sure you understand the following information before you go home:   My goal PT/INR is between _____ and _____.   My next PT/INR blood draw will be on ______________ (date) at _______________ (time) at __________________________________________ (name of healthcare provider or clinic and address).   The name of the healthcare provider who will monitor my anticoagulation is ________________________ and the phone number is _________________________.  Depending on which anticoagulant you are prescribed, you may need other blood tests. Before you are discharged, your healthcare provider will review what testing is needed in detail.  Diet and warfarin  Vitamin K can interact with warfarin and reduce its ability to thin your blood. Vitamin K helps your blood clot. So sudden changes in vitamin K intake can affect the way warfarin works. You dont need to avoid foods with vitamin K. Instead, keep the amount you eat about the same each day. Foods high in vitamin K include:  · Leafy green vegetables like spinach, cabbage, and kale  · Avocado  · Asparagus  · Egg yolks  · Oils like canola, olive, and soybean  When taking warfarin, don't change your diet without first checking with your healthcare provider.  The other anticoagulants do not have the same interaction with vitamin K that warfarin does.   Medicines and your anticoagulant  Some medicines may cause problems with anticoagulant. Check with your healthcare provider before making any changes to your medicines. And don't take over-the-counter (OTC) medicines without checking with your provider. Some medicines interact with your  anticoagulant and make your blood too thin. This increases your risk of bleeding. Others may stop your anticoagulant from doing its job, making your blood too thick. So it is very important to tell your healthcare provider about all of the medicines you take, including OTCs and herbal supplements. Don't start or stop taking any medicine, including OTCs, unless your healthcare provider tells you to.  Medicines that may cause problems with your anticoagulant include:  · Some antibiotic medicines  · Some heart medicines  · Cimetidine  · Aspirin or other nonsteroidal anti-inflammatory drugs (NSAIDs) like ibuprofen, naproxen.  · Some medicines for depression, cancer, HIV infection, diabetes, seizures, gout, high cholesterol, or thyroid disease  · Vitamins with vitamin K  · Some herbal products like [place] wort, garlic, coenzyme Q10, tumeric, and ginkgo biloba  Home care  To help prevent blood clots, you might do the following:  · Wiggle your toes and move your ankles while sitting or lying down.  · When traveling by car, make frequent stops to get up and move around.  · On long airplane rides, get up and move around when possible. If you cant get up, wiggle your toes, move your ankles and tighten your calves to keep your blood moving.  · If you have to stay in bed, do leg exercises.  · Wear support or compression stockings, if prescribed by your healthcare provider.  · Rest and put your legs up whenever they feel swollen or heavy.  · Raise the foot of your mattress 5 to 6 inches, using a foam wedge.  Lifestyle changes  To help you stay healthy, especially your heart and blood vessels, you should:  · Begin an exercise program, if you are not exercising. Ask your healthcare provider how to get started. Try walking, inside or out.  · Stay at a healthy weight. Get help to lose any extra pounds.  · Keep blood pressure in a healthy range  · If you smoke, make a plan to quit. Ask your healthcare provider about stop-smoking  programs to help you quit.  Call 911  Call 911 right away if you have the following symptoms. They may mean a blood clot in your lungs:  · Chest pain  · Trouble breathing  · Fast heartbeat  · Sweating  · Fainting  · Coughing (may cough up blood)  · Heavy or uncontrolled bleeding  When to call your healthcare provider  Call your healthcare provider if you have pain, swelling, or redness in your leg, arm, or other area. These symptoms may mean a blood clot.  If you take blood thinners and are bleeding, you may have:  · Blood in the urine   · Bleeding with bowel movements  · Very dark or tar-like stool  · Vomiting with blood  · Coughing with blood  · Bleeding from the nose  · Bleeding from the gums  · A cut that will not stop bleeding  · Bleeding from the vagina    © 8928-2955 [employer]. [address] [place] [address]. All rights reserved. This information is not intended as a substitute for professional medical care. Always follow your healthcare professional's instructions.        Shortness of Breath (Dyspnea)  Shortness of breath is the feeling that you can't catch your breath or get enough air. It is also known as dyspnea.  Dyspnea can be caused by many different conditions. They include:  · Acute asthma attack.  · Worsening of chronic lung diseases such as chronic bronchitis and emphysema.  · Heart failure. This is when weak heart muscle allows extra fluid to collect in the lungs.  · Panic attacks or anxiety. Fear can cause rapid breathing (hyperventilation).  · Pneumonia, or an infection in the lung tissue.  · Exposure to toxic substances, fumes, smoke, or certain medicines.  · Blood clot in the lung (pulmonary embolism). This is often from a piece of blood clot in a deep vein of the leg (deep vein thrombosis) that breaks off and travels to the lungs.  · Heart attack or heart-related chest pain (angina).  · Anemia.  · Collapsed lung (pneumothorax).  · Dehydration.  · Pregnancy.  Based  on your visit today, the exact cause of your shortness of breath is not certain. Your tests dont show any of the serious causes of dyspnea. You may need other tests to find out if you have a serious problem. Its important to watch for any new symptoms or symptoms that get worse. Follow up with your healthcare provider as directed.  Home care  Follow these tips to take care of yourself at home:  · When your symptoms are better, go back to your usual activities.  · If you smoke, you should stop. Join a quit-smoking program or ask your healthcare provider for help.  · Eat a healthy diet and get plenty of sleep.  · Get regular exercise. Talk with your healthcare provider before starting to exercise, especially if you have other medical problems.  · Cut down on the amount of caffeine and stimulants you consume.  Follow-up care  Follow up with your healthcare provider, or as advised.  If tests were done, you will be told if your treatment needs to be changed. You can call as directed for the results.  (Note: If an X-ray was taken, a specialist will review it. You will be notified of any new findings that may affect your care.)  Call 911 or get immediate medical care  Shortness of breath may be a sign of a serious medical problem. For example, it may be a problem with your heart or lungs. Call 911 if you have worsening shortness of breath or trouble breathing, especially with any of the symptoms below:  · You are confused or its difficult to wake you.  · You faint or lose consciousness.  · You have a fast heartbeat, or your heartbeat is irregular.  · You are coughing up blood.  · You have pain in your chest, arm, shoulder, neck, or upper back.  · You break out in a sweat.  When to seek medical advice  Call your healthcare provider right away if any of these occur:  · Slight shortness of breath or wheezing  · Redness, pain or swelling in your leg, arm, or other body area  · Swelling in both legs or ankles  · Fast weight  gain  · Dizziness or weakness  · Fever of 100.4ºF (38ºC) or higher, or as directed by your healthcare provider    © 5235-0660 [employer]. [address] [place] [address], [place] [address]. All rights reserved. This information is not intended as a substitute for professional medical care. Always follow your healthcare professional's instructions.

## 2020-04-23 PROCEDURE — G0180 MD CERTIFICATION HHA PATIENT: HCPCS | Mod: ,,, | Performed by: HOSPITALIST

## 2020-04-23 PROCEDURE — G0180 PR HOME HEALTH MD CERTIFICATION: ICD-10-PCS | Mod: ,,, | Performed by: HOSPITALIST

## 2020-04-28 ENCOUNTER — CARE AT HOME (OUTPATIENT)
Dept: HOME HEALTH SERVICES | Facility: CLINIC | Age: 65
End: 2020-04-28
Payer: MEDICARE

## 2020-04-28 DIAGNOSIS — U07.1 COVID-19 VIRUS DETECTED: Primary | ICD-10-CM

## 2020-04-28 DIAGNOSIS — J96.01 ACUTE RESPIRATORY FAILURE WITH HYPOXIA: ICD-10-CM

## 2020-04-28 DIAGNOSIS — Z85.46 HISTORY OF PROSTATE CANCER: ICD-10-CM

## 2020-04-28 DIAGNOSIS — E11.610 TYPE 2 DIABETES MELLITUS WITH DIABETIC NEUROPATHIC ARTHROPATHY, WITHOUT LONG-TERM CURRENT USE OF INSULIN: ICD-10-CM

## 2020-04-28 DIAGNOSIS — I10 HTN, GOAL BELOW 140/90: ICD-10-CM

## 2020-04-28 DIAGNOSIS — R53.81 DEBILITY: ICD-10-CM

## 2020-04-28 PROCEDURE — 99495 TRANSJ CARE MGMT MOD F2F 14D: CPT | Mod: S$GLB,,, | Performed by: NURSE PRACTITIONER

## 2020-04-28 PROCEDURE — 99495 TCM SERVICES (MODERATE COMPLEXITY): ICD-10-PCS | Mod: S$GLB,,, | Performed by: NURSE PRACTITIONER

## 2020-04-28 RX ORDER — MEGESTROL ACETATE 40 MG/1
40 TABLET ORAL DAILY
Qty: 90 TABLET | Refills: 3 | Status: SHIPPED | OUTPATIENT
Start: 2020-04-28 | End: 2021-06-09

## 2020-04-28 RX ORDER — MODAFINIL 100 MG/1
100 TABLET ORAL DAILY
COMMUNITY

## 2020-04-28 RX ORDER — LISINOPRIL 10 MG/1
10 TABLET ORAL DAILY
Qty: 90 TABLET | Refills: 3 | Status: SHIPPED | OUTPATIENT
Start: 2020-04-28

## 2020-04-28 RX ORDER — METFORMIN HYDROCHLORIDE 1000 MG/1
1000 TABLET ORAL 2 TIMES DAILY WITH MEALS
Qty: 60 TABLET | Refills: 2 | Status: SHIPPED | OUTPATIENT
Start: 2020-04-28

## 2020-04-28 NOTE — PROGRESS NOTES
[facility]  Transition of Care Home Visit    Visit Date: 4/28/2020  Encounter Provider: Emerita Ash NP  PCP:  Briana Gonzalez MD (Inactive)    PRESENTING HISTORY      Patient ID: Cash Crawford is a 64 y.o. male.    Consult Requested By:  Dr. Roxy Ash  Reason for Consult:  Hospital Follow Up    Cash is being seen at home due to physical limitations.      Chief Complaint: No chief complaint on file.      History of Present Illness: Mr. Cash Crawford is a 64 y.o. male who was recently admitted to the hospital.  Admit: 3/29/20 through 4/17/2020  Hospital Course:  Initial workup showed CXR right lobe infiltrate with elevated , D-Dimer 1.03,  and Ferritin 331. Patient swabbed for COVID 19:positive. Patient started on IV Rocephin and Azithromycin and Plaquenil. He had rapid decline in respiratory status within 24H of admission and was intubated 3/30/2020 c/b ARDS.  He completed Abx for empiric CAP treatment on 4/2 and course of hydroxychloroquine ended 4/5.  Extubated on 4/4/2020 and transitioned to bipap/NC.   Developed hypernatremia which resolved on D5 drip requiring insulin for hyperglycemia.  Developed RUE DVT on therapeutic lovenox.  Started on Depakote for agitation     4/12:    Started apixaban, down to 8L NC, pending psych consult  4/13:    Down to 4L NC, feeling well, behavioral problems w/ nursing (smeared feces all over lines)  4/14:    Down to 3L NC, resume home ziprasidone, weaning depakote  4/15:    On 3L NC, H/H drifting down to 7.8, decreased oxycodone  4/16;    On 2L NC, H/H is stable at 7.2        Interval History:     No acute events overnight,   Hgb at 7.2, VS are stable  no N/V/abd pain, eating mechanical diet due to lack of dentures  No bleeding per patient  On 2L NC     4/17 -  96% on RA,  Took a few steps w the walker.  ___________________________________________________________________    Today:    HPI:  Pt is a 65 y/o male being seen today for  hospital follow up after recent admit for ARDS related to COVID.  See hospital course.  Today, pt is being seen in his home after d/c on 4/17. Pt had a repeat negative COVID screening on 4/15.  He denies any fever or cough in the 10 days he has been home, does report SOB mostly with activity, fatigue and low energy. He reports he has resumed all of his medications and is compliant with his psych med at this time and has had no further episodes such as he had in the hospital.      Review of Systems   Constitutional: Positive for activity change and fatigue. Negative for fever.   HENT: Negative.    Respiratory: Positive for shortness of breath.    Gastrointestinal: Negative.    Genitourinary: Negative.    Musculoskeletal: Positive for gait problem.   Skin: Negative.    Neurological: Positive for weakness.   Psychiatric/Behavioral: Negative.        Assessments:  · Environmental: clean, adequate  · Functional Status: minimal assistance, weak balance issues  · Safety: no issues  · Nutritional: adequate  · Home Health/DME/Supplies: [facility]/walker/oxygen    PAST HISTORY:     Past Medical History:   Diagnosis Date    Behavioral problem     Bipolar 1 disorder     Cervical spondylosis with myelopathy 10/9/2012    Diabetes mellitus type II     History of prostate cancer, s/p radiation 11/11/2015    History of psychiatric hospitalization     Hx of psychiatric care     Psychiatric exam requested by authority     Psychiatric problem     Recurrent major depressive disorder, in partial remission 7/5/2018    Self-harming behavior     Sleep apnea with use of continuous positive airway pressure (CPAP)     Suicide attempt     Therapy     Tobacco abuse 11/11/2015       Past Surgical History:   Procedure Laterality Date    CHOLECYSTECTOMY      HERNIA REPAIR      LEG SURGERY      ORIF tib/fib    SPINE SURGERY         Family History   Problem Relation Age of Onset    Hypertension Mother     Arthritis Mother      Alcohol abuse Mother     Alcohol abuse Father     Alcohol abuse Maternal Uncle     Drug abuse Maternal Uncle     Suicide Maternal Uncle        Social History     Socioeconomic History    Marital status:      Spouse name: Not on file    Number of children: 7    Years of education: Not on file    Highest education level: Not on file   Occupational History    Occupation: unemployed   Social Needs    Financial resource strain: Not on file    Food insecurity:     Worry: Not on file     Inability: Not on file    Transportation needs:     Medical: Not on file     Non-medical: Not on file   Tobacco Use    Smoking status: Current Every Day Smoker     Packs/day: 0.50     Years: 30.00     Pack years: 15.00     Types: Cigarettes     Last attempt to quit: 2015     Years since quittin.4    Smokeless tobacco: Never Used    Tobacco comment: currently using nicotine patches 21 mg   Substance and Sexual Activity    Alcohol use: No     Alcohol/week: 0.0 standard drinks     Comment: stoped 17 months ago but says that he is an alcoholic    Drug use: Not Currently     Comment: stopped 17 months ago; used to used heavy drugs, particularly marijuana, acid, amphetamines, cocaine, IV crack cocaine    Sexual activity: Not on file   Lifestyle    Physical activity:     Days per week: Not on file     Minutes per session: Not on file    Stress: Not on file   Relationships    Social connections:     Talks on phone: Not on file     Gets together: Not on file     Attends Jew service: Not on file     Active member of club or organization: Not on file     Attends meetings of clubs or organizations: Not on file     Relationship status: Not on file   Other Topics Concern    Patient feels they ought to cut down on drinking/drug use Not Asked    Patient annoyed by others criticizing their drinking/drug use Not Asked    Patient has felt bad or guilty about drinking/drug use Not Asked    Patient has had a  drink/used drugs as an eye opener in the AM Not Asked   Social History Narrative    Not on file       MEDICATIONS & ALLERGIES:     Current Outpatient Medications on File Prior to Visit   Medication Sig Dispense Refill    apixaban (ELIQUIS) 5 mg Tab Take 1 tablet (5 mg total) by mouth 2 (two) times daily. 60 tablet 1    BUPROPION HCL (BUPROBAN ORAL) Take 450 mg by mouth once daily.       gabapentin (NEURONTIN) 100 MG capsule Take 1 capsule (100 mg total) by mouth 2 (two) times daily. 180 capsule 3    lamotrigine (LAMICTAL) 200 MG tablet Take 200 mg by mouth once daily.      modafiniL (PROVIGIL) 100 MG Tab Take 100 mg by mouth once daily.      ziprasidone (GEODON) 80 MG capsule Take 80 mg by mouth nightly.      [DISCONTINUED] lisinopril 10 MG tablet Take 1 tablet (10 mg total) by mouth once daily. 90 tablet 3    [DISCONTINUED] metFORMIN (GLUCOPHAGE) 1000 MG tablet Take 1,000 mg by mouth 2 (two) times daily with meals.      B-complex with vitamin C (Z-BEC OR EQUIV) tablet Take 1 tablet by mouth once daily.      diclofenac sodium (VOLTAREN) 1 % Gel Apply topically 4 (four) times daily as needed.      empagliflozin (JARDIANCE) 25 mg Tab Take by mouth.      gluc-kenneth-msm3-webh-jldigh-zoi 500-450-0.67 mg Cap Take by mouth.      melatonin (MELATIN) 3 mg tablet Take 9 mg by mouth nightly as needed for Insomnia.      multivitamin capsule Take 1 capsule by mouth once daily.      multivitamin capsule Take 1 capsule by mouth once daily.      nicotine (NICODERM CQ) 21 mg/24 hr Place 1 patch onto the skin every 24 hours.      pantoprazole (PROTONIX) 40 MG tablet Take 40 mg by mouth once daily.      peg 400-propylene glycol, PF, (LUBRICANT EYE, PG-,,PF,) 0.4-0.3 % Dpet Apply 1 drop to eye 4 (four) times daily as needed.      simethicone (MYLICON) 80 MG chewable tablet Take 80 mg by mouth every 6 (six) hours as needed for Flatulence.      terbinafine HCl (LAMISIL) 1 % cream Apply 1 % topically daily as  needed.      [DISCONTINUED] diazepam (VALIUM) 5 MG tablet Take 1 tablet (5 mg total) by mouth every 8 (eight) hours as needed (muscle spasms). 1 tablet Oral Every 6 hours 60 tablet 0     No current facility-administered medications on file prior to visit.         Review of patient's allergies indicates:  No Known Allergies    OBJECTIVE:     Vital Signs:  There were no vitals filed for this visit.  There is no height or weight on file to calculate BMI.     Physical Exam:  Physical Exam   Constitutional: He is oriented to person, place, and time. He appears well-developed and well-nourished. No distress.   HENT:   Head: Normocephalic and atraumatic.   Eyes: Pupils are equal, round, and reactive to light. EOM are normal.   Neck: Normal range of motion. Neck supple.   Cardiovascular: Normal rate, regular rhythm and normal heart sounds.   Pulmonary/Chest: Effort normal and breath sounds normal.   Abdominal: Soft. Bowel sounds are normal.   Musculoskeletal: Normal range of motion. He exhibits no edema.   Neurological: He is alert and oriented to person, place, and time. No cranial nerve deficit.   Skin: Skin is warm and dry.   Psychiatric: He has a normal mood and affect. His behavior is normal. Judgment and thought content normal.   Vitals reviewed.      Laboratory  Lab Results   Component Value Date    WBC 7.34 04/17/2020    HGB 7.4 (L) 04/17/2020    HCT 25.4 (L) 04/17/2020    MCV 81 (L) 04/17/2020     (H) 04/17/2020     Lab Results   Component Value Date    INR 0.9 03/31/2020     Lab Results   Component Value Date    HGBA1C 7.0 (H) 03/30/2020     No results for input(s): POCTGLUCOSE in the last 72 hours.    Diagnostic Results:      TRANSITION OF CARE:     [facility] Contact Note: 4/21/2020    Family and/or Caretaker present at visit?  Yes.  Diagnostic tests reviewed/disposition: No diagnosic tests pending after this hospitalization.  Disease/illness education: COVID  Home health/community services  discussion/referrals: Patient has home health established at [facility].   Establishment or re-establishment of referral orders for community resources: No other necessary community resources.   Discussion with other health care providers: No discussion with other health care providers necessary.     Transition of Care Visit:     I have reviewed and updated the history and problem list.  I have reconciled the medication list.  I have discussed the hospitalization and current medical issues, prognosis and plans with the patient/family.  I  spent more than 50% of time discussing the care with the patient/family.  Total Face-to-Face Encounter: 60 minutes.    Medications Reconciliation:   I have reconciled the patient's home medications and discharge medications with the patient/family. I have updated all changes.  Refer to After-Visit Medication List.    ASSESSMENT & PLAN:     HIGH RISK CONDITION(S):  COVID    Diagnoses and all orders for this visit:    COVID-19 virus detected  Improving, pt with ARDS on vent  Fever and cough resolved, SOB with activity, fatigue  PT/OT for strengthening  Use oxygen to keep sat above 90    HTN, goal below 140/90  -     lisinopriL 10 MG tablet; Take 1 tablet (10 mg total) by mouth once daily.  Stable, continue Lisinopril    Debility  Related to COVID and prolonged ICU stay  Use rollator at all times  PT/OT in place  Rest when needed, increase activity as tolerated    Type 2 diabetes mellitus with diabetic neuropathic arthropathy, without long-term current use of insulin  Lab Results   Component Value Date    LABA1C 7.3 (H) 07/05/2018    HGBA1C 7.0 (H) 03/30/2020     Stable. Continue current plan of care  PT does not have glucometer  Glucometer of choice to pharmacy with supplies  History of prostate cancer  Continue megestrol  Other orders  -     Cancel: Airborne and Contact and Droplet Isolation Status; Standing  -     metFORMIN (GLUCOPHAGE) 1000 MG tablet; Take 1 tablet (1,000 mg total) by  mouth 2 (two) times daily with meals.  -     megestroL (MEGACE) 40 MG Tab; Take 1 tablet (40 mg total) by mouth once daily.    Time allowed for questions, all questions answered, contact info given for any concerns    Were controlled substances prescribed?  No    Instructions for the patient:  Take all medication as prescribed  Check glucose before meals and at bedtime  Continue PT/OT for strengthening  Fall precautions, use walker at all times  Continue to use oxygen for SOB  Scheduled Follow-up :  No future appointments.    After Visit Medication List :     Medication List           Accurate as of April 28, 2020  3:34 PM. If you have any questions, ask your nurse or doctor.               START taking these medications    megestroL 40 MG Tab  Commonly known as:  MEGACE  Take 1 tablet (40 mg total) by mouth once daily.  Started by:  Emerita Ash NP        CONTINUE taking these medications    B-complex with vitamin C tablet  Commonly known as:  Z-Bec or Equiv     BUPROBAN ORAL     diclofenac sodium 1 % Gel  Commonly known as:  VOLTAREN     ELIQUIS 5 mg Tab  Generic drug:  apixaban  Take 1 tablet (5 mg total) by mouth 2 (two) times daily.     empagliflozin 25 mg Tab  Commonly known as:  JARDIANCE     gabapentin 100 MG capsule  Commonly known as:  NEURONTIN  Take 1 capsule (100 mg total) by mouth 2 (two) times daily.     gluc-kenneth-msm3-acmh-pjmyzt-ofm 500-450-0.67 mg Cap     lamoTRIgine 200 MG tablet  Commonly known as:  LAMICTAL     lisinopriL 10 MG tablet  Take 1 tablet (10 mg total) by mouth once daily.     LUBRICANT EYE (PG-)(PF) 0.4-0.3 % Dpet  Generic drug:  peg 400-propylene glycol (PF)     melatonin 3 mg tablet  Commonly known as:  MELATIN     metFORMIN 1000 MG tablet  Commonly known as:  GLUCOPHAGE  Take 1 tablet (1,000 mg total) by mouth 2 (two) times daily with meals.     modafiniL 100 MG Tab  Commonly known as:  PROVIGIL     * multivitamin capsule     * multivitamin capsule     nicotine 21 mg/24  hr  Commonly known as:  NICODERM CQ     pantoprazole 40 MG tablet  Commonly known as:  PROTONIX     simethicone 80 MG chewable tablet  Commonly known as:  MYLICON     terbinafine HCL 1 % cream  Commonly known as:  LAMISIL     ziprasidone 80 MG capsule  Commonly known as:  GEODON         * This list has 2 medication(s) that are the same as other medications prescribed for you. Read the directions carefully, and ask your doctor or other care provider to review them with you.               Where to Get Your Medications      These medications were sent to [facility] [address] [facility], [place] - [address]  [address], [facility] [address]    Phone:  [phone]   · lisinopriL 10 MG tablet  · megestroL 40 MG Tab  · metFORMIN 1000 MG tablet         Signature:  Emerita Ash NP    Patient consent obtained prior to treatment

## 2020-06-12 ENCOUNTER — EXTERNAL HOME HEALTH (OUTPATIENT)
Dept: HOME HEALTH SERVICES | Facility: HOSPITAL | Age: 65
End: 2020-06-12
Payer: MEDICARE

## 2021-04-26 ENCOUNTER — PATIENT MESSAGE (OUTPATIENT)
Dept: RESEARCH | Facility: HOSPITAL | Age: 66
End: 2021-04-26

## 2021-05-07 ENCOUNTER — OFFICE VISIT (OUTPATIENT)
Dept: URGENT CARE | Facility: CLINIC | Age: 66
End: 2021-05-07
Payer: MEDICARE

## 2021-05-07 VITALS
BODY MASS INDEX: 30.21 KG/M2 | RESPIRATION RATE: 16 BRPM | SYSTOLIC BLOOD PRESSURE: 136 MMHG | HEART RATE: 96 BPM | OXYGEN SATURATION: 97 % | DIASTOLIC BLOOD PRESSURE: 73 MMHG | WEIGHT: 204 LBS | TEMPERATURE: 98 F | HEIGHT: 69 IN

## 2021-05-07 DIAGNOSIS — S16.1XXA ACUTE STRAIN OF NECK MUSCLE, INITIAL ENCOUNTER: ICD-10-CM

## 2021-05-07 DIAGNOSIS — Z76.0 ENCOUNTER FOR MEDICATION REFILL: ICD-10-CM

## 2021-05-07 DIAGNOSIS — Z76.89 ENCOUNTER TO ESTABLISH CARE: ICD-10-CM
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
## 2021-05-07 DIAGNOSIS — S39.012A STRAIN OF LUMBAR REGION, INITIAL ENCOUNTER: ICD-10-CM

## 2021-05-07 PROCEDURE — 1125F PR PAIN SEVERITY QUANTIFIED, PAIN PRESENT: ICD-10-PCS | Mod: S$GLB,,, | Performed by: PHYSICIAN ASSISTANT

## 2021-05-07 PROCEDURE — 72040 X-RAY EXAM NECK SPINE 2-3 VW: CPT | Mod: S$GLB,,, | Performed by: RADIOLOGY

## 2021-05-07 PROCEDURE — 1157F PR ADVANCE CARE PLAN OR EQUIV PRESENT IN MEDICAL RECORD: ICD-10-PCS | Mod: S$GLB,,, | Performed by: PHYSICIAN ASSISTANT

## 2021-05-07 PROCEDURE — 72100 X-RAY EXAM L-S SPINE 2/3 VWS: CPT | Mod: S$GLB,,, | Performed by: RADIOLOGY

## 2021-05-07 PROCEDURE — 1125F AMNT PAIN NOTED PAIN PRSNT: CPT | Mod: S$GLB,,, | Performed by: PHYSICIAN ASSISTANT

## 2021-05-07 PROCEDURE — 3008F PR BODY MASS INDEX (BMI) DOCUMENTED: ICD-10-PCS | Mod: CPTII,S$GLB,, | Performed by: PHYSICIAN ASSISTANT

## 2021-05-07 PROCEDURE — 3008F BODY MASS INDEX DOCD: CPT | Mod: CPTII,S$GLB,, | Performed by: PHYSICIAN ASSISTANT

## 2021-05-07 PROCEDURE — 99204 OFFICE O/P NEW MOD 45 MIN: CPT | Mod: S$GLB,,, | Performed by: PHYSICIAN ASSISTANT

## 2021-05-07 PROCEDURE — 72100 XR LUMBAR SPINE 2 OR 3 VIEWS: ICD-10-PCS | Mod: S$GLB,,, | Performed by: RADIOLOGY

## 2021-05-07 PROCEDURE — 99204 PR OFFICE/OUTPT VISIT, NEW, LEVL IV, 45-59 MIN: ICD-10-PCS | Mod: S$GLB,,, | Performed by: PHYSICIAN ASSISTANT

## 2021-05-07 PROCEDURE — 72040 XR CERVICAL SPINE 2 OR 3 VIEWS: ICD-10-PCS | Mod: S$GLB,,, | Performed by: RADIOLOGY

## 2021-05-07 PROCEDURE — 1157F ADVNC CARE PLAN IN RCRD: CPT | Mod: S$GLB,,, | Performed by: PHYSICIAN ASSISTANT

## 2021-05-07 RX ORDER — TAMSULOSIN HYDROCHLORIDE 0.4 MG/1
CAPSULE ORAL
COMMUNITY
Start: 2021-04-12

## 2021-05-07 RX ORDER — ROSUVASTATIN CALCIUM 40 MG/1
TABLET, COATED ORAL
COMMUNITY
Start: 2021-03-02 | End: 2021-06-09 | Stop reason: ALTCHOICE

## 2021-05-07 RX ORDER — GABAPENTIN 100 MG/1
100 CAPSULE ORAL 2 TIMES DAILY
Qty: 30 CAPSULE | Refills: 0 | Status: SHIPPED | OUTPATIENT
Start: 2021-05-07

## 2021-05-07 RX ORDER — METHOCARBAMOL 500 MG/1
500 TABLET, FILM COATED ORAL 3 TIMES DAILY
Qty: 30 TABLET | Refills: 0 | Status: SHIPPED | OUTPATIENT
Start: 2021-05-07 | End: 2021-05-17

## 2021-05-17 ENCOUNTER — OFFICE VISIT (OUTPATIENT)
Dept: INTERNAL MEDICINE | Facility: CLINIC | Age: 66
End: 2021-05-17
Payer: MEDICARE

## 2021-05-17 VITALS
OXYGEN SATURATION: 97 % | BODY MASS INDEX: 30.4 KG/M2 | HEIGHT: 69 IN | HEART RATE: 80 BPM | SYSTOLIC BLOOD PRESSURE: 110 MMHG | WEIGHT: 205.25 LBS | DIASTOLIC BLOOD PRESSURE: 62 MMHG

## 2021-05-17 DIAGNOSIS — S39.012S STRAIN OF MUSCLE, FASCIA AND TENDON OF LOWER BACK, SEQUELA: Primary | ICD-10-CM

## 2021-05-17 DIAGNOSIS — M47.12 CERVICAL SPONDYLOSIS WITH MYELOPATHY: ICD-10-CM

## 2021-05-17 DIAGNOSIS — M50.00 CERVICAL DISC DISORDER WITH MYELOPATHY OF CERVICAL REGION: ICD-10-CM

## 2021-05-17 PROCEDURE — 99214 PR OFFICE/OUTPT VISIT, EST, LEVL IV, 30-39 MIN: ICD-10-PCS | Mod: GC,S$GLB,, | Performed by: STUDENT IN AN ORGANIZED HEALTH CARE EDUCATION/TRAINING PROGRAM

## 2021-05-17 PROCEDURE — 1159F MED LIST DOCD IN RCRD: CPT | Mod: GC,S$GLB,, | Performed by: STUDENT IN AN ORGANIZED HEALTH CARE EDUCATION/TRAINING PROGRAM

## 2021-05-17 PROCEDURE — 99999 PR PBB SHADOW E&M-EST. PATIENT-LVL V: ICD-10-PCS | Mod: PBBFAC,GC,, | Performed by: STUDENT IN AN ORGANIZED HEALTH CARE EDUCATION/TRAINING PROGRAM

## 2021-05-17 PROCEDURE — 99214 OFFICE O/P EST MOD 30 MIN: CPT | Mod: GC,S$GLB,, | Performed by: STUDENT IN AN ORGANIZED HEALTH CARE EDUCATION/TRAINING PROGRAM

## 2021-05-17 PROCEDURE — 99999 PR PBB SHADOW E&M-EST. PATIENT-LVL V: CPT | Mod: PBBFAC,GC,, | Performed by: STUDENT IN AN ORGANIZED HEALTH CARE EDUCATION/TRAINING PROGRAM

## 2021-05-17 PROCEDURE — 1159F PR MEDICATION LIST DOCUMENTED IN MEDICAL RECORD: ICD-10-PCS | Mod: GC,S$GLB,, | Performed by: STUDENT IN AN ORGANIZED HEALTH CARE EDUCATION/TRAINING PROGRAM

## 2021-05-17 RX ORDER — LIDOCAINE 50 MG/G
2 PATCH TOPICAL DAILY
Qty: 30 PATCH | Refills: 2 | Status: SHIPPED | OUTPATIENT
Start: 2021-05-17

## 2021-05-26 ENCOUNTER — PATIENT OUTREACH (OUTPATIENT)
Dept: ADMINISTRATIVE | Facility: HOSPITAL | Age: 66
End: 2021-05-26

## 2021-06-09 ENCOUNTER — OFFICE VISIT (OUTPATIENT)
Dept: FAMILY MEDICINE | Facility: CLINIC | Age: 66
End: 2021-06-09
Payer: MEDICARE

## 2021-06-09 VITALS
DIASTOLIC BLOOD PRESSURE: 78 MMHG | SYSTOLIC BLOOD PRESSURE: 120 MMHG | BODY MASS INDEX: 30.66 KG/M2 | TEMPERATURE: 100 F | WEIGHT: 207 LBS | OXYGEN SATURATION: 98 % | HEART RATE: 86 BPM | HEIGHT: 69 IN

## 2021-06-09 DIAGNOSIS — Z00.00 ANNUAL PHYSICAL EXAM: Primary | ICD-10-CM

## 2021-06-09 DIAGNOSIS — I82.A11 ACUTE EMBOLISM AND THROMBOSIS OF RIGHT AXILLARY VEIN: ICD-10-CM

## 2021-06-09 DIAGNOSIS — E11.610 TYPE 2 DIABETES MELLITUS WITH DIABETIC NEUROPATHIC ARTHROPATHY, WITHOUT LONG-TERM CURRENT USE OF INSULIN: ICD-10-CM

## 2021-06-09 DIAGNOSIS — B18.2 CHRONIC HEPATITIS C WITHOUT HEPATIC COMA: ICD-10-CM

## 2021-06-09 DIAGNOSIS — F31.60 BIPOLAR AFFECTIVE DISORDER, CURRENT EPISODE MIXED, CURRENT EPISODE SEVERITY UNSPECIFIED: ICD-10-CM

## 2021-06-09 DIAGNOSIS — E78.5 HYPERLIPIDEMIA, UNSPECIFIED HYPERLIPIDEMIA TYPE: ICD-10-CM

## 2021-06-09 PROBLEM — Z99.11 DEPENDENCE ON RESPIRATOR (VENTILATOR) STATUS: Status: ACTIVE | Noted: 2021-06-09

## 2021-06-09 PROCEDURE — 99999 PR PBB SHADOW E&M-EST. PATIENT-LVL IV: CPT | Mod: PBBFAC,,, | Performed by: INTERNAL MEDICINE

## 2021-06-09 PROCEDURE — 99214 PR OFFICE/OUTPT VISIT, EST, LEVL IV, 30-39 MIN: ICD-10-PCS | Mod: S$GLB,,, | Performed by: INTERNAL MEDICINE

## 2021-06-09 PROCEDURE — 1101F PT FALLS ASSESS-DOCD LE1/YR: CPT | Mod: CPTII,S$GLB,, | Performed by: INTERNAL MEDICINE

## 2021-06-09 PROCEDURE — 3008F PR BODY MASS INDEX (BMI) DOCUMENTED: ICD-10-PCS | Mod: CPTII,S$GLB,, | Performed by: INTERNAL MEDICINE

## 2021-06-09 PROCEDURE — 1157F ADVNC CARE PLAN IN RCRD: CPT | Mod: S$GLB,,, | Performed by: INTERNAL MEDICINE

## 2021-06-09 PROCEDURE — 1126F PR PAIN SEVERITY QUANTIFIED, NO PAIN PRESENT: ICD-10-PCS | Mod: S$GLB,,, | Performed by: INTERNAL MEDICINE

## 2021-06-09 PROCEDURE — 1157F PR ADVANCE CARE PLAN OR EQUIV PRESENT IN MEDICAL RECORD: ICD-10-PCS | Mod: S$GLB,,, | Performed by: INTERNAL MEDICINE

## 2021-06-09 PROCEDURE — 3288F FALL RISK ASSESSMENT DOCD: CPT | Mod: CPTII,S$GLB,, | Performed by: INTERNAL MEDICINE

## 2021-06-09 PROCEDURE — 1101F PR PT FALLS ASSESS DOC 0-1 FALLS W/OUT INJ PAST YR: ICD-10-PCS | Mod: CPTII,S$GLB,, | Performed by: INTERNAL MEDICINE

## 2021-06-09 PROCEDURE — 99214 OFFICE O/P EST MOD 30 MIN: CPT | Mod: S$GLB,,, | Performed by: INTERNAL MEDICINE

## 2021-06-09 PROCEDURE — 1126F AMNT PAIN NOTED NONE PRSNT: CPT | Mod: S$GLB,,, | Performed by: INTERNAL MEDICINE

## 2021-06-09 PROCEDURE — 3288F PR FALLS RISK ASSESSMENT DOCUMENTED: ICD-10-PCS | Mod: CPTII,S$GLB,, | Performed by: INTERNAL MEDICINE

## 2021-06-09 PROCEDURE — 3008F BODY MASS INDEX DOCD: CPT | Mod: CPTII,S$GLB,, | Performed by: INTERNAL MEDICINE

## 2021-06-09 PROCEDURE — 99999 PR PBB SHADOW E&M-EST. PATIENT-LVL IV: ICD-10-PCS | Mod: PBBFAC,,, | Performed by: INTERNAL MEDICINE

## 2021-06-09 RX ORDER — ATORVASTATIN CALCIUM 80 MG/1
80 TABLET, FILM COATED ORAL DAILY
Qty: 90 TABLET | Refills: 3
Start: 2021-06-09 | End: 2024-01-31

## 2021-06-14 ENCOUNTER — CLINICAL SUPPORT (OUTPATIENT)
Dept: REHABILITATION | Facility: HOSPITAL | Age: 66
End: 2021-06-14
Payer: MEDICARE

## 2021-06-14 ENCOUNTER — PATIENT OUTREACH (OUTPATIENT)
Dept: ADMINISTRATIVE | Facility: HOSPITAL | Age: 66
End: 2021-06-14

## 2021-06-14 DIAGNOSIS — G89.29 CHRONIC BILATERAL LOW BACK PAIN WITHOUT SCIATICA: ICD-10-CM

## 2021-06-14 DIAGNOSIS — M25.611 DECREASED ROM OF RIGHT SHOULDER: ICD-10-CM

## 2021-06-14 DIAGNOSIS — M54.2 NECK PAIN: ICD-10-CM

## 2021-06-14 DIAGNOSIS — S39.012S STRAIN OF MUSCLE, FASCIA AND TENDON OF LOWER BACK, SEQUELA: ICD-10-CM

## 2021-06-14 DIAGNOSIS — M47.12 CERVICAL SPONDYLOSIS WITH MYELOPATHY: ICD-10-CM

## 2021-06-14 DIAGNOSIS — M53.86 DECREASED ROM OF LUMBAR SPINE: ICD-10-CM

## 2021-06-14 DIAGNOSIS — M54.2 PAINFUL CERVICAL ROM: ICD-10-CM

## 2021-06-14 DIAGNOSIS — M50.00 CERVICAL DISC DISORDER WITH MYELOPATHY OF CERVICAL REGION: ICD-10-CM

## 2021-06-14 DIAGNOSIS — R29.898 UPPER EXTREMITY WEAKNESS: ICD-10-CM

## 2021-06-14 DIAGNOSIS — M54.50 CHRONIC BILATERAL LOW BACK PAIN WITHOUT SCIATICA: ICD-10-CM

## 2021-06-14 PROBLEM — M54.9 BACK PAIN: Status: ACTIVE | Noted: 2021-06-14

## 2021-06-14 PROCEDURE — 97161 PT EVAL LOW COMPLEX 20 MIN: CPT

## 2021-06-17 ENCOUNTER — CLINICAL SUPPORT (OUTPATIENT)
Dept: REHABILITATION | Facility: HOSPITAL | Age: 66
End: 2021-06-17
Payer: MEDICARE

## 2021-06-17 DIAGNOSIS — M54.2 NECK PAIN: ICD-10-CM

## 2021-06-17 DIAGNOSIS — M25.611 DECREASED ROM OF RIGHT SHOULDER: ICD-10-CM

## 2021-06-17 DIAGNOSIS — R29.898 UPPER EXTREMITY WEAKNESS: ICD-10-CM

## 2021-06-17 DIAGNOSIS — M53.86 DECREASED ROM OF LUMBAR SPINE: ICD-10-CM

## 2021-06-17 DIAGNOSIS — M54.50 CHRONIC BILATERAL LOW BACK PAIN WITHOUT SCIATICA: ICD-10-CM

## 2021-06-17 DIAGNOSIS — G89.29 CHRONIC BILATERAL LOW BACK PAIN WITHOUT SCIATICA: ICD-10-CM

## 2021-06-17 PROCEDURE — 97110 THERAPEUTIC EXERCISES: CPT

## 2021-06-21 ENCOUNTER — CLINICAL SUPPORT (OUTPATIENT)
Dept: REHABILITATION | Facility: HOSPITAL | Age: 66
End: 2021-06-21
Payer: MEDICARE

## 2021-06-21 DIAGNOSIS — M53.86 DECREASED ROM OF LUMBAR SPINE: ICD-10-CM

## 2021-06-21 DIAGNOSIS — M54.50 CHRONIC BILATERAL LOW BACK PAIN WITHOUT SCIATICA: ICD-10-CM

## 2021-06-21 DIAGNOSIS — R29.898 UPPER EXTREMITY WEAKNESS: ICD-10-CM

## 2021-06-21 DIAGNOSIS — G89.29 CHRONIC BILATERAL LOW BACK PAIN WITHOUT SCIATICA: ICD-10-CM

## 2021-06-21 DIAGNOSIS — M25.611 DECREASED ROM OF RIGHT SHOULDER: ICD-10-CM

## 2021-06-21 DIAGNOSIS — M54.2 NECK PAIN: ICD-10-CM

## 2021-06-21 PROCEDURE — 97110 THERAPEUTIC EXERCISES: CPT | Mod: CQ

## 2021-06-30 DIAGNOSIS — Z12.11 COLON CANCER SCREENING: ICD-10-CM

## 2021-07-02 ENCOUNTER — PATIENT MESSAGE (OUTPATIENT)
Dept: ADMINISTRATIVE | Facility: HOSPITAL | Age: 66
End: 2021-07-02

## 2021-07-06 ENCOUNTER — PATIENT MESSAGE (OUTPATIENT)
Dept: ADMINISTRATIVE | Facility: HOSPITAL | Age: 66
End: 2021-07-06

## 2021-07-06 ENCOUNTER — PATIENT OUTREACH (OUTPATIENT)
Dept: ADMINISTRATIVE | Facility: HOSPITAL | Age: 66
End: 2021-07-06

## 2021-08-04 ENCOUNTER — PATIENT MESSAGE (OUTPATIENT)
Dept: ADMINISTRATIVE | Facility: HOSPITAL | Age: 66
End: 2021-08-04

## 2021-10-04 ENCOUNTER — PATIENT MESSAGE (OUTPATIENT)
Dept: ADMINISTRATIVE | Facility: HOSPITAL | Age: 66
End: 2021-10-04

## 2021-10-06 DIAGNOSIS — E11.9 TYPE 2 DIABETES MELLITUS WITHOUT COMPLICATION, UNSPECIFIED WHETHER LONG TERM INSULIN USE: ICD-10-CM

## 2021-10-08 ENCOUNTER — TELEPHONE (OUTPATIENT)
Dept: FAMILY MEDICINE | Facility: CLINIC | Age: 66
End: 2021-10-08
Payer: MEDICARE

## 2021-10-08 DIAGNOSIS — E11.610 DIABETIC NEUROGENIC ARTHROPATHY: Primary | ICD-10-CM

## 2021-11-11 ENCOUNTER — TELEPHONE (OUTPATIENT)
Dept: FAMILY MEDICINE | Facility: CLINIC | Age: 66
End: 2021-11-11
Payer: MEDICARE

## 2022-04-21 LAB — HBA1C MFR BLD: 7 % (ref 4–6)

## 2022-05-30 ENCOUNTER — PATIENT MESSAGE (OUTPATIENT)
Dept: ADMINISTRATIVE | Facility: HOSPITAL | Age: 67
End: 2022-05-30
Payer: MEDICARE

## 2022-07-11 DIAGNOSIS — Z12.11 COLON CANCER SCREENING: ICD-10-CM

## 2022-09-20 ENCOUNTER — PATIENT MESSAGE (OUTPATIENT)
Dept: FAMILY MEDICINE | Facility: CLINIC | Age: 67
End: 2022-09-20
Payer: MEDICARE

## 2023-01-04 ENCOUNTER — PATIENT OUTREACH (OUTPATIENT)
Dept: ADMINISTRATIVE | Facility: HOSPITAL | Age: 68
End: 2023-01-04
Payer: MEDICARE

## 2023-01-13 ENCOUNTER — PATIENT OUTREACH (OUTPATIENT)
Dept: ADMINISTRATIVE | Facility: HOSPITAL | Age: 68
End: 2023-01-13
Payer: MEDICARE

## 2023-02-14 LAB
LEFT EYE DM RETINOPATHY: NEGATIVE
RIGHT EYE DM RETINOPATHY: NEGATIVE

## 2023-03-02 ENCOUNTER — PATIENT OUTREACH (OUTPATIENT)
Dept: ADMINISTRATIVE | Facility: HOSPITAL | Age: 68
End: 2023-03-02
Payer: MEDICARE

## 2023-03-02 NOTE — PROGRESS NOTES
Health Maintenance Due   Topic Date Due    Colorectal Cancer Screening  Never done    Abdominal Aortic Aneurysm Screening  Never done    Pneumococcal Vaccines (Age 65+) (3 - PPSV23 if available, else PCV20) 10/01/2021     Chart review done.  updated. Immunizations reviewed & updated. Care Everywhere updated.

## 2023-03-23 ENCOUNTER — PATIENT MESSAGE (OUTPATIENT)
Dept: ADMINISTRATIVE | Facility: HOSPITAL | Age: 68
End: 2023-03-23
Payer: MEDICARE

## 2023-05-16 ENCOUNTER — PATIENT OUTREACH (OUTPATIENT)
Dept: ADMINISTRATIVE | Facility: HOSPITAL | Age: 68
End: 2023-05-16
Payer: MEDICARE

## 2023-05-16 NOTE — PROGRESS NOTES
Health Maintenance Due   Topic Date Due    Colorectal Cancer Screening  Never done    Abdominal Aortic Aneurysm Screening  Never done    Pneumococcal Vaccines (Age 65+) (3 - PPSV23 if available, else PCV20) 10/01/2021    Hemoglobin A1c  04/21/2023     Chart review done.  updated. Immunizations reviewed & updated. Care Everywhere updated.  I CALLED THE PATIENT ABOUT A ANNUAL APPOINTMENT WITH DR REINA. THE PATIENT SCHEDULED

## 2023-12-05 ENCOUNTER — HOSPITAL ENCOUNTER (EMERGENCY)
Facility: HOSPITAL | Age: 68
Discharge: HOME | End: 2023-12-05
Attending: EMERGENCY MEDICINE
Payer: MEDICARE

## 2023-12-05 VITALS
SYSTOLIC BLOOD PRESSURE: 178 MMHG | TEMPERATURE: 98 F | BODY MASS INDEX: 30.27 KG/M2 | OXYGEN SATURATION: 96 % | RESPIRATION RATE: 18 BRPM | WEIGHT: 205 LBS | DIASTOLIC BLOOD PRESSURE: 84 MMHG | HEART RATE: 77 BPM

## 2023-12-05 DIAGNOSIS — R07.89 ATYPICAL CHEST PAIN: Primary | ICD-10-CM

## 2023-12-05 DIAGNOSIS — R07.9 CHEST PAIN: ICD-10-CM

## 2023-12-05 LAB
ALBUMIN SERPL BCP-MCNC: 3.8 G/DL (ref 3.5–5.2)
ALP SERPL-CCNC: 55 U/L (ref 55–135)
ALT SERPL W/O P-5'-P-CCNC: 55 U/L (ref 10–44)
ANION GAP SERPL CALC-SCNC: 12 MMOL/L (ref 8–16)
AST SERPL-CCNC: 55 U/L (ref 10–40)
BASOPHILS # BLD AUTO: 0.02 K/UL (ref 0–0.2)
BASOPHILS NFR BLD: 0.4 % (ref 0–1.9)
BILIRUB SERPL-MCNC: 0.4 MG/DL (ref 0.1–1)
BNP SERPL-MCNC: 26 PG/ML (ref 0–99)
BUN SERPL-MCNC: 12 MG/DL (ref 8–23)
CALCIUM SERPL-MCNC: 9.2 MG/DL (ref 8.7–10.5)
CHLORIDE SERPL-SCNC: 109 MMOL/L (ref 95–110)
CO2 SERPL-SCNC: 21 MMOL/L (ref 23–29)
CREAT SERPL-MCNC: 1 MG/DL (ref 0.5–1.4)
DIFFERENTIAL METHOD: ABNORMAL
EOSINOPHIL # BLD AUTO: 0.1 K/UL (ref 0–0.5)
EOSINOPHIL NFR BLD: 2.3 % (ref 0–8)
ERYTHROCYTE [DISTWIDTH] IN BLOOD BY AUTOMATED COUNT: 12.8 % (ref 11.5–14.5)
EST. GFR  (NO RACE VARIABLE): >60 ML/MIN/1.73 M^2
GLUCOSE SERPL-MCNC: 110 MG/DL (ref 70–110)
HCT VFR BLD AUTO: 35 % (ref 40–54)
HGB BLD-MCNC: 11.3 G/DL (ref 14–18)
HIV 1+2 AB+HIV1 P24 AG SERPL QL IA: NORMAL
IMM GRANULOCYTES # BLD AUTO: 0.01 K/UL (ref 0–0.04)
IMM GRANULOCYTES NFR BLD AUTO: 0.2 % (ref 0–0.5)
LYMPHOCYTES # BLD AUTO: 1.3 K/UL (ref 1–4.8)
LYMPHOCYTES NFR BLD: 25 % (ref 18–48)
MCH RBC QN AUTO: 29.4 PG (ref 27–31)
MCHC RBC AUTO-ENTMCNC: 32.3 G/DL (ref 32–36)
MCV RBC AUTO: 91 FL (ref 82–98)
MONOCYTES # BLD AUTO: 0.7 K/UL (ref 0.3–1)
MONOCYTES NFR BLD: 13.4 % (ref 4–15)
NEUTROPHILS # BLD AUTO: 3.1 K/UL (ref 1.8–7.7)
NEUTROPHILS NFR BLD: 58.7 % (ref 38–73)
NRBC BLD-RTO: 0 /100 WBC
PLATELET # BLD AUTO: 235 K/UL (ref 150–450)
PMV BLD AUTO: 9.4 FL (ref 9.2–12.9)
POTASSIUM SERPL-SCNC: 3.9 MMOL/L (ref 3.5–5.1)
PROT SERPL-MCNC: 6.7 G/DL (ref 6–8.4)
RBC # BLD AUTO: 3.84 M/UL (ref 4.6–6.2)
SODIUM SERPL-SCNC: 142 MMOL/L (ref 136–145)
TROPONIN I SERPL DL<=0.01 NG/ML-MCNC: <0.006 NG/ML (ref 0–0.03)
WBC # BLD AUTO: 5.24 K/UL (ref 3.9–12.7)

## 2023-12-05 PROCEDURE — 87389 HIV-1 AG W/HIV-1&-2 AB AG IA: CPT | Performed by: PHYSICIAN ASSISTANT

## 2023-12-05 PROCEDURE — 94761 N-INVAS EAR/PLS OXIMETRY MLT: CPT

## 2023-12-05 PROCEDURE — 93005 ELECTROCARDIOGRAM TRACING: CPT

## 2023-12-05 PROCEDURE — 85025 COMPLETE CBC W/AUTO DIFF WBC: CPT | Performed by: EMERGENCY MEDICINE

## 2023-12-05 PROCEDURE — 93010 EKG 12-LEAD: ICD-10-PCS | Mod: ,,, | Performed by: INTERNAL MEDICINE

## 2023-12-05 PROCEDURE — 99285 EMERGENCY DEPT VISIT HI MDM: CPT | Mod: 25

## 2023-12-05 PROCEDURE — 83880 ASSAY OF NATRIURETIC PEPTIDE: CPT | Performed by: EMERGENCY MEDICINE

## 2023-12-05 PROCEDURE — 84484 ASSAY OF TROPONIN QUANT: CPT | Performed by: EMERGENCY MEDICINE

## 2023-12-05 PROCEDURE — 80053 COMPREHEN METABOLIC PANEL: CPT | Performed by: EMERGENCY MEDICINE

## 2023-12-05 PROCEDURE — 93010 ELECTROCARDIOGRAM REPORT: CPT | Mod: ,,, | Performed by: INTERNAL MEDICINE

## 2023-12-05 NOTE — ED TRIAGE NOTES
Cash Crawford, a 68 y.o. male presents to the ED w/ complaint of chest pain.  Pt says pain started at 0100 and is midsternal.  Pt says the pain is sharp and 5/10.      Triage note:  Chief Complaint   Patient presents with    Chest Pain     Intermittent, sharp mid sternal CP x3hrs. Denies cardiac hx     Review of patient's allergies indicates:  No Known Allergies  Past Medical History:   Diagnosis Date    Behavioral problem     Bipolar 1 disorder     Cervical spondylosis with myelopathy 10/9/2012    Diabetes mellitus type II     History of prostate cancer, s/p radiation 11/11/2015    History of psychiatric hospitalization     Hx of psychiatric care     Psychiatric exam requested by authority     Psychiatric problem     Recurrent major depressive disorder, in partial remission 7/5/2018    Self-harming behavior     Sleep apnea with use of continuous positive airway pressure (CPAP)     Suicide attempt     Therapy     Tobacco abuse 11/11/2015

## 2023-12-05 NOTE — ED PROVIDER NOTES
Encounter Date: 12/5/2023       History     Chief Complaint   Patient presents with    Chest Pain     Intermittent, sharp mid sternal CP x3hrs. Denies cardiac hx     Cash Crawford is a 68 y.o. male with PMH of order, diabetes, presenting to [facility] ED for chest pain.  Patient states that he has had chest pain since 1:00 a.m. this morning.  Reports that it is midsternal, currently 2/10 pain, sharp, exacerbated by lying flat.  Patient states that he was working under a sink earlier today which is atypical for him.  Denies any history of heart disease.  Patient does not have any family history of heart disease.  Denies fever, chills, sore throat, rhinorrhea, shortness of breath.        Review of patient's allergies indicates:  No Known Allergies  Past Medical History:   Diagnosis Date    Behavioral problem     Bipolar 1 disorder     Cervical spondylosis with myelopathy 10/9/2012    Diabetes mellitus type II     History of prostate cancer, s/p radiation 11/11/2015    History of psychiatric hospitalization     Hx of psychiatric care     Psychiatric exam requested by authority     Psychiatric problem     Recurrent major depressive disorder, in partial remission 7/5/2018    Self-harming behavior     Sleep apnea with use of continuous positive airway pressure (CPAP)     Suicide attempt     Therapy     Tobacco abuse 11/11/2015     Past Surgical History:   Procedure Laterality Date    CHOLECYSTECTOMY      HERNIA REPAIR      LEG SURGERY      ORIF tib/fib    SPINE SURGERY       Family History   Problem Relation Age of Onset    Hypertension Mother     Arthritis Mother     Alcohol abuse Mother     Alcohol abuse Father     Alcohol abuse Maternal Uncle     Drug abuse Maternal Uncle     Suicide Maternal Uncle      Social History     Tobacco Use    Smoking status: Former     Current packs/day: 0.00     Average packs/day: 0.5 packs/day for 30.0 years (15.0 ttl pk-yrs)     Types: Cigarettes     Start date: 11/25/1985     Quit date:  2015     Years since quittin.0    Smokeless tobacco: Never    Tobacco comments:     currently using nicotine patches 21 mg   Substance Use Topics    Alcohol use: No     Alcohol/week: 0.0 standard drinks of alcohol     Comment: stoped 17 months ago but says that he is an alcoholic    Drug use: Not Currently     Comment: stopped 17 months ago; used to used heavy drugs, particularly marijuana, acid, amphetamines, cocaine, IV crack cocaine     Review of Systems   Constitutional:  Negative for fever.   HENT:  Negative for congestion, rhinorrhea and sore throat.    Eyes:  Negative for visual disturbance.   Respiratory:  Negative for cough and shortness of breath.    Cardiovascular:  Positive for chest pain. Negative for leg swelling.   Gastrointestinal:  Negative for abdominal pain, diarrhea, nausea and vomiting.   Genitourinary:  Negative for dysuria and hematuria.   Neurological:  Negative for weakness.       Physical Exam     Initial Vitals [23 0348]   BP Pulse Resp Temp SpO2   (!) 166/77 80 17 97.8 °F (36.6 °C) 97 %      MAP       --         Physical Exam    Nursing note and vitals reviewed.  Constitutional: He appears well-developed and well-nourished. He is cooperative.  Non-toxic appearance. He does not appear ill.   HENT:   Head: Normocephalic and atraumatic.   Mouth/Throat: Mucous membranes are normal. Mucous membranes are not dry.   Eyes: Conjunctivae are normal. Pupils are equal, round, and reactive to light.   Neck: Trachea normal and phonation normal.   Cardiovascular:  Normal rate, regular rhythm, normal heart sounds, intact distal pulses and normal pulses.     Exam reveals no gallop, no S3, no S4 and no friction rub.       No murmur heard.  Pulmonary/Chest: Breath sounds normal. No respiratory distress. He has no wheezes. He has no rhonchi. He has no rales.   Abdominal: Abdomen is soft. He exhibits no distension. There is no abdominal tenderness.   Musculoskeletal:      Right lower leg: No  edema.      Left lower leg: No edema.     Neurological: He is alert.   Skin: Skin is warm, dry and intact. Capillary refill takes less than 2 seconds.   Psychiatric: He has a normal mood and affect. His speech is normal.         ED Course   Procedures  Labs Reviewed   CBC W/ AUTO DIFFERENTIAL - Abnormal; Notable for the following components:       Result Value    RBC 3.84 (*)     Hemoglobin 11.3 (*)     Hematocrit 35.0 (*)     All other components within normal limits    Narrative:     Release to patient->Immediate   COMPREHENSIVE METABOLIC PANEL - Abnormal; Notable for the following components:    CO2 21 (*)     AST 55 (*)     ALT 55 (*)     All other components within normal limits    Narrative:     Release to patient->Immediate   HIV 1 / 2 ANTIBODY    Narrative:     Release to patient->Immediate   B-TYPE NATRIURETIC PEPTIDE    Narrative:     Release to patient->Immediate   TROPONIN I    Narrative:     Release to patient->Immediate     EKG Readings: (Independently Interpreted)   Initial Reading: No STEMI. Rhythm: Normal Sinus Rhythm. Heart Rate: 79. Conduction: Normal. ST Segments: Normal ST Segments. T Waves Flipped: V1. Clinical Impression: Normal Sinus Rhythm       Imaging Results              X-Ray Chest PA And Lateral (In process)                      Medications - No data to display  Medical Decision Making  68-year-old male with history of bipolar disorder, diabetes presenting for chest pain.  Initially, patient is hypertensive but overall hemodynamically stable and very well appearing.      Lower suspicion that patient's chest pain is cardiac in etiology due to its and atypical nature, recent abnormal activity, initial EKG is very reassuring.  Will obtain chest x-ray to evaluate for pulmonary edema, pneumo thorax, pneumonia, pneumomediastinum.  EKG, troponin x1 reassuring.  Patient does not have an elevation in BNP, no significant electrolyte abnormalities.  Extensively discussed importance of 2nd troponin  in the setting of chest pain with patient, patient states that he is unable to stay 3 hours after arrival at the ED to trend troponins.  Patient is able to understand the risks of leaving without complete workup.  Patient is agreeable to obtain chest x-ray to evaluate for other pathology prior to leaving.  Discussed outpatient Cardiology referral, patient is agreeable.  Patient agrees to return to the emergency department if chest pain changes at all or any other concerning symptoms.    Chest x-ray does not show any pneumonia, pneumomediastinum, pulmonary edema, cardiomegaly.  Patient was provided with urgent cardiology referral.  Extensively discussed return precautions, patient and wife voiced understanding.  Patient is stable and appropriate for discharge at this time.    Amount and/or Complexity of Data Reviewed  External Data Reviewed: notes.  Labs: ordered. Decision-making details documented in ED Course.  Radiology: ordered and independent interpretation performed. Decision-making details documented in ED Course.  ECG/medicine tests: ordered and independent interpretation performed.               ED Course as of 12/05/23 0626   Tue Dec 05, 2023   0516 Troponin I: <0.006 [ES]   0516 Comprehensive metabolic panel(!)  No electrolyte abnormalities   [ES]   0517 Hemoglobin(!): 11.3 [ES]   0517 MCV: 91 [ES]   0517 BNP: 26 [ES]   0517 Troponin I: <0.006 [ES]   0606 X-Ray Chest PA And Lateral  Independent interpretation:  No evidence of pneumonia, pulmonary edema [ES]      ED Course User Index  [ES] Tisha Tavera MD                           Clinical Impression:  Final diagnoses:  [R07.9] Chest pain  [R07.89] Atypical chest pain (Primary)                 Tisha Tavera MD  Resident  12/05/23 0626

## 2023-12-05 NOTE — DISCHARGE INSTRUCTIONS
Diagnosis: chest pain    Tests you had showed: EKG and labs did not show a heart attack.    Like we discussed, your workup was incomplete without a 2nd troponin.  Please return to the emergency department if you have any concerning symptoms at all.  Please follow-up with urgent cardiology referral.    Home Care Instructions:  - Continue taking your home medications as prescribed    Take ibuprofen (also called Advil, Motrin) for your pain. This medicine is available over-the-counter in 200 mg tablets.  - You may take 600 mg every 6 hours, or 800 mg every 8 hours as needed   - Do not take more than this amount, as it can cause kidney problems, bleeding in your stomach, and other serious problems.   - Do not also take naproxen (Aleve) at the same time or on the same day  - If you have heart problems or uncontrolled high blood pressure, you should not take ibuprofen for more than 3 days without discussing with your doctor    If your pain is not controlled with ibuprofen, you may also take acetaminophen (also called Tylenol).  - You may take up to 1,000 mg of Tylenol every 6 hours as needed  - Do not take more than 4,000 mg in 24 hours (1 day) as this may cause liver damage  - Many other medicines include acetaminophen (Tylenol) such as: Norco, Vicodin, Tylenol #3, many cold medicines, etc.  - Please read all labels carefully and do not combine medicines that include acetaminophen.  - If you have a history of liver disease or drink alcohol heavily, do not take acetaminophen (Tylenol) since it can damage your liver    Follow-Up Plan:  - Follow-up with: Primary care doctor within 3 - 5 days  - Follow-up for additional testing and/or evaluation as directed by your primary doctor    Return to the Emergency Department for symptoms including but not limited to: worsening symptoms, shortness of breath or chest pain, vomiting with inability to hold down fluids, fevers greater than 100.4°F, dizziness, passing  out/fainting/unconsciousness, or other concerning symptoms.

## 2023-12-05 NOTE — ED NOTES
Pt family member expressed that she was angry that no provider has come to see them even though they have been roomed for nearly an hour.  RN explained to pt that the physician was in an emergency and that they would see them as soon as they were able.

## 2023-12-06 ENCOUNTER — PATIENT OUTREACH (OUTPATIENT)
Dept: EMERGENCY MEDICINE | Facility: HOSPITAL | Age: 68
End: 2023-12-06
Payer: MEDICARE

## 2023-12-06 NOTE — PROGRESS NOTES
Patient was seen in the ED on 12/5/23. Phoned patient to assist with Post ED Discharge Navigation. Patient agreed to assistance. Patient has Cardiology appointment scheduled on 1/4/24. Message sent to PCP staff for assistance scheduling 7 day PCP appointment.  Rojelio Robledo

## 2024-01-01 PROBLEM — I82.A21: Status: ACTIVE | Noted: 2020-04-07

## 2024-01-31 ENCOUNTER — OFFICE VISIT (OUTPATIENT)
Dept: CARDIOLOGY | Facility: CLINIC | Age: 69
End: 2024-01-31
Payer: MEDICARE

## 2024-01-31 VITALS
HEIGHT: 69 IN | OXYGEN SATURATION: 95 % | BODY MASS INDEX: 30.14 KG/M2 | SYSTOLIC BLOOD PRESSURE: 125 MMHG | DIASTOLIC BLOOD PRESSURE: 66 MMHG | HEART RATE: 84 BPM | WEIGHT: 203.5 LBS

## 2024-01-31 DIAGNOSIS — R07.9 CHEST PAIN, UNSPECIFIED TYPE: ICD-10-CM

## 2024-01-31 DIAGNOSIS — Z13.6 ENCOUNTER FOR SCREENING FOR CARDIOVASCULAR DISORDERS: ICD-10-CM

## 2024-01-31 DIAGNOSIS — R07.89 ATYPICAL CHEST PAIN: Primary | ICD-10-CM

## 2024-01-31 DIAGNOSIS — E11.610 TYPE 2 DIABETES MELLITUS WITH DIABETIC NEUROPATHIC ARTHROPATHY, WITHOUT LONG-TERM CURRENT USE OF INSULIN: ICD-10-CM

## 2024-01-31 DIAGNOSIS — F31.9 BIPOLAR 1 DISORDER, DEPRESSED: ICD-10-CM

## 2024-01-31 PROCEDURE — 99999 PR PBB SHADOW E&M-EST. PATIENT-LVL V: CPT | Mod: PBBFAC,GC,, | Performed by: STUDENT IN AN ORGANIZED HEALTH CARE EDUCATION/TRAINING PROGRAM

## 2024-01-31 PROCEDURE — 99204 OFFICE O/P NEW MOD 45 MIN: CPT | Mod: GC,S$GLB,, | Performed by: STUDENT IN AN ORGANIZED HEALTH CARE EDUCATION/TRAINING PROGRAM

## 2024-01-31 RX ORDER — FERROUS SULFATE 325(65) MG
325 TABLET ORAL
COMMUNITY
Start: 2023-05-22

## 2024-01-31 RX ORDER — CLOTRIMAZOLE 1 G/ML
SOLUTION TOPICAL
COMMUNITY
Start: 2023-09-22

## 2024-01-31 RX ORDER — TRAZODONE HYDROCHLORIDE 100 MG/1
100 TABLET ORAL
COMMUNITY
Start: 2023-12-27

## 2024-01-31 RX ORDER — POLYVINYL ALCOHOL 14 MG/ML
SOLUTION/ DROPS OPHTHALMIC
COMMUNITY
Start: 2023-12-06

## 2024-01-31 NOTE — PROGRESS NOTES
PCP - Karen Maddox MD  Referring Physician:     Subjective:   Patient ID:  Cash Crawford is a 68 y.o. male with past medical history of:   HTN  Diabetes   hx polysubstance abuse, cervical myelopathy s/p cervical fusion   Bipolar diosorder  Hep C s/p Harvoni  Prior DVT s/p course of Eliquis    Who presents for new patient evaluation. He was recently in the ER at the beginning of December for chest pain that was deemed to be atypical. Troponin negative x1, EKG without ischemic changes. He states that on the night of the ER admission he had 3 energy drinks and started having palpitations associated with chest pain. He has not had any chest pain since that time and was not having any chest pain before that time. Denies shortness of breath, PND, lower extremity edema. He is a former smoker who quit 8 years ago. He does not know his family history. BP under control on lisinopril. 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    History:     Social History     Tobacco Use    Smoking status: Former     Current packs/day: 0.00     Average packs/day: 0.5 packs/day for 30.0 years (15.0 ttl pk-yrs)     Types: Cigarettes     Start date: 1985     Quit date: 2015     Years since quittin.1    Smokeless tobacco: Never    Tobacco comments:     currently using nicotine patches 21 mg   Substance Use Topics    Alcohol use: No     Alcohol/week: 0.0 standard drinks of alcohol     Comment: stoped 17 months ago but says that he is an alcoholic     Family History   Problem Relation Age of Onset    Hypertension Mother     Arthritis Mother     Alcohol abuse Mother     Alcohol abuse Father     Alcohol abuse Maternal Uncle     Drug abuse Maternal Uncle     Suicide Maternal Uncle        Meds:   Review of patient's allergies indicates:  No Known Allergies    Current Outpatient Medications:     atorvastatin (LIPITOR) 80 MG tablet, Take 1 tablet (80 mg total) by  mouth once daily., Disp: 90 tablet, Rfl: 3    B-complex with vitamin C (Z-BEC OR EQUIV) tablet, Take 1 tablet by mouth once daily., Disp: , Rfl:     BUPROPION HCL (BUPROBAN ORAL), Take 450 mg by mouth once daily. , Disp: , Rfl:     clotrimazole (LOTRIMIN) 1 % Soln, APPLY SMALL AMOUNT TOPICALLY TWICE A DAY -- APPLY TO TOENAILS, Disp: , Rfl:     diclofenac sodium (VOLTAREN) 1 % Gel, Apply topically 4 (four) times daily as needed., Disp: , Rfl:     empagliflozin (JARDIANCE) 25 mg Tab, Take by mouth., Disp: , Rfl:     ferrous sulfate (FEOSOL) 325 mg (65 mg iron) Tab tablet, 325 mg., Disp: , Rfl:     gabapentin (NEURONTIN) 100 MG capsule, Take 1 capsule (100 mg total) by mouth 2 (two) times daily., Disp: 30 capsule, Rfl: 0    gluc-kenneth-msm3-qtqz-rjuvnm-trz 500-450-0.67 mg Cap, Take by mouth., Disp: , Rfl:     lamotrigine (LAMICTAL) 200 MG tablet, Take 200 mg by mouth once daily., Disp: , Rfl:     LIDOcaine (LIDODERM) 5 %, Place 2 patches onto the skin once daily. Remove & Discard patch within 12 hours or as directed by MD, Disp: 30 patch, Rfl: 2    lisinopriL 10 MG tablet, Take 1 tablet (10 mg total) by mouth once daily., Disp: 90 tablet, Rfl: 3    melatonin (MELATIN) 3 mg tablet, Take 9 mg by mouth nightly as needed for Insomnia., Disp: , Rfl:     metFORMIN (GLUCOPHAGE) 1000 MG tablet, Take 1 tablet (1,000 mg total) by mouth 2 (two) times daily with meals., Disp: 60 tablet, Rfl: 2    modafiniL (PROVIGIL) 100 MG Tab, Take 100 mg by mouth once daily., Disp: , Rfl:     multivitamin capsule, Take 1 capsule by mouth once daily., Disp: , Rfl:     nicotine (NICODERM CQ) 21 mg/24 hr, Place 1 patch onto the skin every 24 hours., Disp: , Rfl:     pantoprazole (PROTONIX) 40 MG tablet, Take 40 mg by mouth once daily., Disp: , Rfl:     peg 400-propylene glycol, PF, (LUBRICANT EYE, PG-,,PF,) 0.4-0.3 % Dpet, Apply 1 drop to eye 4 (four) times daily as needed., Disp: , Rfl:     polyvinyl alcohol, artificial tears, (LIQUIFILM  "TEARS) 1.4 % ophthalmic solution, INSTILL ONE DROP IN EACH EYE FOUR TIMES A DAY FOR DRY EYES, Disp: , Rfl:     simethicone (MYLICON) 80 MG chewable tablet, Take 80 mg by mouth every 6 (six) hours as needed for Flatulence., Disp: , Rfl:     tamsulosin (FLOMAX) 0.4 mg Cap, TAKE 1 CAPSULE BY MOUTH EVERY DAY FOR BPH, Disp: , Rfl:     terbinafine HCl (LAMISIL) 1 % cream, Apply 1 % topically daily as needed., Disp: , Rfl:     traZODone (DESYREL) 100 MG tablet, 100 mg., Disp: , Rfl:     ziprasidone (GEODON) 80 MG capsule, Take 80 mg by mouth nightly., Disp: , Rfl:       Objective:   /66   Pulse 84   Ht 5' 9" (1.753 m)   Wt 92.3 kg (203 lb 7.8 oz)   SpO2 95%   BMI 30.05 kg/m²     Physical Exam  Gen: No apparent distress, resting comfortably  HEENT: Pupils equal and reactive to light  Cardio: Regular rate, point of maximal impulse not displaced, no murmur noted, 2+ radial pulses bilaterally, 2+ DP pulses bilaterally  Resp: CTAB, no wheezing  Abd: Soft, non-tender, non-distended  Skin: Warm, dry, no peripheral edema noted  Neuro: Alert and oriented x3  Psych: Normal mood and affect      Labs:     Lab Results   Component Value Date     12/05/2023    K 3.9 12/05/2023     12/05/2023    CO2 21 (L) 12/05/2023    BUN 12 12/05/2023    CREATININE 1.0 12/05/2023    ANIONGAP 12 12/05/2023     Lab Results   Component Value Date    HGBA1C 7.0 (A) 04/21/2022     Lab Results   Component Value Date    BNP 26 12/05/2023    BNP <10 09/22/2014    BNP <10 05/31/2010       Lab Results   Component Value Date    WBC 5.24 12/05/2023    HGB 11.3 (L) 12/05/2023    HCT 35.0 (L) 12/05/2023     12/05/2023    GRAN 3.1 12/05/2023    GRAN 58.7 12/05/2023     Lab Results   Component Value Date    CHOL 166 02/08/2019    HDL 61 02/08/2019    LDLCALC 85 02/08/2019    TRIG 352 (H) 04/04/2020       Lab Results   Component Value Date     12/05/2023    K 3.9 12/05/2023     12/05/2023    CO2 21 (L) 12/05/2023    BUN 12 " "12/05/2023    CREATININE 1.0 12/05/2023    ANIONGAP 12 12/05/2023     Lab Results   Component Value Date    HGBA1C 7.0 (A) 04/21/2022     Lab Results   Component Value Date    BNP 26 12/05/2023    BNP <10 09/22/2014    BNP <10 05/31/2010    Lab Results   Component Value Date    WBC 5.24 12/05/2023    HGB 11.3 (L) 12/05/2023    HCT 35.0 (L) 12/05/2023     12/05/2023    GRAN 3.1 12/05/2023    GRAN 58.7 12/05/2023     Lab Results   Component Value Date    CHOL 166 02/08/2019    HDL 61 02/08/2019    LDLCALC 85 02/08/2019    TRIG 352 (H) 04/04/2020                Cardiovascular Imaging:     Echo: No results found for: "EF"    Stress test:     LHC:    Assessment & Plan:       Chest pain  -Likely in s/o palpitations caused by multiple energy drinks  -Regardless, he has multiple risk factors for coronary disease and will get coronary artery calcium score per Dr. Carson  -Needs updated [facility] ([facility])  -BP well controlled    Hypertension  -BP well controlled, continue current regimen    DM  -Management per primary    RTC in one year. Case staffed with Dr. Carson.      Signed:  Fredi Franz MD  [facility] PGY-6    Staff attestation to follow.      "

## 2024-01-31 NOTE — ASSESSMENT & PLAN NOTE
-Likely in s/o palpitations caused by multiple energy drinks  -Regardless, he has multiple risk factors for coronary disease and will get coronary artery calcium score per Dr. Carson  -Needs updated lipid panel (w[facility] do [facility])  -BP well controlled  -DM management per primary

## 2024-08-14 ENCOUNTER — PATIENT OUTREACH (OUTPATIENT)
Dept: ADMINISTRATIVE | Facility: HOSPITAL | Age: 69
End: 2024-08-14
Payer: MEDICARE

## 2024-08-14 NOTE — PROGRESS NOTES
Health Maintenance Due   Topic Date Due    Colorectal Cancer Screening  Never done    Abdominal Aortic Aneurysm Screening  Never done    Lipid Panel  02/08/2020    Hemoglobin A1c  04/21/2023    COVID-19 Vaccine (9 - 2023-24 season) 03/21/2024   Chart review done. HM updated. Immunizations reviewed & updated. Care Everywhere updated.  OVERDUE HM ADDED TO APPOINTMENT NOTE

## 2024-08-21 ENCOUNTER — OFFICE VISIT (OUTPATIENT)
Dept: FAMILY MEDICINE | Facility: CLINIC | Age: 69
End: 2024-08-21
Payer: MEDICARE

## 2024-08-21 ENCOUNTER — LAB VISIT (OUTPATIENT)
Dept: LAB | Facility: HOSPITAL | Age: 69
End: 2024-08-21
Attending: INTERNAL MEDICINE
Payer: MEDICARE

## 2024-08-21 VITALS
HEIGHT: 69 IN | WEIGHT: 198 LBS | OXYGEN SATURATION: 98 % | HEART RATE: 71 BPM | DIASTOLIC BLOOD PRESSURE: 74 MMHG | TEMPERATURE: 98 F | BODY MASS INDEX: 29.33 KG/M2 | SYSTOLIC BLOOD PRESSURE: 126 MMHG

## 2024-08-21 DIAGNOSIS — E11.610 TYPE 2 DIABETES MELLITUS WITH DIABETIC NEUROPATHIC ARTHROPATHY, WITHOUT LONG-TERM CURRENT USE OF INSULIN: ICD-10-CM

## 2024-08-21 DIAGNOSIS — F14.21 COCAINE DEPENDENCE IN REMISSION: ICD-10-CM

## 2024-08-21 DIAGNOSIS — F60.3 BORDERLINE PERSONALITY DISORDER: ICD-10-CM

## 2024-08-21 DIAGNOSIS — Z13.6 ENCOUNTER FOR ABDOMINAL AORTIC ANEURYSM (AAA) SCREENING: ICD-10-CM

## 2024-08-21 DIAGNOSIS — C61 MALIGNANT NEOPLASM OF PROSTATE: ICD-10-CM

## 2024-08-21 DIAGNOSIS — Z00.00 ANNUAL PHYSICAL EXAM: Primary | ICD-10-CM

## 2024-08-21 DIAGNOSIS — M46.1 SACROILIITIS, NOT ELSEWHERE CLASSIFIED: ICD-10-CM

## 2024-08-21 DIAGNOSIS — I10 ESSENTIAL HYPERTENSION, BENIGN: ICD-10-CM

## 2024-08-21 DIAGNOSIS — Z00.00 ANNUAL PHYSICAL EXAM: ICD-10-CM

## 2024-08-21 DIAGNOSIS — Z12.11 SCREENING FOR COLON CANCER: ICD-10-CM

## 2024-08-21 DIAGNOSIS — I82.A21 CHRONIC EMBOLISM AND THROMBOSIS OF RIGHT AXILLARY VEIN: ICD-10-CM

## 2024-08-21 DIAGNOSIS — B18.2 CHRONIC HEPATITIS C WITHOUT HEPATIC COMA: ICD-10-CM

## 2024-08-21 LAB
ALBUMIN SERPL BCP-MCNC: 4 G/DL (ref 3.5–5.2)
ALBUMIN/CREAT UR: 32 UG/MG (ref 0–30)
ALP SERPL-CCNC: 51 U/L (ref 55–135)
ALT SERPL W/O P-5'-P-CCNC: 27 U/L (ref 10–44)
ANION GAP SERPL CALC-SCNC: 8 MMOL/L (ref 8–16)
AST SERPL-CCNC: 30 U/L (ref 10–40)
BASOPHILS # BLD AUTO: 0.02 K/UL (ref 0–0.2)
BASOPHILS NFR BLD: 0.5 % (ref 0–1.9)
BILIRUB SERPL-MCNC: 0.3 MG/DL (ref 0.1–1)
BUN SERPL-MCNC: 14 MG/DL (ref 8–23)
CALCIUM SERPL-MCNC: 9.5 MG/DL (ref 8.7–10.5)
CHLORIDE SERPL-SCNC: 111 MMOL/L (ref 95–110)
CHOLEST SERPL-MCNC: 123 MG/DL (ref 120–199)
CHOLEST/HDLC SERPL: 2 {RATIO} (ref 2–5)
CO2 SERPL-SCNC: 26 MMOL/L (ref 23–29)
COMPLEXED PSA SERPL-MCNC: 1.3 NG/ML (ref 0–4)
CREAT SERPL-MCNC: 1.1 MG/DL (ref 0.5–1.4)
CREAT UR-MCNC: 128 MG/DL (ref 23–375)
DIFFERENTIAL METHOD BLD: ABNORMAL
EOSINOPHIL # BLD AUTO: 0.2 K/UL (ref 0–0.5)
EOSINOPHIL NFR BLD: 3.7 % (ref 0–8)
ERYTHROCYTE [DISTWIDTH] IN BLOOD BY AUTOMATED COUNT: 13.2 % (ref 11.5–14.5)
EST. GFR  (NO RACE VARIABLE): >60 ML/MIN/1.73 M^2
ESTIMATED AVG GLUCOSE: 131 MG/DL (ref 68–131)
GLUCOSE SERPL-MCNC: 118 MG/DL (ref 70–110)
HBA1C MFR BLD: 6.2 % (ref 4–5.6)
HCT VFR BLD AUTO: 33.4 % (ref 40–54)
HDLC SERPL-MCNC: 63 MG/DL (ref 40–75)
HDLC SERPL: 51.2 % (ref 20–50)
HGB BLD-MCNC: 11.1 G/DL (ref 14–18)
IMM GRANULOCYTES # BLD AUTO: 0.01 K/UL (ref 0–0.04)
IMM GRANULOCYTES NFR BLD AUTO: 0.2 % (ref 0–0.5)
LDLC SERPL CALC-MCNC: 49 MG/DL (ref 63–159)
LYMPHOCYTES # BLD AUTO: 1 K/UL (ref 1–4.8)
LYMPHOCYTES NFR BLD: 24.1 % (ref 18–48)
MCH RBC QN AUTO: 29.6 PG (ref 27–31)
MCHC RBC AUTO-ENTMCNC: 33.2 G/DL (ref 32–36)
MCV RBC AUTO: 89 FL (ref 82–98)
MICROALBUMIN UR DL<=1MG/L-MCNC: 41 UG/ML
MONOCYTES # BLD AUTO: 0.5 K/UL (ref 0.3–1)
MONOCYTES NFR BLD: 12 % (ref 4–15)
NEUTROPHILS # BLD AUTO: 2.4 K/UL (ref 1.8–7.7)
NEUTROPHILS NFR BLD: 59.5 % (ref 38–73)
NONHDLC SERPL-MCNC: 60 MG/DL
NRBC BLD-RTO: 0 /100 WBC
PLATELET # BLD AUTO: 259 K/UL (ref 150–450)
PMV BLD AUTO: 9.3 FL (ref 9.2–12.9)
POTASSIUM SERPL-SCNC: 3.7 MMOL/L (ref 3.5–5.1)
PROT SERPL-MCNC: 6.5 G/DL (ref 6–8.4)
RBC # BLD AUTO: 3.75 M/UL (ref 4.6–6.2)
SODIUM SERPL-SCNC: 145 MMOL/L (ref 136–145)
TRIGL SERPL-MCNC: 55 MG/DL (ref 30–150)
TSH SERPL DL<=0.005 MIU/L-ACNC: 0.73 UIU/ML (ref 0.4–4)
WBC # BLD AUTO: 4.1 K/UL (ref 3.9–12.7)

## 2024-08-21 PROCEDURE — 3288F FALL RISK ASSESSMENT DOCD: CPT | Mod: CPTII,S$GLB,, | Performed by: INTERNAL MEDICINE

## 2024-08-21 PROCEDURE — 84443 ASSAY THYROID STIM HORMONE: CPT | Performed by: INTERNAL MEDICINE

## 2024-08-21 PROCEDURE — 3008F BODY MASS INDEX DOCD: CPT | Mod: CPTII,S$GLB,, | Performed by: INTERNAL MEDICINE

## 2024-08-21 PROCEDURE — 1160F RVW MEDS BY RX/DR IN RCRD: CPT | Mod: CPTII,S$GLB,, | Performed by: INTERNAL MEDICINE

## 2024-08-21 PROCEDURE — 85025 COMPLETE CBC W/AUTO DIFF WBC: CPT | Performed by: INTERNAL MEDICINE

## 2024-08-21 PROCEDURE — 80053 COMPREHEN METABOLIC PANEL: CPT | Performed by: INTERNAL MEDICINE

## 2024-08-21 PROCEDURE — 1126F AMNT PAIN NOTED NONE PRSNT: CPT | Mod: CPTII,S$GLB,, | Performed by: INTERNAL MEDICINE

## 2024-08-21 PROCEDURE — 80061 LIPID PANEL: CPT | Performed by: INTERNAL MEDICINE

## 2024-08-21 PROCEDURE — 1100F PTFALLS ASSESS-DOCD GE2>/YR: CPT | Mod: CPTII,S$GLB,, | Performed by: INTERNAL MEDICINE

## 2024-08-21 PROCEDURE — 3044F HG A1C LEVEL LT 7.0%: CPT | Mod: CPTII,S$GLB,, | Performed by: INTERNAL MEDICINE

## 2024-08-21 PROCEDURE — 3078F DIAST BP <80 MM HG: CPT | Mod: CPTII,S$GLB,, | Performed by: INTERNAL MEDICINE

## 2024-08-21 PROCEDURE — 1157F ADVNC CARE PLAN IN RCRD: CPT | Mod: CPTII,S$GLB,, | Performed by: INTERNAL MEDICINE

## 2024-08-21 PROCEDURE — 3074F SYST BP LT 130 MM HG: CPT | Mod: CPTII,S$GLB,, | Performed by: INTERNAL MEDICINE

## 2024-08-21 PROCEDURE — 84153 ASSAY OF PSA TOTAL: CPT | Performed by: INTERNAL MEDICINE

## 2024-08-21 PROCEDURE — 83036 HEMOGLOBIN GLYCOSYLATED A1C: CPT | Performed by: INTERNAL MEDICINE

## 2024-08-21 PROCEDURE — 4010F ACE/ARB THERAPY RXD/TAKEN: CPT | Mod: CPTII,S$GLB,, | Performed by: INTERNAL MEDICINE

## 2024-08-21 PROCEDURE — 82570 ASSAY OF URINE CREATININE: CPT | Performed by: INTERNAL MEDICINE

## 2024-08-21 PROCEDURE — 99999 PR PBB SHADOW E&M-EST. PATIENT-LVL V: CPT | Mod: PBBFAC,,, | Performed by: INTERNAL MEDICINE

## 2024-08-21 PROCEDURE — 82043 UR ALBUMIN QUANTITATIVE: CPT | Performed by: INTERNAL MEDICINE

## 2024-08-21 PROCEDURE — 1159F MED LIST DOCD IN RCRD: CPT | Mod: CPTII,S$GLB,, | Performed by: INTERNAL MEDICINE

## 2024-08-21 PROCEDURE — 99387 INIT PM E/M NEW PAT 65+ YRS: CPT | Mod: S$GLB,,, | Performed by: INTERNAL MEDICINE

## 2024-08-21 RX ORDER — METFORMIN HYDROCHLORIDE 500 MG/1
2000 TABLET, EXTENDED RELEASE ORAL
COMMUNITY
Start: 2024-05-20

## 2024-08-21 RX ORDER — ROSUVASTATIN CALCIUM 40 MG/1
40 TABLET, COATED ORAL
COMMUNITY
Start: 2024-06-12

## 2024-08-21 RX ORDER — LISINOPRIL 40 MG/1
20 TABLET ORAL
COMMUNITY
Start: 2024-06-12

## 2024-08-21 RX ORDER — ACETAMINOPHEN 325 MG/1
650 TABLET ORAL
COMMUNITY
Start: 2024-06-12

## 2024-08-21 NOTE — PROGRESS NOTES
SUBJECTIVE     Chief Complaint   Patient presents with    Back Pain    Elevated PSA    Diabetes    Referral       HPI  Cash Crawford is a 68 y.o. male with multiple medical diagnoses as listed in the medical history and problem list that presents for annual exam. Pt has been doing well since his last visit. He has a good appetite and eats well. He does not exercise. He sleeps for ~4 hours nightly, but Trazodone causes AM grogginess. Pt does take OTC supplements, which is a MVI. He does not have any current stressors. Pt is not UTD on age appropriate CA screening.    PAST MEDICAL HISTORY:  Past Medical History:   Diagnosis Date    Behavioral problem     Bipolar 1 disorder     Cervical spondylosis with myelopathy 10/9/2012    Diabetes mellitus type II     History of prostate cancer, s/p radiation 2015    History of psychiatric hospitalization     Hx of psychiatric care     Psychiatric exam requested by authority     Psychiatric problem     Recurrent major depressive disorder, in partial remission 2018    Self-harming behavior     Sleep apnea with use of continuous positive airway pressure (CPAP)     Suicide attempt     Therapy     Tobacco abuse 2015       PAST SURGICAL HISTORY:  Past Surgical History:   Procedure Laterality Date    CHOLECYSTECTOMY      HERNIA REPAIR      JOINT REPLACEMENT      LEG SURGERY      ORIF tib/fib    SPINE SURGERY         SOCIAL HISTORY:  Social History     Socioeconomic History    Marital status:     Number of children: 7   Occupational History    Occupation: unemployed   Tobacco Use    Smoking status: Former     Current packs/day: 0.00     Average packs/day: 0.5 packs/day for 30.0 years (15.0 ttl pk-yrs)     Types: Cigarettes     Start date: 1985     Quit date: 2015     Years since quittin.7    Smokeless tobacco: Never    Tobacco comments:     currently using nicotine patches 21 mg   Substance and Sexual Activity    Alcohol use: Not Currently      Comment: stoped 17 months ago but says that he is an alcoholic    Drug use: Not Currently     Comment: stopped 17 months ago; used to used heavy drugs, particularly marijuana, acid, amphetamines, cocaine, IV crack cocaine    Sexual activity: Not Currently     Partners: Male     Birth control/protection: None     Social Determinants of Health     Financial Resource Strain: Low Risk  (8/19/2024)    Overall Financial Resource Strain (CARDIA)     Difficulty of Paying Living Expenses: Not hard at all   Food Insecurity: Food Insecurity Present (8/19/2024)    Hunger Vital Sign     Worried About Running Out of Food in the Last Year: Sometimes true     Ran Out of Food in the Last Year: Sometimes true   Physical Activity: Unknown (8/19/2024)    Exercise Vital Sign     Days of Exercise per Week: 0 days   Stress: No Stress Concern Present (8/19/2024)    [employer] - Occupational Stress Questionnaire     Feeling of Stress : Only a little   Housing Stability: Unknown (8/19/2024)    Housing Stability Vital Sign     Unable to Pay for Housing in the Last Year: No       FAMILY HISTORY:  Family History   Problem Relation Name Age of Onset    Hypertension Mother Nahomi Egan     Arthritis Mother Nahomi Egan     Alcohol abuse Mother Nahomi Egan     Alcohol abuse Father Cash Crawford Sr     Alcohol abuse Maternal Uncle None     Drug abuse Maternal Uncle None     Suicide Maternal Uncle None        ALLERGIES AND MEDICATIONS: updated and reviewed.  Review of patient's allergies indicates:   Allergen Reactions    Semaglutide Other (See Comments)     Current Outpatient Medications   Medication Sig Dispense Refill    acetaminophen (TYLENOL) 325 MG tablet Take 650 mg by mouth.      apalutamide 240 mg Tab Take 240 mg by mouth.      B-complex with vitamin C (Z-BEC OR EQUIV) tablet Take 1 tablet by mouth once daily.      BUPROPION HCL (BUPROBAN ORAL) Take 450 mg by mouth once daily.       clotrimazole  (LOTRIMIN) 1 % Soln APPLY SMALL AMOUNT TOPICALLY TWICE A DAY -- APPLY TO TOENAILS      diclofenac sodium (VOLTAREN) 1 % Gel Apply topically 4 (four) times daily as needed.      empagliflozin (JARDIANCE) 25 mg Tab Take by mouth.      ferrous sulfate (FEOSOL) 325 mg (65 mg iron) Tab tablet 325 mg.      gabapentin (NEURONTIN) 100 MG capsule Take 1 capsule (100 mg total) by mouth 2 (two) times daily. 30 capsule 0    gluc-kenneth-msm3-hklw-fuglhs-frj 500-450-0.67 mg Cap Take by mouth.      lamotrigine (LAMICTAL) 200 MG tablet Take 200 mg by mouth once daily.      LIDOcaine (LIDODERM) 5 % Place 2 patches onto the skin once daily. Remove & Discard patch within 12 hours or as directed by MD 30 patch 2    lisinopriL (PRINIVIL,ZESTRIL) 40 MG tablet Take 20 mg by mouth.      melatonin (MELATIN) 3 mg tablet Take 9 mg by mouth nightly as needed for Insomnia.      metFORMIN (GLUCOPHAGE-XR) 500 MG ER 24hr tablet Take 2,000 mg by mouth.      modafiniL (PROVIGIL) 100 MG Tab Take 100 mg by mouth once daily.      multivitamin capsule Take 1 capsule by mouth once daily.      nicotine (NICODERM CQ) 21 mg/24 hr Place 1 patch onto the skin every 24 hours.      pantoprazole (PROTONIX) 40 MG tablet Take 40 mg by mouth once daily.      peg 400-propylene glycol, PF, (LUBRICANT EYE, PG-,,PF,) 0.4-0.3 % Dpet Apply 1 drop to eye 4 (four) times daily as needed.      polyvinyl alcohol, artificial tears, (LIQUIFILM TEARS) 1.4 % ophthalmic solution INSTILL ONE DROP IN EACH EYE FOUR TIMES A DAY FOR DRY EYES      rosuvastatin (CRESTOR) 40 MG Tab Take 40 mg by mouth.      simethicone (MYLICON) 80 MG chewable tablet Take 80 mg by mouth every 6 (six) hours as needed for Flatulence.      tamsulosin (FLOMAX) 0.4 mg Cap TAKE 1 CAPSULE BY MOUTH EVERY DAY FOR BPH      terbinafine HCl (LAMISIL) 1 % cream Apply 1 % topically daily as needed.      traZODone (DESYREL) 100 MG tablet 100 mg.      ziprasidone (GEODON) 80 MG capsule Take 80 mg by mouth nightly.    "   atorvastatin (LIPITOR) 80 MG tablet Take 1 tablet (80 mg total) by mouth once daily. 90 tablet 3     No current facility-administered medications for this visit.       ROS  Review of Systems   Constitutional:  Negative for chills and fever.   HENT:  Negative for hearing loss and sore throat.    Eyes:  Negative for visual disturbance.   Respiratory:  Negative for cough and shortness of breath.    Cardiovascular:  Negative for chest pain, palpitations and leg swelling.   Gastrointestinal:  Negative for abdominal pain, constipation, diarrhea, nausea and vomiting.   Genitourinary:  Negative for dysuria, frequency and urgency.   Musculoskeletal:  Positive for back pain. Negative for arthralgias, joint swelling and myalgias.   Skin:  Negative for rash and wound.   Neurological:  Negative for headaches.   Psychiatric/Behavioral:  Negative for agitation and confusion. The patient is not nervous/anxious.          OBJECTIVE     Physical Exam  Vitals:    08/21/24 0833   BP: 126/74   Pulse: 71   Temp: 97.6 °F (36.4 °C)    Body mass index is 29.24 kg/m².  Weight: 89.8 kg (197 lb 15.6 oz)   Height: 5' 9" (175.3 cm)     Physical Exam  Constitutional:       General: He is not in acute distress.     Appearance: He is well-developed.   HENT:      Head: Normocephalic and atraumatic.      Right Ear: Tympanic membrane, ear canal and external ear normal.      Left Ear: Tympanic membrane, ear canal and external ear normal.      Nose: Nose normal.   Eyes:      General: No scleral icterus.        Right eye: No discharge.         Left eye: No discharge.      Conjunctiva/sclera: Conjunctivae normal.   Neck:      Vascular: No JVD.      Trachea: No tracheal deviation.   Cardiovascular:      Rate and Rhythm: Normal rate and regular rhythm.      Heart sounds: Normal heart sounds. No murmur heard.     No friction rub. No gallop.   Pulmonary:      Effort: Pulmonary effort is normal. No respiratory distress.      Breath sounds: Normal breath " sounds. No wheezing.   Abdominal:      General: Bowel sounds are normal. There is no distension.      Palpations: Abdomen is soft. There is no mass.      Tenderness: There is no abdominal tenderness. There is no guarding or rebound.   Musculoskeletal:         General: No tenderness or deformity. Normal range of motion.      Cervical back: Normal range of motion and neck supple.   Skin:     General: Skin is warm and dry.      Findings: No erythema or rash.   Neurological:      Mental Status: He is alert and oriented to person, place, and time.      Motor: No abnormal muscle tone.      Coordination: Coordination normal.   Psychiatric:         Behavior: Behavior normal.         Thought Content: Thought content normal.         Judgment: Judgment normal.           Health Maintenance         Date Due Completion Date    Colorectal Cancer Screening Never done ---    Foot Exam 05/29/2016 5/29/2015 (Done)    Override on 5/29/2015: Done    Diabetes Urine Screening 07/05/2019 7/5/2018    Abdominal Aortic Aneurysm Screening Never done ---    COVID-19 Vaccine (8 - 2023-24 season) 01/16/2024 11/21/2023    Eye Exam 02/14/2024 2/14/2023    Override on 4/8/2015: Done    Influenza Vaccine (1) 09/01/2024 10/4/2023    Override on 10/7/2015: Done    Hemoglobin A1c 02/21/2025 8/21/2024    Lipid Panel 08/21/2025 8/21/2024    TETANUS VACCINE 11/03/2031 11/3/2021              ASSESSMENT     68 y.o. male with     1. Annual physical exam    2. Malignant neoplasm of prostate    3. Cocaine dependence in remission    4. Borderline personality disorder    5. Sacroiliitis, not elsewhere classified    6. Chronic hepatitis C without hepatic coma    7. Screening for colon cancer    8. 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 Encounter for abdominal aortic aneurysm (AAA) screening    10. Essential hypertension, benign    11. Type 2 diabetes mellitus with diabetic neuropathic arthropathy, without long-term current use of insulin         PLAN:     1. Annual physical exam  - Counseled on age appropriate medical preventative services, including age appropriate cancer screenings, over all nutritional health, need for a consistent exercise regimen and an over all push towards maintaining a vigorous and active lifestyle.  Counseled on age appropriate vaccines and discussed upcoming health care needs based on age/gender.  Spent time with patient counseling on need for a good patient/doctor relationship moving forward.  Discussed use of common OTC medications and supplements.  Discussed common dietary aids and use of caffeine and the need for good sleep hygiene and stress management.  - CBC Auto Differential; Future  - Comprehensive Metabolic Panel; Future  - Hemoglobin A1C; Future  - TSH; Future  - Lipid Panel; Future    2. Malignant neoplasm of prostate  - will send patient for a 2nd opinion  - Ambulatory referral/consult to Urology; Future  - PROSTATE SPECIFIC ANTIGEN, DIAGNOSTIC; Future    3. Cocaine dependence in remission  - Stable; no acute issues    4. Borderline personality disorder  - Stable; no acute issues    5. Sacroiliitis, not elsewhere classified  - Stable; no acute issues    6. Chronic hepatitis C without hepatic coma  - Stable; no acute issues    7. Screening for colon cancer  - Ambulatory referral/consult to Endo Procedure ; Future    8. 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 Encounter for abdominal aortic aneurysm (AAA) screening  - US Abdominal Aorta; Future    10. Essential hypertension, benign  - BP well controlled; at goal of <140/90  - The current medical regimen is effective;  continue present plan and medications.  - CBC Auto Differential; Future  - Comprehensive Metabolic Panel; Future  - TSH; Future  - Lipid Panel; Future    11. Type 2 diabetes mellitus with diabetic neuropathic arthropathy, without long-term current use of insulin  - Hemoglobin A1C; Future  -  Ambulatory referral/consult to Diabetes Education; Future  - Microalbumin/Creatinine Ratio, Urine; Future        RTC in 4 weeks for repeat assessment of current treatment plan       Karen Maddox MD  08/21/2024 9:16 AM        No follow-ups on file.

## 2024-09-05 NOTE — ASSESSMENT & PLAN NOTE
- Plan: will order HCV RNA, and HIV testing given Hx IVDU (tested neg for HIV 2013, but unclear if still active IVDU)   Administered 1,000 mcg B12 IM to left deltoid. Pt tolerated well.

## 2024-09-06 ENCOUNTER — OFFICE VISIT (OUTPATIENT)
Dept: UROLOGY | Facility: CLINIC | Age: 69
End: 2024-09-06
Payer: MEDICARE

## 2024-09-06 DIAGNOSIS — C61 MALIGNANT NEOPLASM OF PROSTATE: ICD-10-CM

## 2024-09-06 PROCEDURE — 99499 UNLISTED E&M SERVICE: CPT | Mod: S$GLB,,, | Performed by: STUDENT IN AN ORGANIZED HEALTH CARE EDUCATION/TRAINING PROGRAM

## 2024-09-09 ENCOUNTER — CLINICAL SUPPORT (OUTPATIENT)
Dept: DIABETES | Facility: CLINIC | Age: 69
End: 2024-09-09
Payer: MEDICARE

## 2024-09-09 ENCOUNTER — HOSPITAL ENCOUNTER (OUTPATIENT)
Dept: RADIOLOGY | Facility: HOSPITAL | Age: 69
Discharge: HOME | End: 2024-09-09
Attending: INTERNAL MEDICINE
Payer: MEDICARE

## 2024-09-09 VITALS — HEIGHT: 69 IN | BODY MASS INDEX: 27.94 KG/M2 | WEIGHT: 188.63 LBS

## 2024-09-09 DIAGNOSIS — Z13.6 ENCOUNTER FOR ABDOMINAL AORTIC ANEURYSM (AAA) SCREENING: ICD-10-CM

## 2024-09-09 DIAGNOSIS — E11.610 TYPE 2 DIABETES MELLITUS WITH DIABETIC NEUROPATHIC ARTHROPATHY, WITHOUT LONG-TERM CURRENT USE OF INSULIN: ICD-10-CM

## 2024-09-09 DIAGNOSIS — E11.610 TYPE 2 DIABETES MELLITUS WITH DIABETIC NEUROPATHIC ARTHROPATHY, WITHOUT LONG-TERM CURRENT USE OF INSULIN: Primary | ICD-10-CM

## 2024-09-09 PROCEDURE — G0108 DIAB MANAGE TRN  PER INDIV: HCPCS | Mod: S$GLB,,, | Performed by: FAMILY MEDICINE

## 2024-09-09 PROCEDURE — 76775 US EXAM ABDO BACK WALL LIM: CPT | Mod: TC

## 2024-09-09 PROCEDURE — 76775 US EXAM ABDO BACK WALL LIM: CPT | Mod: 26,,, | Performed by: RADIOLOGY

## 2024-09-09 PROCEDURE — 99999 PR PBB SHADOW E&M-EST. PATIENT-LVL I: CPT | Mod: PBBFAC,,,

## 2024-09-09 RX ORDER — LANCETS
1 EACH MISCELLANEOUS DAILY
Qty: 100 EACH | Refills: 3 | Status: SHIPPED | OUTPATIENT
Start: 2024-09-09

## 2024-09-09 RX ORDER — DEXTROSE 4 G
TABLET,CHEWABLE ORAL
Qty: 1 EACH | Refills: 0 | Status: SHIPPED | OUTPATIENT
Start: 2024-09-09

## 2024-09-09 NOTE — Clinical Note
Hi Dr. Maddox, I saw Mr. Crawford for diabetes education. As we were discussing monitoring blood sugars, he informed me that he may need testing strips for his meter. He ask me to mention needing a RX for testing strips, glucose meter kit and lancets. I'll be following up with Mr. Crawford in 3 weeks. Thank you for the referral.  -Roxanne

## 2024-09-09 NOTE — PROGRESS NOTES
"Diabetes Care Specialist Progress Note  Author: Roxanne Guillen RN  Date: 9/9/2024    Intake  Program Intake  Reason for Diabetes Program Visit:: Initial Diabetes Assessment  Current diabetes risk level:: moderate  In the last 12 months, have you:: none  Permission to speak with others about care:: yes    Current Diabetes Treatment: Oral Medications  Oral Medication Type/Dose: Metformin 2000mg daily    Continuous Glucose Monitoring  Patient has CGM: No    Lab Results   Component Value Date    LABA1C 7.3 (H) 07/05/2018    HGBA1C 6.2 (H) 08/21/2024       Weight: 85.5 kg (188 lb 9.6 oz)   Height: 5' 9" (175.3 cm)   Body mass index is 27.85 kg/m².    Lifestyle Coping Support & Clinical  Lifestyle/Coping/Support  Compared to other people your age, how would you rate your health?: Fair  Does anyone in your family have diabetes or does anyone in your family support you in your diabetes care?: Unsure  List anything about Diabetes that causes you stress?: Checking blood sugar  Learning Barriers:: None  Culture or Church beliefs that may impact ability to access healthcare: No  Psychosocial/Coping Skills Assessment Completed: : Yes  Assessment indicates:: Instruction Needed  Area of need?: No    Diabetes Self-Management Skills Assessment  Medication Skills Assessment  Patient is able to identify current diabetes medications, dosages, and appropriate timing of medications.: yes  Patient reports problems or concerns with current medication regimen.: no  Patient is  aware that some diabetes medications can cause low blood sugar?: No  Medication Skills Assessment Completed:: Yes  Assessment indicates:: Instruction Needed, Knowledge deficit  Area of need?: Yes    Diabetes Disease Process/Treatment Options  Diabetes Type?: Type II  When were you diagnosed?: >30 yrs ago  What are your goals for this education session?: Learn how to eat better and manage diabetes.  Is patient aware of what causes diabetes?: No  Does patient " "understand the pathophysiology of diabetes?: No  Diabetes Disease Process/Treatment Options: Skills Assessment Completed: Yes  Assessment indicates:: Instruction Needed, Knowledge deficit  Area of need?: Yes    Nutrition/Healthy Eating  Meal Plan 24 Hour Recall - Breakfast: grits, eggs, and fruits  Meal Plan 24 Hour Recall - Lunch: meal replacement shake + nuts and boiled egg  Meal Plan 24 Hour Recall - Dinner: salad w/chicken or meal replacement shake or salmon w/green beans  Meal Plan 24 Hour Recall - Snack: nuts or popcorn  Meal Plan 24 Hour Recall - Beverage: crystal light, 20 oz energy drink  Who shops/cooks?: wife  Patient can identify foods that impact blood sugar.: no (see comments)  Challenges to healthy eating:: portion control  Nutrition/Healthy Eating Skills Assessment Completed:: Yes  Assessment indicates:: Instruction Needed, Knowledge deficit  Area of need?: Yes    Physical Activity/Exercise  Physical Activity/Exercise Skills Assessment Completed: : No  Deffered due to:: Time    Home Blood Glucose Monitoring  Patient states that blood sugar is checked at home daily.: no  What is your A1c Target?: <6.0%  Home Blood Glucose Monitoring Skills Assessment Completed: : Yes  Assessment indicates:: Instruction Needed, Knowledge deficit  Area of need?: Yes    Acute Complications  Have you ever had hypoglycemia (low BG 70 or less)?: yes  How often and what are your symptoms?: "not often".... s/sx: sweaty  How do you treat hypoglycemia?: eat food  Have you ever had hyperglycemia (high  or more)?: no   Do you know the symptoms of high blood sugar and how to treat: no  Have you ever had DKA?: no  Do you ever test for ketones?: no  Do you have a sick day plan?: no  Acute Complications Skills Assessment Completed: : Yes  Assessment indicates:: Instruction Needed, Knowledge deficit  Area of need?: Yes    Chronic Complications  Chronic Complications Skills Assessment Completed: : No  Deferred due to:: " Time      Assessment Summary and Plan    Based on today's diabetes care assessment, the following areas of need were identified:          9/9/2024    12:01 AM   Areas of Need   Medications/Current Diabetes Treatment Yes- Reviewed Patient's current diabetes medication regimen.    Lifestyle Coping Support No   Diabetes Disease Process/Treatment Options Yes- Provided DM management guide and discussed what is diabetes and the progression of the disease. Discussed significance of current A1c and blood glucose goals.   Nutrition/Healthy Eating Yes- see care planning   Home Blood Glucose Monitoring Yes- Pt to start monitoring and recording BS for review and maintenance of medication. Patient encouraged to document glucose results and bring them to every clinic visit.   Acute Complications Yes- Discussed blood sugar values, prevention, detection, signs and symptoms, and treatment of hypoglycemia following rule of 15.        Today's interventions were provided through individual discussion, instruction, and written materials were provided.      Patient verbalized understanding of instruction and written materials.  Pt was able to return back demonstration of instructions today. Patient understood key points, needs reinforcement and further instruction.     Diabetes Self-Management Care Plan:    Today's Diabetes Self-Management Care Plan was developed with Cash's input. Cash has agreed to work toward the following goal(s) to improve his/her overall diabetes control.      Care Plan: Diabetes Management   Updates made since 8/10/2024 12:00 AM        Problem: Healthy Eating         Goal: :  Eat 2-3 meals daily with 30-45g/2-3 servings of Carbohydrate per meal. Limit snacking in between meal to 1 serving (15 grams).    Start Date: 9/9/2024   Expected End Date: 9/30/2024   Priority: High   Barriers: No Barriers Identified   Note:    9/9/24 - - Patient eats three meals a day. We reviewed eating right with diabetes. We  discussed the importance of eating balanced meals with vegetables, fruits, lean meats, and whole grains. We concentrated on portion sizes and overall meal planning with various choices for meals. We discussed carb sources of foods, appropriate amount of carbs to have at meals/snacks and acceptable serving sizes of individual carb items. Obtained a 24-hr food recall from patient. We discussed various meal plan options to promote healthy eating.      - - Practiced reading food labels on various food items with patient focusing on serving size and total carbohydrate intake (not sugar intake). Instructed on appropriate serving sizes of specific carb containing foods. Stressed importance of eating a protein at each meal and include non-starchy vegetables at lunch/dinner. Instructed pt to aim for 2-3 evenly spaced meals or a small carb snack in place of a missed meal. Written education information provided to patient for use at home. Pt verbalized understanding of all the above.       Task: Reviewed the sources and role of Carbohydrate, Protein, and Fat and how each nutrient impacts blood sugar. Completed 9/9/2024        Task: Provided visual examples using dry measuring cups, food models, and other familiar objects such as computer mouse, deck or cards, tennis ball etc. to help with visualization of portions. Completed 9/9/2024        Task: Explained how to count carbohydrates using the food label and the use of dry measuring cups for accurate carb counting. Completed 9/9/2024     Task: Recommended replacing beverages containing high sugar content with noncaloric/sugar free options and/or water. Completed 9/9/2024        Task: Review the importance of balancing carbohydrates with each meal using portion control techniques to count servings of carbohydrate and label reading to identify serving size and amount of total carbs per serving. Completed 9/9/2024        Task: Provided Sample plate method and reviewed the use of  the plate to estimate amounts of carbohydrate per meal. Completed 9/9/2024        Follow Up Plan   Follow up in about 3 weeks (around 9/30/2024) for F/U review BS log and meal planning.    Today's care plan and follow up schedule was discussed with patient.  Cash verbalized understanding of the care plan, goals, and agrees to follow up plan.        The patient was encouraged to communicate with his/her health care provider/physician and care team regarding his/her condition(s) and treatment.  I provided the patient with my contact information today and encouraged to contact me via phone or [facility] Patient Portal as needed.     Length of Visit   Total Time: 55 Minutes

## 2024-09-16 ENCOUNTER — OFFICE VISIT (OUTPATIENT)
Dept: UROLOGY | Facility: CLINIC | Age: 69
End: 2024-09-16
Payer: MEDICARE

## 2024-09-16 VITALS — BODY MASS INDEX: 28.31 KG/M2 | WEIGHT: 191.69 LBS

## 2024-09-16 DIAGNOSIS — R97.21 RISING PSA FOLLOWING TREATMENT FOR MALIGNANT NEOPLASM OF PROSTATE: Primary | ICD-10-CM

## 2024-09-16 DIAGNOSIS — N52.9 ERECTILE DYSFUNCTION, UNSPECIFIED ERECTILE DYSFUNCTION TYPE: ICD-10-CM

## 2024-09-16 PROCEDURE — 1157F ADVNC CARE PLAN IN RCRD: CPT | Mod: CPTII,S$GLB,, | Performed by: STUDENT IN AN ORGANIZED HEALTH CARE EDUCATION/TRAINING PROGRAM

## 2024-09-16 PROCEDURE — 3288F FALL RISK ASSESSMENT DOCD: CPT | Mod: CPTII,S$GLB,, | Performed by: STUDENT IN AN ORGANIZED HEALTH CARE EDUCATION/TRAINING PROGRAM

## 2024-09-16 PROCEDURE — 1126F AMNT PAIN NOTED NONE PRSNT: CPT | Mod: CPTII,S$GLB,, | Performed by: STUDENT IN AN ORGANIZED HEALTH CARE EDUCATION/TRAINING PROGRAM

## 2024-09-16 PROCEDURE — 3008F BODY MASS INDEX DOCD: CPT | Mod: CPTII,S$GLB,, | Performed by: STUDENT IN AN ORGANIZED HEALTH CARE EDUCATION/TRAINING PROGRAM

## 2024-09-16 PROCEDURE — 1160F RVW MEDS BY RX/DR IN RCRD: CPT | Mod: CPTII,S$GLB,, | Performed by: STUDENT IN AN ORGANIZED HEALTH CARE EDUCATION/TRAINING PROGRAM

## 2024-09-16 PROCEDURE — 3044F HG A1C LEVEL LT 7.0%: CPT | Mod: CPTII,S$GLB,, | Performed by: STUDENT IN AN ORGANIZED HEALTH CARE EDUCATION/TRAINING PROGRAM

## 2024-09-16 PROCEDURE — 99204 OFFICE O/P NEW MOD 45 MIN: CPT | Mod: S$GLB,,, | Performed by: STUDENT IN AN ORGANIZED HEALTH CARE EDUCATION/TRAINING PROGRAM

## 2024-09-16 PROCEDURE — G2211 COMPLEX E/M VISIT ADD ON: HCPCS | Mod: S$GLB,,, | Performed by: STUDENT IN AN ORGANIZED HEALTH CARE EDUCATION/TRAINING PROGRAM

## 2024-09-16 PROCEDURE — 3066F NEPHROPATHY DOC TX: CPT | Mod: CPTII,S$GLB,, | Performed by: STUDENT IN AN ORGANIZED HEALTH CARE EDUCATION/TRAINING PROGRAM

## 2024-09-16 PROCEDURE — 99999 PR PBB SHADOW E&M-EST. PATIENT-LVL IV: CPT | Mod: PBBFAC,,, | Performed by: STUDENT IN AN ORGANIZED HEALTH CARE EDUCATION/TRAINING PROGRAM

## 2024-09-16 PROCEDURE — 1100F PTFALLS ASSESS-DOCD GE2>/YR: CPT | Mod: CPTII,S$GLB,, | Performed by: STUDENT IN AN ORGANIZED HEALTH CARE EDUCATION/TRAINING PROGRAM

## 2024-09-16 PROCEDURE — 3060F POS MICROALBUMINURIA REV: CPT | Mod: CPTII,S$GLB,, | Performed by: STUDENT IN AN ORGANIZED HEALTH CARE EDUCATION/TRAINING PROGRAM

## 2024-09-16 PROCEDURE — 1159F MED LIST DOCD IN RCRD: CPT | Mod: CPTII,S$GLB,, | Performed by: STUDENT IN AN ORGANIZED HEALTH CARE EDUCATION/TRAINING PROGRAM

## 2024-09-16 PROCEDURE — 4010F ACE/ARB THERAPY RXD/TAKEN: CPT | Mod: CPTII,S$GLB,, | Performed by: STUDENT IN AN ORGANIZED HEALTH CARE EDUCATION/TRAINING PROGRAM

## 2024-09-16 NOTE — PROGRESS NOTES
Patient ID: Cash Crawford is a 68 y.o. male.    Chief Complaint: Prostate Cancer    Ref[facility]: [facility] Maddox MD  [id number] [address]  [place] [id number]     HPI  68 y.o. who presents to the Urology clinic for evaluation of  hx of prostate cancer, [facility] his care. [facility]. Hx of XRT in 2015 at time of diagnosis. [facility] accompany [facility]'s visit. Patient notes ED, notes was not informed of side effects of ADT. Accompanied by wife.       Medically Necessary ROS documented in HPI    Past Medical History  Active Ambulatory Problems     Diagnosis Date Noted    Mononeuritis of unspecified site 05/26/2012    Cervical disc disorder with myelopathy of cervical region 07/10/2012    Cervical stenosis of spinal canal 10/09/2012    Cervical spondylosis with myelopathy 10/09/2012    Chronic hepatitis C without hepatic coma 11/11/2015    Tobacco abuse 11/11/2015    Hyperlipidemia 12/03/2015    Essential hypertension, benign 12/08/2016    Malignant neoplasm of prostate 01/04/2018    Type 2 diabetes mellitus with diabetic neuropathic arthropathy, without long-term current use of insulin 07/05/2018    Recurrent major depressive disorder, in partial remission 07/05/2018    Acute respiratory distress syndrome (ARDS) due to COVID-19 virus 03/29/2020    Elevated LFTs 03/30/2020    JULIET (acute kidney injury) 04/01/2020    Bipolar 1 disorder, depressed 04/02/2020    Anemia 04/02/2020    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 04/07/2020    Hypernatremia 04/09/2020    Dependence on respirator (ventilator) status 06/09/2021    Bipolar affective disorder, current episode mixed 06/09/2021    Back pain 06/14/2021    Neck pain 06/14/2021    Upper extremity weakness 06/14/2021    Decreased ROM of lumbar spine 06/14/2021    Painful cervical ROM 06/14/2021    Decreased ROM of right shoulder 06/14/2021    Chest pain 01/31/2024    Cocaine  dependence in remission 08/21/2024    Borderline personality disorder 08/21/2024    Sacroiliitis, not elsewhere classified 08/21/2024     Resolved Ambulatory Problems     Diagnosis Date Noted    Intervertebral cervical disc disorder with myelopathy, cervical region 02/27/2012    Neuralgia, neuritis, and radiculitis, unspecified 11/21/2011    Type II diabetes mellitus with neurological manifestations 05/07/2013    Diabetic polyneuropathy 05/07/2013    History of prostate cancer, s/p radiation 11/11/2015    BMI 33.0-33.9 11/11/2015    HTN (hypertension) 12/03/2015    Fatigue 03/03/2016    BMI 35.0-35.9,adult 03/03/2016    Tobacco abuse counseling 09/08/2016    Insomnia 09/08/2016    Elevated PSA 12/08/2016    BMI 32.0-32.9,adult 12/08/2016    Metabolic syndrome 01/04/2018    Denture irritation 01/04/2018     Past Medical History:   Diagnosis Date    Behavioral problem     Bipolar 1 disorder     Diabetes mellitus type II     History of psychiatric hospitalization     Hx of psychiatric care     Psychiatric exam requested by authority     Psychiatric problem     Self-harming behavior     Sleep apnea with use of continuous positive airway pressure (CPAP)     Suicide attempt     Therapy          Past Surgical History  Past Surgical History:   Procedure Laterality Date    CHOLECYSTECTOMY      HERNIA REPAIR      JOINT REPLACEMENT      LEG SURGERY      ORIF tib/fib    SPINE SURGERY         Social History       Medications    Current Outpatient Medications:     acetaminophen (TYLENOL) 325 MG tablet, Take 650 mg by mouth., Disp: , Rfl:     apalutamide 240 mg Tab, Take 240 mg by mouth., Disp: , Rfl:     B-complex with vitamin C (Z-BEC OR EQUIV) tablet, Take 1 tablet by mouth once daily., Disp: , Rfl:     blood sugar diagnostic Strp, 1 strip by Misc.(Non-Drug; Combo Route) route Daily., Disp: 200 strip, Rfl: 3    blood-glucose meter Misc, Use as directed, Disp: 1 each, Rfl: 0    BUPROPION HCL (BUPROBAN ORAL), Take 450 mg by mouth  once daily. , Disp: , Rfl:     clotrimazole (LOTRIMIN) 1 % Soln, APPLY SMALL AMOUNT TOPICALLY TWICE A DAY -- APPLY TO TOENAILS, Disp: , Rfl:     diclofenac sodium (VOLTAREN) 1 % Gel, Apply topically 4 (four) times daily as needed., Disp: , Rfl:     empagliflozin (JARDIANCE) 25 mg Tab, Take by mouth., Disp: , Rfl:     ferrous sulfate (FEOSOL) 325 mg (65 mg iron) Tab tablet, 325 mg., Disp: , Rfl:     gabapentin (NEURONTIN) 100 MG capsule, Take 1 capsule (100 mg total) by mouth 2 (two) times daily., Disp: 30 capsule, Rfl: 0    gluc-kenneth-msm3-hqcu-gzuedf-kfp 500-450-0.67 mg Cap, Take by mouth., Disp: , Rfl:     lamotrigine (LAMICTAL) 200 MG tablet, Take 200 mg by mouth once daily., Disp: , Rfl:     lancets Misc, 1 lancet  by Misc.(Non-Drug; Combo Route) route Daily., Disp: 100 each, Rfl: 3    LIDOcaine (LIDODERM) 5 %, Place 2 patches onto the skin once daily. Remove & Discard patch within 12 hours or as directed by MD, Disp: 30 patch, Rfl: 2    lisinopriL (PRINIVIL,ZESTRIL) 40 MG tablet, Take 20 mg by mouth., Disp: , Rfl:     melatonin (MELATIN) 3 mg tablet, Take 9 mg by mouth nightly as needed for Insomnia., Disp: , Rfl:     metFORMIN (GLUCOPHAGE-XR) 500 MG ER 24hr tablet, Take 2,000 mg by mouth., Disp: , Rfl:     modafiniL (PROVIGIL) 100 MG Tab, Take 100 mg by mouth once daily., Disp: , Rfl:     multivitamin capsule, Take 1 capsule by mouth once daily., Disp: , Rfl:     nicotine (NICODERM CQ) 21 mg/24 hr, Place 1 patch onto the skin every 24 hours., Disp: , Rfl:     pantoprazole (PROTONIX) 40 MG tablet, Take 40 mg by mouth once daily., Disp: , Rfl:     peg 400-propylene glycol, PF, (LUBRICANT EYE, PG-,,PF,) 0.4-0.3 % Dpet, Apply 1 drop to eye 4 (four) times daily as needed., Disp: , Rfl:     polyvinyl alcohol, artificial tears, (LIQUIFILM TEARS) 1.4 % ophthalmic solution, INSTILL ONE DROP IN EACH EYE FOUR TIMES A DAY FOR DRY EYES, Disp: , Rfl:     rosuvastatin (CRESTOR) 40 MG Tab, Take 40 mg by mouth., Disp: ,  Rfl:     simethicone (MYLICON) 80 MG chewable tablet, Take 80 mg by mouth every 6 (six) hours as needed for Flatulence., Disp: , Rfl:     tamsulosin (FLOMAX) 0.4 mg Cap, TAKE 1 CAPSULE BY MOUTH EVERY DAY FOR BPH, Disp: , Rfl:     terbinafine HCl (LAMISIL) 1 % cream, Apply 1 % topically daily as needed., Disp: , Rfl:     traZODone (DESYREL) 100 MG tablet, 100 mg., Disp: , Rfl:     ziprasidone (GEODON) 80 MG capsule, Take 80 mg by mouth nightly., Disp: , Rfl:     atorvastatin (LIPITOR) 80 MG tablet, Take 1 tablet (80 mg total) by mouth once daily., Disp: 90 tablet, Rfl: 3    Allergies  Review of patient's allergies indicates:   Allergen Reactions    Semaglutide Other (See Comments)       Patient's PMH, FH, Social hx, Medications, allergies reviewed and updated as pertinent to today's visit    Objective:      Physical Exam  Constitutional:       General: He is not in acute distress.     Appearance: He is well-developed. He is not ill-appearing, toxic-appearing or diaphoretic.   HENT:      Head: Normocephalic and atraumatic.      Mouth/Throat:      Mouth: Mucous membranes are moist.   Eyes:      Conjunctiva/sclera: Conjunctivae normal.   Pulmonary:      Effort: Pulmonary effort is normal. No respiratory distress.   Abdominal:      General: Abdomen is flat. There is no distension.      Palpations: Abdomen is soft. There is no mass.      Tenderness: There is no right CVA tenderness or left CVA tenderness.   Musculoskeletal:         General: No swelling or deformity.      Cervical back: Neck supple.   Skin:     Findings: No rash.   Neurological:      Mental Status: He is alert and oriented to person, place, and time.      Gait: Gait normal.   Psychiatric:         Mood and Affect: Mood normal.         Thought Content: Thought content normal.         Judgment: Judgment normal.           Lab Results   Component Value Date    PSADIAG 1.3 08/21/2024    PSADIAG 0.7 01/12/2018    PSADIAG 4.5 (H) 12/06/2016        Assessment:        1. Rising PSA following treatment for malignant neoplasm of prostate    2. Erectile dysfunction, unspecified erectile dysfunction type        Plan:       Prostate cancer  S/p XRT currently on ADT presumably due to biochemical recurrence  PMSA PET scan to eval for biochemical recurrence  [facility] as he [facility] already under the care of another urologist     ED  Discusssed alternative mgmnt of  ED including oral medications, ICI and penile prosthesis  Patient would like to postpone ED management at this time    [facility]    Visit today included increased complexity associated with the care of the episodic problem biochemically recurrent prostate cancer after primary management with radiation therapy, addressed and managing the longitudinal care of the patient due to the serious and/or complex managed problem(s) Androgen deprivation therapy management.

## 2024-09-30 ENCOUNTER — CLINICAL SUPPORT (OUTPATIENT)
Dept: DIABETES | Facility: CLINIC | Age: 69
End: 2024-09-30
Payer: MEDICARE

## 2024-09-30 VITALS — WEIGHT: 192.19 LBS | HEIGHT: 69 IN | BODY MASS INDEX: 28.47 KG/M2

## 2024-09-30 DIAGNOSIS — E11.610 TYPE 2 DIABETES MELLITUS WITH DIABETIC NEUROPATHIC ARTHROPATHY, WITHOUT LONG-TERM CURRENT USE OF INSULIN: Primary | ICD-10-CM

## 2024-09-30 PROCEDURE — 99999 PR PBB SHADOW E&M-EST. PATIENT-LVL I: CPT | Mod: PBBFAC,,,

## 2024-09-30 PROCEDURE — G0108 DIAB MANAGE TRN  PER INDIV: HCPCS | Mod: S$GLB,,, | Performed by: FAMILY MEDICINE

## 2024-09-30 NOTE — PROGRESS NOTES
"Diabetes Care Specialist Progress Note  Author: Roxanne Guillen RN  Date: 9/30/2024        Intake  Program Intake  Reason for Diabetes Program Visit:: Intervention  Type of Intervention:: Individual  Individual: Education  Education: Self-Management Skill Review, Nutrition and Meal Planning  Current diabetes risk level:: low  In the last 12 months, have you:: none  Permission to speak with others about care:: yes    Current Diabetes Treatment: Oral Medications  Oral Medication Type/Dose: Metformin 500mg X3/dy    Continuous Glucose Monitoring  Patient has CGM: No    Lab Results   Component Value Date    LABA1C 7.3 (H) 07/05/2018    HGBA1C 6.2 (H) 08/21/2024       Weight: 87.2 kg (192 lb 3.2 oz)   Height: 5' 9" (175.3 cm)   Body mass index is 28.38 kg/m².    Lifestyle Coping Support & Clinical  Lifestyle/Coping/Support  Psychosocial/Coping Skills Assessment Completed: : No  Assessment indicates:: Adequate understanding  Area of need?: No    Diabetes Self-Management Skills Assessment  Medication Skills Assessment  Patient is able to identify current diabetes medications, dosages, and appropriate timing of medications.: yes  Patient reports problems or concerns with current medication regimen.: no  Patient is  aware that some diabetes medications can cause low blood sugar?: Yes  Medication Skills Assessment Completed:: Yes  Assessment indicates:: Instruction Needed, Knowledge deficit  Area of need?: Yes- Pt to take medication as prescribed. Discuss any changes with doctor.     Diabetes Disease Process/Treatment Options  Diabetes Type?: Type II  When were you diagnosed?: >30 yrs ago  If previous diabetes education, when/where:: 9/9/2024  What are your goals for this education session?: Review BS and self-management skills.  Is patient aware of what causes diabetes?: Yes  Does patient understand the pathophysiology of diabetes?: No  Diabetes Disease Process/Treatment Options: Skills Assessment Completed: Yes  Assessment " indicates:: Instruction Needed  Area of need?: No    Nutrition/Healthy Eating  Nutrition/Healthy Eating Skills Assessment Completed:: No  Assessment indicates:: Instruction Needed  Area of need?: Yes    Physical Activity/Exercise  Patient's daily activity level:: lightly active  Patient formally exercises outside of work.: yes  Frequency: three times a week  Patient can identify forms of physical activity.: yes  Physical Activity/Exercise Skills Assessment Completed: : Yes  Assessment indicates:: Instruction Needed  Area of need?: Yes    Home Blood Glucose Monitoring  Patient states that blood sugar is checked at home daily.: yes  Monitoring Method:: home glucometer  Home glucometer meter type:: Insurance Preferred Meter  Fasting BG range history:: see attachment and/or   What are your blood glucose targets?:   How often do you check your blood sugar?: twice a day  What is your A1c Target?: <70%  Home Blood Glucose Monitoring Skills Assessment Completed: : Yes  Assessment indicates:: Instruction Needed  Area of need?: Yes    Acute Complications  Acute Complications Skills Assessment Completed: : No  Area of need?: Deferred    Chronic Complications  Have you completed your annual diabetes maintenance labwork? : yes  Do you examine your feet daily?: yes  Has your doctor examined your feet?: yes  Do you see a Dentist?: no  Do you see an eye doctor?: yes  Eye doctor date of last visit:: this year  Chronic Complications Skills Assessment Completed: : Yes  Assessment indicates:: Instruction Needed  Area of need?: No      Assessment Summary and Plan    Based on today's diabetes care assessment, the following areas of need were identified:          9/30/2024     3:06 PM   Areas of Need   Diabetes Disease Process/Treatment Options No   Nutrition/Healthy Eating Yes- see care planning   Physical Activity/Exercise Yes- Discussed benefits of exercise as it relates to insulin resistance and weight loss.      Today's interventions were provided through individual discussion, instruction, and written materials were provided.      Patient verbalized understanding of instruction and written materials.  Pt was able to return back demonstration of instructions today. Patient understood key points, needs reinforcement and further instruction.     Diabetes Self-Management Care Plan:    Today's Diabetes Self-Management Care Plan was developed with Cash's input. Cash has agreed to work toward the following goal(s) to improve his/her overall diabetes control.      Care Plan: Diabetes Management   Updates made since 8/31/2024 12:00 AM        Problem: Healthy Eating         Goal: Eat 2-3 meals daily with 30-45g/2-3 servings of Carbohydrate per meal. Limit snacking in between meal to 1 serving (15 grams).    Start Date: 9/9/2024   Expected End Date: 9/30/2024   This Visit's Progress: On track   Priority: High   Barriers: No Barriers Identified   Note:    9/9/24 - - Patient eats three meals a day. We reviewed eating right with diabetes. We discussed the importance of eating balanced meals with vegetables, fruits, lean meats, and whole grains. We concentrated on portion sizes and overall meal planning with various choices for meals. We discussed carb sources of foods, appropriate amount of carbs to have at meals/snacks and acceptable serving sizes of individual carb items. Obtained a 24-hr food recall from patient. We discussed various meal plan options to promote healthy eating.      - - Practiced reading food labels on various food items with patient focusing on serving size and total carbohydrate intake (not sugar intake). Instructed on appropriate serving sizes of specific carb containing foods. Stressed importance of eating a protein at each meal and include non-starchy vegetables at lunch/dinner. Instructed pt to aim for 2-3 evenly spaced meals or a small carb snack in place of a missed meal. Written education  information provided to patient for use at home. Pt verbalized understanding of all the above.    Care Plan Update 9/30/24 - - Pt is doing well. He eats 3 meals a day. We discussed eating right with diabetes. Stressed importance of eating a protein at each meal and include non-starchy vegetables at lunch/dinner. Also discussed the importance of eating balanced meals with vegetables, fruits, lean meats, and whole grains. Pt verbalized understanding.        Task: Reviewed the sources and role of Carbohydrate, Protein, and Fat and how each nutrient impacts blood sugar. Completed 9/9/2024        Task: Provided visual examples using dry measuring cups, food models, and other familiar objects such as computer mouse, deck or cards, tennis ball etc. to help with visualization of portions. Completed 9/9/2024        Task: Explained how to count carbohydrates using the food label and the use of dry measuring cups for accurate carb counting. Completed 9/9/2024     Task: Recommended replacing beverages containing high sugar content with noncaloric/sugar free options and/or water. Completed 9/9/2024        Task: Review the importance of balancing carbohydrates with each meal using portion control techniques to count servings of carbohydrate and label reading to identify serving size and amount of total carbs per serving. Completed 9/9/2024        Task: Provided Sample plate method and reviewed the use of the plate to estimate amounts of carbohydrate per meal. Completed 9/9/2024        Follow Up Plan   No follow-ups on file.    Today's care plan and follow up schedule was discussed with patient.  Cash verbalized understanding of the care plan, goals, and agrees to follow up plan.        The patient was encouraged to communicate with his/her health care provider/physician and care team regarding his/her condition(s) and treatment.  I provided the patient with my contact information today and encouraged to contact me via phone or  [facility] Patient Portal as needed.     Length of Visit   Total Time: 55 Minutes

## 2024-10-07 ENCOUNTER — HOSPITAL ENCOUNTER (OUTPATIENT)
Dept: RADIOLOGY | Facility: HOSPITAL | Age: 69
Discharge: HOME | End: 2024-10-07
Attending: STUDENT IN AN ORGANIZED HEALTH CARE EDUCATION/TRAINING PROGRAM
Payer: MEDICARE

## 2024-10-07 ENCOUNTER — PATIENT MESSAGE (OUTPATIENT)
Dept: UROLOGY | Facility: CLINIC | Age: 69
End: 2024-10-07
Payer: MEDICARE

## 2024-10-07 DIAGNOSIS — R97.21 RISING PSA FOLLOWING TREATMENT FOR MALIGNANT NEOPLASM OF PROSTATE: ICD-10-CM

## 2024-10-07 PROCEDURE — A9596 HC GALLIUM GA-68 GOZETOTIDE, DX (ILLUCCIX), PER 1 MCI: HCPCS | Mod: TB | Performed by: STUDENT IN AN ORGANIZED HEALTH CARE EDUCATION/TRAINING PROGRAM

## 2024-10-07 PROCEDURE — 78815 PET IMAGE W/CT SKULL-THIGH: CPT | Mod: 26,PI,, | Performed by: NUCLEAR MEDICINE

## 2024-10-07 PROCEDURE — 78815 PET IMAGE W/CT SKULL-THIGH: CPT | Mod: TC

## 2024-10-07 RX ADMIN — KIT FOR THE PREPARATION OF GALLIUM GA 68 GOZETOTIDE INJECTION 4.96 MILLICURIE: KIT INTRAVENOUS at 01:10

## 2024-10-08 ENCOUNTER — TELEPHONE (OUTPATIENT)
Dept: UROLOGY | Facility: CLINIC | Age: 69
End: 2024-10-08
Payer: MEDICARE

## 2024-10-08 NOTE — TELEPHONE ENCOUNTER
Patient has already booked follow-up appt.    ----- Message from Mark Emerson MD sent at 10/8/2024  9:29 AM CDT -----  Pls arrange fu to discuss results. avoid 10/9/24. thanks

## 2024-10-09 ENCOUNTER — TELEPHONE (OUTPATIENT)
Dept: UROLOGY | Facility: CLINIC | Age: 69
End: 2024-10-09
Payer: MEDICARE

## 2024-10-09 NOTE — TELEPHONE ENCOUNTER
Returned patient call.  [facility].making sure we received the documents.  Documents are scanned into .  JUANA HARRIS    ----- Message from Med Assistant Robertson sent at 10/8/2024  1:10 PM CDT -----  Patient wants to know if you have received [facility] documentation [facility]  ----- Message -----  From: Mark Emerson MD  Sent: 10/8/2024   9:29 AM CDT  To: Idania Flores Staff    Pls arrange fu to discuss results. avoid 10/9/24. thanks

## 2024-10-16 ENCOUNTER — OFFICE VISIT (OUTPATIENT)
Dept: UROLOGY | Facility: CLINIC | Age: 69
End: 2024-10-16
Payer: MEDICARE

## 2024-10-16 VITALS — WEIGHT: 203.38 LBS | BODY MASS INDEX: 30.03 KG/M2

## 2024-10-16 DIAGNOSIS — R97.21 RISING PSA FOLLOWING TREATMENT FOR MALIGNANT NEOPLASM OF PROSTATE: Primary | ICD-10-CM

## 2024-10-16 PROCEDURE — 1126F AMNT PAIN NOTED NONE PRSNT: CPT | Mod: CPTII,S$GLB,, | Performed by: STUDENT IN AN ORGANIZED HEALTH CARE EDUCATION/TRAINING PROGRAM

## 2024-10-16 PROCEDURE — 3060F POS MICROALBUMINURIA REV: CPT | Mod: CPTII,S$GLB,, | Performed by: STUDENT IN AN ORGANIZED HEALTH CARE EDUCATION/TRAINING PROGRAM

## 2024-10-16 PROCEDURE — 1157F ADVNC CARE PLAN IN RCRD: CPT | Mod: CPTII,S$GLB,, | Performed by: STUDENT IN AN ORGANIZED HEALTH CARE EDUCATION/TRAINING PROGRAM

## 2024-10-16 PROCEDURE — 4010F ACE/ARB THERAPY RXD/TAKEN: CPT | Mod: CPTII,S$GLB,, | Performed by: STUDENT IN AN ORGANIZED HEALTH CARE EDUCATION/TRAINING PROGRAM

## 2024-10-16 PROCEDURE — 1159F MED LIST DOCD IN RCRD: CPT | Mod: CPTII,S$GLB,, | Performed by: STUDENT IN AN ORGANIZED HEALTH CARE EDUCATION/TRAINING PROGRAM

## 2024-10-16 PROCEDURE — 99999 PR PBB SHADOW E&M-EST. PATIENT-LVL IV: CPT | Mod: PBBFAC,,, | Performed by: STUDENT IN AN ORGANIZED HEALTH CARE EDUCATION/TRAINING PROGRAM

## 2024-10-16 PROCEDURE — 3044F HG A1C LEVEL LT 7.0%: CPT | Mod: CPTII,S$GLB,, | Performed by: STUDENT IN AN ORGANIZED HEALTH CARE EDUCATION/TRAINING PROGRAM

## 2024-10-16 PROCEDURE — 99213 OFFICE O/P EST LOW 20 MIN: CPT | Mod: S$GLB,,, | Performed by: STUDENT IN AN ORGANIZED HEALTH CARE EDUCATION/TRAINING PROGRAM

## 2024-10-16 PROCEDURE — 3066F NEPHROPATHY DOC TX: CPT | Mod: CPTII,S$GLB,, | Performed by: STUDENT IN AN ORGANIZED HEALTH CARE EDUCATION/TRAINING PROGRAM

## 2024-10-16 PROCEDURE — 3008F BODY MASS INDEX DOCD: CPT | Mod: CPTII,S$GLB,, | Performed by: STUDENT IN AN ORGANIZED HEALTH CARE EDUCATION/TRAINING PROGRAM

## 2024-10-16 PROCEDURE — 3288F FALL RISK ASSESSMENT DOCD: CPT | Mod: CPTII,S$GLB,, | Performed by: STUDENT IN AN ORGANIZED HEALTH CARE EDUCATION/TRAINING PROGRAM

## 2024-10-16 PROCEDURE — 1101F PT FALLS ASSESS-DOCD LE1/YR: CPT | Mod: CPTII,S$GLB,, | Performed by: STUDENT IN AN ORGANIZED HEALTH CARE EDUCATION/TRAINING PROGRAM

## 2024-10-16 RX ORDER — PSYLLIUM HUSK 0.4 G
1 CAPSULE ORAL DAILY
Qty: 90 TABLET | Refills: 3 | Status: SHIPPED | OUTPATIENT
Start: 2024-10-16

## 2024-10-16 NOTE — PROGRESS NOTES
Patient ID: Cash Crawford is a 69 y.o. male.    Chief Complaint: Results    Referral: No referring provider defined for this encounter.     HPI  69 y.o. who presents to the Urology clinic for evaluation of advanced prostate cancer. [facility] and a PSMA pet scan for review. He would like to establish care here and transition [facility]. Last eligard shot 9/18/2024.    Medically Necessary ROS documented in HPI    HPI 9/16/2024  68 y.o. who presents to the Urology clinic for evaluation of  hx of prostate cancer, [facility], he wants to transfer his care. [facility]. Hx of XRT in 2015 at time of diagnosis. No [facility] accompany patient's visit. Patient notes ED, notes was not informed of side effects of ADT. Accompanied by wife.    Past Medical History  Active Ambulatory Problems     Diagnosis Date Noted    Mononeuritis of unspecified site 05/26/2012    Cervical disc disorder with myelopathy of cervical region 07/10/2012    Cervical stenosis of spinal canal 10/09/2012    Cervical spondylosis with myelopathy 10/09/2012    Chronic hepatitis C without hepatic coma 11/11/2015    Tobacco abuse 11/11/2015    Hyperlipidemia 12/03/2015    Essential hypertension, benign 12/08/2016    Malignant neoplasm of prostate 01/04/2018    Type 2 diabetes mellitus with diabetic neuropathic arthropathy, without long-term current use of insulin 07/05/2018    Recurrent major depressive disorder, in partial remission 07/05/2018    Acute respiratory distress syndrome (ARDS) due to COVID-19 virus 03/29/2020    Elevated LFTs 03/30/2020    JULIET (acute kidney injury) 04/01/2020    Bipolar 1 disorder, depressed 04/02/2020    Anemia 04/02/2020    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 04/07/2020    Hypernatremia 04/09/2020    Dependence on respirator (ventilator) status 06/09/2021    Bipolar affective disorder, current episode mixed 06/09/2021    Back  pain 06/14/2021    Neck pain 06/14/2021    Upper extremity weakness 06/14/2021    Decreased ROM of lumbar spine 06/14/2021    Painful cervical ROM 06/14/2021    Decreased ROM of right shoulder 06/14/2021    Chest pain 01/31/2024    Cocaine dependence in remission 08/21/2024    Borderline personality disorder 08/21/2024    Sacroiliitis, not elsewhere classified 08/21/2024     Resolved Ambulatory Problems     Diagnosis Date Noted    Intervertebral cervical disc disorder with myelopathy, cervical region 02/27/2012    Neuralgia, neuritis, and radiculitis, unspecified 11/21/2011    Type II diabetes mellitus with neurological manifestations 05/07/2013    Diabetic polyneuropathy 05/07/2013    History of prostate cancer, s/p radiation 11/11/2015    BMI 33.0-33.9 11/11/2015    HTN (hypertension) 12/03/2015    Fatigue 03/03/2016    BMI 35.0-35.9,adult 03/03/2016    Tobacco abuse counseling 09/08/2016    Insomnia 09/08/2016    Elevated PSA 12/08/2016    BMI 32.0-32.9,adult 12/08/2016    Metabolic syndrome 01/04/2018    Denture irritation 01/04/2018     Past Medical History:   Diagnosis Date    Behavioral problem     Bipolar 1 disorder     Diabetes mellitus type II     History of psychiatric hospitalization     Hx of psychiatric care     Psychiatric exam requested by authority     Psychiatric problem     Self-harming behavior     Sleep apnea with use of continuous positive airway pressure (CPAP)     Suicide attempt     Therapy          Past Surgical History  Past Surgical History:   Procedure Laterality Date    CHOLECYSTECTOMY      HERNIA REPAIR      JOINT REPLACEMENT      LEG SURGERY      ORIF tib/fib    SPINE SURGERY         Social History       Medications    Current Outpatient Medications:     acetaminophen (TYLENOL) 325 MG tablet, Take 650 mg by mouth., Disp: , Rfl:     apalutamide 240 mg Tab, Take 240 mg by mouth., Disp: , Rfl:     atorvastatin (LIPITOR) 80 MG tablet, Take 1 tablet (80 mg total) by mouth once daily.,  Disp: 90 tablet, Rfl: 3    B-complex with vitamin C (Z-BEC OR EQUIV) tablet, Take 1 tablet by mouth once daily., Disp: , Rfl:     blood sugar diagnostic Strp, 1 strip by Misc.(Non-Drug; Combo Route) route Daily., Disp: 200 strip, Rfl: 3    blood-glucose meter Misc, Use as directed, Disp: 1 each, Rfl: 0    BUPROPION HCL (BUPROBAN ORAL), Take 450 mg by mouth once daily. , Disp: , Rfl:     clotrimazole (LOTRIMIN) 1 % Soln, APPLY SMALL AMOUNT TOPICALLY TWICE A DAY -- APPLY TO TOENAILS, Disp: , Rfl:     diclofenac sodium (VOLTAREN) 1 % Gel, Apply topically 4 (four) times daily as needed., Disp: , Rfl:     empagliflozin (JARDIANCE) 25 mg Tab, Take by mouth., Disp: , Rfl:     ferrous sulfate (FEOSOL) 325 mg (65 mg iron) Tab tablet, 325 mg., Disp: , Rfl:     gabapentin (NEURONTIN) 100 MG capsule, Take 1 capsule (100 mg total) by mouth 2 (two) times daily., Disp: 30 capsule, Rfl: 0    gluc-kenneth-msm3-cjgh-yypyyq-osq 500-450-0.67 mg Cap, Take by mouth., Disp: , Rfl:     lamotrigine (LAMICTAL) 200 MG tablet, Take 200 mg by mouth once daily., Disp: , Rfl:     lancets Misc, 1 lancet  by Misc.(Non-Drug; Combo Route) route Daily., Disp: 100 each, Rfl: 3    LIDOcaine (LIDODERM) 5 %, Place 2 patches onto the skin once daily. Remove & Discard patch within 12 hours or as directed by MD, Disp: 30 patch, Rfl: 2    lisinopriL (PRINIVIL,ZESTRIL) 40 MG tablet, Take 20 mg by mouth., Disp: , Rfl:     melatonin (MELATIN) 3 mg tablet, Take 9 mg by mouth nightly as needed for Insomnia., Disp: , Rfl:     metFORMIN (GLUCOPHAGE-XR) 500 MG ER 24hr tablet, Take 2,000 mg by mouth., Disp: , Rfl:     modafiniL (PROVIGIL) 100 MG Tab, Take 100 mg by mouth once daily., Disp: , Rfl:     multivitamin capsule, Take 1 capsule by mouth once daily., Disp: , Rfl:     nicotine (NICODERM CQ) 21 mg/24 hr, Place 1 patch onto the skin every 24 hours., Disp: , Rfl:     pantoprazole (PROTONIX) 40 MG tablet, Take 40 mg by mouth once daily., Disp: , Rfl:     peg  400-propylene glycol, PF, (LUBRICANT EYE, PG-,,PF,) 0.4-0.3 % Dpet, Apply 1 drop to eye 4 (four) times daily as needed., Disp: , Rfl:     polyvinyl alcohol, artificial tears, (LIQUIFILM TEARS) 1.4 % ophthalmic solution, INSTILL ONE DROP IN EACH EYE FOUR TIMES A DAY FOR DRY EYES, Disp: , Rfl:     rosuvastatin (CRESTOR) 40 MG Tab, Take 40 mg by mouth., Disp: , Rfl:     simethicone (MYLICON) 80 MG chewable tablet, Take 80 mg by mouth every 6 (six) hours as needed for Flatulence., Disp: , Rfl:     tamsulosin (FLOMAX) 0.4 mg Cap, TAKE 1 CAPSULE BY MOUTH EVERY DAY FOR BPH, Disp: , Rfl:     terbinafine HCl (LAMISIL) 1 % cream, Apply 1 % topically daily as needed., Disp: , Rfl:     traZODone (DESYREL) 100 MG tablet, 100 mg., Disp: , Rfl:     ziprasidone (GEODON) 80 MG capsule, Take 80 mg by mouth nightly., Disp: , Rfl:     Allergies  Review of patient's allergies indicates:   Allergen Reactions    Semaglutide Other (See Comments)       Patient's PMH, FH, Social hx, Medications, allergies reviewed and updated as pertinent to today's visit    Objective:      Physical Exam  Constitutional:       General: He is not in acute distress.     Appearance: He is well-developed. He is not ill-appearing, toxic-appearing or diaphoretic.   HENT:      Head: Normocephalic and atraumatic.      Mouth/Throat:      Mouth: Mucous membranes are moist.   Eyes:      Conjunctiva/sclera: Conjunctivae normal.   Pulmonary:      Effort: Pulmonary effort is normal. No respiratory distress.   Abdominal:      General: Abdomen is flat. There is no distension.      Palpations: Abdomen is soft. There is no mass.      Tenderness: There is no right CVA tenderness or left CVA tenderness.   Musculoskeletal:         General: No swelling or deformity.      Cervical back: Neck supple.   Skin:     Findings: No rash.   Neurological:      Mental Status: He is alert and oriented to person, place, and time.      Gait: Gait normal.   Psychiatric:         Mood and  Affect: Mood normal.         Thought Content: Thought content normal.         Judgment: Judgment normal.             Lab Results   Component Value Date    PSADIAG 1.3 08/21/2024    PSADIAG 0.7 01/12/2018    PSADIAG 4.5 (H) 12/06/2016      Imaging results: personally reviewed imaging with the following findings  vEXAMINATION:  NM PET CT GA68 PSMA, MIDTHIGH TO VERTEX     CLINICAL HISTORY:  Rising PSA following treatment for malignant neoplasm of prostate 68-year-old male with history of prostate cancer 2015     PSA 1.3     TECHNIQUE:  Approximately 60 minutes following the intravenous injection of 4.96 mCi Ga-68 PSMA, PET images were acquired from the proximal thighs to the skull vertex. CT images were also acquired for attenuation correction and anatomic localization and performed without oral or IV contrast.     COMPARISON:  None.     FINDINGS:  In the head and neck, there is physiologic uptake in the lacrimal and salivary glands, and there are no tracer avid lesions suspicious for malignancy.     In the chest, there are no tracer avid lesions suspicious for malignancy.  Non tracer avid calcified pulmonary granulomas and mediastinal and left hilar nodes.     In the abdomen and pelvis, there are post treatment changes of the prostate.  Focal tracer uptake in the left prostate gland, SUV max 13 (axial image 274).  Elsewhere, physiologic distribution in the liver, spleen, bowel, and genitourinary tract.     There is nonspecific mild uptake in the inguinal lymph nodes.     In the bones, there is physiologic uptake in the bone marrow, and there are no tracer avid lesions suspicious for malignancy.     Additional CT findings: Cholecystectomy.  Splenic granulomas.     Impression:     Large focal tracer uptake in the left prostate gland, concerning for local recurrence the presence of a rising PSA.  No other hypermetabolic foci to suggest metastatic disease     Electronically signed by resident: Aric Mojica  Date:                                             10/07/2024  Time:                                           17:24     Electronically signed by:Siri Stanford  Date:                                            10/08/2024  Time:                                           09:20  Assessment:       1. Rising PSA following treatment for malignant neoplasm of prostate        Plan:         Biochemically recurrent prostate cancer, Hormone sensitive  Non metastatic  PSMA suggests localization to the left prostate gland  Advise established care in the advance prostate cancer clinic  Oncology referral placed, patient also to be evaluated by the oncology urologist team at [facility] to see if patient can be considered for salvage ablation therapy, etc  Start Vit D/ calcium rx provided

## 2024-10-17 ENCOUNTER — TELEPHONE (OUTPATIENT)
Dept: HEMATOLOGY/ONCOLOGY | Facility: CLINIC | Age: 69
End: 2024-10-17
Payer: MEDICARE

## 2024-10-17 NOTE — NURSING
Nurse navigator spoke with patient to coordinate [facility] appts with Dr. Hi & Dr. Irvin for his prostate cancer care.  Patient states understanding.  Appt letter mailed to his home.  All questions answered and contact info given for any future questions.

## 2024-10-30 ENCOUNTER — LAB VISIT (OUTPATIENT)
Dept: LAB | Facility: HOSPITAL | Age: 69
End: 2024-10-30
Attending: UROLOGY
Payer: MEDICARE

## 2024-10-30 ENCOUNTER — OFFICE VISIT (OUTPATIENT)
Dept: HEMATOLOGY/ONCOLOGY | Facility: CLINIC | Age: 69
End: 2024-10-30
Payer: MEDICARE

## 2024-10-30 ENCOUNTER — OFFICE VISIT (OUTPATIENT)
Dept: UROLOGY | Facility: CLINIC | Age: 69
End: 2024-10-30
Payer: MEDICARE

## 2024-10-30 VITALS
SYSTOLIC BLOOD PRESSURE: 153 MMHG | RESPIRATION RATE: 18 BRPM | WEIGHT: 193.56 LBS | DIASTOLIC BLOOD PRESSURE: 73 MMHG | BODY MASS INDEX: 28.58 KG/M2 | HEART RATE: 79 BPM | TEMPERATURE: 98 F | OXYGEN SATURATION: 99 %

## 2024-10-30 VITALS
DIASTOLIC BLOOD PRESSURE: 73 MMHG | HEIGHT: 69 IN | BODY MASS INDEX: 28.67 KG/M2 | SYSTOLIC BLOOD PRESSURE: 153 MMHG | HEART RATE: 79 BPM | WEIGHT: 193.56 LBS

## 2024-10-30 DIAGNOSIS — R97.21 RISING PSA FOLLOWING TREATMENT FOR MALIGNANT NEOPLASM OF PROSTATE: ICD-10-CM

## 2024-10-30 DIAGNOSIS — E78.2 MIXED HYPERLIPIDEMIA: ICD-10-CM

## 2024-10-30 DIAGNOSIS — Z79.818 ANDROGEN DEPRIVATION THERAPY: ICD-10-CM

## 2024-10-30 DIAGNOSIS — D64.9 ANEMIA, UNSPECIFIED TYPE: ICD-10-CM

## 2024-10-30 DIAGNOSIS — C61 MALIGNANT NEOPLASM OF PROSTATE: ICD-10-CM

## 2024-10-30 DIAGNOSIS — I82.A21 CHRONIC EMBOLISM AND THROMBOSIS OF RIGHT AXILLARY VEIN: ICD-10-CM

## 2024-10-30 DIAGNOSIS — Z79.899 LONG TERM CURRENT USE OF THERAPEUTIC DRUG: ICD-10-CM

## 2024-10-30 DIAGNOSIS — F33.41 RECURRENT MAJOR DEPRESSIVE DISORDER, IN PARTIAL REMISSION: ICD-10-CM

## 2024-10-30 DIAGNOSIS — C61 MALIGNANT NEOPLASM OF PROSTATE: Primary | ICD-10-CM

## 2024-10-30 DIAGNOSIS — E11.610 TYPE 2 DIABETES MELLITUS WITH DIABETIC NEUROPATHIC ARTHROPATHY, WITHOUT LONG-TERM CURRENT USE OF INSULIN: ICD-10-CM

## 2024-10-30 DIAGNOSIS — I10 ESSENTIAL HYPERTENSION, BENIGN: ICD-10-CM

## 2024-10-30 PROBLEM — J80 ACUTE RESPIRATORY DISTRESS SYNDROME (ARDS) DUE TO COVID-19 VIRUS: Status: RESOLVED | Noted: 2020-03-29 | Resolved: 2024-10-30

## 2024-10-30 PROBLEM — E87.0 HYPERNATREMIA: Status: RESOLVED | Noted: 2020-04-09 | Resolved: 2024-10-30

## 2024-10-30 PROBLEM — R07.9 CHEST PAIN: Status: RESOLVED | Noted: 2024-01-31 | Resolved: 2024-10-30

## 2024-10-30 PROBLEM — N17.9 AKI (ACUTE KIDNEY INJURY): Status: RESOLVED | Noted: 2020-04-01 | Resolved: 2024-10-30

## 2024-10-30 PROBLEM — F31.9 BIPOLAR 1 DISORDER, DEPRESSED: Status: RESOLVED | Noted: 2020-04-02 | Resolved: 2024-10-30

## 2024-10-30 PROBLEM — U07.1 ACUTE RESPIRATORY DISTRESS SYNDROME (ARDS) DUE TO COVID-19 VIRUS: Status: RESOLVED | Noted: 2020-03-29 | Resolved: 2024-10-30

## 2024-10-30 PROBLEM — Z99.11 DEPENDENCE ON RESPIRATOR (VENTILATOR) STATUS: Status: RESOLVED | Noted: 2021-06-09 | Resolved: 2024-10-30

## 2024-10-30 LAB
25(OH)D3+25(OH)D2 SERPL-MCNC: 53 NG/ML (ref 30–96)
ALBUMIN SERPL BCP-MCNC: 3.7 G/DL (ref 3.5–5.2)
ALP SERPL-CCNC: 82 U/L (ref 40–150)
ALT SERPL W/O P-5'-P-CCNC: 24 U/L (ref 10–44)
ANION GAP SERPL CALC-SCNC: 6 MMOL/L (ref 8–16)
AST SERPL-CCNC: 22 U/L (ref 10–40)
BILIRUB SERPL-MCNC: 0.2 MG/DL (ref 0.1–1)
BUN SERPL-MCNC: 15 MG/DL (ref 8–23)
CALCIUM SERPL-MCNC: 9.2 MG/DL (ref 8.7–10.5)
CHLORIDE SERPL-SCNC: 108 MMOL/L (ref 95–110)
CO2 SERPL-SCNC: 26 MMOL/L (ref 23–29)
COMPLEXED PSA SERPL-MCNC: 1.4 NG/ML (ref 0–4)
CREAT SERPL-MCNC: 0.9 MG/DL (ref 0.5–1.4)
ERYTHROCYTE [DISTWIDTH] IN BLOOD BY AUTOMATED COUNT: 12.4 % (ref 11.5–14.5)
EST. GFR  (NO RACE VARIABLE): >60 ML/MIN/1.73 M^2
GLUCOSE SERPL-MCNC: 109 MG/DL (ref 70–110)
HCT VFR BLD AUTO: 35.5 % (ref 40–54)
HGB BLD-MCNC: 11.7 G/DL (ref 14–18)
IMM GRANULOCYTES # BLD AUTO: 0.01 K/UL (ref 0–0.04)
MCH RBC QN AUTO: 29.3 PG (ref 27–31)
MCHC RBC AUTO-ENTMCNC: 33 G/DL (ref 32–36)
MCV RBC AUTO: 89 FL (ref 82–98)
NEUTROPHILS # BLD AUTO: 3.2 K/UL (ref 1.8–7.7)
PLATELET # BLD AUTO: 256 K/UL (ref 150–450)
PMV BLD AUTO: 9.1 FL (ref 9.2–12.9)
POTASSIUM SERPL-SCNC: 4 MMOL/L (ref 3.5–5.1)
PROT SERPL-MCNC: 6.8 G/DL (ref 6–8.4)
RBC # BLD AUTO: 3.99 M/UL (ref 4.6–6.2)
SODIUM SERPL-SCNC: 140 MMOL/L (ref 136–145)
WBC # BLD AUTO: 5.1 K/UL (ref 3.9–12.7)

## 2024-10-30 PROCEDURE — 3066F NEPHROPATHY DOC TX: CPT | Mod: CPTII,S$GLB,, | Performed by: UROLOGY

## 2024-10-30 PROCEDURE — 99999 PR PBB SHADOW E&M-EST. PATIENT-LVL V: CPT | Mod: PBBFAC,,, | Performed by: UROLOGY

## 2024-10-30 PROCEDURE — 3044F HG A1C LEVEL LT 7.0%: CPT | Mod: CPTII,S$GLB,, | Performed by: HOSPITALIST

## 2024-10-30 PROCEDURE — 99205 OFFICE O/P NEW HI 60 MIN: CPT | Mod: S$GLB,,, | Performed by: HOSPITALIST

## 2024-10-30 PROCEDURE — 3060F POS MICROALBUMINURIA REV: CPT | Mod: CPTII,S$GLB,, | Performed by: HOSPITALIST

## 2024-10-30 PROCEDURE — 99999 PR PBB SHADOW E&M-EST. PATIENT-LVL III: CPT | Mod: PBBFAC,,, | Performed by: HOSPITALIST

## 2024-10-30 PROCEDURE — 3078F DIAST BP <80 MM HG: CPT | Mod: CPTII,S$GLB,, | Performed by: UROLOGY

## 2024-10-30 PROCEDURE — 1101F PT FALLS ASSESS-DOCD LE1/YR: CPT | Mod: CPTII,S$GLB,, | Performed by: HOSPITALIST

## 2024-10-30 PROCEDURE — 36415 COLL VENOUS BLD VENIPUNCTURE: CPT | Performed by: UROLOGY

## 2024-10-30 PROCEDURE — 3078F DIAST BP <80 MM HG: CPT | Mod: CPTII,S$GLB,, | Performed by: HOSPITALIST

## 2024-10-30 PROCEDURE — 3008F BODY MASS INDEX DOCD: CPT | Mod: CPTII,S$GLB,, | Performed by: HOSPITALIST

## 2024-10-30 PROCEDURE — 3008F BODY MASS INDEX DOCD: CPT | Mod: CPTII,S$GLB,, | Performed by: UROLOGY

## 2024-10-30 PROCEDURE — 3066F NEPHROPATHY DOC TX: CPT | Mod: CPTII,S$GLB,, | Performed by: HOSPITALIST

## 2024-10-30 PROCEDURE — 3060F POS MICROALBUMINURIA REV: CPT | Mod: CPTII,S$GLB,, | Performed by: UROLOGY

## 2024-10-30 PROCEDURE — 84153 ASSAY OF PSA TOTAL: CPT | Performed by: UROLOGY

## 2024-10-30 PROCEDURE — 84270 ASSAY OF SEX HORMONE GLOBUL: CPT | Performed by: UROLOGY

## 2024-10-30 PROCEDURE — 3044F HG A1C LEVEL LT 7.0%: CPT | Mod: CPTII,S$GLB,, | Performed by: UROLOGY

## 2024-10-30 PROCEDURE — 1101F PT FALLS ASSESS-DOCD LE1/YR: CPT | Mod: CPTII,S$GLB,, | Performed by: UROLOGY

## 2024-10-30 PROCEDURE — 3288F FALL RISK ASSESSMENT DOCD: CPT | Mod: CPTII,S$GLB,, | Performed by: HOSPITALIST

## 2024-10-30 PROCEDURE — 3077F SYST BP >= 140 MM HG: CPT | Mod: CPTII,S$GLB,, | Performed by: HOSPITALIST

## 2024-10-30 PROCEDURE — 85027 COMPLETE CBC AUTOMATED: CPT | Performed by: HOSPITALIST

## 2024-10-30 PROCEDURE — 1157F ADVNC CARE PLAN IN RCRD: CPT | Mod: CPTII,S$GLB,, | Performed by: UROLOGY

## 2024-10-30 PROCEDURE — 3077F SYST BP >= 140 MM HG: CPT | Mod: CPTII,S$GLB,, | Performed by: UROLOGY

## 2024-10-30 PROCEDURE — 1157F ADVNC CARE PLAN IN RCRD: CPT | Mod: CPTII,S$GLB,, | Performed by: HOSPITALIST

## 2024-10-30 PROCEDURE — 82306 VITAMIN D 25 HYDROXY: CPT | Performed by: HOSPITALIST

## 2024-10-30 PROCEDURE — 1126F AMNT PAIN NOTED NONE PRSNT: CPT | Mod: CPTII,S$GLB,, | Performed by: UROLOGY

## 2024-10-30 PROCEDURE — 1159F MED LIST DOCD IN RCRD: CPT | Mod: CPTII,S$GLB,, | Performed by: HOSPITALIST

## 2024-10-30 PROCEDURE — G2211 COMPLEX E/M VISIT ADD ON: HCPCS | Mod: S$GLB,,, | Performed by: HOSPITALIST

## 2024-10-30 PROCEDURE — 99215 OFFICE O/P EST HI 40 MIN: CPT | Mod: S$GLB,,, | Performed by: UROLOGY

## 2024-10-30 PROCEDURE — G2211 COMPLEX E/M VISIT ADD ON: HCPCS | Mod: S$GLB,,, | Performed by: UROLOGY

## 2024-10-30 PROCEDURE — 1126F AMNT PAIN NOTED NONE PRSNT: CPT | Mod: CPTII,S$GLB,, | Performed by: HOSPITALIST

## 2024-10-30 PROCEDURE — 3288F FALL RISK ASSESSMENT DOCD: CPT | Mod: CPTII,S$GLB,, | Performed by: UROLOGY

## 2024-10-30 PROCEDURE — 4010F ACE/ARB THERAPY RXD/TAKEN: CPT | Mod: CPTII,S$GLB,, | Performed by: UROLOGY

## 2024-10-30 PROCEDURE — 4010F ACE/ARB THERAPY RXD/TAKEN: CPT | Mod: CPTII,S$GLB,, | Performed by: HOSPITALIST

## 2024-10-30 PROCEDURE — 80053 COMPREHEN METABOLIC PANEL: CPT | Performed by: HOSPITALIST

## 2024-10-30 RX ORDER — BUPROPION HYDROCHLORIDE 150 MG/1
450 TABLET ORAL
COMMUNITY
Start: 2024-10-15

## 2024-10-30 RX ORDER — TRAZODONE HYDROCHLORIDE 150 MG/1
1 TABLET ORAL NIGHTLY PRN
COMMUNITY
Start: 2024-10-15

## 2024-10-30 RX ORDER — BUTALB/ACETAMINOPHEN/CAFFEINE 50-325-40
1 TABLET ORAL
COMMUNITY
Start: 2024-06-12

## 2024-10-30 RX ORDER — PANTOPRAZOLE SODIUM 20 MG/1
20 TABLET, DELAYED RELEASE ORAL
COMMUNITY
Start: 2024-06-12 | End: 2024-10-30

## 2024-11-04 ENCOUNTER — TELEPHONE (OUTPATIENT)
Dept: HEMATOLOGY/ONCOLOGY | Facility: CLINIC | Age: 69
End: 2024-11-04
Payer: MEDICARE

## 2024-11-04 NOTE — TELEPHONE ENCOUNTER
Called and spoke to patient to confirm virtual appointment on 11/11/24 and to complete the genetic questionnaire at least 2 days before the visit

## 2024-11-07 ENCOUNTER — TELEPHONE (OUTPATIENT)
Dept: HEMATOLOGY/ONCOLOGY | Facility: CLINIC | Age: 69
End: 2024-11-07
Payer: MEDICARE

## 2024-11-08 ENCOUNTER — CLINICAL SUPPORT (OUTPATIENT)
Dept: ENDOSCOPY | Facility: HOSPITAL | Age: 69
End: 2024-11-08
Attending: INTERNAL MEDICINE
Payer: MEDICARE

## 2024-11-08 DIAGNOSIS — Z12.11 SCREENING FOR COLON CANCER: ICD-10-CM

## 2024-11-08 LAB
ALBUMIN SERPL-MCNC: 4.1 G/DL (ref 3.6–5.1)
TESTOST SERPL-MCNC: 1 NG/DL (ref 250–1100)

## 2024-11-11 ENCOUNTER — TELEPHONE (OUTPATIENT)
Dept: HEMATOLOGY/ONCOLOGY | Facility: CLINIC | Age: 69
End: 2024-11-11
Payer: MEDICARE

## 2024-11-11 NOTE — TELEPHONE ENCOUNTER
Called patient to reschedule the genetic appointment he canceled for today he stated the did no want genetic testing.

## 2024-11-20 ENCOUNTER — HOSPITAL ENCOUNTER (OUTPATIENT)
Dept: RADIOLOGY | Facility: CLINIC | Age: 69
Discharge: HOME | End: 2024-11-20
Attending: HOSPITALIST
Payer: MEDICARE

## 2024-11-20 DIAGNOSIS — C61 MALIGNANT NEOPLASM OF PROSTATE: ICD-10-CM

## 2024-11-20 DIAGNOSIS — Z79.818 ANDROGEN DEPRIVATION THERAPY: ICD-10-CM

## 2024-11-20 DIAGNOSIS — Z79.899 LONG TERM CURRENT USE OF THERAPEUTIC DRUG: ICD-10-CM

## 2024-11-20 PROCEDURE — 77080 DXA BONE DENSITY AXIAL: CPT | Mod: TC

## 2024-12-16 ENCOUNTER — PATIENT MESSAGE (OUTPATIENT)
Dept: ADMINISTRATIVE | Facility: HOSPITAL | Age: 69
End: 2024-12-16
Payer: MEDICARE

## 2025-03-24 DIAGNOSIS — Z00.00 ENCOUNTER FOR MEDICARE ANNUAL WELLNESS EXAM: ICD-10-CM

## 2025-04-03 ENCOUNTER — HOSPITAL ENCOUNTER (EMERGENCY)
Facility: HOSPITAL | Age: 70
Discharge: HOME | End: 2025-04-03
Attending: EMERGENCY MEDICINE
Payer: MEDICARE

## 2025-04-03 VITALS
DIASTOLIC BLOOD PRESSURE: 103 MMHG | SYSTOLIC BLOOD PRESSURE: 190 MMHG | HEART RATE: 61 BPM | BODY MASS INDEX: 31.1 KG/M2 | OXYGEN SATURATION: 97 % | TEMPERATURE: 98 F | HEIGHT: 69 IN | RESPIRATION RATE: 18 BRPM | WEIGHT: 210 LBS

## 2025-04-03 DIAGNOSIS — I10 HYPERTENSION: Primary | ICD-10-CM

## 2025-04-03 DIAGNOSIS — R51.9 NONINTRACTABLE HEADACHE, UNSPECIFIED CHRONICITY PATTERN, UNSPECIFIED HEADACHE TYPE: ICD-10-CM

## 2025-04-03 DIAGNOSIS — M79.605 LEFT LEG PAIN: ICD-10-CM

## 2025-04-03 LAB
ABSOLUTE EOSINOPHIL (OHS): 0.16 K/UL
ABSOLUTE MONOCYTE (OHS): 0.6 K/UL (ref 0.3–1)
ABSOLUTE NEUTROPHIL COUNT (OHS): 3.45 K/UL (ref 1.8–7.7)
ALBUMIN SERPL BCP-MCNC: 3.9 G/DL (ref 3.5–5.2)
ALP SERPL-CCNC: 84 UNIT/L (ref 40–150)
ALT SERPL W/O P-5'-P-CCNC: 26 UNIT/L (ref 10–44)
ANION GAP (OHS): 9 MMOL/L (ref 8–16)
AST SERPL-CCNC: 24 UNIT/L (ref 11–45)
BASOPHILS # BLD AUTO: 0.02 K/UL
BASOPHILS NFR BLD AUTO: 0.4 %
BILIRUB SERPL-MCNC: 0.3 MG/DL (ref 0.1–1)
BNP SERPL-MCNC: 62 PG/ML (ref 0–99)
BUN SERPL-MCNC: 11 MG/DL (ref 6–30)
BUN SERPL-MCNC: 11 MG/DL (ref 8–23)
CALCIUM SERPL-MCNC: 9.5 MG/DL (ref 8.7–10.5)
CHLORIDE SERPL-SCNC: 107 MMOL/L (ref 95–110)
CHLORIDE SERPL-SCNC: 109 MMOL/L (ref 95–110)
CO2 SERPL-SCNC: 23 MMOL/L (ref 23–29)
CREAT SERPL-MCNC: 1 MG/DL (ref 0.5–1.4)
CREAT SERPL-MCNC: 1 MG/DL (ref 0.5–1.4)
ERYTHROCYTE [DISTWIDTH] IN BLOOD BY AUTOMATED COUNT: 12.4 % (ref 11.5–14.5)
GFR SERPLBLD CREATININE-BSD FMLA CKD-EPI: >60 ML/MIN/1.73/M2
GLUCOSE SERPL-MCNC: 196 MG/DL (ref 70–110)
GLUCOSE SERPL-MCNC: 202 MG/DL (ref 70–110)
HCT VFR BLD AUTO: 37.4 % (ref 40–54)
HCT VFR BLD CALC: 35 %PCV (ref 36–54)
HCV AB SERPL QL IA: REACTIVE
HGB BLD-MCNC: 12.6 GM/DL (ref 14–18)
HIV 1+2 AB+HIV1 P24 AG SERPL QL IA: NORMAL
IMM GRANULOCYTES # BLD AUTO: 0.02 K/UL (ref 0–0.04)
IMM GRANULOCYTES NFR BLD AUTO: 0.4 % (ref 0–0.5)
LYMPHOCYTES # BLD AUTO: 1.25 K/UL (ref 1–4.8)
MCH RBC QN AUTO: 28.4 PG (ref 27–31)
MCHC RBC AUTO-ENTMCNC: 33.7 G/DL (ref 32–36)
MCV RBC AUTO: 84 FL (ref 82–98)
NUCLEATED RBC (/100WBC) (OHS): 0 /100 WBC
OHS QRS DURATION: 76 MS
OHS QTC CALCULATION: 406 MS
PLATELET # BLD AUTO: 227 K/UL (ref 150–450)
PMV BLD AUTO: 9.3 FL (ref 9.2–12.9)
POC IONIZED CALCIUM: 1.23 MMOL/L (ref 1.06–1.42)
POC TCO2 (MEASURED): 24 MMOL/L (ref 23–29)
POCT GLUCOSE: 216 MG/DL (ref 70–110)
POTASSIUM BLD-SCNC: 3.9 MMOL/L (ref 3.5–5.1)
POTASSIUM SERPL-SCNC: 4 MMOL/L (ref 3.5–5.1)
PROT SERPL-MCNC: 7 GM/DL (ref 6–8.4)
RBC # BLD AUTO: 4.43 M/UL (ref 4.6–6.2)
RELATIVE EOSINOPHIL (OHS): 2.9 %
RELATIVE LYMPHOCYTE (OHS): 22.7 % (ref 18–48)
RELATIVE MONOCYTE (OHS): 10.9 % (ref 4–15)
RELATIVE NEUTROPHIL (OHS): 62.7 % (ref 38–73)
SAMPLE: ABNORMAL
SODIUM BLD-SCNC: 141 MMOL/L (ref 136–145)
SODIUM SERPL-SCNC: 141 MMOL/L (ref 136–145)
TROPONIN I SERPL HS-MCNC: 4 NG/L
WBC # BLD AUTO: 5.5 K/UL (ref 3.9–12.7)

## 2025-04-03 PROCEDURE — 99285 EMERGENCY DEPT VISIT HI MDM: CPT | Mod: 25

## 2025-04-03 PROCEDURE — 80047 BASIC METABLC PNL IONIZED CA: CPT

## 2025-04-03 PROCEDURE — 82330 ASSAY OF CALCIUM: CPT | Mod: 59

## 2025-04-03 PROCEDURE — 25000003 PHARM REV CODE 250: Performed by: EMERGENCY MEDICINE

## 2025-04-03 PROCEDURE — 87389 HIV-1 AG W/HIV-1&-2 AB AG IA: CPT | Performed by: PHYSICIAN ASSISTANT

## 2025-04-03 PROCEDURE — 82040 ASSAY OF SERUM ALBUMIN: CPT | Performed by: EMERGENCY MEDICINE

## 2025-04-03 PROCEDURE — 93005 ELECTROCARDIOGRAM TRACING: CPT

## 2025-04-03 PROCEDURE — 83880 ASSAY OF NATRIURETIC PEPTIDE: CPT | Performed by: EMERGENCY MEDICINE

## 2025-04-03 PROCEDURE — 82962 GLUCOSE BLOOD TEST: CPT

## 2025-04-03 PROCEDURE — 84484 ASSAY OF TROPONIN QUANT: CPT | Performed by: EMERGENCY MEDICINE

## 2025-04-03 PROCEDURE — 85025 COMPLETE CBC W/AUTO DIFF WBC: CPT | Performed by: EMERGENCY MEDICINE

## 2025-04-03 PROCEDURE — 87522 HEPATITIS C REVRS TRNSCRPJ: CPT | Performed by: PHYSICIAN ASSISTANT

## 2025-04-03 PROCEDURE — 86803 HEPATITIS C AB TEST: CPT | Performed by: PHYSICIAN ASSISTANT

## 2025-04-03 PROCEDURE — 93010 ELECTROCARDIOGRAM REPORT: CPT | Mod: ,,, | Performed by: INTERNAL MEDICINE

## 2025-04-03 RX ORDER — AMLODIPINE BESYLATE 5 MG/1
5 TABLET ORAL DAILY
Qty: 30 TABLET | Refills: 0 | Status: SHIPPED | OUTPATIENT
Start: 2025-04-03 | End: 2026-04-03

## 2025-04-03 RX ORDER — AMLODIPINE BESYLATE 5 MG/1
5 TABLET ORAL
Status: COMPLETED | OUTPATIENT
Start: 2025-04-03 | End: 2025-04-03

## 2025-04-03 RX ORDER — ACETAMINOPHEN 500 MG
1000 TABLET ORAL
Status: COMPLETED | OUTPATIENT
Start: 2025-04-03 | End: 2025-04-03

## 2025-04-03 RX ORDER — KETOROLAC TROMETHAMINE 30 MG/ML
10 INJECTION, SOLUTION INTRAMUSCULAR; INTRAVENOUS
Status: DISCONTINUED | OUTPATIENT
Start: 2025-04-03 | End: 2025-04-03

## 2025-04-03 RX ADMIN — AMLODIPINE BESYLATE 5 MG: 5 TABLET ORAL at 01:04

## 2025-04-03 RX ADMIN — ACETAMINOPHEN 1000 MG: 500 TABLET ORAL at 11:04

## 2025-04-03 NOTE — ED PROVIDER NOTES
"Encounter Date: 4/3/2025       History     Chief Complaint   Patient presents with    Hypertension     Endorses headache, blurry vision.  200 systolic at home.  190s CBG at home.  Compliant with medications.     68 yo M with pmhx NIDDM, bipolar disorder, prostate CA, BARBI on CPAP, HTN presents with multiple complaints, most concerning for him is hypertension.  Patient notes he has been suffering from blurry vision and headache intermittently for the past month or so.  He took his blood pressure this morning and it was 210/110 so came to the ER.  He notes he takes lisinopril 40 mg nightly and took it last night.  No chest pain or shortness of breath.  He noted his head was "pounding".  He notes that blood pressure typically is around 160.  He also was concerned because his glucose was elevated at 190.  Patient reports compliance with the metformin.  He does not check his blood glucose regularly.  He notes left lateral thigh was hurting as well in his concerned could be a blood clot.  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  He notes upper middle back pain that has been present for greater than 1 year since a fall.    The history is provided by the patient and the spouse.     Review of patient's allergies indicates:   Allergen Reactions    Semaglutide Other (See Comments)     Past Medical History:   Diagnosis Date    Acute respiratory distress syndrome (ARDS) due to COVID-19 virus 03/29/2020    Bipolar 1 disorder     Cervical spondylosis with myelopathy 10/09/2012    Diabetes mellitus type II     History of psychiatric hospitalization     Hx of psychiatric care     Prostate cancer 2010    Psychiatric problem     Recurrent major depressive disorder, in partial remission 07/05/2018    Sleep apnea with use of continuous positive airway pressure (CPAP)     Suicide attempt     Tobacco " abuse 11/11/2015     Past Surgical History:   Procedure Laterality Date    CHOLECYSTECTOMY      HERNIA REPAIR      JOINT REPLACEMENT      LEG SURGERY      ORIF tib/fib    SPINE SURGERY       Family History   Problem Relation Name Age of Onset    Hypertension Mother Nahomi Egan     Arthritis Mother Nahomi Egan     Alcohol abuse Mother Nahomi Egan     Alcohol abuse Father Cash Crawford Sr     Breast cancer Half-sister Zeenat         Half sister    Prostate cancer Neg Hx       Social History[1]  Review of Systems    Physical Exam     Initial Vitals [04/03/25 1005]   BP Pulse Resp Temp SpO2   (!) 216/98 77 20 98.1 °F (36.7 °C) 95 %      MAP       --         Physical Exam    Nursing note and vitals reviewed.  Constitutional: He appears well-developed and well-nourished. He is not diaphoretic. No distress.   HENT:   Head: Normocephalic and atraumatic.   Eyes: Right eye exhibits no discharge. Left eye exhibits no discharge. No scleral icterus.   Neck: Neck supple. No JVD present.   Normal range of motion.  Cardiovascular:  Normal rate, regular rhythm and normal heart sounds.     Exam reveals no gallop and no friction rub.       No murmur heard.  Pulmonary/Chest: Breath sounds normal. No respiratory distress. He has no wheezes. He has no rhonchi. He has no rales.   Abdominal: Abdomen is soft. He exhibits no distension and no mass. There is no abdominal tenderness. There is no rebound and no guarding.   Musculoskeletal:         General: Edema (Pitting edema, bilaterally symmetric) present. No tenderness. Normal range of motion.      Cervical back: Normal range of motion and neck supple.      Comments: No tenderness over area of pain-left lateral thigh     Neurological: He is alert and oriented to person, place, and time. He has normal strength.   Skin: Skin is warm and dry. Capillary refill takes less than 2 seconds.   Subungual hematoma to left great toe         ED Course   Procedures  Labs Reviewed   HEPATITIS C  ANTIBODY - Abnormal       Result Value    Hep C Ab Interp Reactive (*)    COMPREHENSIVE METABOLIC PANEL - Abnormal    Sodium 141      Potassium 4.0      Chloride 109      CO2 23      Glucose 196 (*)     BUN 11      Creatinine 1.0      Calcium 9.5      Protein Total 7.0      Albumin 3.9      Bilirubin Total 0.3      ALP 84      AST 24      ALT 26      Anion Gap 9      eGFR >60     CBC WITH DIFFERENTIAL - Abnormal    WBC 5.50      RBC 4.43 (*)     HGB 12.6 (*)     HCT 37.4 (*)     MCV 84      MCH 28.4      MCHC 33.7      RDW 12.4      Platelet Count 227      MPV 9.3      Nucleated RBC 0      Neut % 62.7      Lymph % 22.7      Mono % 10.9      Eos % 2.9      Basophil % 0.4      Imm Grans % 0.4      Neut # 3.45      Lymph # 1.25      Mono # 0.60      Eos # 0.16      Baso # 0.02      Imm Grans # 0.02     POCT GLUCOSE - Abnormal    POCT Glucose 216 (*)    ISTAT PROCEDURE - Abnormal    POC Glucose 202 (*)     POC BUN 11      POC Creatinine 1.0      POC Sodium 141      POC Potassium 3.9      POC Chloride 107      POC TCO2 (MEASURED) 24      POC Ionized Calcium 1.23      POC Hematocrit 35 (*)     Sample PATRICIA     HIV 1 / 2 ANTIBODY - Normal    HIV 1/2 Ag/Ab Non-Reactive     TROPONIN I HIGH SENSITIVITY - Normal    Troponin High Sensitive 4     B-TYPE NATRIURETIC PEPTIDE - Normal    BNP 62     CBC W/ AUTO DIFFERENTIAL    Narrative:     The following orders were created for panel order CBC auto differential.  Procedure                               Abnormality         Status                     ---------                               -----------         ------                     CBC with Differential[[id number]]       Abnormal            Final result                 Please view results for these tests on the individual orders.   HEPATITIS C RNA, QUANTITATIVE, PCR     EKG Readings: (Independently Interpreted)   Initial Reading: No STEMI. Rhythm: Normal Sinus Rhythm. Heart Rate: 70. ST Segments: Normal ST Segments. T Waves  Flipped: III. Axis: Normal.     ECG Results              EKG 12-lead (Final result)        Collection Time Result Time QRS Duration OHS QTC Calculation    04/03/25 11:01:00 04/03/25 11:34:23 76 406                     Final result by Interface, Lab In [facility] (04/03/25 11:34:30)                   Narrative:    Test Reason : I10,    Vent. Rate :  70 BPM     Atrial Rate :  70 BPM     P-R Int : 164 ms          QRS Dur :  76 ms      QT Int : 376 ms       P-R-T Axes :  48   4  24 degrees    QTcB Int : 406 ms    Normal sinus rhythm  Normal ECG  When compared with ECG of 05-Dec-2023 03:51,  No significant change was found  Confirmed by Jim Carlson ([id number]) on 4/3/2025 11:34:22 AM    Referred By: AAAREFERRAL SELF           Confirmed By: Jim Carlson                                  Imaging Results              US Lower Extremity Veins Left (In process)                      X-Ray Chest AP Portable (Final result)  Result time 04/03/25 12:20:21      Final result by Andres Rizzo MD (04/03/25 12:20:21)                   Impression:      No acute findings.      Electronically signed by: Andres Rizzo MD  Date:    04/03/2025  Time:    12:20               Narrative:    EXAMINATION:  XR CHEST AP PORTABLE    CLINICAL HISTORY:  Essential (primary) hypertension    TECHNIQUE:  Single frontal view of the chest was performed.    COMPARISON:  12/05/2023    FINDINGS:  Cardiomediastinal contour is within normal limits.The lungs are grossly clear.  No pneumothorax.No pleural effusions.                                       CT Head Without Contrast (Final result)  Result time 04/03/25 11:40:52      Final result by Nir Malloy MD (04/03/25 11:40:52)                   Impression:      No acute intracranial process.      Electronically signed by: Nir Malloy  Date:    04/03/2025  Time:    11:40               Narrative:    EXAMINATION:  CT HEAD WITHOUT CONTRAST    CLINICAL HISTORY:  Headache, new or worsening (Age >=  50y);    TECHNIQUE:  Low dose axial images were obtained through the head.  Coronal and sagittal reformations were also performed. Contrast was not administered.    COMPARISON:  Report of head CT dated 07/09/2012    FINDINGS:  No evidence of hydrocephalus, mass effect, intracranial hemorrhage or acute territorial infarct.    Mild decreased attenuation within the periventricular white matter is nonspecific but may reflect mild chronic small vessel ischemic change.    No acute calvarial fracture. The visualized sinuses and mastoid air cells are clear.  Prominent chronic defects within the medial walls of the orbits bilaterally                                       Medications   amLODIPine tablet 5 mg (has no administration in time range)   acetaminophen tablet 1,000 mg (1,000 mg Oral Given 4/3/25 1146)     Medical Decision Making  70 yo M with pmhx NIDDM, bipolar disorder, prostate CA, BARBI on CPAP, HTN presents with multiple complaints, most concerning for him is hypertension, also headache, blurry vision, glucose 190, left lateral thigh pain and 1 year of upper middle back pain.    Differential includes, but not limited to: Hypertensive emergency, CVA, headache, tumor, ICH, ACS, DVT, muscular strains    Will obtain labs, CT head, chest x-ray, left lower extremity ultrasound.  Area of tenderness is not along the deep venous system and I suspect it is more musculoskeletal in nature.  Will administer acetaminophen.    Reassessment:  I-STAT Chem 8 is negative for any emergent abnormality.  Hep C antibody is reactive.  CMP with hyperglycemia of 196.  Anion gap is normal.  Troponin 4.  BNP 62.  CBC without leukocytosis.  Anemia with a hemoglobin 12.6.  Chest x-ray is negative for acute process.  CT head without acute process.  Ultrasound negative for DVT On repeat assessment, blood pressure 190/103.  He notes headache has improved but still mildly persistent.  Given persistent hypertension, will add a 2nd antihypertensive  agent to the patient's regimen-amlodipine 5.  1st dose in ER.  He was advised the importance of home blood pressure journaling and further discussion of elevated blood pressure with PCP.  Advised acetaminophen and NSAIDs as needed for headache.  Suspect that left thigh pain is musculoskeletal in origin secondary to increased use at work.  Extensive return precautions.  Patient and wife are comfortable with discharge and plan.    Amount and/or Complexity of Data Reviewed  Labs: ordered.  Radiology: ordered.    Risk  OTC drugs.  Prescription drug management.                                      Clinical Impression:  Final diagnoses:  [I10] Hypertension (Primary)  [M79.605] Left leg pain  [R51.9] Nonintractable headache, unspecified chronicity pattern, unspecified headache type          ED Disposition Condition    Discharge Stable          ED Prescriptions       Medication Sig Dispense Start Date End Date Auth. Provider    amLODIPine (NORVASC) 5 MG tablet Take 1 tablet (5 mg total) by mouth once daily. 30 tablet 4/3/2025 4/3/2026 Lonny Cheng MD          Follow-up Information       Follow up With Specialties Details Why Contact Info    Karen Maddox MD Internal Medicine, Wound Care Schedule an appointment as soon as possible for a visit   [address] [place] [address]  [address]  [place] [id number]  [phone]      [facility] - Emergency Dept Emergency Medicine  As needed, If symptoms worsen [facility] [facility] [address] [phone]                 [1]   Social History  Tobacco Use    Smoking status: Former     Current packs/day: 0.00     Average packs/day: 0.5 packs/day for 30.0 years (15.0 ttl pk-yrs)     Types: Cigarettes     Start date: 1985     Quit date: 2015     Years since quittin.3    Smokeless tobacco: Never    Tobacco comments:     currently using nicotine patches 21 mg   Substance Use Topics    Alcohol use: Not Currently     Comment: stoped 17 months ago but says that he  is an alcoholic    Drug use: Not Currently     Comment: stopped 17 months ago; used to used heavy drugs, particularly marijuana, acid, amphetamines, cocaine, IV crack cocaine        Lonny Cheng MD  04/03/25 2393

## 2025-04-03 NOTE — ED NOTES
I-STAT Chem-8+ Results:   Value Reference Range   Sodium 141 136-145 mmol/L   Potassium  3.9 3.5-5.1 mmol/L   Chloride 107  mmol/L   Ionized Calcium 1.23 1.06-1.42 mmol/L   CO2 (measured) 24 23-29 mmol/L   Glucose 202  mg/dL   BUN 11 6-30 mg/dL   Creatinine 1.0 0.5-1.4 mg/dL   Hematocrit 35 36-54%

## 2025-04-03 NOTE — DISCHARGE INSTRUCTIONS
As explained, besides your lisinopril, I am going to add another blood pressure medication to your daily regimen.  Take amlodipine 5 mg daily.  You can measure your blood pressure at home and discuss further blood pressure medication adjustments with your primary care doctor.    Take Tylenol and ibuprofen as needed for headache or leg pain.    Return to the ER for any worsening headache, vomiting, confusion, weakness, numbness, or other concerning symptoms.

## 2025-04-03 NOTE — ED TRIAGE NOTES
Cash Crawford, a 69 y.o. male presents to the ED w/ complaint of headache and blurry vision since on and off for a month, HTN at home    Triage note:  Chief Complaint   Patient presents with    Hypertension     Endorses headache, blurry vision.  200 systolic at home.  190s CBG at home.  Compliant with medications.     Review of patient's allergies indicates:   Allergen Reactions    Semaglutide Other (See Comments)           APPEARANCE: awake and alert in NAD. PAIN  *610  SKIN: warm, dry and intact. No breakdown or bruising.  MUSCULOSKELETAL: Patient moving all extremities spontaneously, no obvious swelling or deformities noted. Ambulates independently.  RESPIRATORY: Denies shortness of breath.Respirations unlabored.   CARDIAC: Denies CP, 2+ distal pulses; no peripheral edema  ABDOMEN: S/ND/NT, Denies nausea  : voids spontaneously, denies difficulty  Neurologic: AAO x 4; follows commands equal strength in all extremities; denies numbness/tingling. Denies dizziness

## 2025-04-04 LAB — HCV RNA SERPL NAA+PROBE-LOG IU: NOT DETECTED {LOG_IU}/ML

## 2025-04-08 ENCOUNTER — RESULTS FOLLOW-UP (OUTPATIENT)
Dept: EMERGENCY MEDICINE | Facility: HOSPITAL | Age: 70
End: 2025-04-08

## 2025-04-28 ENCOUNTER — TELEPHONE (OUTPATIENT)
Dept: DIABETES | Facility: CLINIC | Age: 70
End: 2025-04-28
Payer: MEDICARE

## 2025-05-07 ENCOUNTER — TELEPHONE (OUTPATIENT)
Dept: DIABETES | Facility: CLINIC | Age: 70
End: 2025-05-07
Payer: MEDICARE

## 2025-05-09 ENCOUNTER — TELEPHONE (OUTPATIENT)
Dept: DIABETES | Facility: CLINIC | Age: 70
End: 2025-05-09
Payer: MEDICARE

## 2025-08-25 ENCOUNTER — PATIENT OUTREACH (OUTPATIENT)
Dept: ADMINISTRATIVE | Facility: HOSPITAL | Age: 70
End: 2025-08-25
Payer: MEDICARE

## 2025-08-25 DIAGNOSIS — E11.610 TYPE 2 DIABETES MELLITUS WITH DIABETIC NEUROPATHIC ARTHROPATHY, WITHOUT LONG-TERM CURRENT USE OF INSULIN: Primary | ICD-10-CM

## 2025-08-25 DIAGNOSIS — I10 ESSENTIAL HYPERTENSION, BENIGN: ICD-10-CM
